# Patient Record
Sex: FEMALE | Race: WHITE | Employment: OTHER | URBAN - METROPOLITAN AREA
[De-identification: names, ages, dates, MRNs, and addresses within clinical notes are randomized per-mention and may not be internally consistent; named-entity substitution may affect disease eponyms.]

---

## 2017-01-23 ENCOUNTER — TELEPHONE (OUTPATIENT)
Dept: CARDIOLOGY CLINIC | Age: 82
End: 2017-01-23

## 2017-02-13 RX ORDER — CARVEDILOL 12.5 MG/1
TABLET ORAL
Qty: 60 TABLET | Refills: 2 | Status: SHIPPED | OUTPATIENT
Start: 2017-02-13 | End: 2017-03-23 | Stop reason: SDUPTHER

## 2017-02-27 LAB — HEMOGLOBIN: 14.4 G/DL (ref 12–16)

## 2017-03-22 ENCOUNTER — TELEPHONE (OUTPATIENT)
Dept: INTERNAL MEDICINE CLINIC | Age: 82
End: 2017-03-22

## 2017-03-23 ENCOUNTER — OFFICE VISIT (OUTPATIENT)
Dept: INTERNAL MEDICINE CLINIC | Age: 82
End: 2017-03-23

## 2017-03-23 VITALS
WEIGHT: 131.2 LBS | RESPIRATION RATE: 16 BRPM | HEART RATE: 72 BPM | SYSTOLIC BLOOD PRESSURE: 138 MMHG | BODY MASS INDEX: 24.79 KG/M2 | DIASTOLIC BLOOD PRESSURE: 92 MMHG

## 2017-03-23 DIAGNOSIS — R41.89 COGNITIVE IMPAIRMENT: Primary | ICD-10-CM

## 2017-03-23 DIAGNOSIS — J44.9 CHRONIC OBSTRUCTIVE PULMONARY DISEASE, UNSPECIFIED COPD TYPE (HCC): ICD-10-CM

## 2017-03-23 DIAGNOSIS — E78.2 MIXED HYPERLIPIDEMIA: ICD-10-CM

## 2017-03-23 DIAGNOSIS — N39.46 MIXED INCONTINENCE: ICD-10-CM

## 2017-03-23 PROCEDURE — G8926 SPIRO NO PERF OR DOC: HCPCS | Performed by: INTERNAL MEDICINE

## 2017-03-23 PROCEDURE — 1090F PRES/ABSN URINE INCON ASSESS: CPT | Performed by: INTERNAL MEDICINE

## 2017-03-23 PROCEDURE — G8484 FLU IMMUNIZE NO ADMIN: HCPCS | Performed by: INTERNAL MEDICINE

## 2017-03-23 PROCEDURE — G8420 CALC BMI NORM PARAMETERS: HCPCS | Performed by: INTERNAL MEDICINE

## 2017-03-23 PROCEDURE — G8599 NO ASA/ANTIPLAT THER USE RNG: HCPCS | Performed by: INTERNAL MEDICINE

## 2017-03-23 PROCEDURE — G8427 DOCREV CUR MEDS BY ELIG CLIN: HCPCS | Performed by: INTERNAL MEDICINE

## 2017-03-23 PROCEDURE — 1036F TOBACCO NON-USER: CPT | Performed by: INTERNAL MEDICINE

## 2017-03-23 PROCEDURE — 0509F URINE INCON PLAN DOCD: CPT | Performed by: INTERNAL MEDICINE

## 2017-03-23 PROCEDURE — 3023F SPIROM DOC REV: CPT | Performed by: INTERNAL MEDICINE

## 2017-03-23 PROCEDURE — 4040F PNEUMOC VAC/ADMIN/RCVD: CPT | Performed by: INTERNAL MEDICINE

## 2017-03-23 PROCEDURE — G8400 PT W/DXA NO RESULTS DOC: HCPCS | Performed by: INTERNAL MEDICINE

## 2017-03-23 PROCEDURE — 1123F ACP DISCUSS/DSCN MKR DOCD: CPT | Performed by: INTERNAL MEDICINE

## 2017-03-23 PROCEDURE — 99213 OFFICE O/P EST LOW 20 MIN: CPT | Performed by: INTERNAL MEDICINE

## 2017-05-01 RX ORDER — CARVEDILOL 12.5 MG/1
TABLET ORAL
Qty: 60 TABLET | Refills: 1 | Status: ON HOLD | OUTPATIENT
Start: 2017-05-01 | End: 2017-07-06 | Stop reason: HOSPADM

## 2017-05-04 ENCOUNTER — TELEPHONE (OUTPATIENT)
Dept: INTERNAL MEDICINE CLINIC | Age: 82
End: 2017-05-04

## 2017-06-01 ENCOUNTER — OFFICE VISIT (OUTPATIENT)
Dept: CARDIOLOGY CLINIC | Age: 82
End: 2017-06-01

## 2017-06-01 VITALS
BODY MASS INDEX: 24.55 KG/M2 | WEIGHT: 130 LBS | HEART RATE: 64 BPM | SYSTOLIC BLOOD PRESSURE: 122 MMHG | DIASTOLIC BLOOD PRESSURE: 76 MMHG | HEIGHT: 61 IN

## 2017-06-01 DIAGNOSIS — R06.02 SHORTNESS OF BREATH: Primary | ICD-10-CM

## 2017-06-01 PROCEDURE — G8400 PT W/DXA NO RESULTS DOC: HCPCS | Performed by: INTERNAL MEDICINE

## 2017-06-01 PROCEDURE — G8599 NO ASA/ANTIPLAT THER USE RNG: HCPCS | Performed by: INTERNAL MEDICINE

## 2017-06-01 PROCEDURE — G8420 CALC BMI NORM PARAMETERS: HCPCS | Performed by: INTERNAL MEDICINE

## 2017-06-01 PROCEDURE — 93000 ELECTROCARDIOGRAM COMPLETE: CPT | Performed by: INTERNAL MEDICINE

## 2017-06-01 PROCEDURE — G8427 DOCREV CUR MEDS BY ELIG CLIN: HCPCS | Performed by: INTERNAL MEDICINE

## 2017-06-01 PROCEDURE — 4040F PNEUMOC VAC/ADMIN/RCVD: CPT | Performed by: INTERNAL MEDICINE

## 2017-06-01 PROCEDURE — 99214 OFFICE O/P EST MOD 30 MIN: CPT | Performed by: INTERNAL MEDICINE

## 2017-06-01 PROCEDURE — 1123F ACP DISCUSS/DSCN MKR DOCD: CPT | Performed by: INTERNAL MEDICINE

## 2017-06-01 PROCEDURE — 1036F TOBACCO NON-USER: CPT | Performed by: INTERNAL MEDICINE

## 2017-06-01 PROCEDURE — 1090F PRES/ABSN URINE INCON ASSESS: CPT | Performed by: INTERNAL MEDICINE

## 2017-06-01 RX ORDER — ROSUVASTATIN CALCIUM 5 MG/1
5 TABLET, COATED ORAL DAILY
Qty: 30 TABLET | Refills: 3 | Status: SHIPPED | OUTPATIENT
Start: 2017-06-01 | End: 2017-10-31 | Stop reason: DRUGHIGH

## 2017-06-05 ENCOUNTER — HOSPITAL ENCOUNTER (OUTPATIENT)
Dept: GENERAL RADIOLOGY | Age: 82
Discharge: HOME | End: 2017-06-05
Attending: INTERNAL MEDICINE | Admitting: INTERNAL MEDICINE

## 2017-06-05 DIAGNOSIS — Z01.818 PRE-OP EXAMINATION: ICD-10-CM

## 2017-06-05 LAB
ANION GAP SERPL CALCULATED.3IONS-SCNC: 12 MMOL/L (ref 4–16)
APTT: 29.1 SECONDS (ref 21.2–33)
BUN BLDV-MCNC: 16 MG/DL (ref 6–23)
CALCIUM SERPL-MCNC: 9.6 MG/DL (ref 8.3–10.6)
CHLORIDE BLD-SCNC: 100 MMOL/L (ref 99–110)
CO2: 31 MMOL/L (ref 21–32)
CREAT SERPL-MCNC: 0.7 MG/DL (ref 0.6–1.1)
GFR AFRICAN AMERICAN: >60 ML/MIN/1.73M2
GFR NON-AFRICAN AMERICAN: >60 ML/MIN/1.73M2
GLUCOSE BLD-MCNC: 183 MG/DL (ref 70–140)
HCT VFR BLD CALC: 43.6 % (ref 37–47)
HEMOGLOBIN: 14.4 GM/DL (ref 12.5–16)
INR BLD: 0.95 INDEX
MCH RBC QN AUTO: 33.5 PG (ref 27–31)
MCHC RBC AUTO-ENTMCNC: 33 % (ref 32–36)
MCV RBC AUTO: 101.4 FL (ref 78–100)
PDW BLD-RTO: 12.7 % (ref 11.7–14.9)
PLATELET # BLD: 312 K/CU MM (ref 140–440)
PMV BLD AUTO: 8.7 FL (ref 7.5–11.1)
POTASSIUM SERPL-SCNC: 4.3 MMOL/L (ref 3.5–5.1)
PROTHROMBIN TIME: 10.8 SECONDS
RBC # BLD: 4.3 M/CU MM (ref 4.2–5.4)
SODIUM BLD-SCNC: 143 MMOL/L (ref 135–145)
WBC # BLD: 5.9 K/CU MM (ref 4–10.5)

## 2017-06-12 PROBLEM — Z95.1 S/P CABG (CORONARY ARTERY BYPASS GRAFT): Status: ACTIVE | Noted: 2017-06-01

## 2017-06-15 ENCOUNTER — TELEPHONE (OUTPATIENT)
Dept: CARDIOLOGY CLINIC | Age: 82
End: 2017-06-15

## 2017-07-01 ENCOUNTER — HOSPITAL ENCOUNTER (OUTPATIENT)
Dept: OTHER | Age: 82
Discharge: HOME | End: 2017-07-01
Attending: INTERNAL MEDICINE | Admitting: INTERNAL MEDICINE

## 2017-07-07 LAB
ALBUMIN SERPL-MCNC: 3.6 GM/DL (ref 3.4–5)
ALP BLD-CCNC: 76 IU/L (ref 40–128)
ALT SERPL-CCNC: 13 U/L (ref 10–40)
ANION GAP SERPL CALCULATED.3IONS-SCNC: 18 MMOL/L (ref 4–16)
AST SERPL-CCNC: 20 IU/L (ref 15–37)
BASOPHILS ABSOLUTE: 0 K/CU MM
BASOPHILS RELATIVE PERCENT: 0.5 % (ref 0–1)
BILIRUB SERPL-MCNC: 0.5 MG/DL (ref 0–1)
BUN BLDV-MCNC: 13 MG/DL (ref 6–23)
CALCIUM SERPL-MCNC: 8.7 MG/DL (ref 8.3–10.6)
CHLORIDE BLD-SCNC: 101 MMOL/L (ref 99–110)
CO2: 27 MMOL/L (ref 21–32)
CREAT SERPL-MCNC: 0.6 MG/DL (ref 0.6–1.1)
DIFFERENTIAL TYPE: ABNORMAL
EOSINOPHILS ABSOLUTE: 0.1 K/CU MM
EOSINOPHILS RELATIVE PERCENT: 1.4 % (ref 0–3)
GFR AFRICAN AMERICAN: >60 ML/MIN/1.73M2
GFR NON-AFRICAN AMERICAN: >60 ML/MIN/1.73M2
GLUCOSE BLD-MCNC: 100 MG/DL (ref 70–140)
HCT VFR BLD CALC: 33.7 % (ref 37–47)
HEMOGLOBIN: 10.1 GM/DL (ref 12.5–16)
IMMATURE NEUTROPHIL %: 0.8 % (ref 0–0.43)
LYMPHOCYTES ABSOLUTE: 2.1 K/CU MM
LYMPHOCYTES RELATIVE PERCENT: 23.7 % (ref 24–44)
MCH RBC QN AUTO: 32.8 PG (ref 27–31)
MCHC RBC AUTO-ENTMCNC: 30 % (ref 32–36)
MCV RBC AUTO: 109.4 FL (ref 78–100)
MONOCYTES ABSOLUTE: 0.9 K/CU MM
MONOCYTES RELATIVE PERCENT: 10.2 % (ref 0–4)
NUCLEATED RBC %: 0 %
PDW BLD-RTO: 17 % (ref 11.7–14.9)
PLATELET # BLD: 573 K/CU MM (ref 140–440)
PMV BLD AUTO: 9.2 FL (ref 7.5–11.1)
POTASSIUM SERPL-SCNC: 3.7 MMOL/L (ref 3.5–5.1)
RBC # BLD: 3.08 M/CU MM (ref 4.2–5.4)
SEGMENTED NEUTROPHILS ABSOLUTE COUNT: 5.5 K/CU MM
SEGMENTED NEUTROPHILS RELATIVE PERCENT: 63.4 % (ref 36–66)
SODIUM BLD-SCNC: 146 MMOL/L (ref 135–145)
TOTAL IMMATURE NEUTOROPHIL: 0.07 K/CU MM
TOTAL NUCLEATED RBC: 0 K/CU MM
TOTAL PROTEIN: 6.5 GM/DL (ref 6.4–8.2)
TSH HIGH SENSITIVITY: 1.77 UIU/ML (ref 0.27–4.2)
WBC # BLD: 8.7 K/CU MM (ref 4–10.5)

## 2017-07-14 LAB
ANION GAP SERPL CALCULATED.3IONS-SCNC: 14 MMOL/L (ref 4–16)
BUN BLDV-MCNC: 15 MG/DL (ref 6–23)
CALCIUM SERPL-MCNC: 8.7 MG/DL (ref 8.3–10.6)
CHLORIDE BLD-SCNC: 97 MMOL/L (ref 99–110)
CO2: 32 MMOL/L (ref 21–32)
CREAT SERPL-MCNC: 0.6 MG/DL (ref 0.6–1.1)
GFR AFRICAN AMERICAN: >60 ML/MIN/1.73M2
GFR NON-AFRICAN AMERICAN: >60 ML/MIN/1.73M2
GLUCOSE BLD-MCNC: 95 MG/DL (ref 70–140)
POTASSIUM SERPL-SCNC: 3.9 MMOL/L (ref 3.5–5.1)
SODIUM BLD-SCNC: 143 MMOL/L (ref 135–145)

## 2017-07-24 ENCOUNTER — TELEPHONE (OUTPATIENT)
Dept: INTERNAL MEDICINE CLINIC | Age: 82
End: 2017-07-24

## 2017-07-24 ENCOUNTER — OFFICE VISIT (OUTPATIENT)
Dept: CARDIOLOGY CLINIC | Age: 82
End: 2017-07-24

## 2017-07-24 VITALS
HEART RATE: 80 BPM | WEIGHT: 121 LBS | BODY MASS INDEX: 22.26 KG/M2 | SYSTOLIC BLOOD PRESSURE: 116 MMHG | DIASTOLIC BLOOD PRESSURE: 70 MMHG | HEIGHT: 62 IN

## 2017-07-24 DIAGNOSIS — I25.10 CORONARY ARTERY DISEASE INVOLVING NATIVE CORONARY ARTERY OF NATIVE HEART WITHOUT ANGINA PECTORIS: Primary | ICD-10-CM

## 2017-07-24 PROCEDURE — 1036F TOBACCO NON-USER: CPT | Performed by: INTERNAL MEDICINE

## 2017-07-24 PROCEDURE — G8400 PT W/DXA NO RESULTS DOC: HCPCS | Performed by: INTERNAL MEDICINE

## 2017-07-24 PROCEDURE — G8598 ASA/ANTIPLAT THER USED: HCPCS | Performed by: INTERNAL MEDICINE

## 2017-07-24 PROCEDURE — 1111F DSCHRG MED/CURRENT MED MERGE: CPT | Performed by: INTERNAL MEDICINE

## 2017-07-24 PROCEDURE — 1123F ACP DISCUSS/DSCN MKR DOCD: CPT | Performed by: INTERNAL MEDICINE

## 2017-07-24 PROCEDURE — G8420 CALC BMI NORM PARAMETERS: HCPCS | Performed by: INTERNAL MEDICINE

## 2017-07-24 PROCEDURE — 4040F PNEUMOC VAC/ADMIN/RCVD: CPT | Performed by: INTERNAL MEDICINE

## 2017-07-24 PROCEDURE — G8428 CUR MEDS NOT DOCUMENT: HCPCS | Performed by: INTERNAL MEDICINE

## 2017-07-24 PROCEDURE — 99214 OFFICE O/P EST MOD 30 MIN: CPT | Performed by: INTERNAL MEDICINE

## 2017-07-24 PROCEDURE — 1090F PRES/ABSN URINE INCON ASSESS: CPT | Performed by: INTERNAL MEDICINE

## 2017-07-31 ENCOUNTER — OFFICE VISIT (OUTPATIENT)
Dept: INTERNAL MEDICINE CLINIC | Age: 82
End: 2017-07-31

## 2017-07-31 VITALS
WEIGHT: 118 LBS | SYSTOLIC BLOOD PRESSURE: 110 MMHG | RESPIRATION RATE: 16 BRPM | BODY MASS INDEX: 22.3 KG/M2 | DIASTOLIC BLOOD PRESSURE: 70 MMHG | HEART RATE: 85 BPM | OXYGEN SATURATION: 85 %

## 2017-07-31 DIAGNOSIS — D50.9 IRON DEFICIENCY ANEMIA, UNSPECIFIED IRON DEFICIENCY ANEMIA TYPE: Primary | ICD-10-CM

## 2017-07-31 DIAGNOSIS — I25.10 CORONARY ARTERY DISEASE DUE TO LIPID RICH PLAQUE: ICD-10-CM

## 2017-07-31 DIAGNOSIS — I25.83 CORONARY ARTERY DISEASE DUE TO LIPID RICH PLAQUE: ICD-10-CM

## 2017-07-31 DIAGNOSIS — R73.09 ELEVATED GLUCOSE: ICD-10-CM

## 2017-07-31 DIAGNOSIS — Z95.1 S/P CABG (CORONARY ARTERY BYPASS GRAFT): ICD-10-CM

## 2017-07-31 DIAGNOSIS — I77.1 SUBCLAVIAN ARTERY STENOSIS, LEFT (HCC): ICD-10-CM

## 2017-07-31 PROCEDURE — G8427 DOCREV CUR MEDS BY ELIG CLIN: HCPCS | Performed by: INTERNAL MEDICINE

## 2017-07-31 PROCEDURE — 4040F PNEUMOC VAC/ADMIN/RCVD: CPT | Performed by: INTERNAL MEDICINE

## 2017-07-31 PROCEDURE — 1036F TOBACCO NON-USER: CPT | Performed by: INTERNAL MEDICINE

## 2017-07-31 PROCEDURE — 1090F PRES/ABSN URINE INCON ASSESS: CPT | Performed by: INTERNAL MEDICINE

## 2017-07-31 PROCEDURE — G8420 CALC BMI NORM PARAMETERS: HCPCS | Performed by: INTERNAL MEDICINE

## 2017-07-31 PROCEDURE — G8400 PT W/DXA NO RESULTS DOC: HCPCS | Performed by: INTERNAL MEDICINE

## 2017-07-31 PROCEDURE — 99215 OFFICE O/P EST HI 40 MIN: CPT | Performed by: INTERNAL MEDICINE

## 2017-07-31 PROCEDURE — 1123F ACP DISCUSS/DSCN MKR DOCD: CPT | Performed by: INTERNAL MEDICINE

## 2017-07-31 PROCEDURE — G8598 ASA/ANTIPLAT THER USED: HCPCS | Performed by: INTERNAL MEDICINE

## 2017-07-31 PROCEDURE — 1111F DSCHRG MED/CURRENT MED MERGE: CPT | Performed by: INTERNAL MEDICINE

## 2017-07-31 RX ORDER — IPRATROPIUM BROMIDE AND ALBUTEROL SULFATE 2.5; .5 MG/3ML; MG/3ML
1 SOLUTION RESPIRATORY (INHALATION) 3 TIMES DAILY
COMMUNITY
End: 2017-10-31 | Stop reason: SDUPTHER

## 2017-08-01 ENCOUNTER — HOSPITAL ENCOUNTER (OUTPATIENT)
Dept: OTHER | Age: 82
Discharge: HOME | End: 2017-08-01
Attending: INTERNAL MEDICINE | Admitting: INTERNAL MEDICINE

## 2017-08-01 ASSESSMENT — ENCOUNTER SYMPTOMS
SORE THROAT: 0
BLURRED VISION: 0
VOMITING: 0
SPUTUM PRODUCTION: 0
DOUBLE VISION: 0
ORTHOPNEA: 0
ABDOMINAL PAIN: 0
HEARTBURN: 0
COUGH: 0
SHORTNESS OF BREATH: 1
BACK PAIN: 0
NAUSEA: 0
PHOTOPHOBIA: 0
DIARRHEA: 0

## 2017-08-07 LAB
BASOPHILS ABSOLUTE: 0 K/CU MM
BASOPHILS RELATIVE PERCENT: 0.3 % (ref 0–1)
DIFFERENTIAL TYPE: ABNORMAL
EOSINOPHILS ABSOLUTE: 0.3 K/CU MM
EOSINOPHILS RELATIVE PERCENT: 4.2 % (ref 0–3)
ESTIMATED AVERAGE GLUCOSE: 100 MG/DL
HBA1C MFR BLD: 5.1 % (ref 4.2–6.3)
HCT VFR BLD CALC: 37 % (ref 37–47)
HEMOGLOBIN: 11.7 GM/DL (ref 12.5–16)
IMMATURE NEUTROPHIL %: 0.3 % (ref 0–0.43)
LYMPHOCYTES ABSOLUTE: 2.2 K/CU MM
LYMPHOCYTES RELATIVE PERCENT: 29.5 % (ref 24–44)
MCH RBC QN AUTO: 31.7 PG (ref 27–31)
MCHC RBC AUTO-ENTMCNC: 31.6 % (ref 32–36)
MCV RBC AUTO: 100.3 FL (ref 78–100)
MONOCYTES ABSOLUTE: 0.5 K/CU MM
MONOCYTES RELATIVE PERCENT: 6.1 % (ref 0–4)
NUCLEATED RBC %: 0 %
PDW BLD-RTO: 14 % (ref 11.7–14.9)
PLATELET # BLD: 366 K/CU MM (ref 140–440)
PMV BLD AUTO: 9 FL (ref 7.5–11.1)
RBC # BLD: 3.69 M/CU MM (ref 4.2–5.4)
SEGMENTED NEUTROPHILS ABSOLUTE COUNT: 4.4 K/CU MM
SEGMENTED NEUTROPHILS RELATIVE PERCENT: 59.6 % (ref 36–66)
TOTAL IMMATURE NEUTOROPHIL: 0.02 K/CU MM
TOTAL NUCLEATED RBC: 0 K/CU MM
WBC # BLD: 7.4 K/CU MM (ref 4–10.5)

## 2017-08-11 LAB
FERRITIN: 77 NG/ML (ref 15–150)
FOLATE: 12.8 NG/ML (ref 3.1–17.5)
HCT VFR BLD CALC: 37.3 % (ref 37–47)
HEMOGLOBIN: 11.5 GM/DL (ref 12.5–16)
IRON: 48 UG/DL (ref 37–145)
PCT TRANSFERRIN: 18 % (ref 10–44)
TOTAL IRON BINDING CAPACITY: 264 UG/DL (ref 250–450)
UNSATURATED IRON BINDING CAPACITY: 216 UG/DL (ref 110–370)
VITAMIN B-12: 267.2 PG/ML (ref 211–911)

## 2017-08-21 LAB
ALBUMIN SERPL-MCNC: 4.3 GM/DL (ref 3.4–5)
ALP BLD-CCNC: 57 IU/L (ref 40–128)
ALT SERPL-CCNC: 22 U/L (ref 10–40)
ANION GAP SERPL CALCULATED.3IONS-SCNC: 14 MMOL/L (ref 4–16)
AST SERPL-CCNC: 22 IU/L (ref 15–37)
BILIRUB SERPL-MCNC: 0.4 MG/DL (ref 0–1)
BUN BLDV-MCNC: 15 MG/DL (ref 6–23)
CALCIUM SERPL-MCNC: 9.5 MG/DL (ref 8.3–10.6)
CHLORIDE BLD-SCNC: 96 MMOL/L (ref 99–110)
CHOLESTEROL: 210 MG/DL
CO2: 28 MMOL/L (ref 21–32)
CREAT SERPL-MCNC: 0.6 MG/DL (ref 0.6–1.1)
ESTIMATED AVERAGE GLUCOSE: 111 MG/DL
GFR AFRICAN AMERICAN: >60 ML/MIN/1.73M2
GFR NON-AFRICAN AMERICAN: >60 ML/MIN/1.73M2
GLUCOSE BLD-MCNC: 91 MG/DL (ref 70–140)
HBA1C MFR BLD: 5.5 % (ref 4.2–6.3)
HDLC SERPL-MCNC: 85 MG/DL
LDL CHOLESTEROL DIRECT: 108 MG/DL
POTASSIUM SERPL-SCNC: 4.8 MMOL/L (ref 3.5–5.1)
SODIUM BLD-SCNC: 138 MMOL/L (ref 135–145)
T3 FREE: 2.1 PG/ML (ref 2.3–4.2)
T4 FREE: 1.31 NG/DL (ref 0.9–1.8)
TOTAL PROTEIN: 7.3 GM/DL (ref 6.4–8.2)
TRIGL SERPL-MCNC: 152 MG/DL

## 2017-08-31 ENCOUNTER — TELEPHONE (OUTPATIENT)
Dept: INTERNAL MEDICINE CLINIC | Age: 82
End: 2017-08-31

## 2017-09-07 ENCOUNTER — TELEPHONE (OUTPATIENT)
Dept: INTERNAL MEDICINE CLINIC | Age: 82
End: 2017-09-07

## 2017-09-11 ENCOUNTER — HOSPITAL ENCOUNTER (OUTPATIENT)
Dept: GENERAL RADIOLOGY | Age: 82
Discharge: HOME | End: 2017-09-11
Attending: INTERNAL MEDICINE | Admitting: INTERNAL MEDICINE

## 2017-09-11 ENCOUNTER — OFFICE VISIT (OUTPATIENT)
Dept: INTERNAL MEDICINE CLINIC | Age: 82
End: 2017-09-11

## 2017-09-11 VITALS
RESPIRATION RATE: 14 BRPM | OXYGEN SATURATION: 82 % | WEIGHT: 121.4 LBS | HEART RATE: 39 BPM | DIASTOLIC BLOOD PRESSURE: 60 MMHG | SYSTOLIC BLOOD PRESSURE: 102 MMHG | BODY MASS INDEX: 22.94 KG/M2

## 2017-09-11 DIAGNOSIS — F32.4 MAJOR DEPRESSIVE DISORDER WITH SINGLE EPISODE, IN PARTIAL REMISSION (HCC): ICD-10-CM

## 2017-09-11 DIAGNOSIS — J90 RECURRENT RIGHT PLEURAL EFFUSION: ICD-10-CM

## 2017-09-11 DIAGNOSIS — R06.09 DYSPNEA ON EXERTION: ICD-10-CM

## 2017-09-11 DIAGNOSIS — R09.02 HYPOXEMIA: ICD-10-CM

## 2017-09-11 DIAGNOSIS — R06.09 DYSPNEA ON EXERTION: Primary | ICD-10-CM

## 2017-09-11 PROCEDURE — 4040F PNEUMOC VAC/ADMIN/RCVD: CPT | Performed by: INTERNAL MEDICINE

## 2017-09-11 PROCEDURE — G8420 CALC BMI NORM PARAMETERS: HCPCS | Performed by: INTERNAL MEDICINE

## 2017-09-11 PROCEDURE — G8427 DOCREV CUR MEDS BY ELIG CLIN: HCPCS | Performed by: INTERNAL MEDICINE

## 2017-09-11 PROCEDURE — 1123F ACP DISCUSS/DSCN MKR DOCD: CPT | Performed by: INTERNAL MEDICINE

## 2017-09-11 PROCEDURE — G8598 ASA/ANTIPLAT THER USED: HCPCS | Performed by: INTERNAL MEDICINE

## 2017-09-11 PROCEDURE — 99214 OFFICE O/P EST MOD 30 MIN: CPT | Performed by: INTERNAL MEDICINE

## 2017-09-11 PROCEDURE — G8400 PT W/DXA NO RESULTS DOC: HCPCS | Performed by: INTERNAL MEDICINE

## 2017-09-11 PROCEDURE — 1090F PRES/ABSN URINE INCON ASSESS: CPT | Performed by: INTERNAL MEDICINE

## 2017-09-11 PROCEDURE — 1036F TOBACCO NON-USER: CPT | Performed by: INTERNAL MEDICINE

## 2017-09-11 RX ORDER — BUDESONIDE AND FORMOTEROL FUMARATE DIHYDRATE 160; 4.5 UG/1; UG/1
2 AEROSOL RESPIRATORY (INHALATION) 2 TIMES DAILY
Qty: 3 INHALER | Refills: 2 | Status: SHIPPED | OUTPATIENT
Start: 2017-09-11 | End: 2018-12-21 | Stop reason: SDUPTHER

## 2017-09-18 ENCOUNTER — HOSPITAL ENCOUNTER (OUTPATIENT)
Dept: CARDIAC REHAB | Age: 82
Discharge: HOME | End: 2017-09-18
Attending: INTERNAL MEDICINE | Admitting: INTERNAL MEDICINE

## 2017-09-21 ENCOUNTER — TELEPHONE (OUTPATIENT)
Dept: INTERNAL MEDICINE CLINIC | Age: 82
End: 2017-09-21

## 2017-09-25 PROBLEM — J18.9 PNEUMONIA: Status: ACTIVE | Noted: 2017-09-25

## 2017-09-29 ENCOUNTER — TELEPHONE (OUTPATIENT)
Dept: INTERNAL MEDICINE CLINIC | Age: 82
End: 2017-09-29

## 2017-10-01 ENCOUNTER — HOSPITAL ENCOUNTER (OUTPATIENT)
Dept: OTHER | Age: 82
Discharge: HOME | End: 2017-10-01
Attending: INTERNAL MEDICINE | Admitting: INTERNAL MEDICINE

## 2017-10-10 LAB
BACTERIA: ABNORMAL /HPF
BILIRUBIN URINE: NEGATIVE MG/DL
BLOOD, URINE: ABNORMAL
CALCIUM OXALATE CRYSTALS: ABNORMAL /HPF
CLARITY: ABNORMAL
COLOR: YELLOW
CULTURE: NORMAL
GLUCOSE, URINE: NEGATIVE MG/DL
KETONES, URINE: NEGATIVE MG/DL
LEUKOCYTE ESTERASE, URINE: ABNORMAL
MUCUS: ABNORMAL HPF
NITRITE URINE, QUANTITATIVE: NEGATIVE
PH, URINE: 5 (ref 5–8)
PROTEIN UA: NEGATIVE MG/DL
RBC URINE: ABNORMAL /HPF (ref 0–6)
REPORT STATUS: NORMAL
REQUEST PROBLEM: NORMAL
SPECIFIC GRAVITY UA: 1.02 (ref 1–1.03)
SPECIMEN: NORMAL
SQUAMOUS EPITHELIAL: 21 /HPF
TOTAL COLONY COUNT: NORMAL
TRICHOMONAS: ABNORMAL /HPF
UROBILINOGEN, URINE: NORMAL MG/DL (ref 0.2–1)
WBC UA: 37 /HPF (ref 0–5)
YEAST: ABNORMAL /HPF

## 2017-10-13 LAB
ANION GAP SERPL CALCULATED.3IONS-SCNC: 14 MMOL/L (ref 4–16)
BASOPHILS ABSOLUTE: 0 K/CU MM
BASOPHILS RELATIVE PERCENT: 0.3 % (ref 0–1)
BUN BLDV-MCNC: 9 MG/DL (ref 6–23)
CALCIUM SERPL-MCNC: 8.9 MG/DL (ref 8.3–10.6)
CHLORIDE BLD-SCNC: 104 MMOL/L (ref 99–110)
CO2: 26 MMOL/L (ref 21–32)
CREAT SERPL-MCNC: 0.6 MG/DL (ref 0.6–1.1)
DIFFERENTIAL TYPE: ABNORMAL
EOSINOPHILS ABSOLUTE: 0.3 K/CU MM
EOSINOPHILS RELATIVE PERCENT: 2.5 % (ref 0–3)
GFR AFRICAN AMERICAN: >60 ML/MIN/1.73M2
GFR NON-AFRICAN AMERICAN: >60 ML/MIN/1.73M2
GLUCOSE BLD-MCNC: 110 MG/DL (ref 70–140)
HCT VFR BLD CALC: 42.1 % (ref 37–47)
HEMOGLOBIN: 12.9 GM/DL (ref 12.5–16)
IMMATURE NEUTROPHIL %: 0.7 % (ref 0–0.43)
LYMPHOCYTES ABSOLUTE: 2.1 K/CU MM
LYMPHOCYTES RELATIVE PERCENT: 19.9 % (ref 24–44)
MCH RBC QN AUTO: 29.5 PG (ref 27–31)
MCHC RBC AUTO-ENTMCNC: 30.6 % (ref 32–36)
MCV RBC AUTO: 96.1 FL (ref 78–100)
MONOCYTES ABSOLUTE: 0.7 K/CU MM
MONOCYTES RELATIVE PERCENT: 6.7 % (ref 0–4)
NUCLEATED RBC %: 0 %
PDW BLD-RTO: 15.9 % (ref 11.7–14.9)
PLATELET # BLD: 343 K/CU MM (ref 140–440)
PMV BLD AUTO: 9.2 FL (ref 7.5–11.1)
POTASSIUM SERPL-SCNC: 4.1 MMOL/L (ref 3.5–5.1)
RBC # BLD: 4.38 M/CU MM (ref 4.2–5.4)
SEGMENTED NEUTROPHILS ABSOLUTE COUNT: 7.2 K/CU MM
SEGMENTED NEUTROPHILS RELATIVE PERCENT: 69.9 % (ref 36–66)
SODIUM BLD-SCNC: 144 MMOL/L (ref 135–145)
TOTAL IMMATURE NEUTOROPHIL: 0.07 K/CU MM
TOTAL NUCLEATED RBC: 0 K/CU MM
WBC # BLD: 10.3 K/CU MM (ref 4–10.5)

## 2017-10-19 ENCOUNTER — CARE COORDINATION (OUTPATIENT)
Dept: CASE MANAGEMENT | Age: 82
End: 2017-10-19

## 2017-10-19 NOTE — CARE COORDINATION
Return call regarding patient updates. Kenney Ayala from therapy reports patients progress depends upon fatigue level. Bed mobility mod I, transfers CGA, ambulation CGA, distance 5-10 ft at a time with multiple rest breaks using a rolling walker. ADL's grooming set up/supervision, supervision for upper/lower body, toileting and bathing. Last cover day 10/25/17 discharge home with  and possibly [facility]. Will continue to monitor.  ANNE MARIE StoryN RN [address] [place] [address] Transition Coordinator  [phone]

## 2017-10-19 NOTE — CARE COORDINATION
Received a VM from rehab director Nisha Silva regarding patient updates, left contact information. Will return call today.  ANNE MARIE HelmsN RN  Care Transition Coordinator  [phone]

## 2017-10-31 ENCOUNTER — OFFICE VISIT (OUTPATIENT)
Dept: INTERNAL MEDICINE CLINIC | Age: 82
End: 2017-10-31

## 2017-10-31 ENCOUNTER — HOSPITAL ENCOUNTER (OUTPATIENT)
Dept: GENERAL RADIOLOGY | Age: 82
Discharge: HOME | End: 2017-10-31
Attending: INTERNAL MEDICINE | Admitting: INTERNAL MEDICINE

## 2017-10-31 VITALS
DIASTOLIC BLOOD PRESSURE: 78 MMHG | WEIGHT: 122.2 LBS | SYSTOLIC BLOOD PRESSURE: 110 MMHG | RESPIRATION RATE: 16 BRPM | BODY MASS INDEX: 23.09 KG/M2 | HEART RATE: 64 BPM

## 2017-10-31 DIAGNOSIS — J44.9 CHRONIC OBSTRUCTIVE PULMONARY DISEASE, UNSPECIFIED COPD TYPE (HCC): Primary | ICD-10-CM

## 2017-10-31 DIAGNOSIS — J90 PLEURAL EFFUSION: ICD-10-CM

## 2017-10-31 DIAGNOSIS — R41.89 COGNITIVE IMPAIRMENT: ICD-10-CM

## 2017-10-31 DIAGNOSIS — Z23 NEED FOR INFLUENZA VACCINATION: ICD-10-CM

## 2017-10-31 DIAGNOSIS — F33.41 RECURRENT MAJOR DEPRESSIVE DISORDER, IN PARTIAL REMISSION (HCC): ICD-10-CM

## 2017-10-31 PROCEDURE — G8598 ASA/ANTIPLAT THER USED: HCPCS | Performed by: INTERNAL MEDICINE

## 2017-10-31 PROCEDURE — 3023F SPIROM DOC REV: CPT | Performed by: INTERNAL MEDICINE

## 2017-10-31 PROCEDURE — G0008 ADMIN INFLUENZA VIRUS VAC: HCPCS | Performed by: INTERNAL MEDICINE

## 2017-10-31 PROCEDURE — 4040F PNEUMOC VAC/ADMIN/RCVD: CPT | Performed by: INTERNAL MEDICINE

## 2017-10-31 PROCEDURE — 1036F TOBACCO NON-USER: CPT | Performed by: INTERNAL MEDICINE

## 2017-10-31 PROCEDURE — G8427 DOCREV CUR MEDS BY ELIG CLIN: HCPCS | Performed by: INTERNAL MEDICINE

## 2017-10-31 PROCEDURE — 90662 IIV NO PRSV INCREASED AG IM: CPT | Performed by: INTERNAL MEDICINE

## 2017-10-31 PROCEDURE — G8926 SPIRO NO PERF OR DOC: HCPCS | Performed by: INTERNAL MEDICINE

## 2017-10-31 PROCEDURE — 99215 OFFICE O/P EST HI 40 MIN: CPT | Performed by: INTERNAL MEDICINE

## 2017-10-31 PROCEDURE — G8400 PT W/DXA NO RESULTS DOC: HCPCS | Performed by: INTERNAL MEDICINE

## 2017-10-31 PROCEDURE — 1090F PRES/ABSN URINE INCON ASSESS: CPT | Performed by: INTERNAL MEDICINE

## 2017-10-31 PROCEDURE — G8420 CALC BMI NORM PARAMETERS: HCPCS | Performed by: INTERNAL MEDICINE

## 2017-10-31 PROCEDURE — G8484 FLU IMMUNIZE NO ADMIN: HCPCS | Performed by: INTERNAL MEDICINE

## 2017-10-31 PROCEDURE — 1123F ACP DISCUSS/DSCN MKR DOCD: CPT | Performed by: INTERNAL MEDICINE

## 2017-10-31 RX ORDER — DULOXETIN HYDROCHLORIDE 60 MG/1
60 CAPSULE, DELAYED RELEASE ORAL DAILY
Qty: 30 CAPSULE | Refills: 4 | Status: SHIPPED | OUTPATIENT
Start: 2017-10-31 | End: 2018-07-02 | Stop reason: SDUPTHER

## 2017-10-31 RX ORDER — CARVEDILOL 12.5 MG/1
12.5 TABLET ORAL 2 TIMES DAILY
Qty: 60 TABLET | Refills: 2 | Status: SHIPPED | OUTPATIENT
Start: 2017-10-31 | End: 2018-03-12 | Stop reason: SDUPTHER

## 2017-10-31 RX ORDER — IPRATROPIUM BROMIDE AND ALBUTEROL SULFATE 2.5; .5 MG/3ML; MG/3ML
1 SOLUTION RESPIRATORY (INHALATION) EVERY 6 HOURS PRN
Qty: 360 ML | Refills: 3 | Status: ON HOLD | OUTPATIENT
Start: 2017-10-31 | End: 2018-09-11 | Stop reason: HOSPADM

## 2017-10-31 RX ORDER — ROSUVASTATIN CALCIUM 5 MG/1
5 TABLET, COATED ORAL DAILY
COMMUNITY
End: 2017-10-31 | Stop reason: SDUPTHER

## 2017-10-31 RX ORDER — ROSUVASTATIN CALCIUM 5 MG/1
5 TABLET, COATED ORAL NIGHTLY
Qty: 30 TABLET | Refills: 4 | Status: SHIPPED | OUTPATIENT
Start: 2017-10-31 | End: 2018-06-25 | Stop reason: SDUPTHER

## 2017-10-31 ASSESSMENT — ENCOUNTER SYMPTOMS
SHORTNESS OF BREATH: 0
BLURRED VISION: 0
DOUBLE VISION: 0
COUGH: 1
BACK PAIN: 1
ABDOMINAL PAIN: 0
DIARRHEA: 0
SORE THROAT: 0
HEARTBURN: 0
NAUSEA: 0
SPUTUM PRODUCTION: 0
VOMITING: 0

## 2017-10-31 NOTE — PROGRESS NOTES
carvedilol (COREG) 12.5 MG tablet Take 1 tablet by mouth 2 times daily 60 tablet 2    DULoxetine (CYMBALTA) 60 MG extended release capsule Take 1 capsule by mouth daily 30 capsule 4    ipratropium-albuterol (DUONEB) 0.5-2.5 (3) MG/3ML SOLN nebulizer solution Inhale 3 mLs into the lungs every 6 hours as needed for Shortness of Breath 360 mL 3    guaiFENesin (MUCINEX) 600 MG extended release tablet Take 1 tablet by mouth 2 times daily      budesonide-formoterol (SYMBICORT) 160-4.5 MCG/ACT AERO Inhale 2 puffs into the lungs 2 times daily 3 Inhaler 2    aspirin 81 MG chewable tablet Take 1 tablet by mouth daily 30 tablet 3     No current facility-administered medications for this visit. Past Medical History:   Diagnosis Date    Acute ischemic colitis ([facility] [address] 2008    Colonoscopy done    CAD (coronary artery disease) 06/2017    4 vessel CABG    Cerebrovascular event ([facility] [address] 02/2016    balance, speech, leg weakness; ARU    Chronic neck pain     Dr Lacey Scale 2/2011    Cognitive impairment 09/2011    COPD (chronic obstructive pulmonary disease) ([facility] [address] 3/2013    Moderately severe by PFTs    Depression     Diverticulitis 5/2012    Family history of colon cancer     Family history of diabetes mellitus     Fibromyalgia 1989    Graves disease 2012    Sandeep Clements; tapazole for a time    Hyperlipidemia     Hypertension     Lumbar spinal stenosis 11/2015    moderate ; MRI by Dr Kang  Nocturnal oxygen desaturation 2015    Obstructive sleep apnea     Osteoporosis     Prediabetes 2012    hgb 6.5    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 Sandeep Clements;  Tapazole    Urine incontinence 6/2011    Timed voiding; Kegel; urology      Past Surgical History:   Procedure Laterality Date    CARPAL

## 2017-11-06 ENCOUNTER — TELEPHONE (OUTPATIENT)
Dept: INTERNAL MEDICINE CLINIC | Age: 82
End: 2017-11-06

## 2017-11-06 PROCEDURE — G0179 MD RECERTIFICATION HHA PT: HCPCS | Performed by: INTERNAL MEDICINE

## 2017-11-09 ENCOUNTER — CARE COORDINATION (OUTPATIENT)
Dept: CASE MANAGEMENT | Age: 82
End: 2017-11-09

## 2017-11-09 NOTE — CARE COORDINATION
Called skilled nursing facility [facility] for a patient update. This nurse informed per therapy patient discharged to home with [facility] on 10/25/17. CC notified. Post acute transition coordinator signing off .  Joselito Beltran, ANNE MARIEN  [facility] Transition Coordinator  [phone]

## 2017-11-10 ENCOUNTER — TELEPHONE (OUTPATIENT)
Dept: INTERNAL MEDICINE CLINIC | Age: 82
End: 2017-11-10

## 2017-11-10 NOTE — TELEPHONE ENCOUNTER
[facility] called in to report that patient continues to be weak, no energy, today noting diminished bases RLL with a faint crackle.

## 2017-11-13 ENCOUNTER — OFFICE VISIT (OUTPATIENT)
Dept: INTERNAL MEDICINE CLINIC | Age: 82
End: 2017-11-13

## 2017-11-13 VITALS
BODY MASS INDEX: 23.66 KG/M2 | DIASTOLIC BLOOD PRESSURE: 80 MMHG | SYSTOLIC BLOOD PRESSURE: 112 MMHG | WEIGHT: 125.2 LBS | HEART RATE: 103 BPM | RESPIRATION RATE: 16 BRPM

## 2017-11-13 DIAGNOSIS — I10 ESSENTIAL HYPERTENSION: ICD-10-CM

## 2017-11-13 DIAGNOSIS — R53.83 FATIGUE, UNSPECIFIED TYPE: Primary | ICD-10-CM

## 2017-11-13 DIAGNOSIS — R06.09 DYSPNEA ON EXERTION: ICD-10-CM

## 2017-11-13 PROCEDURE — 1123F ACP DISCUSS/DSCN MKR DOCD: CPT | Performed by: INTERNAL MEDICINE

## 2017-11-13 PROCEDURE — G8400 PT W/DXA NO RESULTS DOC: HCPCS | Performed by: INTERNAL MEDICINE

## 2017-11-13 PROCEDURE — 99214 OFFICE O/P EST MOD 30 MIN: CPT | Performed by: INTERNAL MEDICINE

## 2017-11-13 PROCEDURE — G8484 FLU IMMUNIZE NO ADMIN: HCPCS | Performed by: INTERNAL MEDICINE

## 2017-11-13 PROCEDURE — G8427 DOCREV CUR MEDS BY ELIG CLIN: HCPCS | Performed by: INTERNAL MEDICINE

## 2017-11-13 PROCEDURE — 1090F PRES/ABSN URINE INCON ASSESS: CPT | Performed by: INTERNAL MEDICINE

## 2017-11-13 PROCEDURE — 1036F TOBACCO NON-USER: CPT | Performed by: INTERNAL MEDICINE

## 2017-11-13 PROCEDURE — 4040F PNEUMOC VAC/ADMIN/RCVD: CPT | Performed by: INTERNAL MEDICINE

## 2017-11-13 PROCEDURE — G8598 ASA/ANTIPLAT THER USED: HCPCS | Performed by: INTERNAL MEDICINE

## 2017-11-13 PROCEDURE — G8420 CALC BMI NORM PARAMETERS: HCPCS | Performed by: INTERNAL MEDICINE

## 2017-11-13 RX ORDER — THEOPHYLLINE ANHYDROUS 100 MG
100 TABLET, EXTENDED RELEASE 12 HR ORAL 2 TIMES DAILY
Qty: 60 TABLET | Refills: 3 | Status: SHIPPED | OUTPATIENT
Start: 2017-11-13 | End: 2017-11-13 | Stop reason: RX

## 2017-11-14 ENCOUNTER — HOSPITAL ENCOUNTER (OUTPATIENT)
Dept: GENERAL RADIOLOGY | Age: 82
Discharge: HOME | End: 2017-11-14
Attending: INTERNAL MEDICINE | Admitting: INTERNAL MEDICINE

## 2017-11-14 DIAGNOSIS — R06.09 DYSPNEA ON EXERTION: ICD-10-CM

## 2017-11-14 LAB
ALBUMIN SERPL-MCNC: 4.9 GM/DL (ref 3.4–5)
ALP BLD-CCNC: 60 IU/L (ref 40–128)
ALT SERPL-CCNC: 19 U/L (ref 10–40)
ANION GAP SERPL CALCULATED.3IONS-SCNC: 16 MMOL/L (ref 4–16)
AST SERPL-CCNC: 18 IU/L (ref 15–37)
BASOPHILS ABSOLUTE: 0 K/CU MM
BASOPHILS RELATIVE PERCENT: 0.3 % (ref 0–1)
BILIRUB SERPL-MCNC: 1 MG/DL (ref 0–1)
BUN BLDV-MCNC: 18 MG/DL (ref 6–23)
CALCIUM SERPL-MCNC: 9.6 MG/DL (ref 8.3–10.6)
CHLORIDE BLD-SCNC: 99 MMOL/L (ref 99–110)
CO2: 25 MMOL/L (ref 21–32)
CREAT SERPL-MCNC: 0.6 MG/DL (ref 0.6–1.1)
DIFFERENTIAL TYPE: ABNORMAL
EOSINOPHILS ABSOLUTE: 0.2 K/CU MM
EOSINOPHILS RELATIVE PERCENT: 2.3 % (ref 0–3)
GFR AFRICAN AMERICAN: >60 ML/MIN/1.73M2
GFR NON-AFRICAN AMERICAN: >60 ML/MIN/1.73M2
GLUCOSE FASTING: 110 MG/DL (ref 70–99)
HCT VFR BLD CALC: 46 % (ref 37–47)
HEMOGLOBIN: 14.8 GM/DL (ref 12.5–16)
IMMATURE NEUTROPHIL %: 0.4 % (ref 0–0.43)
LYMPHOCYTES ABSOLUTE: 2.7 K/CU MM
LYMPHOCYTES RELATIVE PERCENT: 28.1 % (ref 24–44)
MCH RBC QN AUTO: 31.1 PG (ref 27–31)
MCHC RBC AUTO-ENTMCNC: 32.2 % (ref 32–36)
MCV RBC AUTO: 96.6 FL (ref 78–100)
MONOCYTES ABSOLUTE: 0.8 K/CU MM
MONOCYTES RELATIVE PERCENT: 8.3 % (ref 0–4)
NUCLEATED RBC %: 0 %
PDW BLD-RTO: 16.5 % (ref 11.7–14.9)
PLATELET # BLD: 324 K/CU MM (ref 140–440)
PMV BLD AUTO: 9.2 FL (ref 7.5–11.1)
POTASSIUM SERPL-SCNC: 4.4 MMOL/L (ref 3.5–5.1)
RBC # BLD: 4.76 M/CU MM (ref 4.2–5.4)
SEGMENTED NEUTROPHILS ABSOLUTE COUNT: 5.8 K/CU MM
SEGMENTED NEUTROPHILS RELATIVE PERCENT: 60.6 % (ref 36–66)
SODIUM BLD-SCNC: 140 MMOL/L (ref 135–145)
TOTAL IMMATURE NEUTOROPHIL: 0.04 K/CU MM
TOTAL NUCLEATED RBC: 0 K/CU MM
TOTAL PROTEIN: 7.2 GM/DL (ref 6.4–8.2)
WBC # BLD: 9.6 K/CU MM (ref 4–10.5)

## 2017-11-16 ENCOUNTER — OFFICE VISIT (OUTPATIENT)
Dept: CARDIOLOGY CLINIC | Age: 82
End: 2017-11-16

## 2017-11-16 VITALS
DIASTOLIC BLOOD PRESSURE: 84 MMHG | BODY MASS INDEX: 23.37 KG/M2 | SYSTOLIC BLOOD PRESSURE: 122 MMHG | HEART RATE: 66 BPM | HEIGHT: 61 IN | WEIGHT: 123.8 LBS

## 2017-11-16 DIAGNOSIS — I25.10 CORONARY ARTERY DISEASE INVOLVING NATIVE CORONARY ARTERY OF NATIVE HEART WITHOUT ANGINA PECTORIS: Primary | ICD-10-CM

## 2017-11-16 PROCEDURE — G8427 DOCREV CUR MEDS BY ELIG CLIN: HCPCS | Performed by: INTERNAL MEDICINE

## 2017-11-16 PROCEDURE — G8598 ASA/ANTIPLAT THER USED: HCPCS | Performed by: INTERNAL MEDICINE

## 2017-11-16 PROCEDURE — 1123F ACP DISCUSS/DSCN MKR DOCD: CPT | Performed by: INTERNAL MEDICINE

## 2017-11-16 PROCEDURE — 99213 OFFICE O/P EST LOW 20 MIN: CPT | Performed by: INTERNAL MEDICINE

## 2017-11-16 PROCEDURE — 1036F TOBACCO NON-USER: CPT | Performed by: INTERNAL MEDICINE

## 2017-11-16 PROCEDURE — 1090F PRES/ABSN URINE INCON ASSESS: CPT | Performed by: INTERNAL MEDICINE

## 2017-11-16 PROCEDURE — G8484 FLU IMMUNIZE NO ADMIN: HCPCS | Performed by: INTERNAL MEDICINE

## 2017-11-16 PROCEDURE — 4040F PNEUMOC VAC/ADMIN/RCVD: CPT | Performed by: INTERNAL MEDICINE

## 2017-11-16 PROCEDURE — G8400 PT W/DXA NO RESULTS DOC: HCPCS | Performed by: INTERNAL MEDICINE

## 2017-11-16 PROCEDURE — G8420 CALC BMI NORM PARAMETERS: HCPCS | Performed by: INTERNAL MEDICINE

## 2017-11-20 ENCOUNTER — HOSPITAL ENCOUNTER (OUTPATIENT)
Dept: PULMONOLOGY | Age: 82
Discharge: HOME | End: 2017-11-20
Attending: INTERNAL MEDICINE | Admitting: INTERNAL MEDICINE

## 2017-11-20 DIAGNOSIS — R06.09 OTHER FORMS OF DYSPNEA: ICD-10-CM

## 2017-11-20 NOTE — PROGRESS NOTES
Patient was evaluated today for the diagnosis of COPD. I entered a DME order for home oxygen because the diagnosis and testing requires the patient to have supplemental oxygen. Condition will improve or be benefited by oxygen use. The patient is not able to perform good mobility in a home setting and therefore does require the use of a portable oxygen system. The need for this equipment was discussed with the patient and she understands and is in agreement.
tablet 3    rosuvastatin (CRESTOR) 5 MG tablet Take 1 tablet by mouth nightly 30 tablet 4    carvedilol (COREG) 12.5 MG tablet Take 1 tablet by mouth 2 times daily 60 tablet 2    DULoxetine (CYMBALTA) 60 MG extended release capsule Take 1 capsule by mouth daily 30 capsule 4    ipratropium-albuterol (DUONEB) 0.5-2.5 (3) MG/3ML SOLN nebulizer solution Inhale 3 mLs into the lungs every 6 hours as needed for Shortness of Breath 360 mL 3    guaiFENesin (MUCINEX) 600 MG extended release tablet Take 1 tablet by mouth 2 times daily      budesonide-formoterol (SYMBICORT) 160-4.5 MCG/ACT AERO Inhale 2 puffs into the lungs 2 times daily 3 Inhaler 2    aspirin 81 MG chewable tablet Take 1 tablet by mouth daily 30 tablet 3     No current facility-administered medications for this visit. Past Medical History:   Diagnosis Date    Acute ischemic colitis ([facility] [address] 2008    Colonoscopy done    CAD (coronary artery disease) 06/2017    4 vessel CABG    Cerebrovascular event ([facility] [address] 02/2016    balance, speech, leg weakness; ARU    Chronic neck pain     Dr Kale Alvarenga 2/2011    Cognitive impairment 09/2011    COPD (chronic obstructive pulmonary disease) ([facility] [address] 3/2013    Moderately severe by PFTs    Depression     Diverticulitis 5/2012    Family history of colon cancer     Family history of diabetes mellitus     Fibromyalgia 1989    Graves disease 2012    Matthew Mejia; tapazole for a time    Hyperlipidemia     Hypertension     Lumbar spinal stenosis 11/2015    moderate ; MRI by Dr Nikki Sethi Nocturnal oxygen desaturation 2015    Obstructive sleep apnea     Osteoporosis     Prediabetes 2012    hgb 6.5    S/P CABG (coronary artery bypass graft) 06/2017    4 vessel-CABG EVH RCA Marginal, OM1, OM2,  LIMA to LAD    Subclavian artery stenosis, left ([facility] 06/2017    noted in hosp for CABG; to be dealt with as OP by vasc surgeons    Subclinical Hyperthyroidism 2010    Dr. Matthew Mejia;  Tapazole    Urine incontinence 6/2011

## 2017-11-20 NOTE — PROGRESS NOTES
11/20/2017 1:42 PM    Patient Room #: Room/bed info not found  Patient Name: Kar Wills    (Step 1 Done by RN if possible otherwise call Pulmonary Diagnostics)  1. Place patient on room air at rest for at least 30 minutes. If patient falls below 88% before 30 minutes then you can record the level and stop. Record room air saturation level 95 %. If patient is at 88% or below, they will qualify for home oxygen and you can stop. If level does not fall below 88%, fill in level above. If indicated continue to Step 2. RN Signature:_______________________ Date: ____________    (Step 2&3 Done by [facility]) 3 minute room air walk  2. Ambulate patient on room air until saturation falls below 89%. Record level of room air saturation with ambulation_92 %. Next, place patient back on ______lpm oxygen and ambulate, record level _____%. (Note:  this level must show improvement from room air level done with ambulation.)  If patients saturation on room air with ambulation is 88% or below AND patient shows improvement with oxygen during ambulation, they will qualify for home oxygen and you can stop. If patient does not drop below 89%, then patient should have an overnight oximetry trending on room air to see if level falls below 88%. Complete level in Step 3 below. 3. Room air overnight oximetry level ____  % for____  cumulative minutes. If patients room air oxygen level is < 89% for at least 5 cumulative minutes, patient will qualify for home oxygen and you can stop. (Attach Night Trending Report)  Done by [facility]    Complete order below: Diagnosis:______________________________________    Home oxygen at:  Length of Need: ? Lifetime ?  3 Months     _____lpm or _____%   via  [] nasal cannula  []mask  [] other:________________         []continuous []  with activity  []  Nocturnal   [] Portable Tanks []  Concentrator        Therapist Signature:  _____________________________     Date:

## 2017-11-21 ENCOUNTER — OFFICE VISIT (OUTPATIENT)
Dept: INTERNAL MEDICINE CLINIC | Age: 82
End: 2017-11-21

## 2017-11-21 VITALS
RESPIRATION RATE: 16 BRPM | DIASTOLIC BLOOD PRESSURE: 70 MMHG | BODY MASS INDEX: 23.43 KG/M2 | SYSTOLIC BLOOD PRESSURE: 136 MMHG | WEIGHT: 124 LBS | HEART RATE: 81 BPM | OXYGEN SATURATION: 93 %

## 2017-11-21 DIAGNOSIS — J44.9 CHRONIC OBSTRUCTIVE PULMONARY DISEASE, UNSPECIFIED COPD TYPE (HCC): Primary | ICD-10-CM

## 2017-11-21 DIAGNOSIS — I10 ESSENTIAL HYPERTENSION: ICD-10-CM

## 2017-11-21 DIAGNOSIS — G47.34 NOCTURNAL HYPOXEMIA: ICD-10-CM

## 2017-11-21 LAB — THEOPHYLLINE LEVEL: 9.7 UG/ML (ref 8–20)

## 2017-11-21 PROCEDURE — 4040F PNEUMOC VAC/ADMIN/RCVD: CPT | Performed by: INTERNAL MEDICINE

## 2017-11-21 PROCEDURE — 99214 OFFICE O/P EST MOD 30 MIN: CPT | Performed by: INTERNAL MEDICINE

## 2017-11-21 PROCEDURE — 36415 COLL VENOUS BLD VENIPUNCTURE: CPT | Performed by: INTERNAL MEDICINE

## 2017-11-21 PROCEDURE — G8427 DOCREV CUR MEDS BY ELIG CLIN: HCPCS | Performed by: INTERNAL MEDICINE

## 2017-11-21 PROCEDURE — G8598 ASA/ANTIPLAT THER USED: HCPCS | Performed by: INTERNAL MEDICINE

## 2017-11-21 PROCEDURE — G8926 SPIRO NO PERF OR DOC: HCPCS | Performed by: INTERNAL MEDICINE

## 2017-11-21 PROCEDURE — G8420 CALC BMI NORM PARAMETERS: HCPCS | Performed by: INTERNAL MEDICINE

## 2017-11-21 PROCEDURE — G8484 FLU IMMUNIZE NO ADMIN: HCPCS | Performed by: INTERNAL MEDICINE

## 2017-11-21 PROCEDURE — 1123F ACP DISCUSS/DSCN MKR DOCD: CPT | Performed by: INTERNAL MEDICINE

## 2017-11-21 PROCEDURE — 1090F PRES/ABSN URINE INCON ASSESS: CPT | Performed by: INTERNAL MEDICINE

## 2017-11-21 PROCEDURE — 1036F TOBACCO NON-USER: CPT | Performed by: INTERNAL MEDICINE

## 2017-11-21 PROCEDURE — 3023F SPIROM DOC REV: CPT | Performed by: INTERNAL MEDICINE

## 2017-11-21 PROCEDURE — G8400 PT W/DXA NO RESULTS DOC: HCPCS | Performed by: INTERNAL MEDICINE

## 2017-11-21 NOTE — PROGRESS NOTES
Subjective:      Deepthi Montalvo is a 80 y.o. female who presents today for follow up on her chronic medical conditions as noted below. Patient Active Problem List:     Depression     Chronic neck pain     Graves' disease     Hyperlipidemia     Hypertension     Colon cancer screening     Breast cancer screening     Cognitive impairment     COPD (chronic obstructive pulmonary disease)     Cerebrovascular accident (CVA) due to vascular stenosis (HCC)     Gait disturbance     Mixed incontinence     Abnormal stress test     S/P CABG (coronary artery bypass graft)     Subclavian artery stenosis, left (HCC)     Pneumonia     Was here with sob on 11/13    Added back theo24 200mg   Will check blood level 6 hr after morning doseage    Had home oxygen eval and qualifies for nocturnal home oxygen at 2L nocturnally   Will arrange    Deepthi Montalvo requires the assistance of a wheeled walker to successfully ambulate from room to room at home to allow completion of daily living tasks such as: bathing, toileting, dressing and grooming. A wheeled walker is necessary due to the patient's unsteady gait, upper body weakness, inability to  a standard walker. This patient can ambulate only by pushing a walker instead of using a lesser assistive device such as a cane or crutch. Patient denies any exertional chest pain, dyspnea, palpitations, syncope, orthopnea, edema or paroxysmal nocturnal dyspnea. BP is fine    The patient denies cough, chest pain, increased sputum production, wheezing or hemoptysis.     Mood is satisfactory with cymbalta      Current Outpatient Prescriptions   Medication Sig Dispense Refill    OXYGEN Inhale 2 L/min into the lungs nightly      theophylline (BROOKE-24) 200 MG extended release capsule Take 1 capsule by mouth daily 30 capsule 5    rosuvastatin (CRESTOR) 5 MG tablet Take 1 tablet by mouth nightly 30 tablet 4    carvedilol (COREG) 12.5 MG tablet Take 1 tablet by mouth 2 times daily 60 tablet 2    DULoxetine (CYMBALTA) 60 MG extended release capsule Take 1 capsule by mouth daily 30 capsule 4    ipratropium-albuterol (DUONEB) 0.5-2.5 (3) MG/3ML SOLN nebulizer solution Inhale 3 mLs into the lungs every 6 hours as needed for Shortness of Breath 360 mL 3    guaiFENesin (MUCINEX) 600 MG extended release tablet Take 1 tablet by mouth 2 times daily      budesonide-formoterol (SYMBICORT) 160-4.5 MCG/ACT AERO Inhale 2 puffs into the lungs 2 times daily 3 Inhaler 2    aspirin 81 MG chewable tablet Take 1 tablet by mouth daily 30 tablet 3     No current facility-administered medications for this visit. Past Medical History:   Diagnosis Date    Acute ischemic colitis ([facility] [address] 2008    Colonoscopy done    CAD (coronary artery disease) 06/2017    4 vessel CABG    Cerebrovascular event ([facility] [address] 02/2016    balance, speech, leg weakness; ARU    Chronic neck pain     Dr Teresa Randolph 2/2011    Cognitive impairment 09/2011    COPD (chronic obstructive pulmonary disease) ([facility] [address] 3/2013    Moderately severe by PFTs    Depression     Diverticulitis 5/2012    Family history of colon cancer     Family history of diabetes mellitus     Fibromyalgia 1989    Graves disease 2012    Gay Graves; tapazole for a time    H/O cardiac catheterization 06/07/2017    severe multivessel disease    H/O echocardiogram 06/19/2017    EF 50-55%    Hyperlipidemia     Hypertension     Lumbar spinal stenosis 11/2015    moderate ; MRI by Dr Ramirez Hi Nocturnal oxygen desaturation 2015    Obstructive sleep apnea     Osteoporosis     Prediabetes 2012    hgb 6.5    S/P CABG (coronary artery bypass graft) 06/2017    4 vessel-CABG EVH RCA Marginal, OM1, OM2,  LIMA to LAD    Subclavian artery stenosis, left ([facility] [address] 06/2017    noted in hosp for CABG; to be dealt with as OP by vasc surgeons    Subclinical Hyperthyroidism 2010    Dr. Gay Graves;  Tapazole    Urine incontinence 6/2011    Timed voiding; Kegel; urology        Social History Substance Use Topics    Smoking status: Former Smoker     Packs/day: 0.50     Years: 30.00     Types: Cigarettes     Start date: 12/16/1957     Quit date: 1/25/1987    Smokeless tobacco: Never Used    Alcohol use Yes      Comment: occ        ROS: The patient has had no headache, sore throat, fever or chills, cough, dyspnea, chest pain, nausea, vomiting or diarrhea, or edema. Objective:      /70   Pulse 81   Resp 16   Wt 124 lb (56.2 kg)   SpO2 93%   BMI 23.43 kg/m²    General: in no apparent distress   The patient's neck is free of nodes. Lungs are clear. Heart is normal in rate and regular in rhythm. Legs are free of edema. No rash or erythema. NOv labs and home oxygen eval rev'd       Assessment / Plan:      1. Chronic obstructive pulmonary disease, unspecified COPD type ([address]    2. Nocturnal hypoxemia    3.  Essential hypertension            Plan   Arrange nocturnal oxygen  kermit level drawn here 6 hr after morning dose  Same meds  RTC 6wk  Orders Placed This Encounter   Procedures    THEOPHYLLINE LEVEL

## 2017-11-29 ENCOUNTER — CARE COORDINATION (OUTPATIENT)
Dept: MEDSURG UNIT | Age: 82
End: 2017-11-29

## 2017-11-29 NOTE — CARE COORDINATION
[facility] contacted [facility] at [phone] and was informed by [place] that Patient has been readmitted to [facility] of admit date. Transfer to RN and disconnected    Called facility back to speak to IBETH Nowak.  Informed Pt was discharged to home on 10/25/17    Ramon Prieto RN BSN   Care Transitions Coordinator  [phone]

## 2017-12-01 ENCOUNTER — TELEPHONE (OUTPATIENT)
Dept: INTERNAL MEDICINE CLINIC | Age: 82
End: 2017-12-01

## 2017-12-01 DIAGNOSIS — Z87.01 HISTORY OF RECURRENT PNEUMONIA: Primary | ICD-10-CM

## 2017-12-01 NOTE — PROGRESS NOTES
Deb Fields was evaluated today and a DME order was entered for a wheeled walker with seat because she requires this to successfully complete daily living tasks of eating, bathing, toileting, personal cares, ambulating, grooming, hygiene, dressing upper body, dressing lower body, meal preparation and taking own medications. A wheeled walker with seat is necessary due to the patient's unsteady gait, upper body weakness, inability to  and ambulation device, ambulating only short distances by pushing a walker, and the need to sit for a short time before resuming ambulation. These tasks cannot be completed with a lesser ambulation device such as a cane, crutch, or standard walker. The need for this equipment was discussed with the patient and she understands and is in agreement.

## 2017-12-20 ENCOUNTER — TELEPHONE (OUTPATIENT)
Dept: INTERNAL MEDICINE CLINIC | Age: 82
End: 2017-12-20

## 2018-01-02 ENCOUNTER — OFFICE VISIT (OUTPATIENT)
Dept: INTERNAL MEDICINE CLINIC | Age: 83
End: 2018-01-02

## 2018-01-02 VITALS
WEIGHT: 126 LBS | HEART RATE: 72 BPM | DIASTOLIC BLOOD PRESSURE: 90 MMHG | SYSTOLIC BLOOD PRESSURE: 138 MMHG | BODY MASS INDEX: 23.81 KG/M2 | RESPIRATION RATE: 16 BRPM

## 2018-01-02 DIAGNOSIS — I10 ESSENTIAL HYPERTENSION: Primary | ICD-10-CM

## 2018-01-02 DIAGNOSIS — I25.10 CORONARY ARTERY DISEASE INVOLVING NATIVE CORONARY ARTERY OF NATIVE HEART WITHOUT ANGINA PECTORIS: ICD-10-CM

## 2018-01-02 DIAGNOSIS — F33.41 RECURRENT MAJOR DEPRESSIVE DISORDER, IN PARTIAL REMISSION (HCC): ICD-10-CM

## 2018-01-02 DIAGNOSIS — J43.9 PULMONARY EMPHYSEMA, UNSPECIFIED EMPHYSEMA TYPE (HCC): ICD-10-CM

## 2018-01-02 PROBLEM — J18.9 PNEUMONIA: Status: RESOLVED | Noted: 2017-09-25 | Resolved: 2018-01-02

## 2018-01-02 PROCEDURE — 99214 OFFICE O/P EST MOD 30 MIN: CPT | Performed by: INTERNAL MEDICINE

## 2018-01-09 ENCOUNTER — TELEPHONE (OUTPATIENT)
Dept: INTERNAL MEDICINE CLINIC | Age: 83
End: 2018-01-09

## 2018-01-09 DIAGNOSIS — Z87.01 HISTORY OF RECURRENT PNEUMONIA: Primary | ICD-10-CM

## 2018-01-30 ENCOUNTER — HOSPITAL ENCOUNTER (OUTPATIENT)
Dept: SLEEP CENTER | Age: 83
Discharge: HOME | End: 2018-01-30
Attending: INTERNAL MEDICINE | Admitting: INTERNAL MEDICINE

## 2018-01-30 VITALS — HEIGHT: 61 IN | BODY MASS INDEX: 23.22 KG/M2 | WEIGHT: 123 LBS

## 2018-01-30 ASSESSMENT — SLEEP AND FATIGUE QUESTIONNAIRES
HOW LIKELY ARE YOU TO NOD OFF OR FALL ASLEEP WHILE SITTING AND TALKING TO SOMEONE: 0
HOW LIKELY ARE YOU TO NOD OFF OR FALL ASLEEP WHEN YOU ARE A PASSENGER IN A CAR FOR AN HOUR WITHOUT A BREAK: 1
HOW LIKELY ARE YOU TO NOD OFF OR FALL ASLEEP IN A CAR, WHILE STOPPED FOR A FEW MINUTES IN TRAFFIC: 1
HOW LIKELY ARE YOU TO NOD OFF OR FALL ASLEEP WHILE WATCHING TV: 2
NECK CIRCUMFERENCE (INCHES): 13.5
NECK CIRCUMFERENCE (INCHES): 16
ESS TOTAL SCORE: 11
HOW LIKELY ARE YOU TO NOD OFF OR FALL ASLEEP WHILE LYING DOWN TO REST IN THE AFTERNOON WHEN CIRCUMSTANCES PERMIT: 3
HOW LIKELY ARE YOU TO NOD OFF OR FALL ASLEEP WHILE SITTING INACTIVE IN A PUBLIC PLACE: 1
HOW LIKELY ARE YOU TO NOD OFF OR FALL ASLEEP WHILE SITTING AND READING: 2
HOW LIKELY ARE YOU TO NOD OFF OR FALL ASLEEP WHILE SITTING QUIETLY AFTER LUNCH WITHOUT ALCOHOL: 1

## 2018-02-05 NOTE — PROGRESS NOTES
Results for the most recent sleep study on [address] [place] [address] Place  1937 are finalized and available. Please see media tab.     Electronically signed by Vik Morales on 2/4/2018 at 10:22 PM

## 2018-03-02 ENCOUNTER — OFFICE VISIT (OUTPATIENT)
Dept: CARDIOLOGY CLINIC | Age: 83
End: 2018-03-02

## 2018-03-02 VITALS
WEIGHT: 130.2 LBS | DIASTOLIC BLOOD PRESSURE: 70 MMHG | SYSTOLIC BLOOD PRESSURE: 120 MMHG | BODY MASS INDEX: 24.58 KG/M2 | HEIGHT: 61 IN | HEART RATE: 68 BPM | OXYGEN SATURATION: 95 %

## 2018-03-02 DIAGNOSIS — Z95.1 S/P CABG (CORONARY ARTERY BYPASS GRAFT): Primary | ICD-10-CM

## 2018-03-02 PROCEDURE — 1123F ACP DISCUSS/DSCN MKR DOCD: CPT | Performed by: INTERNAL MEDICINE

## 2018-03-02 PROCEDURE — G8599 NO ASA/ANTIPLAT THER USE RNG: HCPCS | Performed by: INTERNAL MEDICINE

## 2018-03-02 PROCEDURE — 1090F PRES/ABSN URINE INCON ASSESS: CPT | Performed by: INTERNAL MEDICINE

## 2018-03-02 PROCEDURE — 99214 OFFICE O/P EST MOD 30 MIN: CPT | Performed by: INTERNAL MEDICINE

## 2018-03-02 PROCEDURE — G8420 CALC BMI NORM PARAMETERS: HCPCS | Performed by: INTERNAL MEDICINE

## 2018-03-02 PROCEDURE — 4040F PNEUMOC VAC/ADMIN/RCVD: CPT | Performed by: INTERNAL MEDICINE

## 2018-03-02 PROCEDURE — 1036F TOBACCO NON-USER: CPT | Performed by: INTERNAL MEDICINE

## 2018-03-02 PROCEDURE — G8427 DOCREV CUR MEDS BY ELIG CLIN: HCPCS | Performed by: INTERNAL MEDICINE

## 2018-03-02 PROCEDURE — G8400 PT W/DXA NO RESULTS DOC: HCPCS | Performed by: INTERNAL MEDICINE

## 2018-03-02 PROCEDURE — G8484 FLU IMMUNIZE NO ADMIN: HCPCS | Performed by: INTERNAL MEDICINE

## 2018-03-02 RX ORDER — ACETAMINOPHEN 500 MG
500 TABLET ORAL EVERY 6 HOURS PRN
COMMUNITY
End: 2018-04-02 | Stop reason: CLARIF

## 2018-03-02 NOTE — PROGRESS NOTES
Clay Lorenzo MD        OFFICE  FOLLOWUP NOTE    Chief complaints: patient is here for management of CAD,S/P CABG, HTN, DYSLPIDEMIA  Subjective: patient feels better, no chest pain, no shortness of breath, no dizziness, no palpitations    HPI Dana Glasgow is a [de-identified] y. o.year old who  has a past medical history of Acute ischemic colitis ([facility] [address] CAD (coronary artery disease); Cerebrovascular event ([facility] [address] Chronic neck pain; Cognitive impairment; COPD (chronic obstructive pulmonary disease) ([facility] [address] Depression; Diverticulitis; Family history of colon cancer; Family history of diabetes mellitus; Fibromyalgia; Graves disease; H/O cardiac catheterization; H/O echocardiogram; Hyperlipidemia; Hypertension; Lumbar spinal stenosis; Nocturnal oxygen desaturation; Obstructive sleep apnea; Osteoporosis; Prediabetes; S/P CABG (coronary artery bypass graft); Subclavian artery stenosis, left ([facility] [address] Subclinical Hyperthyroidism; and Urine incontinence. and presents for management of CAD,S/P CABG, HTN, DYSLPIDEMIA which are well controlled, she will need pulmonary rehab, has not started cardiac rehab, she has recovered from pneumonia and then sent to NH, finally at home now.       Current Outpatient Prescriptions   Medication Sig Dispense Refill    acetaminophen (TYLENOL) 500 MG tablet Take 500 mg by mouth every 6 hours as needed for Pain      OXYGEN Inhale 2 L/min into the lungs continuous       theophylline (BROOKE-24) 200 MG extended release capsule Take 1 capsule by mouth daily 30 capsule 5    rosuvastatin (CRESTOR) 5 MG tablet Take 1 tablet by mouth nightly 30 tablet 4    carvedilol (COREG) 12.5 MG tablet Take 1 tablet by mouth 2 times daily 60 tablet 2    DULoxetine (CYMBALTA) 60 MG extended release capsule Take 1 capsule by mouth daily 30 capsule 4    ipratropium-albuterol (DUONEB) 0.5-2.5 (3) MG/3ML SOLN nebulizer solution Inhale 3 mLs into the lungs every 6 hours as needed for Shortness of Breath 360 mL 3    198 (H) 12/07/2016    LDLDIRECT 108 (H) 08/21/2017     Lab Results   Component Value Date    ALT 19 11/14/2017    AST 18 11/14/2017     TSH:    Lab Results   Component Value Date    TSH 0.87 12/07/2016       Impression:  Marge Espinosa is a [de-identified] y. o.year old who  has a past medical history of Acute ischemic colitis ([facility] [address] CAD (coronary artery disease); Cerebrovascular event ([address] Chronic neck pain; Cognitive impairment; COPD (chronic obstructive pulmonary disease) ([address] Depression; Diverticulitis; Family history of colon cancer; Family history of diabetes mellitus; Fibromyalgia; Graves disease; H/O cardiac catheterization; H/O echocardiogram; Hyperlipidemia; Hypertension; Lumbar spinal stenosis; Nocturnal oxygen desaturation; Obstructive sleep apnea; Osteoporosis; Prediabetes; S/P CABG (coronary artery bypass graft); Subclavian artery stenosis, left ([address] Subclinical Hyperthyroidism; and Urine incontinence. and presents with     Plan:  1. CAD s/p CABG and had complications post CABG with sternal infection and had to redo sternotomy, she is better now,continue coreg, aspirin and crestor, home physical therapy. once she makes up her mind, will start cardiac rehab after regular stress test  2. Dyslipidemia: continue crestor 5mg po daily  3. History of TIA:CONTINUE aspirincretor  4. HTN: stable, continue coreg  5. Dm: she is  Not Diabetuc and off  metformin Health maintenance: exerise and diet  All labs, medications and tests reviewed, continue all other medications of all above medical condition listed as is.     @[facility]@

## 2018-03-06 ENCOUNTER — PROCEDURE VISIT (OUTPATIENT)
Dept: CARDIOLOGY CLINIC | Age: 83
End: 2018-03-06

## 2018-03-06 VITALS
HEART RATE: 84 BPM | HEIGHT: 61 IN | BODY MASS INDEX: 24.55 KG/M2 | DIASTOLIC BLOOD PRESSURE: 88 MMHG | SYSTOLIC BLOOD PRESSURE: 138 MMHG | RESPIRATION RATE: 16 BRPM | WEIGHT: 130 LBS

## 2018-03-06 DIAGNOSIS — Z95.1 S/P CABG (CORONARY ARTERY BYPASS GRAFT): Primary | ICD-10-CM

## 2018-03-06 DIAGNOSIS — R06.02 SHORTNESS OF BREATH: ICD-10-CM

## 2018-03-06 DIAGNOSIS — R00.0 TACHYCARDIA: ICD-10-CM

## 2018-03-06 PROCEDURE — 93015 CV STRESS TEST SUPVJ I&R: CPT | Performed by: INTERNAL MEDICINE

## 2018-03-07 PROCEDURE — G0179 MD RECERTIFICATION HHA PT: HCPCS | Performed by: INTERNAL MEDICINE

## 2018-03-12 ENCOUNTER — TELEPHONE (OUTPATIENT)
Dept: CARDIOLOGY CLINIC | Age: 83
End: 2018-03-12

## 2018-03-12 RX ORDER — CARVEDILOL 12.5 MG/1
12.5 TABLET ORAL 2 TIMES DAILY
Qty: 60 TABLET | Refills: 2 | Status: SHIPPED | OUTPATIENT
Start: 2018-03-12 | End: 2018-08-20 | Stop reason: SDUPTHER

## 2018-03-12 NOTE — TELEPHONE ENCOUNTER
Monika Lima from Cardiac Rehab called concerning pt and stated you would know what it was about. She would like you to return her call.

## 2018-03-23 ENCOUNTER — TELEPHONE (OUTPATIENT)
Dept: INTERNAL MEDICINE CLINIC | Age: 83
End: 2018-03-23

## 2018-03-23 DIAGNOSIS — J44.9 OBSTRUCTIVE CHRONIC BRONCHITIS WITHOUT EXACERBATION (HCC): Primary | ICD-10-CM

## 2018-03-28 ENCOUNTER — HOSPITAL ENCOUNTER (OUTPATIENT)
Dept: OTHER | Age: 83
Discharge: HOME | End: 2018-03-31
Attending: INTERNAL MEDICINE | Admitting: INTERNAL MEDICINE

## 2018-04-01 ENCOUNTER — HOSPITAL ENCOUNTER (OUTPATIENT)
Dept: OTHER | Age: 83
Discharge: HOME | End: 2018-04-30
Attending: INTERNAL MEDICINE | Admitting: INTERNAL MEDICINE

## 2018-04-02 ENCOUNTER — OFFICE VISIT (OUTPATIENT)
Dept: INTERNAL MEDICINE CLINIC | Age: 83
End: 2018-04-02

## 2018-04-02 VITALS
RESPIRATION RATE: 16 BRPM | WEIGHT: 129 LBS | HEART RATE: 68 BPM | DIASTOLIC BLOOD PRESSURE: 72 MMHG | BODY MASS INDEX: 24.37 KG/M2 | SYSTOLIC BLOOD PRESSURE: 132 MMHG

## 2018-04-02 DIAGNOSIS — I77.1 SUBCLAVIAN ARTERY STENOSIS, LEFT (HCC): ICD-10-CM

## 2018-04-02 DIAGNOSIS — G89.29 CHRONIC NECK PAIN: Primary | ICD-10-CM

## 2018-04-02 DIAGNOSIS — M54.2 CHRONIC NECK PAIN: Primary | ICD-10-CM

## 2018-04-02 DIAGNOSIS — E78.2 MIXED HYPERLIPIDEMIA: ICD-10-CM

## 2018-04-02 DIAGNOSIS — R10.13 DYSPEPSIA: ICD-10-CM

## 2018-04-02 DIAGNOSIS — I25.10 CORONARY ARTERY DISEASE INVOLVING NATIVE CORONARY ARTERY OF NATIVE HEART WITHOUT ANGINA PECTORIS: ICD-10-CM

## 2018-04-02 DIAGNOSIS — J43.9 PULMONARY EMPHYSEMA, UNSPECIFIED EMPHYSEMA TYPE (HCC): ICD-10-CM

## 2018-04-02 DIAGNOSIS — R11.0 NAUSEA: ICD-10-CM

## 2018-04-02 PROCEDURE — G8599 NO ASA/ANTIPLAT THER USE RNG: HCPCS | Performed by: INTERNAL MEDICINE

## 2018-04-02 PROCEDURE — 1123F ACP DISCUSS/DSCN MKR DOCD: CPT | Performed by: INTERNAL MEDICINE

## 2018-04-02 PROCEDURE — 4040F PNEUMOC VAC/ADMIN/RCVD: CPT | Performed by: INTERNAL MEDICINE

## 2018-04-02 PROCEDURE — 99214 OFFICE O/P EST MOD 30 MIN: CPT | Performed by: INTERNAL MEDICINE

## 2018-04-02 PROCEDURE — 3023F SPIROM DOC REV: CPT | Performed by: INTERNAL MEDICINE

## 2018-04-02 PROCEDURE — 1036F TOBACCO NON-USER: CPT | Performed by: INTERNAL MEDICINE

## 2018-04-02 PROCEDURE — G8420 CALC BMI NORM PARAMETERS: HCPCS | Performed by: INTERNAL MEDICINE

## 2018-04-02 PROCEDURE — 1090F PRES/ABSN URINE INCON ASSESS: CPT | Performed by: INTERNAL MEDICINE

## 2018-04-02 PROCEDURE — G8400 PT W/DXA NO RESULTS DOC: HCPCS | Performed by: INTERNAL MEDICINE

## 2018-04-02 PROCEDURE — G8926 SPIRO NO PERF OR DOC: HCPCS | Performed by: INTERNAL MEDICINE

## 2018-04-02 PROCEDURE — G8427 DOCREV CUR MEDS BY ELIG CLIN: HCPCS | Performed by: INTERNAL MEDICINE

## 2018-04-02 RX ORDER — OMEPRAZOLE 40 MG/1
40 CAPSULE, DELAYED RELEASE ORAL DAILY
Qty: 30 CAPSULE | Refills: 3 | Status: SHIPPED | OUTPATIENT
Start: 2018-04-02 | End: 2018-06-06 | Stop reason: ALTCHOICE

## 2018-04-03 PROBLEM — R06.02 SOB (SHORTNESS OF BREATH): Status: ACTIVE | Noted: 2018-04-03

## 2018-04-03 PROBLEM — R20.2 NUMBNESS AND TINGLING: Status: ACTIVE | Noted: 2018-04-03

## 2018-04-03 PROBLEM — J69.0 ASPIRATION PNEUMONIA DUE TO GASTRIC SECRETIONS (HCC): Status: ACTIVE | Noted: 2018-04-03

## 2018-04-03 PROBLEM — J20.9 ACUTE BRONCHITIS: Status: ACTIVE | Noted: 2018-04-03

## 2018-04-03 PROBLEM — R20.0 NUMBNESS AND TINGLING: Status: ACTIVE | Noted: 2018-04-03

## 2018-04-25 ENCOUNTER — CARE COORDINATION (OUTPATIENT)
Dept: CASE MANAGEMENT | Age: 83
End: 2018-04-25

## 2018-05-01 ENCOUNTER — HOSPITAL ENCOUNTER (OUTPATIENT)
Dept: OTHER | Age: 83
Discharge: HOME | End: 2018-05-31
Attending: INTERNAL MEDICINE | Admitting: INTERNAL MEDICINE

## 2018-05-09 ENCOUNTER — CARE COORDINATION (OUTPATIENT)
Dept: CASE MANAGEMENT | Age: 83
End: 2018-05-09

## 2018-05-23 ENCOUNTER — CARE COORDINATION (OUTPATIENT)
Dept: CASE MANAGEMENT | Age: 83
End: 2018-05-23

## 2018-05-31 ENCOUNTER — TELEPHONE (OUTPATIENT)
Dept: INTERNAL MEDICINE CLINIC | Age: 83
End: 2018-05-31

## 2018-05-31 RX ORDER — FUROSEMIDE 20 MG/1
20 TABLET ORAL DAILY
Qty: 30 TABLET | Refills: 3 | Status: SHIPPED | OUTPATIENT
Start: 2018-05-31 | End: 2018-06-06 | Stop reason: DRUGHIGH

## 2018-05-31 RX ORDER — POTASSIUM CHLORIDE 750 MG/1
10 TABLET, EXTENDED RELEASE ORAL DAILY
Qty: 60 TABLET | Refills: 3 | Status: SHIPPED | OUTPATIENT
Start: 2018-05-31 | End: 2019-04-09 | Stop reason: SDUPTHER

## 2018-06-01 ENCOUNTER — HOSPITAL ENCOUNTER (OUTPATIENT)
Dept: GENERAL RADIOLOGY | Age: 83
Discharge: HOME | End: 2018-06-01
Attending: INTERNAL MEDICINE | Admitting: INTERNAL MEDICINE

## 2018-06-01 ENCOUNTER — OFFICE VISIT (OUTPATIENT)
Dept: CARDIOLOGY CLINIC | Age: 83
End: 2018-06-01

## 2018-06-01 ENCOUNTER — HOSPITAL ENCOUNTER (OUTPATIENT)
Dept: OTHER | Age: 83
Discharge: HOME | End: 2018-06-30
Attending: INTERNAL MEDICINE | Admitting: INTERNAL MEDICINE

## 2018-06-01 VITALS
DIASTOLIC BLOOD PRESSURE: 80 MMHG | BODY MASS INDEX: 24.84 KG/M2 | SYSTOLIC BLOOD PRESSURE: 118 MMHG | HEIGHT: 61 IN | WEIGHT: 131.6 LBS | HEART RATE: 72 BPM

## 2018-06-01 DIAGNOSIS — I25.10 CORONARY ARTERY DISEASE INVOLVING NATIVE CORONARY ARTERY OF NATIVE HEART WITHOUT ANGINA PECTORIS: Primary | ICD-10-CM

## 2018-06-01 LAB
CHOLESTEROL: 196 MG/DL
ESTIMATED AVERAGE GLUCOSE: 131 MG/DL
HBA1C MFR BLD: 6.2 % (ref 4.2–6.3)
HDLC SERPL-MCNC: 74 MG/DL
LDL CHOLESTEROL DIRECT: 112 MG/DL
TRIGL SERPL-MCNC: 174 MG/DL

## 2018-06-01 PROCEDURE — G8420 CALC BMI NORM PARAMETERS: HCPCS | Performed by: INTERNAL MEDICINE

## 2018-06-01 PROCEDURE — G8400 PT W/DXA NO RESULTS DOC: HCPCS | Performed by: INTERNAL MEDICINE

## 2018-06-01 PROCEDURE — 1090F PRES/ABSN URINE INCON ASSESS: CPT | Performed by: INTERNAL MEDICINE

## 2018-06-01 PROCEDURE — 1036F TOBACCO NON-USER: CPT | Performed by: INTERNAL MEDICINE

## 2018-06-01 PROCEDURE — G8427 DOCREV CUR MEDS BY ELIG CLIN: HCPCS | Performed by: INTERNAL MEDICINE

## 2018-06-01 PROCEDURE — G8598 ASA/ANTIPLAT THER USED: HCPCS | Performed by: INTERNAL MEDICINE

## 2018-06-01 PROCEDURE — 4040F PNEUMOC VAC/ADMIN/RCVD: CPT | Performed by: INTERNAL MEDICINE

## 2018-06-01 PROCEDURE — 1123F ACP DISCUSS/DSCN MKR DOCD: CPT | Performed by: INTERNAL MEDICINE

## 2018-06-01 PROCEDURE — 99214 OFFICE O/P EST MOD 30 MIN: CPT | Performed by: INTERNAL MEDICINE

## 2018-06-04 ENCOUNTER — HOSPITAL ENCOUNTER (OUTPATIENT)
Dept: GENERAL RADIOLOGY | Age: 83
Discharge: HOME | End: 2018-06-04
Attending: INTERNAL MEDICINE | Admitting: INTERNAL MEDICINE

## 2018-06-04 DIAGNOSIS — R63.5 WEIGHT GAIN: ICD-10-CM

## 2018-06-04 DIAGNOSIS — R06.02 SOB (SHORTNESS OF BREATH): Primary | ICD-10-CM

## 2018-06-04 LAB
ANION GAP SERPL CALCULATED.3IONS-SCNC: 19 MMOL/L (ref 4–16)
BUN BLDV-MCNC: 17 MG/DL (ref 6–23)
CALCIUM SERPL-MCNC: 10.2 MG/DL (ref 8.3–10.6)
CHLORIDE BLD-SCNC: 98 MMOL/L (ref 99–110)
CO2: 28 MMOL/L (ref 21–32)
CREAT SERPL-MCNC: 0.8 MG/DL (ref 0.6–1.1)
GFR AFRICAN AMERICAN: >60 ML/MIN/1.73M2
GFR NON-AFRICAN AMERICAN: >60 ML/MIN/1.73M2
GLUCOSE BLD-MCNC: 132 MG/DL (ref 70–99)
MAGNESIUM: 2.2 MG/DL (ref 1.8–2.4)
POTASSIUM SERPL-SCNC: 4.2 MMOL/L (ref 3.5–5.1)
SODIUM BLD-SCNC: 145 MMOL/L (ref 135–145)

## 2018-06-06 ENCOUNTER — OFFICE VISIT (OUTPATIENT)
Dept: INTERNAL MEDICINE CLINIC | Age: 83
End: 2018-06-06

## 2018-06-06 VITALS
SYSTOLIC BLOOD PRESSURE: 122 MMHG | DIASTOLIC BLOOD PRESSURE: 60 MMHG | RESPIRATION RATE: 16 BRPM | HEART RATE: 64 BPM | WEIGHT: 129.6 LBS | BODY MASS INDEX: 24.49 KG/M2

## 2018-06-06 DIAGNOSIS — M54.2 CHRONIC NECK PAIN: ICD-10-CM

## 2018-06-06 DIAGNOSIS — N39.41 URGE INCONTINENCE OF URINE: Primary | ICD-10-CM

## 2018-06-06 DIAGNOSIS — G89.29 CHRONIC NECK PAIN: ICD-10-CM

## 2018-06-06 DIAGNOSIS — R60.9 FLUID RETENTION: ICD-10-CM

## 2018-06-06 DIAGNOSIS — J43.9 PULMONARY EMPHYSEMA, UNSPECIFIED EMPHYSEMA TYPE (HCC): ICD-10-CM

## 2018-06-06 PROBLEM — J20.9 ACUTE BRONCHITIS: Status: RESOLVED | Noted: 2018-04-03 | Resolved: 2018-06-06

## 2018-06-06 PROBLEM — J69.0 ASPIRATION PNEUMONIA DUE TO GASTRIC SECRETIONS (HCC): Status: RESOLVED | Noted: 2018-04-03 | Resolved: 2018-06-06

## 2018-06-06 PROBLEM — R06.02 SOB (SHORTNESS OF BREATH): Status: RESOLVED | Noted: 2018-04-03 | Resolved: 2018-06-06

## 2018-06-06 PROCEDURE — G8400 PT W/DXA NO RESULTS DOC: HCPCS | Performed by: INTERNAL MEDICINE

## 2018-06-06 PROCEDURE — G8427 DOCREV CUR MEDS BY ELIG CLIN: HCPCS | Performed by: INTERNAL MEDICINE

## 2018-06-06 PROCEDURE — G8598 ASA/ANTIPLAT THER USED: HCPCS | Performed by: INTERNAL MEDICINE

## 2018-06-06 PROCEDURE — 0509F URINE INCON PLAN DOCD: CPT | Performed by: INTERNAL MEDICINE

## 2018-06-06 PROCEDURE — 4040F PNEUMOC VAC/ADMIN/RCVD: CPT | Performed by: INTERNAL MEDICINE

## 2018-06-06 PROCEDURE — G8926 SPIRO NO PERF OR DOC: HCPCS | Performed by: INTERNAL MEDICINE

## 2018-06-06 PROCEDURE — 99214 OFFICE O/P EST MOD 30 MIN: CPT | Performed by: INTERNAL MEDICINE

## 2018-06-06 PROCEDURE — 1090F PRES/ABSN URINE INCON ASSESS: CPT | Performed by: INTERNAL MEDICINE

## 2018-06-06 PROCEDURE — 3023F SPIROM DOC REV: CPT | Performed by: INTERNAL MEDICINE

## 2018-06-06 PROCEDURE — 1123F ACP DISCUSS/DSCN MKR DOCD: CPT | Performed by: INTERNAL MEDICINE

## 2018-06-06 PROCEDURE — G8420 CALC BMI NORM PARAMETERS: HCPCS | Performed by: INTERNAL MEDICINE

## 2018-06-06 PROCEDURE — 1036F TOBACCO NON-USER: CPT | Performed by: INTERNAL MEDICINE

## 2018-06-06 RX ORDER — FUROSEMIDE 20 MG/1
20 TABLET ORAL DAILY
COMMUNITY
End: 2018-12-18 | Stop reason: SDUPTHER

## 2018-06-06 RX ORDER — TOLTERODINE 4 MG/1
4 CAPSULE, EXTENDED RELEASE ORAL DAILY
Qty: 30 CAPSULE | Refills: 3 | Status: SHIPPED | OUTPATIENT
Start: 2018-06-06 | End: 2018-08-06 | Stop reason: ALTCHOICE

## 2018-06-12 DIAGNOSIS — J44.9 CHRONIC OBSTRUCTIVE PULMONARY DISEASE, UNSPECIFIED COPD TYPE (HCC): Primary | ICD-10-CM

## 2018-06-15 ENCOUNTER — HOSPITAL ENCOUNTER (OUTPATIENT)
Dept: GENERAL RADIOLOGY | Age: 83
Discharge: HOME | End: 2018-06-15
Attending: INTERNAL MEDICINE | Admitting: INTERNAL MEDICINE

## 2018-06-15 LAB — THEOPHYLLINE LEVEL: 13.6 UG/ML (ref 10–20)

## 2018-06-25 RX ORDER — ROSUVASTATIN CALCIUM 5 MG/1
TABLET, COATED ORAL
Qty: 30 TABLET | Refills: 3 | Status: SHIPPED | OUTPATIENT
Start: 2018-06-25 | End: 2019-01-08 | Stop reason: SDUPTHER

## 2018-07-01 ENCOUNTER — HOSPITAL ENCOUNTER (OUTPATIENT)
Dept: OTHER | Age: 83
Discharge: HOME | End: 2018-07-31
Attending: INTERNAL MEDICINE | Admitting: INTERNAL MEDICINE

## 2018-07-02 RX ORDER — DULOXETIN HYDROCHLORIDE 60 MG/1
CAPSULE, DELAYED RELEASE ORAL
Qty: 30 CAPSULE | Refills: 3 | Status: SHIPPED | OUTPATIENT
Start: 2018-07-02 | End: 2019-01-08 | Stop reason: SDUPTHER

## 2018-07-06 ENCOUNTER — CARE COORDINATION (OUTPATIENT)
Dept: CASE MANAGEMENT | Age: 83
End: 2018-07-06

## 2018-08-01 ENCOUNTER — HOSPITAL ENCOUNTER (OUTPATIENT)
Dept: OTHER | Age: 83
Discharge: HOME | End: 2018-08-31
Attending: INTERNAL MEDICINE | Admitting: INTERNAL MEDICINE

## 2018-08-02 ENCOUNTER — HOSPITAL ENCOUNTER (OUTPATIENT)
Dept: GENERAL RADIOLOGY | Age: 83
Discharge: HOME | End: 2018-08-02
Attending: INTERNAL MEDICINE | Admitting: INTERNAL MEDICINE

## 2018-08-02 LAB
ALBUMIN SERPL-MCNC: 4.8 GM/DL (ref 3.4–5)
ALP BLD-CCNC: 47 IU/L (ref 40–128)
ALT SERPL-CCNC: 22 U/L (ref 10–40)
ANION GAP SERPL CALCULATED.3IONS-SCNC: 20 MMOL/L (ref 4–16)
AST SERPL-CCNC: 20 IU/L (ref 15–37)
BASOPHILS ABSOLUTE: 0 K/CU MM
BASOPHILS RELATIVE PERCENT: 0.6 % (ref 0–1)
BILIRUB SERPL-MCNC: 0.7 MG/DL (ref 0–1)
BUN BLDV-MCNC: 17 MG/DL (ref 6–23)
CALCIUM SERPL-MCNC: 9.9 MG/DL (ref 8.3–10.6)
CHLORIDE BLD-SCNC: 102 MMOL/L (ref 99–110)
CHOLESTEROL: 183 MG/DL
CO2: 24 MMOL/L (ref 21–32)
CREAT SERPL-MCNC: 0.8 MG/DL (ref 0.6–1.1)
DIFFERENTIAL TYPE: ABNORMAL
EOSINOPHILS ABSOLUTE: 0.1 K/CU MM
EOSINOPHILS RELATIVE PERCENT: 1.7 % (ref 0–3)
GFR AFRICAN AMERICAN: >60 ML/MIN/1.73M2
GFR NON-AFRICAN AMERICAN: >60 ML/MIN/1.73M2
GLUCOSE BLD-MCNC: 108 MG/DL (ref 70–99)
HCT VFR BLD CALC: 46.8 % (ref 37–47)
HDLC SERPL-MCNC: 69 MG/DL
HEMOGLOBIN: 15 GM/DL (ref 12.5–16)
IMMATURE NEUTROPHIL %: 0.3 % (ref 0–0.43)
LDL CHOLESTEROL DIRECT: 104 MG/DL
LYMPHOCYTES ABSOLUTE: 2.2 K/CU MM
LYMPHOCYTES RELATIVE PERCENT: 33.4 % (ref 24–44)
MCH RBC QN AUTO: 33.8 PG (ref 27–31)
MCHC RBC AUTO-ENTMCNC: 32.1 % (ref 32–36)
MCV RBC AUTO: 105.4 FL (ref 78–100)
MONOCYTES ABSOLUTE: 0.5 K/CU MM
MONOCYTES RELATIVE PERCENT: 7.4 % (ref 0–4)
NUCLEATED RBC %: 0 %
PDW BLD-RTO: 12.5 % (ref 11.7–14.9)
PLATELET # BLD: 304 K/CU MM (ref 140–440)
PMV BLD AUTO: 9 FL (ref 7.5–11.1)
POTASSIUM SERPL-SCNC: 4.4 MMOL/L (ref 3.5–5.1)
RBC # BLD: 4.44 M/CU MM (ref 4.2–5.4)
SEGMENTED NEUTROPHILS ABSOLUTE COUNT: 3.8 K/CU MM
SEGMENTED NEUTROPHILS RELATIVE PERCENT: 56.6 % (ref 36–66)
SODIUM BLD-SCNC: 146 MMOL/L (ref 135–145)
TOTAL IMMATURE NEUTOROPHIL: 0.02 K/CU MM
TOTAL NUCLEATED RBC: 0 K/CU MM
TOTAL PROTEIN: 6.8 GM/DL (ref 6.4–8.2)
TRIGL SERPL-MCNC: 168 MG/DL
WBC # BLD: 6.6 K/CU MM (ref 4–10.5)

## 2018-08-06 ENCOUNTER — TELEPHONE (OUTPATIENT)
Dept: INTERNAL MEDICINE CLINIC | Age: 83
End: 2018-08-06

## 2018-08-06 ENCOUNTER — TELEPHONE (OUTPATIENT)
Dept: CARDIOLOGY CLINIC | Age: 83
End: 2018-08-06

## 2018-08-06 ENCOUNTER — OFFICE VISIT (OUTPATIENT)
Dept: INTERNAL MEDICINE CLINIC | Age: 83
End: 2018-08-06

## 2018-08-06 VITALS
WEIGHT: 131 LBS | BODY MASS INDEX: 24.75 KG/M2 | SYSTOLIC BLOOD PRESSURE: 132 MMHG | HEART RATE: 86 BPM | RESPIRATION RATE: 16 BRPM | DIASTOLIC BLOOD PRESSURE: 80 MMHG | OXYGEN SATURATION: 95 %

## 2018-08-06 DIAGNOSIS — R27.0 ATAXIA: Primary | ICD-10-CM

## 2018-08-06 DIAGNOSIS — R26.89 BALANCE PROBLEM: ICD-10-CM

## 2018-08-06 PROCEDURE — 99214 OFFICE O/P EST MOD 30 MIN: CPT | Performed by: INTERNAL MEDICINE

## 2018-08-06 PROCEDURE — G8427 DOCREV CUR MEDS BY ELIG CLIN: HCPCS | Performed by: INTERNAL MEDICINE

## 2018-08-06 PROCEDURE — 1036F TOBACCO NON-USER: CPT | Performed by: INTERNAL MEDICINE

## 2018-08-06 PROCEDURE — 1123F ACP DISCUSS/DSCN MKR DOCD: CPT | Performed by: INTERNAL MEDICINE

## 2018-08-06 PROCEDURE — 4040F PNEUMOC VAC/ADMIN/RCVD: CPT | Performed by: INTERNAL MEDICINE

## 2018-08-06 PROCEDURE — G8420 CALC BMI NORM PARAMETERS: HCPCS | Performed by: INTERNAL MEDICINE

## 2018-08-06 PROCEDURE — G8400 PT W/DXA NO RESULTS DOC: HCPCS | Performed by: INTERNAL MEDICINE

## 2018-08-06 PROCEDURE — 1090F PRES/ABSN URINE INCON ASSESS: CPT | Performed by: INTERNAL MEDICINE

## 2018-08-06 PROCEDURE — 1101F PT FALLS ASSESS-DOCD LE1/YR: CPT | Performed by: INTERNAL MEDICINE

## 2018-08-06 PROCEDURE — G8598 ASA/ANTIPLAT THER USED: HCPCS | Performed by: INTERNAL MEDICINE

## 2018-08-06 RX ORDER — OMEPRAZOLE 40 MG/1
40 CAPSULE, DELAYED RELEASE ORAL DAILY
Qty: 30 CAPSULE | Refills: 3 | Status: SHIPPED | OUTPATIENT
Start: 2018-08-06 | End: 2019-01-08 | Stop reason: SDUPTHER

## 2018-08-06 NOTE — PROGRESS NOTES
Subjective:      Mónica Oliver is a [de-identified] y.o. female who presents today for follow up on her chronic medical conditions as noted below. Patient Active Problem List:     Depression     Chronic neck pain     Graves' disease     Hyperlipidemia     Hypertension     Colon cancer screening     Breast cancer screening     Cognitive impairment     COPD (chronic obstructive pulmonary disease)     Cerebrovascular accident (CVA) due to vascular stenosis (HCC)     Gait disturbance     Mixed incontinence     Abnormal stress test     S/P CABG (coronary artery bypass graft)     Subclavian artery stenosis, left (HCC)     Numbness and tingling     She was last seen by me in June     She completed cardiac rehab   She has had issues with incontinence and Boyd Blake has given botox in past  The detrol I gave her didn't help and it is stopped  She will see Boyd Blake again for more Botox in late Aug    She saw Cherelle Barron in late June for COPD; no changes made  Uses oxygen at night    These actions were taken last visit:    CUT LASIX TO DAILY     ADD DETROL LA 4 MG DAILY OF BLADDER CONTROL     STOP OMEPRAZOLE , RESTART IF NECESSARY     STAY OFF KLONOPIN     She is fine off klonopin    She needs prilosec and it is restarted for recureent dyspepsia    Patient denies any exertional chest pain, dyspnea, palpitations, syncope, orthopnea, edema or paroxysmal nocturnal dyspnea. The patient denies any new focal symptoms of neurological impairment or TIA's; no amaurosis, diplopia, dysphasia, or unilateral disturbance of motor or sensory function.      Her major problem is ongoing balance trouble  She is using cane and walker    Current Outpatient Prescriptions   Medication Sig Dispense Refill    omeprazole (PRILOSEC) 40 MG delayed release capsule Take 1 capsule by mouth daily 30 capsule 3    DULoxetine (CYMBALTA) 60 MG extended release capsule TAKE ONE (1) CAPSULE ONCE DAILY 30 capsule 3    rosuvastatin (CRESTOR) 5 MG tablet TAKE ONE (1) TABLET Subclavian artery stenosis, left ([address] 06/2017    noted in hosp for CABG; to be dealt with as OP by vasc surgeons    Subclinical Hyperthyroidism 2010    Dr. Skylar Cochran; Tapazole    Urine incontinence 6/2011    Timed voiding; Isidro; urology      Past Surgical History:   Procedure Laterality Date    CARPAL TUNNEL RELEASE      Rt & Lt     CATARACT REMOVAL WITH IMPLANT Right 07/2016    CERVICAL DISCECTOMY  01-    C5-6    CERVICAL LAMINECTOMY  09-    with fusion and instrumentation    CERVICAL SPINE SURGERY  4/2011    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; fusion; fixation    CORONARY ARTERY BYPASS GRAFT  06/2017    4- vessel    ROTATOR CUFF REPAIR  1992    Left    ROTATOR CUFF REPAIR  1998    Right    TOTAL HIP ARTHROPLASTY Left 06-012008    Left         Social History   Substance Use Topics    Smoking status: Former Smoker     Packs/day: 0.50     Years: 30.00     Types: Cigarettes     Start date: 12/16/1957     Quit date: 1/25/1987    Smokeless tobacco: Never Used    Alcohol use Yes      Comment: occ        ROS: The patient has had no headache, sore throat, fever or chills, cough, dyspnea, chest pain, nausea, vomiting or diarrhea, or edema. Objective:      /80   Pulse 86   Resp 16   Wt 131 lb (59.4 kg)   SpO2 95%   BMI 24.75 kg/m²    General: in no apparent distress   The patient's neck is free of nodes. Lungs are clear. Heart is normal in rate and regular in rhythm. Legs are free of edema. No rash or erythema. aug lab on chart and rev'd and umremarkable    Assessment / Plan:      1. Ataxia    2.  Balance problem            Plan   Get B12 today  Refer for MRI brain for NPH:  (ataxia and urine incont)  Refer to DC[facility], neuro for eval  PT for balance therapy  Add back prilosec 40/d  F/u Suze Mooers for botox  RTC 2 mo  Orders Placed This Encounter   Procedures    MRI Brain W WO Contrast    Vitamin B12 & Folate    Ambulatory referral to Physical Therapy    External Referral To Neurology

## 2018-08-07 LAB
FOLATE: 11.74 NG/ML (ref 4.78–24.2)
VITAMIN B-12: 290 PG/ML (ref 211–911)

## 2018-08-08 ENCOUNTER — NURSE ONLY (OUTPATIENT)
Dept: INTERNAL MEDICINE CLINIC | Age: 83
End: 2018-08-08

## 2018-08-08 VITALS
DIASTOLIC BLOOD PRESSURE: 70 MMHG | HEART RATE: 89 BPM | SYSTOLIC BLOOD PRESSURE: 108 MMHG | OXYGEN SATURATION: 93 % | RESPIRATION RATE: 16 BRPM

## 2018-08-08 DIAGNOSIS — E53.8 VITAMIN B12 DEFICIENCY: Primary | ICD-10-CM

## 2018-08-08 PROCEDURE — 96372 THER/PROPH/DIAG INJ SC/IM: CPT | Performed by: INTERNAL MEDICINE

## 2018-08-08 RX ORDER — CYANOCOBALAMIN 1000 UG/ML
1000 INJECTION INTRAMUSCULAR; SUBCUTANEOUS ONCE
Status: COMPLETED | OUTPATIENT
Start: 2018-08-08 | End: 2018-08-08

## 2018-08-08 RX ADMIN — CYANOCOBALAMIN 1000 MCG: 1000 INJECTION INTRAMUSCULAR; SUBCUTANEOUS at 09:52

## 2018-08-08 NOTE — PROGRESS NOTES
Patient is here today for Cyanocobalamin injection 1000mcg/ml  1 ml given into left deltoid IM. Patient tolerated well.

## 2018-08-13 ENCOUNTER — NURSE ONLY (OUTPATIENT)
Dept: INTERNAL MEDICINE CLINIC | Age: 83
End: 2018-08-13

## 2018-08-13 VITALS
RESPIRATION RATE: 16 BRPM | SYSTOLIC BLOOD PRESSURE: 120 MMHG | HEART RATE: 76 BPM | DIASTOLIC BLOOD PRESSURE: 60 MMHG | OXYGEN SATURATION: 94 %

## 2018-08-13 DIAGNOSIS — E53.8 B12 DEFICIENCY: Primary | ICD-10-CM

## 2018-08-13 PROCEDURE — 96372 THER/PROPH/DIAG INJ SC/IM: CPT | Performed by: INTERNAL MEDICINE

## 2018-08-13 RX ORDER — CYANOCOBALAMIN 1000 UG/ML
1000 INJECTION INTRAMUSCULAR; SUBCUTANEOUS ONCE
Status: COMPLETED | OUTPATIENT
Start: 2018-08-13 | End: 2018-08-13

## 2018-08-13 RX ADMIN — CYANOCOBALAMIN 1000 MCG: 1000 INJECTION INTRAMUSCULAR; SUBCUTANEOUS at 09:12

## 2018-08-14 ENCOUNTER — HOSPITAL ENCOUNTER (OUTPATIENT)
Dept: MRI IMAGING | Age: 83
Discharge: HOME | End: 2018-08-14
Attending: INTERNAL MEDICINE | Admitting: INTERNAL MEDICINE

## 2018-08-14 DIAGNOSIS — R26.89 BALANCE PROBLEM: ICD-10-CM

## 2018-08-14 DIAGNOSIS — R27.0 ATAXIA: ICD-10-CM

## 2018-08-20 ENCOUNTER — NURSE ONLY (OUTPATIENT)
Dept: INTERNAL MEDICINE CLINIC | Age: 83
End: 2018-08-20

## 2018-08-20 VITALS
RESPIRATION RATE: 16 BRPM | HEART RATE: 85 BPM | DIASTOLIC BLOOD PRESSURE: 70 MMHG | OXYGEN SATURATION: 96 % | SYSTOLIC BLOOD PRESSURE: 112 MMHG

## 2018-08-20 DIAGNOSIS — E53.8 VITAMIN B 12 DEFICIENCY: Primary | ICD-10-CM

## 2018-08-20 PROCEDURE — 96372 THER/PROPH/DIAG INJ SC/IM: CPT | Performed by: INTERNAL MEDICINE

## 2018-08-20 RX ORDER — CARVEDILOL 12.5 MG/1
TABLET ORAL
Qty: 60 TABLET | Refills: 1 | Status: SHIPPED | OUTPATIENT
Start: 2018-08-20 | End: 2018-11-29 | Stop reason: SDUPTHER

## 2018-08-20 RX ORDER — CYANOCOBALAMIN 1000 UG/ML
1000 INJECTION INTRAMUSCULAR; SUBCUTANEOUS ONCE
Status: COMPLETED | OUTPATIENT
Start: 2018-08-20 | End: 2018-08-20

## 2018-08-20 RX ADMIN — CYANOCOBALAMIN 1000 MCG: 1000 INJECTION INTRAMUSCULAR; SUBCUTANEOUS at 09:09

## 2018-08-22 ENCOUNTER — HOSPITAL ENCOUNTER (OUTPATIENT)
Dept: PHYSICAL THERAPY | Age: 83
Discharge: HOME | End: 2018-08-31
Attending: INTERNAL MEDICINE | Admitting: INTERNAL MEDICINE

## 2018-08-22 ASSESSMENT — PAIN DESCRIPTION - DESCRIPTORS: DESCRIPTORS: RADIATING

## 2018-08-22 ASSESSMENT — PAIN SCALES - GENERAL: PAINLEVEL_OUTOF10: 6

## 2018-08-22 ASSESSMENT — PAIN DESCRIPTION - ORIENTATION: ORIENTATION: LEFT

## 2018-08-22 ASSESSMENT — PAIN DESCRIPTION - FREQUENCY: FREQUENCY: CONTINUOUS

## 2018-08-22 ASSESSMENT — PAIN DESCRIPTION - DIRECTION: RADIATING_TOWARDS: LEFT SHOULDER/ELBOW

## 2018-08-22 ASSESSMENT — PAIN DESCRIPTION - LOCATION: LOCATION: NECK

## 2018-08-22 ASSESSMENT — PAIN DESCRIPTION - PAIN TYPE: TYPE: CHRONIC PAIN

## 2018-08-22 NOTE — FLOWSHEET NOTE
Physical Therapy Daily Treatment Note  Date:  2018    Patient Name:  Tiffani Gresham    :  1935  MRN: [id number]  Other position/activity restrictions: High fall risk - use gait belt  DX:  Ataxia, balance problems  Treatment Diagnosis: dec balance, B LE weakness, deconditioning  PT Insurance Information: Medicare  -  G-coded  Referring Practitioner: Devyn Fitzgerald  Referring Practitioner Follow-Up:   Unknown  POC Signed: pending  POC Date Range:  18 - 10/17/18  Progress Note Due:  Every 10 visits w/goal documentation  Visit# / total visits:                    G-Code Selection: (On Eval and every 10th visit or Discharge)    MEASURE    [x] Mobility: Walking and Moving Around     [x] Current ()   [x] Goal ()   [] DC ()  [] Changing/Maintaining Body Position     [] Current (3373)      [] Goal ()   [] DC ()  [] Carrying / Moving / Handling Objects     [] Current ()   [] Goal ()   [] DC ()  [] Self-Care     [] Current ()   [] Goal ()   [] DC ()  [] Other PT/OT primary DX     [] Current ()   [] Goal ()   [] DC ()    SEVERITY    CURRENT - EVAL GOAL  DISCHARGE   [] CH (0% Impaired, Indep.)  [] CI (1-19% Impaired, SBA-CGA)  [] CJ (20-39% Impaired, MIN A)  [] CK  (40-59% Impairment, Mod A)  [x] CL  (60-79% Impairment, Max A)  [] CM  (80-99% Impairment, Dep.)   [] CN  (100% Impairment, Tot Dep.) [] CH (0% Impaired, Indep.)  [] CI (1-19% Impaired, SBA-CGA)  [x] CJ (20-39% Impaired, MIN A)  [] CK  (40-59% Impairment, Mod A)  [] CL  (60-79% Impairment, Max A)  [] CM  (80-99% Impairment, Dep.)   [] CN  (100% Impairment, Tot Dep.)  [] CH (0% Impaired, Indep.)  [] CI (1-19% Impaired, SBA-CGA)  [] CJ (20-39% Impaired, MIN A)  [] CK  (40-59% Impairment, Mod A)  [] CL  (60-79% Impairment, Max A)  [] CM  (80-99% Impairment, Dep.)   [] CN  (100% Impairment, Tot Dep.)            Goals:     Long term goals  Time Frame for Long term goals : In 8 weeks, patient will  Long term goal 1: demonstrate compliance and SBA for HEP. Long term goal 2: score 19 or better on Tinetti Balance/Gait assessment tool as indication of decreased risk of falls. Long term goal 3: perform all appropriate facility transfers w/supervision, using chair arms prn to complete the task safely. Long term goal 4: ambulate facility surfaces >= 200 feet, using appropriate AD, w/supervision. Long term goal 5: ambulate community surfaces >= 200 feet, using appropriate AD, w/close supervision and cues for safety prn. Summary of Eval:   Pt is an [de-identified] y.o. female w/DX balance problem/ataxia. Patient and spouse report a decline in mobility, balance, strength and activity over the last year. Over recent years, has had significant medical issues (CVAs/pneumonia.)  Currently, patient is ambulating w/a SPC over the last 3 years off/on, however recently uses it consistently and ocassionally uses a RW. Spouse assist w/mobility (transfers sometimes and gait), showers. Patient toilets self, self-feed self. Patient is unable to perform any household tasks other than fold clothes. Balance and mobility deficits appear to be multi-factorial; hx CVAs, c-spine ROM limitations, LE weakness, impaired safety awareness, deconditioning. INITIAL PAIN LEVEL: 6 /10;  Left posterior neck    SUBJECTIVE:  See summary of eval above. Any changes to Ambulatory Summary Sheet? Any major status changes? No/No    ACTIVITIES: Date 8/22/18 Date Date   Outcomes Measure Tinetti 10/28  64.3%     THER EX                                    MANUAL                                    NEURO RE-ED                                     THER ACT Therapeutic Activity Training:   Therapeutic activity training was instructed today. Cues were given for safety, sequence, UE/LE placement, awareness, and balance. Activities performed today included  sup-sit, sit-stand, SPT.   vc especially for complete rise from chair w/ leaning LEs against chair. Also, vc to turn fully and back to chair prior to sit; use UEs to reach back to chair. GAIT amb 100 feet x 2 using SPC and CGA x 1. Pt w/wide YUNIOR, tendency for posterior LOB, inconsistent foot placement and step length. Gait Training:  Cues were given for safety, sequence, device management, balance, posture, awareness, path.                                    MODALITIES                          HEP: To be developed     Supervision/Cues:  CGA gait     Objectives: See eval     Response to intervention: fatigue     Post Tx Pain Rating: No change     Overall change since Evaluation: N/A     Prognosis: Fair to Good     Plan for Next Session: C-spine STM and gentle ROM  Nu-Step       Plan:  _2_x/week x _8_ weeks    Intervention used today:  [] Therapeutic Exercise    [x] Therapeutic Activity     [] Ultrasound  [] Elec  Stim  [x] Gait Training      [] Cervical Traction [] Lumbar Traction  [] Neuromuscular Re-education    [] Cold/hotpack [] Iontophoresis   [] Instruction in HEP      [] Vasopneumatic     [] Manual Therapy               [] Self care home management                    (    ) Dry needling    Time In: 0906  Time CPY:4274  Timed Code Treatment Minutes:  28  Total Treatment Minutes:  58    Electronically signed by:  Thomas Michael, PT 8/22/2018, 3:29 PM

## 2018-08-24 ENCOUNTER — OFFICE VISIT (OUTPATIENT)
Dept: CARDIOLOGY CLINIC | Age: 83
End: 2018-08-24

## 2018-08-24 VITALS
SYSTOLIC BLOOD PRESSURE: 126 MMHG | HEART RATE: 68 BPM | BODY MASS INDEX: 25.19 KG/M2 | DIASTOLIC BLOOD PRESSURE: 84 MMHG | WEIGHT: 133.4 LBS | HEIGHT: 61 IN

## 2018-08-24 DIAGNOSIS — E78.1 PURE HYPERGLYCERIDEMIA: ICD-10-CM

## 2018-08-24 DIAGNOSIS — I10 ESSENTIAL HYPERTENSION: Primary | ICD-10-CM

## 2018-08-24 DIAGNOSIS — Z95.1 S/P CABG (CORONARY ARTERY BYPASS GRAFT): ICD-10-CM

## 2018-08-24 PROCEDURE — 99214 OFFICE O/P EST MOD 30 MIN: CPT | Performed by: NURSE PRACTITIONER

## 2018-08-24 RX ORDER — SULFAMETHOXAZOLE AND TRIMETHOPRIM 800; 160 MG/1; MG/1
1 TABLET ORAL 2 TIMES DAILY
Status: ON HOLD | COMMUNITY
End: 2018-09-07

## 2018-08-24 NOTE — PROGRESS NOTES
ARTERY BYPASS GRAFT  06/2017    4- vessel    ROTATOR CUFF REPAIR  1992    Left    ROTATOR CUFF REPAIR  1998    Right    TOTAL HIP ARTHROPLASTY Left [id number]    Left     Family History   Problem Relation Age of Onset    Diabetes Mother     Cancer Father 76        colon cancer    Diabetes Other         [address]    High Blood Pressure Other         [address]    Stroke Other         [address]    Cancer Other         [address] colon ca     Social History   Substance Use Topics    Smoking status: Former Smoker     Packs/day: 0.50     Years: 30.00     Types: Cigarettes     Start date: 12/16/1957     Quit date: 1/25/1987    Smokeless tobacco: Never Used    Alcohol use Yes      Comment: occ        Review of Systems:   · Constitutional: No Fever or Weight Loss   · Eyes: No Decreased Vision  · ENT: No Headaches, Hearing Loss or Vertigo  · Cardiovascular: as per note above   · Respiratory: No cough or wheezing and as per note above. · Gastrointestinal: No abdominal pain, appetite loss, blood in stools, constipation, diarrhea or heartburn  · Genitourinary: No dysuria, trouble voiding, or hematuria  · Musculoskeletal:  None  · Integumentary: No rash or pruritis  · Neurological: No TIA or stroke symptoms  · Psychiatric: No anxiety or depression  · Endocrine: No malaise, fatigue or temperature intolerance  · Hematologic/Lymphatic: No bleeding problems, blood clots or swollen lymph nodes  · Allergic/Immunologic: No nasal congestion or hives    Objective:      Physical Exam:  /84   Pulse 68   Ht 5' 1\" (1.549 m)   Wt 133 lb 6.4 oz (60.5 kg)   BMI 25.21 kg/m²   Wt Readings from Last 3 Encounters:   08/24/18 133 lb 6.4 oz (60.5 kg)   08/06/18 131 lb (59.4 kg)   06/26/18 131 lb (59.4 kg)     Body mass index is 25.21 kg/m². GENERAL - Alert, oriented, pleasant, in no apparent distress. Head unremarkable  Eyes - Not injected conjunctiva  ENT  normal mucosa  Neck - Supple. No jugular venous distention noted. No carotid bruits. Cardiovascular  Normal S1 and S2 No murmur appreciated, No gallop. Extremities - No cyanosis, clubbing, trace edema. Pulmonary  No respiratory distress. No wheezes or rales. Chest is clear and diminished in bases. Pulses: Bilateral radial and pedal pulses normal  Abdomen  no tenderness  Musculoskeletal  normal strength  Neurologic  There are no gross focal neurologic abnormalities. Skin-  No rash  Affect- normal mood    DATA:  Lab Results   Component Value Date    TROPONINI <0.006 02/25/2013     BNP:    Lab Results   Component Value Date    BNP 44 02/25/2013     PT/INR:  No results found for: PTINR  Lab Results   Component Value Date    LABA1C 6.2 06/01/2018    LABA1C 5.5 08/21/2017     Lab Results   Component Value Date    CHOL 183 08/02/2018    TRIG 168 (H) 08/02/2018    HDL 69 08/02/2018    LDLCALC 198 (H) 12/07/2016    LDLDIRECT 104 (H) 08/02/2018     Lab Results   Component Value Date    ALT 22 08/02/2018    AST 20 08/02/2018     TSH:    Lab Results   Component Value Date    TSH 0.87 12/07/2016       Assessment/ Plan:     Patient seen,  interviewed and examined     CAD    S/p CABG     Stable continue with medications  On beta blocker, ASA, plavix and statin    HTN    Controlled  advised low salt diet  To cont same medications  Echo 04/2018  Technically difficult examination. Patient is on ventilator.   Left ventricular function is hyperdynamic with small size ventricle, EF >   60%   moderate to severe concentric left ventricular hypertrophy.   Grade II diastolic dysfunction.   Right ventricular systolic pressure of 32 mm Hg.   Mild tricuspid regurgitation.   No evidence of any pericardial effusion. Hyperlipidemia   Labs noted from 08/02/2018  HDL 69    Does not tolerate man statins  Taking Crestor 5 mg daily  Continue.         TIA   Some confusion and memory loss at times  secondary prevention with asa statin     Patient is encouraged to exercise even a brisk walk for 30 minutes at

## 2018-09-01 ENCOUNTER — HOSPITAL ENCOUNTER (OUTPATIENT)
Dept: OTHER | Age: 83
Discharge: HOME | End: 2018-09-01
Attending: INTERNAL MEDICINE | Admitting: INTERNAL MEDICINE

## 2018-09-01 ENCOUNTER — HOSPITAL ENCOUNTER (OUTPATIENT)
Dept: OTHER | Age: 83
Discharge: HOME | End: 2018-09-10
Attending: INTERNAL MEDICINE | Admitting: INTERNAL MEDICINE

## 2018-09-07 PROBLEM — J96.00 ACUTE RESPIRATORY FAILURE (HCC): Status: ACTIVE | Noted: 2018-09-07

## 2018-09-07 PROBLEM — J96.01 ACUTE RESPIRATORY FAILURE WITH HYPOXIA (HCC): Status: ACTIVE | Noted: 2018-09-07

## 2018-09-13 ENCOUNTER — CARE COORDINATION (OUTPATIENT)
Dept: CASE MANAGEMENT | Age: 83
End: 2018-09-13

## 2018-09-20 ENCOUNTER — CARE COORDINATION (OUTPATIENT)
Dept: CASE MANAGEMENT | Age: 83
End: 2018-09-20

## 2018-09-26 ENCOUNTER — CARE COORDINATION (OUTPATIENT)
Dept: CASE MANAGEMENT | Age: 83
End: 2018-09-26

## 2018-10-03 ENCOUNTER — CARE COORDINATION (OUTPATIENT)
Dept: CASE MANAGEMENT | Age: 83
End: 2018-10-03

## 2018-10-10 ENCOUNTER — TELEPHONE (OUTPATIENT)
Dept: INTERNAL MEDICINE CLINIC | Age: 83
End: 2018-10-10

## 2018-10-10 NOTE — TELEPHONE ENCOUNTER
[facility] [place] states that [facility] and she needs a home care services.

## 2018-10-12 ENCOUNTER — CARE COORDINATION (OUTPATIENT)
Dept: CARE COORDINATION | Age: 83
End: 2018-10-12

## 2018-10-12 NOTE — CARE COORDINATION
[facility] and spoke to Stanley Sidhu. Patient discharged to home 10/12/2018. Per Theodore with F/U.     Lg Mcknight LPN  Care Transitions

## 2018-10-15 ENCOUNTER — CARE COORDINATION (OUTPATIENT)
Dept: CASE MANAGEMENT | Age: 83
End: 2018-10-15

## 2018-10-19 RX ORDER — SULFAMETHOXAZOLE AND TRIMETHOPRIM 800; 160 MG/1; MG/1
1 TABLET ORAL 2 TIMES DAILY
Qty: 10 TABLET | Refills: 0 | Status: SHIPPED | OUTPATIENT
Start: 2018-10-19 | End: 2018-10-24

## 2018-10-19 NOTE — TELEPHONE ENCOUNTER
Home care nurse called that patient having a urinary tract infection issue- having discomfort on urination, no temp. Please advise.

## 2018-10-26 ENCOUNTER — TELEPHONE (OUTPATIENT)
Dept: CARDIOLOGY CLINIC | Age: 83
End: 2018-10-26

## 2018-10-29 ENCOUNTER — OFFICE VISIT (OUTPATIENT)
Dept: CARDIOLOGY CLINIC | Age: 83
End: 2018-10-29
Payer: MEDICARE

## 2018-10-29 VITALS
HEART RATE: 84 BPM | DIASTOLIC BLOOD PRESSURE: 82 MMHG | SYSTOLIC BLOOD PRESSURE: 130 MMHG | WEIGHT: 132.8 LBS | BODY MASS INDEX: 25.07 KG/M2 | HEIGHT: 61 IN

## 2018-10-29 DIAGNOSIS — I25.10 CORONARY ARTERY DISEASE INVOLVING NATIVE CORONARY ARTERY OF NATIVE HEART WITHOUT ANGINA PECTORIS: ICD-10-CM

## 2018-10-29 DIAGNOSIS — I10 ESSENTIAL HYPERTENSION: ICD-10-CM

## 2018-10-29 DIAGNOSIS — E78.5 HYPERLIPIDEMIA, UNSPECIFIED HYPERLIPIDEMIA TYPE: ICD-10-CM

## 2018-10-29 DIAGNOSIS — Z01.810 PREOP CARDIOVASCULAR EXAM: Primary | ICD-10-CM

## 2018-10-29 PROCEDURE — 1090F PRES/ABSN URINE INCON ASSESS: CPT | Performed by: INTERNAL MEDICINE

## 2018-10-29 PROCEDURE — 1101F PT FALLS ASSESS-DOCD LE1/YR: CPT | Performed by: INTERNAL MEDICINE

## 2018-10-29 PROCEDURE — G8598 ASA/ANTIPLAT THER USED: HCPCS | Performed by: INTERNAL MEDICINE

## 2018-10-29 PROCEDURE — G8419 CALC BMI OUT NRM PARAM NOF/U: HCPCS | Performed by: INTERNAL MEDICINE

## 2018-10-29 PROCEDURE — 4040F PNEUMOC VAC/ADMIN/RCVD: CPT | Performed by: INTERNAL MEDICINE

## 2018-10-29 PROCEDURE — 1036F TOBACCO NON-USER: CPT | Performed by: INTERNAL MEDICINE

## 2018-10-29 PROCEDURE — 1123F ACP DISCUSS/DSCN MKR DOCD: CPT | Performed by: INTERNAL MEDICINE

## 2018-10-29 PROCEDURE — G8427 DOCREV CUR MEDS BY ELIG CLIN: HCPCS | Performed by: INTERNAL MEDICINE

## 2018-10-29 PROCEDURE — G8484 FLU IMMUNIZE NO ADMIN: HCPCS | Performed by: INTERNAL MEDICINE

## 2018-10-29 PROCEDURE — G8400 PT W/DXA NO RESULTS DOC: HCPCS | Performed by: INTERNAL MEDICINE

## 2018-10-29 PROCEDURE — 99214 OFFICE O/P EST MOD 30 MIN: CPT | Performed by: INTERNAL MEDICINE

## 2018-10-29 RX ORDER — MEMANTINE HYDROCHLORIDE 5 MG-10 MG
KIT ORAL SEE ADMIN INSTRUCTIONS
COMMUNITY
End: 2019-03-19

## 2018-10-29 RX ORDER — ACETAMINOPHEN 325 MG/1
650 TABLET ORAL EVERY 6 HOURS PRN
COMMUNITY
End: 2018-11-13 | Stop reason: ALTCHOICE

## 2018-10-29 NOTE — PROGRESS NOTES
60 MG extended release capsule TAKE ONE (1) CAPSULE ONCE DAILY 30 capsule 3    rosuvastatin (CRESTOR) 5 MG tablet TAKE ONE (1) TABLET BY MOUTH EACH EVENING 30 tablet 3    theophylline (BROOKE-24) 200 MG extended release capsule Take 1 capsule by mouth daily 30 capsule 3    furosemide (LASIX) 20 MG tablet Take 20 mg by mouth daily      potassium chloride (KLOR-CON M) 10 MEQ extended release tablet Take 1 tablet by mouth daily 60 tablet 3    OXYGEN Inhale 2 L/min into the lungs nightly       budesonide-formoterol (SYMBICORT) 160-4.5 MCG/ACT AERO Inhale 2 puffs into the lungs 2 times daily 3 Inhaler 2    aspirin 81 MG chewable tablet Take 1 tablet by mouth daily 30 tablet 3     No current facility-administered medications for this visit. Allergies: Cipro xr; Demerol; Moxifloxacin; Statins;  Lipitor [atorvastatin]; and Wellbutrin [bupropion]  Past Medical History:   Diagnosis Date    Acute ischemic [facility] [address] 2008    Colonoscopy done    CAD (coronary artery disease) 06/2017    4 vessel CABG    Cerebrovascular event ([facility] [address] 02/2016    balance, speech, leg weakness; ARU    Chronic neck pain     Dr Mitzi Olson 2/2011    Cognitive impairment 09/2011    COPD (chronic obstructive pulmonary disease) ([facility] [address] 3/2013    Moderately severe by PFTs    Depression     Diverticulitis 5/2012    Family history of colon cancer     Family history of diabetes mellitus     Fibromyalgia 1989    Graves disease 2012    Gloria Perches; tapazole for a time    History of exercise stress test 03/06/2018    treadmill    Hyperlipidemia     Hypertension     Lumbar spinal stenosis 11/2015    moderate ; MRI by Dr Xiomara Trujillo Nocturnal oxygen desaturation 2015    Obstructive sleep apnea     Osteoporosis     Prediabetes 2012    hgb 6.5    S/P CABG (coronary artery bypass graft) 06/2017    4 vessel-CABG EVH RCA Marginal, OM1, OM2,  LIMA to LAD    S/P cardiac catheterization 06/07/2017    severe multivessel disease    Subclavian artery stenosis, left ([facility] [address] 06/2017    noted in hosp for CABG; to be dealt with as OP by vas surgeons    Subclinical Hyperthyroidism 2010    Dr. Alisa Shannon;  Tapazole    Urine incontinence 6/2011    Timed voiding; Isidro; urology     Past Surgical History:   Procedure Laterality Date    CARPAL TUNNEL RELEASE      Rt & Lt     CATARACT REMOVAL WITH IMPLANT Right 07/2016    CERVICAL DISCECTOMY  01-    C5-6    CERVICAL LAMINECTOMY  09-    with fusion and instrumentation    CERVICAL SPINE SURGERY  4/2011    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; fusion; fixation    CORONARY ARTERY BYPASS GRAFT  06/2017    4- vessel    ROTATOR CUFF REPAIR  1992    Left    ROTATOR CUFF REPAIR  1998    Right    TOTAL HIP ARTHROPLASTY Left [id number]    Left     Family History   Problem Relation Age of Onset    Diabetes Mother     Cancer Father 76        colon cancer    Diabetes Other         [address]    High Blood Pressure Other         [address]    Stroke Other         [address]    Cancer Other         [address] colon ca     Social History   Substance Use Topics    Smoking status: Former Smoker     Packs/day: 0.50     Years: 30.00     Types: Cigarettes     Start date: 12/16/1957     Quit date: 1/25/1987    Smokeless tobacco: Never Used    Alcohol use No      @CHA@  Review of Systems:   · Constitutional: No Fever or Weight Loss   · Eyes: No Decreased Vision  · ENT: No Headaches, Hearing Loss or Vertigo  · Cardiovascular: No chest pain, dyspnea on exertion, palpitations or loss of consciousness  · Respiratory: No cough or wheezing    · Gastrointestinal: No abdominal pain, appetite loss, blood in stools, constipation, diarrhea or heartburn  · Genitourinary: No dysuria, trouble voiding, or hematuria  · Musculoskeletal:  No gait disturbance, weakness or joint complaints  · Integumentary: No rash or pruritis  · Neurological: No TIA or stroke symptoms  · Psychiatric: No anxiety or depression  · Endocrine: No malaise, fatigue or temperature

## 2018-11-07 ENCOUNTER — INITIAL CONSULT (OUTPATIENT)
Dept: PULMONOLOGY | Age: 83
End: 2018-11-07
Payer: MEDICARE

## 2018-11-07 VITALS
HEART RATE: 77 BPM | BODY MASS INDEX: 24.92 KG/M2 | RESPIRATION RATE: 18 BRPM | OXYGEN SATURATION: 97 % | SYSTOLIC BLOOD PRESSURE: 120 MMHG | HEIGHT: 61 IN | DIASTOLIC BLOOD PRESSURE: 70 MMHG | WEIGHT: 132 LBS

## 2018-11-07 DIAGNOSIS — R06.02 SHORTNESS OF BREATH: ICD-10-CM

## 2018-11-07 DIAGNOSIS — G47.34 NOCTURNAL HYPOXEMIA: ICD-10-CM

## 2018-11-07 DIAGNOSIS — J44.9 COPD, SEVERE (HCC): Primary | ICD-10-CM

## 2018-11-07 PROCEDURE — G8926 SPIRO NO PERF OR DOC: HCPCS | Performed by: INTERNAL MEDICINE

## 2018-11-07 PROCEDURE — 1101F PT FALLS ASSESS-DOCD LE1/YR: CPT | Performed by: INTERNAL MEDICINE

## 2018-11-07 PROCEDURE — 4040F PNEUMOC VAC/ADMIN/RCVD: CPT | Performed by: INTERNAL MEDICINE

## 2018-11-07 PROCEDURE — G8484 FLU IMMUNIZE NO ADMIN: HCPCS | Performed by: INTERNAL MEDICINE

## 2018-11-07 PROCEDURE — 1123F ACP DISCUSS/DSCN MKR DOCD: CPT | Performed by: INTERNAL MEDICINE

## 2018-11-07 PROCEDURE — G8598 ASA/ANTIPLAT THER USED: HCPCS | Performed by: INTERNAL MEDICINE

## 2018-11-07 PROCEDURE — G8400 PT W/DXA NO RESULTS DOC: HCPCS | Performed by: INTERNAL MEDICINE

## 2018-11-07 PROCEDURE — 3023F SPIROM DOC REV: CPT | Performed by: INTERNAL MEDICINE

## 2018-11-07 PROCEDURE — 1090F PRES/ABSN URINE INCON ASSESS: CPT | Performed by: INTERNAL MEDICINE

## 2018-11-07 PROCEDURE — G8420 CALC BMI NORM PARAMETERS: HCPCS | Performed by: INTERNAL MEDICINE

## 2018-11-07 PROCEDURE — 1036F TOBACCO NON-USER: CPT | Performed by: INTERNAL MEDICINE

## 2018-11-07 PROCEDURE — G8427 DOCREV CUR MEDS BY ELIG CLIN: HCPCS | Performed by: INTERNAL MEDICINE

## 2018-11-07 PROCEDURE — 99203 OFFICE O/P NEW LOW 30 MIN: CPT | Performed by: INTERNAL MEDICINE

## 2018-11-07 RX ORDER — ALBUTEROL SULFATE 2.5 MG/3ML
2.5 SOLUTION RESPIRATORY (INHALATION) EVERY 6 HOURS PRN
Qty: 120 EACH | Refills: 3 | Status: SHIPPED | OUTPATIENT
Start: 2018-11-07 | End: 2018-11-29

## 2018-11-07 RX ORDER — ALBUTEROL SULFATE 90 UG/1
2 AEROSOL, METERED RESPIRATORY (INHALATION) EVERY 6 HOURS PRN
Qty: 1 INHALER | Refills: 11 | Status: SHIPPED | OUTPATIENT
Start: 2018-11-07 | End: 2020-02-25 | Stop reason: SDUPTHER

## 2018-11-07 NOTE — PROGRESS NOTES
6. 5    S/P CABG (coronary artery bypass graft) 06/2017    4 vessel-CABG EVH RCA Marginal, OM1, OM2,  LIMA to LAD    S/P cardiac catheterization 06/07/2017    severe multivessel disease    Shortness of breath 11/7/2018    Subclavian artery stenosis, left ([address] 06/2017    noted in hosp for CABG; to be dealt with as OP by [facility] surgeons    Subclinical Hyperthyroidism 2010    Dr. Macias Senior; Tapazole    Urine incontinence 6/2011    Timed voiding; Kegel; urology       Current Medications:    No current facility-administered medications for this visit. Allergies   Allergen Reactions    Cipro Xr     Demerol     Moxifloxacin Other (See Comments)     Phlebitis, urticaria    Statins      Myalgia      Lipitor [Atorvastatin] Other (See Comments)     myalgia    Wellbutrin [Bupropion] Other (See Comments)     \"zonked\"       Social History:    Social History     Social History    Marital status:      Spouse name: N/A    Number of children: 2    Years of education: 15     Occupational History    retired      Social History Main Topics    Smoking status: Former Smoker     Packs/day: 0.50     Years: 30.00     Types: Cigarettes     Start date: 12/16/1957     Quit date: 1/25/1987    Smokeless tobacco: Never Used    Alcohol use No    Drug use: No    Sexual activity: Not Currently     Partners: Male     Other Topics Concern    None     Social History Narrative    None       Family History:    Family History   Problem Relation Age of Onset    Diabetes Mother     Cancer Father 76        colon cancer    Diabetes Other         [address]    High Blood Pressure Other         [address]    Stroke Other         [address]    Cancer Other         [address] colon ca         REVIEW OF SYSTEMS:    CONSTITUTIONAL:  negative for fevers, chills, diaphoresis, activity change, appetite change, night sweats and unexpected weight change.    HEENT:  negative for hearing loss,  sinus pressure, nasal congestion, epistaxis   RESPIRATORY:  See

## 2018-11-13 ENCOUNTER — OFFICE VISIT (OUTPATIENT)
Dept: INTERNAL MEDICINE CLINIC | Age: 83
End: 2018-11-13
Payer: MEDICARE

## 2018-11-13 VITALS
RESPIRATION RATE: 16 BRPM | OXYGEN SATURATION: 98 % | WEIGHT: 135 LBS | HEART RATE: 73 BPM | SYSTOLIC BLOOD PRESSURE: 108 MMHG | DIASTOLIC BLOOD PRESSURE: 60 MMHG | BODY MASS INDEX: 25.51 KG/M2

## 2018-11-13 DIAGNOSIS — E09.9 STEROID-INDUCED DIABETES (HCC): Primary | ICD-10-CM

## 2018-11-13 DIAGNOSIS — T38.0X5A STEROID-INDUCED DIABETES (HCC): Primary | ICD-10-CM

## 2018-11-13 DIAGNOSIS — Z23 NEED FOR IMMUNIZATION AGAINST INFLUENZA: ICD-10-CM

## 2018-11-13 DIAGNOSIS — R73.9 ELEVATED BLOOD SUGAR: ICD-10-CM

## 2018-11-13 DIAGNOSIS — J44.9 COPD, SEVERE (HCC): ICD-10-CM

## 2018-11-13 PROBLEM — R20.0 NUMBNESS AND TINGLING: Status: RESOLVED | Noted: 2018-04-03 | Resolved: 2018-11-13

## 2018-11-13 PROBLEM — R06.02 SHORTNESS OF BREATH: Status: RESOLVED | Noted: 2018-11-07 | Resolved: 2018-11-13

## 2018-11-13 PROBLEM — J96.01 ACUTE RESPIRATORY FAILURE WITH HYPOXIA (HCC): Status: RESOLVED | Noted: 2018-09-07 | Resolved: 2018-11-13

## 2018-11-13 PROBLEM — R20.2 NUMBNESS AND TINGLING: Status: RESOLVED | Noted: 2018-04-03 | Resolved: 2018-11-13

## 2018-11-13 PROBLEM — J96.00 ACUTE RESPIRATORY FAILURE (HCC): Status: RESOLVED | Noted: 2018-09-07 | Resolved: 2018-11-13

## 2018-11-13 LAB — GLUCOSE BLD-MCNC: 151 MG/DL

## 2018-11-13 PROCEDURE — G0008 ADMIN INFLUENZA VIRUS VAC: HCPCS | Performed by: INTERNAL MEDICINE

## 2018-11-13 PROCEDURE — 4040F PNEUMOC VAC/ADMIN/RCVD: CPT | Performed by: INTERNAL MEDICINE

## 2018-11-13 PROCEDURE — G8419 CALC BMI OUT NRM PARAM NOF/U: HCPCS | Performed by: INTERNAL MEDICINE

## 2018-11-13 PROCEDURE — 3023F SPIROM DOC REV: CPT | Performed by: INTERNAL MEDICINE

## 2018-11-13 PROCEDURE — 1123F ACP DISCUSS/DSCN MKR DOCD: CPT | Performed by: INTERNAL MEDICINE

## 2018-11-13 PROCEDURE — G8598 ASA/ANTIPLAT THER USED: HCPCS | Performed by: INTERNAL MEDICINE

## 2018-11-13 PROCEDURE — G8400 PT W/DXA NO RESULTS DOC: HCPCS | Performed by: INTERNAL MEDICINE

## 2018-11-13 PROCEDURE — G8427 DOCREV CUR MEDS BY ELIG CLIN: HCPCS | Performed by: INTERNAL MEDICINE

## 2018-11-13 PROCEDURE — 1090F PRES/ABSN URINE INCON ASSESS: CPT | Performed by: INTERNAL MEDICINE

## 2018-11-13 PROCEDURE — 82962 GLUCOSE BLOOD TEST: CPT | Performed by: INTERNAL MEDICINE

## 2018-11-13 PROCEDURE — 1101F PT FALLS ASSESS-DOCD LE1/YR: CPT | Performed by: INTERNAL MEDICINE

## 2018-11-13 PROCEDURE — G8482 FLU IMMUNIZE ORDER/ADMIN: HCPCS | Performed by: INTERNAL MEDICINE

## 2018-11-13 PROCEDURE — 1036F TOBACCO NON-USER: CPT | Performed by: INTERNAL MEDICINE

## 2018-11-13 PROCEDURE — 90662 IIV NO PRSV INCREASED AG IM: CPT | Performed by: INTERNAL MEDICINE

## 2018-11-13 PROCEDURE — 99214 OFFICE O/P EST MOD 30 MIN: CPT | Performed by: INTERNAL MEDICINE

## 2018-11-13 PROCEDURE — G8926 SPIRO NO PERF OR DOC: HCPCS | Performed by: INTERNAL MEDICINE

## 2018-11-13 NOTE — PROGRESS NOTES
Vaccine Information Sheet, \"Influenza - Inactivated\"  given to Christiaen Barber, or parent/legal guardian of  Christiane Barber and verbalized understanding. Patient responses:    Have you ever had a reaction to a flu vaccine? No  Are you able to eat eggs without adverse effects? Yes  Do you have any current illness? No  Have you ever had Guillian [place] Syndrome? No    Flu vaccine given per order. Please see immunization tab.

## 2018-11-13 NOTE — PROGRESS NOTES
Inhale 2 puffs into the lungs daily 1 Inhaler 11    albuterol (PROVENTIL) (2.5 MG/3ML) 0.083% nebulizer solution Take 3 mLs by nebulization every 6 hours as needed for Wheezing 120 each 3    albuterol sulfate HFA (PROAIR HFA) 108 (90 Base) MCG/ACT inhaler Inhale 2 puffs into the lungs every 6 hours as needed for Wheezing 1 Inhaler 11    memantine (NAMENDA TITRATION PACK) 5 (28)-10 (21) MG tablet pack Take by mouth See Admin Instructions 5 mg/day for =1 week; 5 mg twice daily for =1 week; 15 mg/day given in 5 mg and 10 mg  doses for =1 week; then 10 mg twice daily      carvedilol (COREG) 12.5 MG tablet TAKE 1 TABLET BY MOUTH TWO TIMES DAILY (Patient taking differently: TAKE 1 TABLET daily) 60 tablet 1    omeprazole (PRILOSEC) 40 MG delayed release capsule Take 1 capsule by mouth daily 30 capsule 3    DULoxetine (CYMBALTA) 60 MG extended release capsule TAKE ONE (1) CAPSULE ONCE DAILY 30 capsule 3    rosuvastatin (CRESTOR) 5 MG tablet TAKE ONE (1) TABLET BY MOUTH EACH EVENING 30 tablet 3    theophylline (BROOKE-24) 200 MG extended release capsule Take 1 capsule by mouth daily 30 capsule 3    furosemide (LASIX) 20 MG tablet Take 20 mg by mouth daily      potassium chloride (KLOR-CON M) 10 MEQ extended release tablet Take 1 tablet by mouth daily 60 tablet 3    OXYGEN Inhale 2 L/min into the lungs nightly       budesonide-formoterol (SYMBICORT) 160-4.5 MCG/ACT AERO Inhale 2 puffs into the lungs 2 times daily 3 Inhaler 2    aspirin 81 MG chewable tablet Take 1 tablet by mouth daily 30 tablet 3     No current facility-administered medications for this visit.         Past Medical History:   Diagnosis Date    Acute ischemic colitis [facility]) 2008    Colonoscopy done    CAD (coronary artery disease) 06/2017    4 vessel CABG    Cerebrovascular event ([facility] [address] 02/2016    balance, speech, leg weakness; ARU    Chronic neck pain     Dr Mary Barrow 2/2011    Cognitive impairment 09/2011    COPD (chronic obstructive pulmonary disease) ([address] 3/2013    Moderately severe by PFTs    COPD, severe ([address] 11/7/2018    Depression     Diverticulitis 5/2012    Family history of colon cancer     Family history of diabetes mellitus     Fibromyalgia 1989    Graves disease 2012    Sheri Muniz; tapazole for a time    History of exercise stress test 03/06/2018    treadmill    Hyperlipidemia     Hypertension     Lumbar spinal stenosis 11/2015    moderate ; MRI by Dr Horacio Chaidez Nocturnal hypoxemia 11/7/2018    Nocturnal oxygen desaturation 2015    Obstructive sleep apnea     Osteoporosis     Prediabetes 2012    hgb 6.5    S/P CABG (coronary artery bypass graft) 06/2017    4 vessel-CABG EVH RCA Marginal, OM1, OM2,  LIMA to LAD    S/P cardiac catheterization 06/07/2017    severe multivessel disease    Shortness of breath 11/7/2018    Subclavian artery stenosis, left ([address] 06/2017    noted in hosp for CABG; to be dealt with as OP by vasc surgeons    Subclinical Hyperthyroidism 2010    Dr. Sheri Muniz; Tapazole    Urine incontinence 6/2011    Timed voiding; [place]; urology        Social History   Substance Use Topics    Smoking status: Former Smoker     Packs/day: 0.50     Years: 30.00     Types: Cigarettes     Start date: 12/16/1957     Quit date: 1/25/1987    Smokeless tobacco: Never Used    Alcohol use No        ROS: The patient has had no headache, sore throat, fever or chills, cough, dyspnea, chest pain, nausea, vomiting or diarrhea, or edema. Objective:      /60   Pulse 73   Resp 16   Wt 135 lb (61.2 kg)   SpO2 98%   BMI 25.51 kg/m²    General: in no apparent distress   The patient's neck is free of nodes. Lungs are clear. Heart is normal in rate and regular in rhythm. Legs are free of edema. No rash or erythema. Random  today     Assessment / Plan:      1. Steroid-induced diabetes ([address]    2. COPD, severe ([address]    3. Need for immunization against influenza    4.  Elevated blood sugar

## 2018-11-20 ENCOUNTER — HOSPITAL ENCOUNTER (OUTPATIENT)
Age: 83
Discharge: HOME | End: 2018-11-20
Payer: MEDICARE

## 2018-11-20 LAB
ALBUMIN SERPL-MCNC: 4.9 GM/DL (ref 3.4–5)
ALP BLD-CCNC: 53 IU/L (ref 40–129)
ALT SERPL-CCNC: 16 U/L (ref 10–40)
ANION GAP SERPL CALCULATED.3IONS-SCNC: 15 MMOL/L (ref 4–16)
AST SERPL-CCNC: 16 IU/L (ref 15–37)
BASOPHILS ABSOLUTE: 0 K/CU MM
BASOPHILS RELATIVE PERCENT: 0.5 % (ref 0–1)
BILIRUB SERPL-MCNC: 0.8 MG/DL (ref 0–1)
BUN BLDV-MCNC: 14 MG/DL (ref 6–23)
CALCIUM SERPL-MCNC: 9.8 MG/DL (ref 8.3–10.6)
CHLORIDE BLD-SCNC: 102 MMOL/L (ref 99–110)
CO2: 28 MMOL/L (ref 21–32)
CREAT SERPL-MCNC: 0.7 MG/DL (ref 0.6–1.1)
DIFFERENTIAL TYPE: ABNORMAL
EOSINOPHILS ABSOLUTE: 0.2 K/CU MM
EOSINOPHILS RELATIVE PERCENT: 3.3 % (ref 0–3)
GFR AFRICAN AMERICAN: >60 ML/MIN/1.73M2
GFR NON-AFRICAN AMERICAN: >60 ML/MIN/1.73M2
GLUCOSE BLD-MCNC: 123 MG/DL (ref 70–99)
HCT VFR BLD CALC: 43.2 % (ref 37–47)
HEMOGLOBIN: 14.3 GM/DL (ref 12.5–16)
IMMATURE NEUTROPHIL %: 0.7 % (ref 0–0.43)
LYMPHOCYTES ABSOLUTE: 2 K/CU MM
LYMPHOCYTES RELATIVE PERCENT: 27.2 % (ref 24–44)
MCH RBC QN AUTO: 34.2 PG (ref 27–31)
MCHC RBC AUTO-ENTMCNC: 33.1 % (ref 32–36)
MCV RBC AUTO: 103.3 FL (ref 78–100)
MONOCYTES ABSOLUTE: 0.5 K/CU MM
MONOCYTES RELATIVE PERCENT: 7.1 % (ref 0–4)
NUCLEATED RBC %: 0 %
PDW BLD-RTO: 13 % (ref 11.7–14.9)
PLATELET # BLD: 327 K/CU MM (ref 140–440)
PMV BLD AUTO: 9.2 FL (ref 7.5–11.1)
POTASSIUM SERPL-SCNC: 4.3 MMOL/L (ref 3.5–5.1)
RBC # BLD: 4.18 M/CU MM (ref 4.2–5.4)
SEGMENTED NEUTROPHILS ABSOLUTE COUNT: 4.5 K/CU MM
SEGMENTED NEUTROPHILS RELATIVE PERCENT: 61.2 % (ref 36–66)
SODIUM BLD-SCNC: 145 MMOL/L (ref 135–145)
TOTAL IMMATURE NEUTOROPHIL: 0.05 K/CU MM
TOTAL NUCLEATED RBC: 0 K/CU MM
TOTAL PROTEIN: 7.2 GM/DL (ref 6.4–8.2)
WBC # BLD: 7.3 K/CU MM (ref 4–10.5)

## 2018-11-20 PROCEDURE — 36415 COLL VENOUS BLD VENIPUNCTURE: CPT

## 2018-11-20 PROCEDURE — 80053 COMPREHEN METABOLIC PANEL: CPT

## 2018-11-20 PROCEDURE — 85025 COMPLETE CBC W/AUTO DIFF WBC: CPT

## 2018-11-26 ENCOUNTER — TELEPHONE (OUTPATIENT)
Dept: INTERNAL MEDICINE CLINIC | Age: 83
End: 2018-11-26

## 2018-11-29 ENCOUNTER — OFFICE VISIT (OUTPATIENT)
Dept: CARDIOLOGY CLINIC | Age: 83
End: 2018-11-29
Payer: MEDICARE

## 2018-11-29 ENCOUNTER — TELEPHONE (OUTPATIENT)
Dept: CARDIOLOGY CLINIC | Age: 83
End: 2018-11-29

## 2018-11-29 VITALS
HEIGHT: 62 IN | SYSTOLIC BLOOD PRESSURE: 132 MMHG | DIASTOLIC BLOOD PRESSURE: 84 MMHG | BODY MASS INDEX: 24.84 KG/M2 | WEIGHT: 135 LBS | HEART RATE: 118 BPM

## 2018-11-29 DIAGNOSIS — I25.10 CORONARY ARTERY DISEASE INVOLVING NATIVE CORONARY ARTERY OF NATIVE HEART WITHOUT ANGINA PECTORIS: ICD-10-CM

## 2018-11-29 DIAGNOSIS — I10 ESSENTIAL HYPERTENSION: Primary | ICD-10-CM

## 2018-11-29 DIAGNOSIS — E78.1 PURE HYPERGLYCERIDEMIA: ICD-10-CM

## 2018-11-29 PROCEDURE — 93000 ELECTROCARDIOGRAM COMPLETE: CPT | Performed by: NURSE PRACTITIONER

## 2018-11-29 PROCEDURE — 99214 OFFICE O/P EST MOD 30 MIN: CPT | Performed by: NURSE PRACTITIONER

## 2018-11-29 RX ORDER — CARVEDILOL 12.5 MG/1
TABLET ORAL
Qty: 60 TABLET | Refills: 1 | Status: SHIPPED | OUTPATIENT
Start: 2018-11-29 | End: 2019-02-26 | Stop reason: SDUPTHER

## 2018-11-29 RX ORDER — IPRATROPIUM BROMIDE AND ALBUTEROL SULFATE 2.5; .5 MG/3ML; MG/3ML
1 SOLUTION RESPIRATORY (INHALATION) EVERY 4 HOURS
COMMUNITY
End: 2020-02-25 | Stop reason: SDUPTHER

## 2018-11-29 NOTE — PROGRESS NOTES
CARDIOLOGY  NOTE      11/29/2018    RE: Kristi Saunders  (1935)                               TO:  Dr. Tiera Anderson MD  The primary cardiologist is     CC: elevated HR per home care nurse:  H/o CAD  HTN  HLP    HPI:  She during this visit has the following concerns:   Chest pain Yes  She notices a constant chest tightness. It is across the neck. It is non radiating. There are no associated s/s. SOB Yes   Palpitations No  Dizziness Yes in the am when first getting up  Goes away on own  Brief in nature. Syncope No    She was noted per her home care nurse that her heart rate was elevated today at 114 bpm.    She notes she has had an eventful year- CABG - infection- resp failure requiring intubation and ventilation. She has been in and out of rehab and nursing facilities.      Vitals:    11/29/18 1446   BP: 132/84   Pulse: 118       Current Outpatient Prescriptions   Medication Sig Dispense Refill    ipratropium-albuterol (DUONEB) 0.5-2.5 (3) MG/3ML SOLN nebulizer solution Inhale 1 vial into the lungs every 4 hours      tiotropium (SPIRIVA RESPIMAT) 2.5 MCG/ACT AERS inhaler Inhale 2 puffs into the lungs daily 1 Inhaler 11    albuterol sulfate HFA (PROAIR HFA) 108 (90 Base) MCG/ACT inhaler Inhale 2 puffs into the lungs every 6 hours as needed for Wheezing 1 Inhaler 11    memantine (NAMENDA TITRATION PACK) 5 (28)-10 (21) MG tablet pack Take by mouth See Admin Instructions 5 mg/day for =1 week; 5 mg twice daily for =1 week; 15 mg/day given in 5 mg and 10 mg  doses for =1 week; then 10 mg twice daily      carvedilol (COREG) 12.5 MG tablet TAKE 1 TABLET BY MOUTH TWO TIMES DAILY (Patient taking differently: TAKE 1 TABLET daily) 60 tablet 1    omeprazole (PRILOSEC) 40 MG delayed release capsule Take 1 capsule by mouth daily 30 capsule 3    DULoxetine (CYMBALTA) 60 MG extended release capsule TAKE ONE (1) CAPSULE ONCE DAILY 30 16 11/20/2018     TSH:    Lab Results   Component Value Date    TSH 0.87 12/07/2016     EKG ST     Assessment/ Plan:    Patient seen, interviewed and examined. Testing was reviewed. CAD    H/o CABG  Stable   She to increase the coreg to bid   Return in 2 weeks for BP check     HTN    Controlled  advised low salt diet     Hyperlipidemia  At or near goal Yes  Current medications include: crestor. She is to continue current medications     Patient is encouraged to exercise even a brisk walk for 30 minutes at least 3 to 4 times a week. Lifestyle and risk factor modificatons discussed. Various goals are discussed and questions answered. Continue current medications. Appropriate prescriptions are addressed. Questions answered and patient verbalizes understanding.    Call for any problems, questions, or concerns.

## 2018-12-07 RX ORDER — FUROSEMIDE 20 MG/1
TABLET ORAL
Qty: 30 TABLET | Refills: 2 | Status: SHIPPED | OUTPATIENT
Start: 2018-12-07 | End: 2019-03-09 | Stop reason: SDUPTHER

## 2018-12-10 ENCOUNTER — TELEPHONE (OUTPATIENT)
Dept: INTERNAL MEDICINE CLINIC | Age: 83
End: 2018-12-10

## 2018-12-13 ENCOUNTER — NURSE ONLY (OUTPATIENT)
Dept: CARDIOLOGY CLINIC | Age: 83
End: 2018-12-13

## 2018-12-13 VITALS
BODY MASS INDEX: 25.21 KG/M2 | DIASTOLIC BLOOD PRESSURE: 76 MMHG | HEIGHT: 62 IN | HEART RATE: 74 BPM | SYSTOLIC BLOOD PRESSURE: 118 MMHG | WEIGHT: 137 LBS

## 2018-12-13 DIAGNOSIS — Z01.30 BLOOD PRESSURE CHECK: Primary | ICD-10-CM

## 2018-12-18 ENCOUNTER — OFFICE VISIT (OUTPATIENT)
Dept: INTERNAL MEDICINE CLINIC | Age: 83
End: 2018-12-18
Payer: MEDICARE

## 2018-12-18 VITALS — SYSTOLIC BLOOD PRESSURE: 126 MMHG | RESPIRATION RATE: 16 BRPM | HEART RATE: 68 BPM | DIASTOLIC BLOOD PRESSURE: 76 MMHG

## 2018-12-18 DIAGNOSIS — N39.46 MIXED INCONTINENCE: ICD-10-CM

## 2018-12-18 DIAGNOSIS — G89.29 CHRONIC NECK PAIN: Primary | ICD-10-CM

## 2018-12-18 DIAGNOSIS — I10 ESSENTIAL HYPERTENSION: ICD-10-CM

## 2018-12-18 DIAGNOSIS — R41.89 COGNITIVE IMPAIRMENT: ICD-10-CM

## 2018-12-18 DIAGNOSIS — M54.2 CHRONIC NECK PAIN: Primary | ICD-10-CM

## 2018-12-18 DIAGNOSIS — Z95.1 S/P CABG (CORONARY ARTERY BYPASS GRAFT): ICD-10-CM

## 2018-12-18 PROCEDURE — 4040F PNEUMOC VAC/ADMIN/RCVD: CPT | Performed by: INTERNAL MEDICINE

## 2018-12-18 PROCEDURE — 1036F TOBACCO NON-USER: CPT | Performed by: INTERNAL MEDICINE

## 2018-12-18 PROCEDURE — G8598 ASA/ANTIPLAT THER USED: HCPCS | Performed by: INTERNAL MEDICINE

## 2018-12-18 PROCEDURE — G8400 PT W/DXA NO RESULTS DOC: HCPCS | Performed by: INTERNAL MEDICINE

## 2018-12-18 PROCEDURE — G8482 FLU IMMUNIZE ORDER/ADMIN: HCPCS | Performed by: INTERNAL MEDICINE

## 2018-12-18 PROCEDURE — 1123F ACP DISCUSS/DSCN MKR DOCD: CPT | Performed by: INTERNAL MEDICINE

## 2018-12-18 PROCEDURE — G8427 DOCREV CUR MEDS BY ELIG CLIN: HCPCS | Performed by: INTERNAL MEDICINE

## 2018-12-18 PROCEDURE — 1090F PRES/ABSN URINE INCON ASSESS: CPT | Performed by: INTERNAL MEDICINE

## 2018-12-18 PROCEDURE — G8419 CALC BMI OUT NRM PARAM NOF/U: HCPCS | Performed by: INTERNAL MEDICINE

## 2018-12-18 PROCEDURE — 0509F URINE INCON PLAN DOCD: CPT | Performed by: INTERNAL MEDICINE

## 2018-12-18 PROCEDURE — 1101F PT FALLS ASSESS-DOCD LE1/YR: CPT | Performed by: INTERNAL MEDICINE

## 2018-12-18 PROCEDURE — 99213 OFFICE O/P EST LOW 20 MIN: CPT | Performed by: INTERNAL MEDICINE

## 2018-12-18 NOTE — PROGRESS NOTES
Subjective:      Tyler Wong is a [de-identified] y.o. female who presents today for follow up on her chronic medical conditions as noted below. Patient Active Problem List:     Depression     Chronic neck pain     Graves' disease     Hyperlipidemia     Hypertension     Cognitive impairment     Cerebrovascular accident (CVA) due to vascular stenosis (HCC)     Gait disturbance     Mixed incontinence     Abnormal stress test     S/P CABG (coronary artery bypass graft)     Subclavian artery stenosis, left (HCC)     COPD, severe (HCC)     Nocturnal hypoxemia     Coronary artery disease involving native coronary artery of native heart without angina pectoris     She was last seen 11/13    Her chronic neck pain is stable    She saw Karolina 2 wk ago for tachy with   Coreg was increased to bid    Dr Timothy Espinal added namenda with benefit to memory  Not taking exelon  She has nearterm f/u with [facility]    botox in urinary sphincter from [place] helped    Home health is completed     The patient denies cough, chest pain, increased phlegm, wheezing or hemoptysis. Patient denies any exertional chest pain, palpitations, syncope, orthopnea, edema or paroxysmal nocturnal dyspnea.     She is ambulatory with walker    Current Outpatient Prescriptions   Medication Sig Dispense Refill    furosemide (LASIX) 20 MG tablet TAKE 1 TABLET BY MOUTH ONCE DAILY 30 tablet 2    ipratropium-albuterol (DUONEB) 0.5-2.5 (3) MG/3ML SOLN nebulizer solution Inhale 1 vial into the lungs every 4 hours      carvedilol (COREG) 12.5 MG tablet TAKE 1 TABLET BY MOUTH TWO TIMES DAILY 60 tablet 1    tiotropium (SPIRIVA RESPIMAT) 2.5 MCG/ACT AERS inhaler Inhale 2 puffs into the lungs daily 1 Inhaler 11    albuterol sulfate HFA (PROAIR HFA) 108 (90 Base) MCG/ACT inhaler Inhale 2 puffs into the lungs every 6 hours as needed for Wheezing 1 Inhaler 11    memantine (NAMENDA TITRATION PACK) 5 (28)-10 (21) MG tablet pack Take by mouth See Admin Instructions 5 mg/day

## 2018-12-21 RX ORDER — BUDESONIDE AND FORMOTEROL FUMARATE DIHYDRATE 160; 4.5 UG/1; UG/1
2 AEROSOL RESPIRATORY (INHALATION) 2 TIMES DAILY
Qty: 3 INHALER | Refills: 2 | Status: SHIPPED | OUTPATIENT
Start: 2018-12-21 | End: 2019-10-28 | Stop reason: SDUPTHER

## 2019-01-08 RX ORDER — ROSUVASTATIN CALCIUM 5 MG/1
TABLET, COATED ORAL
Qty: 30 TABLET | Refills: 5 | Status: SHIPPED | OUTPATIENT
Start: 2019-01-08 | End: 2019-07-15 | Stop reason: SDUPTHER

## 2019-01-08 RX ORDER — DULOXETIN HYDROCHLORIDE 60 MG/1
60 CAPSULE, DELAYED RELEASE ORAL DAILY
Qty: 30 CAPSULE | Refills: 5 | Status: SHIPPED | OUTPATIENT
Start: 2019-01-08 | End: 2019-07-08 | Stop reason: SDUPTHER

## 2019-01-08 RX ORDER — OMEPRAZOLE 40 MG/1
CAPSULE, DELAYED RELEASE ORAL
Qty: 30 CAPSULE | Refills: 5 | Status: SHIPPED | OUTPATIENT
Start: 2019-01-08 | End: 2019-07-08 | Stop reason: SDUPTHER

## 2019-02-18 ENCOUNTER — OFFICE VISIT (OUTPATIENT)
Dept: PULMONOLOGY | Age: 84
End: 2019-02-18
Payer: MEDICARE

## 2019-02-18 VITALS
BODY MASS INDEX: 25.3 KG/M2 | DIASTOLIC BLOOD PRESSURE: 66 MMHG | HEART RATE: 86 BPM | WEIGHT: 134 LBS | OXYGEN SATURATION: 94 % | SYSTOLIC BLOOD PRESSURE: 120 MMHG | HEIGHT: 61 IN

## 2019-02-18 DIAGNOSIS — R06.02 SHORTNESS OF BREATH: ICD-10-CM

## 2019-02-18 DIAGNOSIS — J96.11 CHRONIC HYPOXEMIC RESPIRATORY FAILURE (HCC): ICD-10-CM

## 2019-02-18 DIAGNOSIS — J44.9 COPD, SEVERE (HCC): Primary | ICD-10-CM

## 2019-02-18 PROBLEM — G47.34 NOCTURNAL HYPOXEMIA: Status: RESOLVED | Noted: 2018-11-07 | Resolved: 2019-02-18

## 2019-02-18 PROCEDURE — G8599 NO ASA/ANTIPLAT THER USE RNG: HCPCS | Performed by: INTERNAL MEDICINE

## 2019-02-18 PROCEDURE — 4040F PNEUMOC VAC/ADMIN/RCVD: CPT | Performed by: INTERNAL MEDICINE

## 2019-02-18 PROCEDURE — 1090F PRES/ABSN URINE INCON ASSESS: CPT | Performed by: INTERNAL MEDICINE

## 2019-02-18 PROCEDURE — 3023F SPIROM DOC REV: CPT | Performed by: INTERNAL MEDICINE

## 2019-02-18 PROCEDURE — 1036F TOBACCO NON-USER: CPT | Performed by: INTERNAL MEDICINE

## 2019-02-18 PROCEDURE — 1123F ACP DISCUSS/DSCN MKR DOCD: CPT | Performed by: INTERNAL MEDICINE

## 2019-02-18 PROCEDURE — G8427 DOCREV CUR MEDS BY ELIG CLIN: HCPCS | Performed by: INTERNAL MEDICINE

## 2019-02-18 PROCEDURE — G8482 FLU IMMUNIZE ORDER/ADMIN: HCPCS | Performed by: INTERNAL MEDICINE

## 2019-02-18 PROCEDURE — 1101F PT FALLS ASSESS-DOCD LE1/YR: CPT | Performed by: INTERNAL MEDICINE

## 2019-02-18 PROCEDURE — 99213 OFFICE O/P EST LOW 20 MIN: CPT | Performed by: INTERNAL MEDICINE

## 2019-02-18 PROCEDURE — G8419 CALC BMI OUT NRM PARAM NOF/U: HCPCS | Performed by: INTERNAL MEDICINE

## 2019-02-18 PROCEDURE — G8400 PT W/DXA NO RESULTS DOC: HCPCS | Performed by: INTERNAL MEDICINE

## 2019-02-18 PROCEDURE — G8926 SPIRO NO PERF OR DOC: HCPCS | Performed by: INTERNAL MEDICINE

## 2019-02-18 RX ORDER — DONEPEZIL HYDROCHLORIDE 10 MG/1
10 TABLET, FILM COATED ORAL NIGHTLY
COMMUNITY
End: 2019-07-31 | Stop reason: ALTCHOICE

## 2019-02-26 LAB
ALBUMIN SERPL-MCNC: 4.9 G/DL
ALP BLD-CCNC: 63 U/L
ALT SERPL-CCNC: 18 U/L
ANION GAP SERPL CALCULATED.3IONS-SCNC: 14 MMOL/L
AST SERPL-CCNC: 20 U/L
AVERAGE GLUCOSE: 163
BILIRUB SERPL-MCNC: 0.9 MG/DL (ref 0.1–1.4)
BUN BLDV-MCNC: 12 MG/DL
CALCIUM SERPL-MCNC: 10.4 MG/DL
CHLORIDE BLD-SCNC: 99 MMOL/L
CHOLESTEROL, TOTAL: 231 MG/DL
CHOLESTEROL/HDL RATIO: 3
CO2: 31 MMOL/L
CREAT SERPL-MCNC: 0.9 MG/DL
GFR CALCULATED: ABNORMAL
GLUCOSE BLD-MCNC: 100 MG/DL
HBA1C MFR BLD: 7.3 %
HDLC SERPL-MCNC: 78 MG/DL (ref 35–70)
LDL CHOLESTEROL CALCULATED: 105 MG/DL (ref 0–160)
POTASSIUM SERPL-SCNC: 4.7 MMOL/L
SODIUM BLD-SCNC: 144 MMOL/L
TOTAL PROTEIN: 7.4
TRIGL SERPL-MCNC: 242 MG/DL
VLDLC SERPL CALC-MCNC: 48 MG/DL

## 2019-02-26 RX ORDER — CARVEDILOL 12.5 MG/1
TABLET ORAL
Qty: 60 TABLET | Refills: 1 | Status: SHIPPED | OUTPATIENT
Start: 2019-02-26 | End: 2019-04-29 | Stop reason: SDUPTHER

## 2019-03-11 RX ORDER — FUROSEMIDE 20 MG/1
TABLET ORAL
Qty: 30 TABLET | Refills: 1 | Status: SHIPPED | OUTPATIENT
Start: 2019-03-11 | End: 2019-05-11 | Stop reason: SDUPTHER

## 2019-03-19 ENCOUNTER — OFFICE VISIT (OUTPATIENT)
Dept: INTERNAL MEDICINE CLINIC | Age: 84
End: 2019-03-19
Payer: MEDICARE

## 2019-03-19 VITALS
DIASTOLIC BLOOD PRESSURE: 80 MMHG | SYSTOLIC BLOOD PRESSURE: 120 MMHG | WEIGHT: 135 LBS | BODY MASS INDEX: 25.49 KG/M2 | OXYGEN SATURATION: 97 % | HEART RATE: 84 BPM | HEIGHT: 61 IN

## 2019-03-19 DIAGNOSIS — R73.9 ELEVATED BLOOD SUGAR: Primary | ICD-10-CM

## 2019-03-19 DIAGNOSIS — J44.9 COPD, SEVERE (HCC): ICD-10-CM

## 2019-03-19 DIAGNOSIS — F33.40 RECURRENT MAJOR DEPRESSIVE DISORDER, IN REMISSION (HCC): ICD-10-CM

## 2019-03-19 DIAGNOSIS — I77.1 SUBCLAVIAN ARTERY STENOSIS, LEFT (HCC): ICD-10-CM

## 2019-03-19 DIAGNOSIS — R41.89 COGNITIVE IMPAIRMENT: ICD-10-CM

## 2019-03-19 DIAGNOSIS — F32.4 MAJOR DEPRESSIVE DISORDER, SINGLE EPISODE, IN PARTIAL REMISSION (HCC): ICD-10-CM

## 2019-03-19 PROCEDURE — G8599 NO ASA/ANTIPLAT THER USE RNG: HCPCS | Performed by: INTERNAL MEDICINE

## 2019-03-19 PROCEDURE — 1123F ACP DISCUSS/DSCN MKR DOCD: CPT | Performed by: INTERNAL MEDICINE

## 2019-03-19 PROCEDURE — G8926 SPIRO NO PERF OR DOC: HCPCS | Performed by: INTERNAL MEDICINE

## 2019-03-19 PROCEDURE — 1090F PRES/ABSN URINE INCON ASSESS: CPT | Performed by: INTERNAL MEDICINE

## 2019-03-19 PROCEDURE — 3023F SPIROM DOC REV: CPT | Performed by: INTERNAL MEDICINE

## 2019-03-19 PROCEDURE — G8482 FLU IMMUNIZE ORDER/ADMIN: HCPCS | Performed by: INTERNAL MEDICINE

## 2019-03-19 PROCEDURE — 4040F PNEUMOC VAC/ADMIN/RCVD: CPT | Performed by: INTERNAL MEDICINE

## 2019-03-19 PROCEDURE — G8419 CALC BMI OUT NRM PARAM NOF/U: HCPCS | Performed by: INTERNAL MEDICINE

## 2019-03-19 PROCEDURE — G8427 DOCREV CUR MEDS BY ELIG CLIN: HCPCS | Performed by: INTERNAL MEDICINE

## 2019-03-19 PROCEDURE — G8400 PT W/DXA NO RESULTS DOC: HCPCS | Performed by: INTERNAL MEDICINE

## 2019-03-19 PROCEDURE — 99214 OFFICE O/P EST MOD 30 MIN: CPT | Performed by: INTERNAL MEDICINE

## 2019-03-19 PROCEDURE — 1036F TOBACCO NON-USER: CPT | Performed by: INTERNAL MEDICINE

## 2019-03-19 PROCEDURE — 1101F PT FALLS ASSESS-DOCD LE1/YR: CPT | Performed by: INTERNAL MEDICINE

## 2019-03-19 RX ORDER — TOBRAMYCIN 3 MG/ML
1 SOLUTION/ DROPS OPHTHALMIC 2 TIMES DAILY
COMMUNITY
Start: 2019-01-29 | End: 2019-04-30

## 2019-03-19 RX ORDER — MEMANTINE HYDROCHLORIDE 10 MG/1
10 TABLET ORAL 2 TIMES DAILY
COMMUNITY
End: 2019-07-31 | Stop reason: ALTCHOICE

## 2019-03-19 RX ORDER — BUPROPION HYDROCHLORIDE 150 MG/1
150 TABLET ORAL EVERY MORNING
Qty: 30 TABLET | Refills: 3 | Status: SHIPPED | OUTPATIENT
Start: 2019-03-19 | End: 2019-04-30 | Stop reason: ALTCHOICE

## 2019-03-19 RX ORDER — PREDNISOLONE ACETATE 10 MG/ML
1 SUSPENSION/ DROPS OPHTHALMIC 3 TIMES DAILY
COMMUNITY
Start: 2019-01-29 | End: 2019-09-03

## 2019-04-02 RX ORDER — THEOPHYLLINE ANHYDROUS 200 MG/1
CAPSULE, EXTENDED RELEASE ORAL
Qty: 30 CAPSULE | Refills: 4 | Status: SHIPPED | OUTPATIENT
Start: 2019-04-02 | End: 2019-09-03 | Stop reason: SDUPTHER

## 2019-04-09 RX ORDER — POTASSIUM CHLORIDE 750 MG/1
10 TABLET, EXTENDED RELEASE ORAL DAILY
Qty: 30 TABLET | Refills: 3 | Status: SHIPPED | OUTPATIENT
Start: 2019-04-09 | End: 2019-08-17 | Stop reason: SDUPTHER

## 2019-04-17 ENCOUNTER — HOSPITAL ENCOUNTER (OUTPATIENT)
Dept: PHYSICAL THERAPY | Age: 84
Setting detail: THERAPIES SERIES
Discharge: HOME | End: 2019-04-17
Payer: MEDICARE

## 2019-04-17 PROCEDURE — 97535 SELF CARE MNGMENT TRAINING: CPT

## 2019-04-17 PROCEDURE — 97110 THERAPEUTIC EXERCISES: CPT

## 2019-04-17 PROCEDURE — 97162 PT EVAL MOD COMPLEX 30 MIN: CPT

## 2019-04-17 NOTE — PLAN OF CARE
Stimulation  [x] Gait Training     [] Cervical Traction [] Lumbar Traction  [x] Neuromuscular Re-education [] Cold/hotpack [] Iontophoresis   [x] Instruction in HEP       [] Manual Therapy     [] vasopneumatic            [x] Self care home management        []Dry needling trigger point point/pain management      Plan of Care Date Range:  4/17/2019 to 5/29/2019    ? Frequency/Duration:  # Days per week: [] 1 day # Weeks: [] 1 week [x] 5 weeks     [x] 2 days? [] 2 weeks [] 6 weeks     [] 3 days   [] 3 weeks [] 7 weeks     [] 4 days   [] 4 weeks [] 8 weeks    Rehab Potential: [] Excellent [] Good [x] Fair  [] Poor     Goals:     Long term goals  Time Frame for Long term goals : All goals to be assessed by the 10th visit  Long term goal 1: Pt to be independent with HEP  Long term goal 2: Pt to increase chandrika LE strength  Long term goal 3: Pt to ambulate with better safety awareness x 100 feet  Long term goal 4: Pt to improve balance to be able to ambulate in // bars without hands-on assistance from therapist    Electronically signed by:  Diana Hicks, PT, 4/17/2019, 4:15 PM          If you have any questions or concerns, please don't hesitate to call.   Thank you for your referral.    Physician Signature:_________________Date:____________Time: ________  By signing above, therapists plan is approved by physician

## 2019-04-17 NOTE — PROGRESS NOTES
Physical Therapy  Initial Assessment  Date: 2019  Patient Name: Agustin Smith  MRN: [id number]  : 1935     Treatment Diagnosis: Muscular weakness, deconditioned, difficulty walking    Restrictions  Position Activity Restriction  Other position/activity restrictions: None noted    Subjective   General  Chart Reviewed: Yes  Patient assessed for rehabilitation services?: Yes  Family / Caregiver Present: Yes  Referring Practitioner: Angel Monterroso PA-C  Referral Date : 19  Diagnosis: Muscle Weakness, Vascular Dementia  Follows Commands: Impaired  General Comment  Comments: CABG X 4, FLU, Left Hip Replacement 6-7 yr ago, Cataract surgery recently,   PT Visit Information  PT Insurance Information: Medicare  Subjective  Subjective: Pt has a soft cervical collar on and c/o neck and shoulder pain. Pt has had significant weakness since the flu 3 weeks ago and was incapacitated with a lot of time down. Pt has had a decline since her CABG several years ago. Pt reports that her memory seems better since increasing fluids and her medicine has been changed. Vision/Hearing  Vision  Vision: Within Functional Limits  Hearing  Hearing: Within functional limits    Orientation  Orientation  Overall Orientation Status: Within Functional Limits    Social/Functional History  Social/Functional History  Lives With: Spouse  Type of Home: House  Home Layout: One level(condo (story and 1/2, only used by visiting family))  Bathroom Shower/Tub: Walk-in shower  Bathroom Equipment: Grab bars in [address]: 4 wheeled walker;Rolling walker; Wheelchair-manual;Cane  Active : No    Objective     Observation/Palpation  Posture: Fair         Strength RLE  Comment: Hip 3-/5 Knee 4/5 Ankle 3-/5  Strength LLE  Comment: Hip 3-/5 Knee 4/5 Ankle 3-/5                      Ambulation  Ambulation?: Yes  Ambulation 1  Surface: level tile  Device: Rollator  Assistance: Stand by assistance  Quality of Gait: Pt takes very short stride/narrow steps, shuffling gait  Distance: x 30 feet  Comments: Pt requires VC's for staying up close to the 4WW, making sure she keeps the walker with her when she turns to sit down, MAX VC's to back up to the chair/lock the brakes and reach back for chair when sitting down.   Balance  Standing - Static: Poor;+  Standing - Dynamic: Poor;-                         Assessment  Patient is an  83 yo female who presents with  Muscle Weakness, Vascular Dementia which impacts on gait, balance, ADL's, activities in/out of the house;patient's goal is to be able to do more than just walk from her chair to the bathroom or kitchen and sit back down, she wants to be able to go places with her spouse to go shopping and walking more ; PT to address patient's goals, impairments and activity limitations with skilled interventions checked in plan of care;patient's level of function prior to onset of  Muscle Weakness, Vascular Dementia was [facility]; did not observe any barriers to learning during PT eval; learning preferences include demonstration, practice, and handouts; patient expressed understanding of HEP; patient appears to be motivated to participate in an active PT program and to be compliant with HEP expectations;patient assisted in developing treatment plan and goals; 4WW DME is currently being used;        Conditions Requiring Skilled Therapeutic Intervention  Treatment Diagnosis: Muscular weakness, deconditioned, difficulty walking  Prognosis: Fair  Decision Making: Medium Complexity  Exam: strength, balance, gait  Clinical Presentation: evolving  Patient Education: HEP, educated on what therapy will be working on to develop her needs for improved gait and balance  Barriers to Learning: Dementia  REQUIRES PT FOLLOW UP: Yes  Treatment Initiated : Yes         Plan   Plan  Times per week: 2x's/wk  Plan weeks: 5 weeks  Specific instructions for Next Treatment: plan to advance chandrika LE strength, Nu-Step, eventually work into gait/balance activities  Current Treatment Recommendations: Strengthening, Balance Training, Functional Mobility Training, Transfer Training, Endurance Training, Neuromuscular Re-education, Stair training, Gait Training, Home Exercise Program, Safety Education & Training, Patient/Caregiver Education & Training    OutComes Score   Dynamic Standing Balance = POOR -                                               Goals  Long term goals  Time Frame for Long term goals :  All goals to be assessed by the 10th visit  Long term goal 1: Pt to be independent with HEP  Long term goal 2: Pt to increase chandrika LE strength  Long term goal 3: Pt to ambulate with better safety awareness x 100 feet  Long term goal 4: Pt to improve balance to be able to ambulate in // bars without hands-on assistance from therapist       Therapy Time   Individual Concurrent Group Co-treatment   Time In [address]         Time Out 1545         Minutes 60         Timed Code Treatment Minutes: [address], PT

## 2019-04-17 NOTE — FLOWSHEET NOTE
Outpatient Physical Therapy  [facility]           [x] Phone: [phone]   Fax: [phone]  [facility]           [] Phone: [phone]   Fax: [phone]        Physical Therapy Daily Treatment Note  Date:  2019    Patient Name:  Kimberly Postal    :  1935  MRN: [id number]  Restrictions/Precautions: Other position/activity restrictions: None noted  Diagnosis:   Diagnosis: Muscle Weakness, Vascular Dementia  Date of Injury/Surgery:   Treatment Diagnosis: Treatment Diagnosis: Muscular weakness, deconditioned, difficulty walking    Insurance/Certification information: PT Insurance Information: Medicare   Referring Physician:  Referring Practitioner: Susana Cheng PA-C  Next Doctor Visit:    Plan of care signed (Y/N):    Outcome Measure:   Visit# / total visits:   1/10  Pain level: 010   Goals:          Long term goals  Time Frame for Long term goals :  All goals to be assessed by the 10th visit  Long term goal 1: Pt to be independent with HEP  Long term goal 2: Pt to increase chandrika LE strength  Long term goal 3: Pt to ambulate with better safety awareness x 100 feet  Long term goal 4: Pt to improve balance to be able to ambulate in // bars without hands-on assistance from therapist    Summary of Evaluation: Assessment: Patient is an  81 yo female who presents with  Muscle Weakness, Vascular Dementia which impacts on gait, balance, ADL's, activities in/out of the house;patient's goal is to be able to do more than just walk from her chair to the bathroom or kitchen and sit back down, she wants to be able to go places with her spouse to go shopping and walking more ; PT to address patient's goals, impairments and activity limitations with skilled interventions checked in plan of care;patient's level of function prior to onset of  Muscle Weakness, Vascular Dementia was [facility]; did not observe any barriers to learning during PT eval; learning preferences include demonstration, practice, and handouts; patient expressed understanding of HEP; patient appears to be motivated to participate in an active PT program and to be compliant with HEP expectations;patient assisted in developing treatment plan and goals; 4[employer] DME is currently being used; Subjective:  See eval         Any changes in Ambulatory Summary Sheet? None        Objective:  See eval           Exercises: (No more than 4 columns)   Exercise/Equipment Date 4/17/2019 (1) Date Date           WARM UP       Nu-Step  -----           TABLE                               SEATED        LAQ 3 ct x 10 reps (add 1# next visit)     Hip abd RTB x 10 reps     Hip add Pillow squeeze 3 ct x 10 reps                 STANDING                                                     PROPRIOCEPTION                                    MODALITIES                      Other Therapeutic Activities/Education:  Pt received education on their current pathology and how their condition affects functional activities. Pt understood discussion and questions were answered. Pt understands their activity limitations and understands rational for treatment progression.       Home Exercise Program:  4/17/2019 HEP as above, copy to pt/chart      Manual Treatments: ------       Modalities: ------        Communication with other providers:        Assessment:  (Response towards treatment session) (Pain Rating)    Assessment: Patient is an  83 yo female who presents with  Muscle Weakness, Vascular Dementia which impacts on gait, balance, ADL's, activities in/out of the house;patient's goal is to be able to do more than just walk from her chair to the bathroom or kitchen and sit back down, she wants to be able to go places with her spouse to go shopping and walking more ; PT to address patient's goals, impairments and activity limitations with skilled interventions checked in plan of care;patient's level of function prior to onset of  Muscle Weakness, Vascular Dementia was [facility]; did not observe any barriers to learning during PT eval; learning preferences include demonstration, practice, and handouts; patient expressed understanding of HEP; patient appears to be motivated to participate in an active PT program and to be compliant with HEP expectations;patient assisted in developing treatment plan and goals; [employer] DME is currently being used;      Plan for Next Session: Specific instructions for Next Treatment: plan to advance chandrika LE strength, Nu-Step, eventually work into gait/balance activities      Time In / Time Out:  1445/1545         Timed Code/Total Treatment Minutes: 23/60; 1 PT Eval Mod; 1 TE (13'); 1 ADL (10')       Next Progress Note due:  10th visit      Plan of Care Interventions:  [x] Therapeutic Exercise             [] Aquatics:  [x] Therapeutic Activity               [] Ultrasound                  [] Elec Stimulation  [x] Gait Training                          [] Cervical Traction        [] Lumbar Traction  [x] Neuromuscular Re-education            [] Cold/hotpack  [] Iontophoresis   [x] Instruction in HEP                                [] Manual Therapy                                 [] vasopneumatic            [x] Self care home management        []Dry needling trigger point point/pain management                   Electronically signed by:  Phil Blake 4/17/2019, 4:15 PM

## 2019-04-23 ENCOUNTER — OFFICE VISIT (OUTPATIENT)
Dept: CARDIOLOGY CLINIC | Age: 84
End: 2019-04-23
Payer: MEDICARE

## 2019-04-23 VITALS
HEART RATE: 80 BPM | SYSTOLIC BLOOD PRESSURE: 126 MMHG | WEIGHT: 131 LBS | BODY MASS INDEX: 24.73 KG/M2 | DIASTOLIC BLOOD PRESSURE: 76 MMHG | HEIGHT: 61 IN

## 2019-04-23 DIAGNOSIS — I25.10 CORONARY ARTERY DISEASE INVOLVING NATIVE CORONARY ARTERY OF NATIVE HEART WITHOUT ANGINA PECTORIS: Primary | ICD-10-CM

## 2019-04-23 PROCEDURE — G8420 CALC BMI NORM PARAMETERS: HCPCS | Performed by: INTERNAL MEDICINE

## 2019-04-23 PROCEDURE — 4040F PNEUMOC VAC/ADMIN/RCVD: CPT | Performed by: INTERNAL MEDICINE

## 2019-04-23 PROCEDURE — G8427 DOCREV CUR MEDS BY ELIG CLIN: HCPCS | Performed by: INTERNAL MEDICINE

## 2019-04-23 PROCEDURE — G8599 NO ASA/ANTIPLAT THER USE RNG: HCPCS | Performed by: INTERNAL MEDICINE

## 2019-04-23 PROCEDURE — 99214 OFFICE O/P EST MOD 30 MIN: CPT | Performed by: INTERNAL MEDICINE

## 2019-04-23 PROCEDURE — G8400 PT W/DXA NO RESULTS DOC: HCPCS | Performed by: INTERNAL MEDICINE

## 2019-04-23 PROCEDURE — 1123F ACP DISCUSS/DSCN MKR DOCD: CPT | Performed by: INTERNAL MEDICINE

## 2019-04-23 PROCEDURE — 1036F TOBACCO NON-USER: CPT | Performed by: INTERNAL MEDICINE

## 2019-04-23 PROCEDURE — 1090F PRES/ABSN URINE INCON ASSESS: CPT | Performed by: INTERNAL MEDICINE

## 2019-04-23 NOTE — PROGRESS NOTES
Jamse Severance, MD        OFFICE  FOLLOWUP NOTE    Chief complaints: patient is here for management of  CAD,S/P CABG, HTN, DYSLPIDEMIA, post op for botox ( bladder in continence)    Subjective: patient recovered from FLU,no chest pain, no shortness of breath, no dizziness, no palpitations    POPEYE Liriano is a 82 y. o.year old who  has a past medical history of Acute ischemic colitis ([address] CAD (coronary artery disease), Cerebrovascular event ([address] Chronic hypoxemic respiratory failure ([address] Chronic neck pain, Cognitive impairment, COPD, severe ([address] Depression, Diverticulitis, Family history of colon cancer, Family history of diabetes mellitus, Fibromyalgia, Graves disease, History of exercise stress test, Hyperlipidemia, Hypertension, Lumbar spinal stenosis, Nocturnal hypoxemia, Nocturnal oxygen desaturation, Obstructive sleep apnea, Osteoporosis, Prediabetes, S/P CABG (coronary artery bypass graft), S/P cardiac catheterization, Subclavian artery stenosis, left ([address] Subclinical Hyperthyroidism, and Urine incontinence.  and presents for management of CAD,S/P CABG, HTN, DYSLPIDEMIA, post op for botox ( bladder in continence)   which are well controlled      Current Outpatient Medications   Medication Sig Dispense Refill    potassium chloride (KLOR-CON M) 10 MEQ extended release tablet Take 1 tablet by mouth daily 30 tablet 3    BROOKE-24 200 MG extended release capsule TAKE 1 CAPSULE BY MOUTH ONCE DAILY 30 capsule 4    prednisoLONE acetate (PRED FORTE) 1 % ophthalmic suspension Place 1 drop into the left eye 3 times daily      tobramycin (TOBREX) 0.3 % ophthalmic solution Place 1 drop into the left eye 2 times daily      memantine (NAMENDA) 10 MG tablet Take 10 mg by mouth 2 times daily      omeprazole (PRILOSEC) 40 MG delayed release capsule TAKE ONE (1) CAPSULE BY MOUTH ONCE DAILY 30 capsule 5    rosuvastatin (CRESTOR) 5 MG tablet TAKE ONE (1) TABLET BY MOUTH ONCE DAILY 30 tablet 5    tiotropium (SPIRIVA RESPIMAT) 2.5 MCG/ACT AERS inhaler Inhale 2 puffs into the lungs daily 1 Inhaler 11    ipratropium-albuterol (DUONEB) 0.5-2.5 (3) MG/3ML SOLN nebulizer solution Inhale 1 vial into the lungs every 4 hours      OXYGEN Inhale 2 L/min into the lungs nightly And portable; battery powered oxygen unit when needed      buPROPion (WELLBUTRIN XL) 150 MG extended release tablet Take 1 tablet by mouth every morning 30 tablet 3    furosemide (LASIX) 20 MG tablet TAKE 1 TABLET BY MOUTH ONCE DAILY 30 tablet 1    carvedilol (COREG) 12.5 MG tablet TAKE 1 TABLET BY MOUTH TWO TIMES DAILY 60 tablet 1    donepezil (ARICEPT) 10 MG tablet Take 10 mg by mouth nightly      DULoxetine (CYMBALTA) 60 MG extended release capsule Take 1 capsule by mouth daily 30 capsule 5    budesonide-formoterol (SYMBICORT) 160-4.5 MCG/ACT AERO Inhale 2 puffs into the lungs 2 times daily 3 Inhaler 2    albuterol sulfate HFA (PROAIR HFA) 108 (90 Base) MCG/ACT inhaler Inhale 2 puffs into the lungs every 6 hours as needed for Wheezing 1 Inhaler 11    aspirin 81 MG chewable tablet Take 1 tablet by mouth daily 30 tablet 3     No current facility-administered medications for this visit. Allergies: Cipro xr; Demerol; Moxifloxacin; Statins;  Lipitor [atorvastatin]; and Wellbutrin [bupropion]  Past Medical History:   Diagnosis Date    Acute ischemic colitis ([facility] 2008    Colonoscopy done    CAD (coronary artery disease) 06/2017    4 vessel CABG    Cerebrovascular event ([facility] 02/2016    balance, speech, leg weakness; ARU    Chronic hypoxemic respiratory failure ([facility] 2/18/2019    Chronic neck pain     Dr Ruth Brooks 2/2011    Cognitive impairment 09/2011    COPD, severe ([facility] 11/7/2018    Depression     Diverticulitis 5/2012    Family history of colon cancer     Family history of diabetes mellitus     Fibromyalgia 1989    Graves disease 2012    Jean-Claude Perks; tapazole for a time    History of exercise stress test 03/06/2018 a year maybe       @CHA@  Review of Systems:   · Constitutional: No Fever or Weight Loss   · Eyes: No Decreased Vision  · ENT: No Headaches, Hearing Loss or Vertigo  · Cardiovascular: No chest pain, dyspnea on exertion, palpitations or loss of consciousness  · Respiratory: No cough or wheezing    · Gastrointestinal: No abdominal pain, appetite loss, blood in stools, constipation, diarrhea or heartburn  · Genitourinary: No dysuria, trouble voiding, or hematuria  · Musculoskeletal:  No gait disturbance, weakness or joint complaints  · Integumentary: No rash or pruritis  · Neurological: No TIA or stroke symptoms  · Psychiatric: No anxiety or depression  · Endocrine: No malaise, fatigue or temperature intolerance  · Hematologic/Lymphatic: No bleeding problems, blood clots or swollen lymph nodes  · Allergic/Immunologic: No nasal congestion or hives  All systems negative except as marked. Objective:  /76   Pulse 80   Ht 5' 1\" (1.549 m)   Wt 131 lb (59.4 kg)   LMP  (LMP Unknown)   BMI 24.75 kg/m²   Wt Readings from Last 3 Encounters:   04/23/19 131 lb (59.4 kg)   03/19/19 135 lb (61.2 kg)   02/18/19 134 lb (60.8 kg)     Body mass index is 24.75 kg/m². GENERAL - Alert, oriented, pleasant, in no apparent distress,normal grooming  HEENT - pupils are reactive to light and accomodation, cornea intact, conjunctive normal color, ears are normal in exam,throat exam in normal, teeth, gum and palate are normal, oral mucosa is normal without any notation of pallor or cyanosis  Neck - Supple. No jugular venous distention noted. No carotid bruits, no apical -carotid delay  Respiratory:  Normal breath sounds, No respiratory distress, No wheezing, No chest tenderness. ,no use of accessory muscles, diaphragm movement is normal  Cardiovascular: (PMI) apex non displaced,no lifts no thrills, no s3,no s4, Normal heart rate, Normal rhythm, No murmurs, No rubs, No gallops.  Carotid arteries pulse and amplitude are normal no bruit, no abdominal bruit noted ( normal abdominal aorta ausculation), femoral arteries pulse and amplitude are normal no bruit, pedal pulses are normal  Femoral pulses have normal amplitude, no bruits   Extremities - No cyanosis, clubbing, or significant edema, no varicose veins    Abdomen - No masses, tenderness, or organomegaly, no hepato-splenomegally, no bruits  Musculoskeletal - No significant edema, no kyphosis or scoliosis, no deformity in any extremity noted, muscle strength and tone are normal  Skin: no ulcer,no scar,no stasis dermatitis   Neurologic - alert oriented times 3,Cranial nerves II through XII are grossly intact. There were no gross focal neurologic abnormalities. All sensory and motor nerves examined and were normal  Psychiatric: normal mood and affect    Lab Results   Component Value Date    TROPONINI <0.006 02/25/2013     BNP:    Lab Results   Component Value Date    BNP 44 02/25/2013     PT/INR:  No results found for: [employer]Easy  Lab Results   Component Value Date    LABA1C 7.3 (H) 02/06/2019    LABA1C 6.9 (H) 09/07/2018     Lab Results   Component Value Date    CHOL 231 (H) 02/06/2019    TRIG 242 (H) 02/06/2019    HDL 78 (A) 02/06/2019    LDLCALC 105 02/06/2019    LDLDIRECT 104 (H) 08/02/2018     Lab Results   Component Value Date    ALT 18 02/06/2019    AST 20 02/06/2019     TSH:    Lab Results   Component Value Date    TSH 0.87 12/07/2016       Impression:  Sawti Wolff is a 82 y. o.year old who  has a past medical history of Acute ischemic colitis ([address] CAD (coronary artery disease), Cerebrovascular event ([address] Chronic hypoxemic respiratory failure ([address] Chronic neck pain, Cognitive impairment, COPD, severe ([address] Depression, Diverticulitis, Family history of colon cancer, Family history of diabetes mellitus, Fibromyalgia, Graves disease, History of exercise stress test, Hyperlipidemia, Hypertension, Lumbar spinal stenosis, Nocturnal hypoxemia, Nocturnal oxygen desaturation, Obstructive sleep apnea, Osteoporosis, Prediabetes, S/P CABG (coronary artery bypass graft), S/P cardiac catheterization, Subclavian artery stenosis, left ([address] Subclinical Hyperthyroidism, and Urine incontinence. and presents with     Plan:  1. POST OP for bladder botox, no cardiac complication noted, recovered from FLU  2. CAD s/p CABG and had complications post CABG with sternal infection and had to redo sternotomy, she is better now,continue coreg, aspirin and crestor, continue cardiac rehab   3. Dyslipidemia: continue crestor 5mg po daily  4. History of TIA:CONTINUE aspirincretor  5. HTN: stable, continue core  6. Dm: she is  Not Diabetuc and off  metformin    7. Health maintenance: exerise and diet  All labs, medications and tests reviewed, continue all other medications of all above medical condition listed as is.     @[facility]@

## 2019-04-25 ENCOUNTER — HOSPITAL ENCOUNTER (OUTPATIENT)
Dept: PHYSICAL THERAPY | Age: 84
Setting detail: THERAPIES SERIES
Discharge: HOME | End: 2019-04-25
Payer: MEDICARE

## 2019-04-25 PROCEDURE — 97112 NEUROMUSCULAR REEDUCATION: CPT

## 2019-04-25 PROCEDURE — 97110 THERAPEUTIC EXERCISES: CPT

## 2019-04-25 PROCEDURE — 97116 GAIT TRAINING THERAPY: CPT

## 2019-04-25 NOTE — FLOWSHEET NOTE
Outpatient Physical Therapy  [place]           [x] Phone: [phone]   Fax: [phone]  Jack Natarajan           [] Phone: [phone]   Fax: [phone]        Physical Therapy Daily Treatment Note  Date:  2019    Patient Name:  Zulema Evans    :  1935  MRN: [id number]  Restrictions/Precautions: Other position/activity restrictions: None noted  Diagnosis:   Diagnosis: Muscle Weakness, Vascular Dementia  Date of Injury/Surgery:   Treatment Diagnosis: Treatment Diagnosis: Muscular weakness, deconditioned, difficulty walking    Insurance/Certification information: PT Insurance Information: Medicare   Referring Physician:  Referring Practitioner: Jesus Alberto Nielson PA-C  Next Doctor Visit:    Plan of care signed (Y/N):    Outcome Measure:   Visit# / total visits:   2/10  Pain level: 6/10   Goals:          Long term goals  Time Frame for Long term goals :  All goals to be assessed by the 10th visit  Long term goal 1: Pt to be independent with HEP  Long term goal 2: Pt to increase chandrika LE strength  Long term goal 3: Pt to ambulate with better safety awareness x 100 feet  Long term goal 4: Pt to improve balance to be able to ambulate in // bars without hands-on assistance from therapist    Summary of Evaluation: Assessment: Patient is an  81 yo female who presents with  Muscle Weakness, Vascular Dementia which impacts on gait, balance, ADL's, activities in/out of the house;patient's goal is to be able to do more than just walk from her chair to the bathroom or kitchen and sit back down, she wants to be able to go places with her spouse to go shopping and walking more ; PT to address patient's goals, impairments and activity limitations with skilled interventions checked in plan of care;patient's level of function prior to onset of  Muscle Weakness, Vascular Dementia was [facility]; did not observe any barriers to learning during PT eval; learning preferences include demonstration, practice, and handouts; patient expressed understanding of HEP; patient appears to be motivated to participate in an active PT program and to be compliant with HEP expectations;patient assisted in developing treatment plan and goals; [employer] DME is currently being used; Subjective:  Pt reports of pain in her neck and shlds. Any changes in Ambulatory Summary Sheet? None        Objective:  See gait below          Exercises: (No more than 4 columns)   Exercise/Equipment Date 4/17/2019 (1) Date 4/25/19 (2) Date           WARM UP       Nu-Step  ----- Seat 6 arms7 WL 2 x 10 min          TABLE                               SEATED        LAQ 3 ct x 10 reps (add 1# next visit) 3 ct 2x10 reps     Hip abd RTB x 10 reps RTB 2x10 reps    Hip add Pillow squeeze 3 ct x 10 reps ball squeeze 3 ct 2x10 reps    Sit to stands   Using UE's x 5 reps, c/o left knee pain          STANDING      Gait with 4 wheel walker  [id number] SBA, fatigued and a little short of breath following. Gait with st cane  1x20 with CGA/SBA                                       PROPRIOCEPTION                                    MODALITIES                      Other Therapeutic Activities/Education:  Pt received education on their current pathology and how their condition affects functional activities. Pt understood discussion and questions were answered. Pt understands their activity limitations and understands rational for treatment progression. Home Exercise Program:  4/17/2019 HEP as above, copy to pt/chart      Manual Treatments: ------       Modalities: ------        Communication with other providers:        Assessment:  (Response towards treatment session) (Pain Rating) Verbal and manual cueing on proper performance of the prescribed exercise or of the designated task. Reports of fatigue following therapy session. Did require rest breaks during therapy session.            Plan for Next Session: Specific instructions for Next Treatment: plan to advance chandrika LE strength, Nu-Step, eventually work into gait/balance activities      Time In / Time Out:  0815/0900         Timed Code/Total Treatment Minutes: 45/45, (1) gait, (1) TE, (1) NR      Next Progress Note due:  10th visit      Plan of Care Interventions:  [x] Therapeutic Exercise             [] Aquatics:  [x] Therapeutic Activity               [] Ultrasound                  [] Elec Stimulation  [x] Gait Training                          [] Cervical Traction        [] Lumbar Traction  [x] Neuromuscular Re-education            [] Cold/hotpack  [] Iontophoresis   [x] Instruction in HEP                                [] Manual Therapy                                 [] vasopneumatic            [x] Self care home management        []Dry needling trigger point point/pain management                   Electronically signed by:  Rosio Suazo 4/25/2019, 8:13 AM

## 2019-04-29 ENCOUNTER — HOSPITAL ENCOUNTER (OUTPATIENT)
Dept: PHYSICAL THERAPY | Age: 84
Setting detail: THERAPIES SERIES
Discharge: HOME | End: 2019-04-29
Payer: MEDICARE

## 2019-04-29 PROCEDURE — 97112 NEUROMUSCULAR REEDUCATION: CPT

## 2019-04-29 PROCEDURE — 97110 THERAPEUTIC EXERCISES: CPT

## 2019-04-29 PROCEDURE — 97116 GAIT TRAINING THERAPY: CPT

## 2019-04-29 NOTE — FLOWSHEET NOTE
Outpatient Physical Therapy  [facility]           [x] Phone: [phone]   Fax: [phone]  Naveen Velez           [] Phone: [phone]   Fax: [phone]        Physical Therapy Daily Treatment Note  Date:  2019    Patient Name:  Henri Gaines    :  1935  MRN: [id number]  Restrictions/Precautions: Other position/activity restrictions: None noted  Diagnosis:   Diagnosis: Muscle Weakness, Vascular Dementia  Date of Injury/Surgery:   Treatment Diagnosis: Treatment Diagnosis: Muscular weakness, deconditioned, difficulty walking    Insurance/Certification information: PT Insurance Information: Medicare   Referring Physician:  Referring Practitioner: Elizabeth Nowak PA-C  Next Doctor Visit:    Plan of care signed (Y/N):    Outcome Measure:   Visit# / total visits:   3/10  Pain level: 6/10   Goals:          Long term goals  Time Frame for Long term goals :  All goals to be assessed by the 10th visit  Long term goal 1: Pt to be independent with HEP  Long term goal 2: Pt to increase chandrika LE strength  Long term goal 3: Pt to ambulate with better safety awareness x 100 feet  Long term goal 4: Pt to improve balance to be able to ambulate in // bars without hands-on assistance from therapist    Summary of Evaluation: Assessment: Patient is an  83 yo female who presents with  Muscle Weakness, Vascular Dementia which impacts on gait, balance, ADL's, activities in/out of the house;patient's goal is to be able to do more than just walk from her chair to the bathroom or kitchen and sit back down, she wants to be able to go places with her spouse to go shopping and walking more ; PT to address patient's goals, impairments and activity limitations with skilled interventions checked in plan of care;patient's level of function [facility] to onset of  [facility] Weakness[facility] did not observe any barriers to learning during PT eval; learning preferences include demonstration, practice, and handouts; patient expressed understanding of HEP; patient appears to be motivated to participate in an active PT program and to be compliant with HEP expectations;patient assisted in developing treatment plan and goals; [employer] DME is currently being used; Subjective:  Pt states she always has pain in her neck and shlds. States today is not one of her better days. Feeling awfully tired and not sure why. Any changes in Ambulatory Summary Sheet? None        Objective:  See gait below          Exercises: (No more than 4 columns)   Exercise/Equipment Date 4/17/2019 (1) Date 4/25/19 (2) Date 4/29/19 (3)           WARM UP       Nu-Step  ----- Seat 6 arms7 WL 2 x 10 min Seat 6 arms7 WL 2 x 10 min         TABLE                               SEATED        LAQ 3 ct x 10 reps (add 1# next visit) 3 ct 2x10 reps  3 ct 2x10 reps   Hip abd RTB x 10 reps RTB 2x10 reps RTB 2x10 reps   Hip add Pillow squeeze 3 ct x 10 reps ball squeeze 3 ct 2x10 reps ball squeeze 3 ct 2x10 reps   Sit to stands   Using UE's x 5 reps, c/o left knee pain Using UE's x 5 reps, c/o left knee pain         STANDING      Gait with 4 wheel walker  [id number] SBA, fatigued and a little short of breath following. [id number] SBA, fatigued and a little short of breath following. Gait with st cane  1x20 with CGA/SBA 1x50 with HHA                                      PROPRIOCEPTION                                    MODALITIES                      Other Therapeutic Activities/Education:  Pt received education on their current pathology and how their condition affects functional activities. Pt understood discussion and questions were answered. Pt understands their activity limitations and understands rational for treatment progression.       Home Exercise Program:  4/17/2019 HEP as above, copy to pt/chart      Manual Treatments: ------       Modalities: ------        Communication with other providers:        Assessment:  (Response towards treatment session) (Pain

## 2019-04-30 ENCOUNTER — OFFICE VISIT (OUTPATIENT)
Dept: INTERNAL MEDICINE CLINIC | Age: 84
End: 2019-04-30
Payer: MEDICARE

## 2019-04-30 VITALS
WEIGHT: 131.5 LBS | DIASTOLIC BLOOD PRESSURE: 82 MMHG | BODY MASS INDEX: 24.85 KG/M2 | HEART RATE: 98 BPM | RESPIRATION RATE: 16 BRPM | SYSTOLIC BLOOD PRESSURE: 118 MMHG | OXYGEN SATURATION: 98 %

## 2019-04-30 DIAGNOSIS — G89.29 CHRONIC NECK PAIN: ICD-10-CM

## 2019-04-30 DIAGNOSIS — J44.9 COPD, SEVERE (HCC): ICD-10-CM

## 2019-04-30 DIAGNOSIS — M54.2 CHRONIC NECK PAIN: ICD-10-CM

## 2019-04-30 DIAGNOSIS — R73.03 PREDIABETES: Primary | ICD-10-CM

## 2019-04-30 DIAGNOSIS — E03.9 HYPOTHYROIDISM, UNSPECIFIED TYPE: ICD-10-CM

## 2019-04-30 DIAGNOSIS — R41.89 COGNITIVE IMPAIRMENT: ICD-10-CM

## 2019-04-30 PROCEDURE — 99214 OFFICE O/P EST MOD 30 MIN: CPT | Performed by: INTERNAL MEDICINE

## 2019-04-30 PROCEDURE — 1123F ACP DISCUSS/DSCN MKR DOCD: CPT | Performed by: INTERNAL MEDICINE

## 2019-04-30 PROCEDURE — 3023F SPIROM DOC REV: CPT | Performed by: INTERNAL MEDICINE

## 2019-04-30 PROCEDURE — 1090F PRES/ABSN URINE INCON ASSESS: CPT | Performed by: INTERNAL MEDICINE

## 2019-04-30 PROCEDURE — G8427 DOCREV CUR MEDS BY ELIG CLIN: HCPCS | Performed by: INTERNAL MEDICINE

## 2019-04-30 PROCEDURE — G8420 CALC BMI NORM PARAMETERS: HCPCS | Performed by: INTERNAL MEDICINE

## 2019-04-30 PROCEDURE — G8400 PT W/DXA NO RESULTS DOC: HCPCS | Performed by: INTERNAL MEDICINE

## 2019-04-30 PROCEDURE — 1036F TOBACCO NON-USER: CPT | Performed by: INTERNAL MEDICINE

## 2019-04-30 PROCEDURE — G8599 NO ASA/ANTIPLAT THER USE RNG: HCPCS | Performed by: INTERNAL MEDICINE

## 2019-04-30 PROCEDURE — G8926 SPIRO NO PERF OR DOC: HCPCS | Performed by: INTERNAL MEDICINE

## 2019-04-30 PROCEDURE — 4040F PNEUMOC VAC/ADMIN/RCVD: CPT | Performed by: INTERNAL MEDICINE

## 2019-04-30 RX ORDER — TRIAMCINOLONE ACETONIDE 0.15 MG/G
AEROSOL, SPRAY TOPICAL
Qty: 1 CAN | Refills: 5 | Status: SHIPPED | OUTPATIENT
Start: 2019-04-30 | End: 2019-10-28 | Stop reason: ALTCHOICE

## 2019-04-30 RX ORDER — KETOCONAZOLE 20 MG/ML
SHAMPOO TOPICAL
Qty: 1 BOTTLE | Refills: 5 | Status: SHIPPED | OUTPATIENT
Start: 2019-04-30 | End: 2019-10-28 | Stop reason: ALTCHOICE

## 2019-04-30 NOTE — PROGRESS NOTES
nizo  Subjective:      Christa Egan is a 82 y.o. female who presents today for follow up on her chronic medical conditions as noted below. Patient Active Problem List:     Depression     Chronic neck pain     Graves' disease     Hyperlipidemia     Hypertension     Cognitive impairment     Cerebrovascular accident (CVA) due to vascular stenosis (HCC)     Gait disturbance     Mixed incontinence     Abnormal stress test     S/P CABG (coronary artery bypass graft)     Subclavian artery stenosis, left (HCC)     COPD, severe (HCC)     Coronary artery disease involving native coronary artery of native heart without angina pectoris     Chronic hypoxemic respiratory failure (HCC)     Shortness of breath     I saw her last March 19    I added wellbutrin for irribability   No benefit and it is stopped    She complains of itching scalp  Will try kenalog spray , head and shoulders, and nizoral shampoo    She has been in PT and is improving in walking    She has been using nebulizer with mouthpiece    The patient denies cough, chest pain, dyspnea, wheezing or hemoptysis. Her Home BS this AM was 103  Running   She has \"prediabetes\" by hx   Will checck BS after next blood draw to confirm accuracy    Dat [place] for some reason  Has hx of low thyroid and i'll recheck values    Patient denies any exertional chest pain, dyspnea, palpitations, syncope, orthopnea, edema or paroxysmal nocturnal dyspnea. The patient denies any new focL symptoms of neurological impairment or TIA's; no amaurosis, diplopia, dysphasia, or unilateral disturbance of motor or sensory function. No loss of balance or vertigo. Current Outpatient Medications   Medication Sig Dispense Refill    ketoconazole (NIZORAL) 2 % shampoo Apply topically daily as needed. 1 Bottle 5    triamcinolone (KENALOG) 0.147 MG/GM topical spray Apply topically 2 times daily.  1 Can 5    potassium chloride (KLOR-CON M) 10 MEQ extended release tablet Take 1 tablet by mouth daily 30 tablet 3    BROOKE-24 200 MG extended release capsule TAKE 1 CAPSULE BY MOUTH ONCE DAILY 30 capsule 4    prednisoLONE acetate (PRED FORTE) 1 % ophthalmic suspension Place 1 drop into the left eye 3 times daily      memantine (NAMENDA) 10 MG tablet Take 10 mg by mouth 2 times daily      furosemide (LASIX) 20 MG tablet TAKE 1 TABLET BY MOUTH ONCE DAILY 30 tablet 1    carvedilol (COREG) 12.5 MG tablet TAKE 1 TABLET BY MOUTH TWO TIMES DAILY 60 tablet 1    donepezil (ARICEPT) 10 MG tablet Take 10 mg by mouth nightly      omeprazole (PRILOSEC) 40 MG delayed release capsule TAKE ONE (1) CAPSULE BY MOUTH ONCE DAILY 30 capsule 5    rosuvastatin (CRESTOR) 5 MG tablet TAKE ONE (1) TABLET BY MOUTH ONCE DAILY 30 tablet 5    DULoxetine (CYMBALTA) 60 MG extended release capsule Take 1 capsule by mouth daily 30 capsule 5    tiotropium (SPIRIVA RESPIMAT) 2.5 MCG/ACT AERS inhaler Inhale 2 puffs into the lungs daily 1 Inhaler 11    budesonide-formoterol (SYMBICORT) 160-4.5 MCG/ACT AERO Inhale 2 puffs into the lungs 2 times daily 3 Inhaler 2    ipratropium-albuterol (DUONEB) 0.5-2.5 (3) MG/3ML SOLN nebulizer solution Inhale 1 vial into the lungs every 4 hours      albuterol sulfate HFA (PROAIR HFA) 108 (90 Base) MCG/ACT inhaler Inhale 2 puffs into the lungs every 6 hours as needed for Wheezing 1 Inhaler 11    OXYGEN Inhale 2 L/min into the lungs nightly And portable; battery powered oxygen unit when needed      aspirin 81 MG chewable tablet Take 1 tablet by mouth daily 30 tablet 3     No current facility-administered medications for this visit.             Past Medical History:   Diagnosis Date    Acute ischemic colitis [facility]) 2008    Colonoscopy done    CAD (coronary artery disease) 06/2017    4 vessel CABG    Cerebrovascular event ([facility] [address] 02/2016    balance, speech, leg weakness; ARU    Chronic hypoxemic respiratory failure (HCC) 2/18/2019    Chronic neck pain     Dr Pastrana Screen 2/2011    Cognitive impairment 2011    COPD, severe ([address] 2018    Depression     Diverticulitis 2012    Family history of colon cancer     Family history of diabetes mellitus     Fibromyalgia 1989    Graves disease     Elvin Mary; tapazole for a time    Hyperlipidemia     Hypertension     Lumbar spinal stenosis 2015    moderate ; MRI by Dr Yoly Richard Nocturnal hypoxemia 2018    Nocturnal oxygen desaturation     Obstructive sleep apnea     Osteoporosis     Prediabetes     hgb 6.5    S/P CABG (coronary artery bypass graft) 2017    4 vessel-CABG EVH RCA Marginal, OM1, OM2,  LIMA to LAD    S/P cardiac catheterization 2017    severe multivessel disease    Subclavian artery stenosis, left ([address] 2017    noted in hosp for CABG; to be dealt with as OP by vasc surgeons    Subclinical Hyperthyroidism     Dr. Elvin Mary; Tapazole    Urine incontinence 2011    Timed voiding; Isidro; urology        Social History     Tobacco Use    Smoking status: Former Smoker     Packs/day: 0.50     Years: 30.00     Pack years: 15.00     Types: Cigarettes     Start date: 1957     Last attempt to quit: 1987     Years since quittin.2    Smokeless tobacco: Never Used   Substance Use Topics    Alcohol use: No     Comment: special occasion once a year maybe         ROS: The patient has had no headache, sore throat, fever or chills, cough, dyspnea, chest pain, nausea, vomiting or diarrhea, or edema. Objective:      /82   Pulse 98   Resp 16   Wt 131 lb 8 oz (59.6 kg)   LMP  (LMP Unknown)   SpO2 98%   BMI 24.85 kg/m²      Physical Exam   Constitutional: She is oriented to person, place, and time. She appears well-developed and well-nourished. No distress. HENT:   Head: Normocephalic and atraumatic. Mouth/Throat: Oropharynx is clear and moist.   Eyes: Conjunctivae are normal. No scleral icterus. Neck: Neck supple. Cardiovascular: Normal rate and regular rhythm.    No murmur heard. Pulmonary/Chest: Effort normal. No respiratory distress. She has no wheezes. She has no rales. Abdominal: Soft. She exhibits no distension. There is no tenderness. Musculoskeletal: Normal range of motion. Neurological: She is alert and oriented to person, place, and time. Coordination normal.   Skin: Skin is warm and dry. Psychiatric: She has a normal mood and affect. Her behavior is normal.   Vitals reviewed. Feb lab rev'd       Assessment / Plan:      1. Prediabetes    2. COPD, severe ([address]    3. Chronic neck pain    4. Hypothyroidism, unspecified type    5.  Cognitive impairment            Plan   STOP WELLBUTRIN ; INEFFECTIVE  ADD NIZORAL SHAMPOO AND KENALOG SPRAY TO SCALP FOR SEBORREHA    F/U JAWADI, NEUROLOGY , ETC  CONTINUE PT AND NEBULIZER  rtc 3 MO, LAB FIRST  Orders Placed This Encounter   Procedures    Basic Metabolic Panel    Hemoglobin A1C    TSH without Reflex    T4, Free     I believe she is on namzeric now   to bring dose

## 2019-05-01 ENCOUNTER — HOSPITAL ENCOUNTER (OUTPATIENT)
Dept: PHYSICAL THERAPY | Age: 84
Setting detail: THERAPIES SERIES
Discharge: HOME | End: 2019-05-01
Payer: MEDICARE

## 2019-05-01 PROCEDURE — 97116 GAIT TRAINING THERAPY: CPT

## 2019-05-01 PROCEDURE — 97110 THERAPEUTIC EXERCISES: CPT

## 2019-05-01 RX ORDER — CARVEDILOL 12.5 MG/1
TABLET ORAL
Qty: 60 TABLET | Refills: 5 | Status: SHIPPED | OUTPATIENT
Start: 2019-05-01 | End: 2019-10-28 | Stop reason: SDUPTHER

## 2019-05-01 NOTE — FLOWSHEET NOTE
Outpatient Physical Therapy  [facility]           [x] Phone: [phone]   Fax: [phone]  [place]           [] Phone: [phone]   Fax: [phone]        Physical Therapy Daily Treatment Note  Date:  2019    Patient Name:  Agustin Smith    :  1935  MRN: [id number]  Restrictions/Precautions: Other position/activity restrictions: None noted  Diagnosis:   Diagnosis: Muscle Weakness, Vascular Dementia  Date of Injury/Surgery:   Treatment Diagnosis: Treatment Diagnosis: Muscular weakness, deconditioned, difficulty walking    Insurance/Certification information: PT Insurance Information: Medicare   Referring Physician:  Referring Practitioner: Angel Monterroso PA-C  Next Doctor Visit:    Plan of care signed (Y/N):    Outcome Measure:   Visit# / total visits:  4/10  Pain level: 6/10   Goals:          Long term goals  Time Frame for Long term goals :  All goals to be assessed by the 10th visit  Long term goal 1: Pt to be independent with HEP  Long term goal 2: Pt to increase chandrika LE strength  Long term goal 3: Pt to ambulate with better safety awareness x 100 feet  Long term goal 4: Pt to improve balance to be able to ambulate in // bars without hands-on assistance from therapist    Summary of Evaluation: Assessment: Patient is an  81 yo female who presents with  Muscle Weakness, Vascular Dementia which impacts on gait, balance, ADL's, activities in/out of the house;patient's goal is to be able to do more than just walk from her chair to the bathroom or kitchen and sit back down, she wants to be able to go places with her spouse to go shopping and walking more ; PT to address patient's goals, impairments and activity limitations with skilled interventions checked in plan of care;patient's level of function prior to onset of  Muscle Weakness, Vascular Dementia was [facility]; did not observe any barriers to learning during PT eval; learning preferences include demonstration, practice, and handouts; patient expressed understanding of HEP; patient appears to be motivated to participate in an active PT program and to be compliant with HEP expectations;patient assisted in developing treatment plan and goals; [employer] DME is currently being used; Subjective:  Pt states she is feeling really weak today. Luis Metz backwards yesterday landing on the floor. Any changes in Ambulatory Summary Sheet? None        Objective:  See gait below          Exercises: (No more than 4 columns)   Exercise/Equipment Date 4/17/2019 (1) Date 4/25/19 (2) Date 4/29/19 (3) Date 5/1/19 (4)            WARM UP        Nu-Step  ----- Seat 6 arms7 WL 2 x 10 min Seat 6 arms7 WL 2 x 10 min Seat 6 arms7 WL 2 x 10 min          TABLE                                    SEATED         LAQ 3 ct x 10 reps (add 1# next visit) 3 ct 2x10 reps  3 ct 2x10 reps 3 ct 2x10 reps   Hip abd RTB x 10 reps RTB 2x10 reps RTB 2x10 reps RTB 2x10 reps   Hip add Pillow squeeze 3 ct x 10 reps ball squeeze 3 ct 2x10 reps ball squeeze 3 ct 2x10 reps ball squeeze 3 ct 2x10 reps   Sit to stands   Using UE's x 5 reps, c/o left knee pain Using UE's x 5 reps, c/o left knee pain Using UE's x 5 reps, c/o left knee pain from mat table with aeromat          STANDING       Gait with 4 wheel walker  [id number] SBA, fatigued and a little short of breath following. [id number] SBA, fatigued and a little short of breath following. ambulated in and out of therapy visit with rolling walker with CGA, required sitting rest breaks   Gait with st cane  1x20 with CGA/SBA 1x50 with HHA 1 x 10ft                                           PROPRIOCEPTION                                          MODALITIES                         Other Therapeutic Activities/Education:  Pt received education on their current pathology and how their condition affects functional activities. Pt understood discussion and questions were answered.  Pt understands their activity limitations and understands rational for treatment progression. Home Exercise Program:  4/17/2019 HEP as above, copy to pt/chart      Manual Treatments: ------       Modalities: ------        Communication with other providers:        Assessment:  (Response towards treatment session) (Pain Rating) Verbal and manual cueing on proper performance of the prescribed exercise. Pt required more assistance today and several rest breaks do due fatigue and feeling weak. Did have a fall yesterday.             Plan for Next Session: Specific instructions for Next Treatment: plan to advance chandrika LE strength, Nu-Step, eventually work into gait/balance activities      Time In / Time Out: 1429/1500         Timed Code/Total Treatment Minutes: 31/31, (1) gait, (1) TE       Next Progress Note due:  10th visit      Plan of Care Interventions:  [x] Therapeutic Exercise             [] Aquatics:  [x] Therapeutic Activity               [] Ultrasound                  [] Elec Stimulation  [x] Gait Training                          [] Cervical Traction        [] Lumbar Traction  [x] Neuromuscular Re-education            [] Cold/hotpack  [] Iontophoresis   [x] Instruction in HEP                                [] Manual Therapy                                 [] vasopneumatic            [x] Self care home management        []Dry needling trigger point point/pain management                   Electronically signed by:  Otf Bailon 5/1/2019, 2:29 PM

## 2019-05-06 ENCOUNTER — HOSPITAL ENCOUNTER (OUTPATIENT)
Dept: PHYSICAL THERAPY | Age: 84
Setting detail: THERAPIES SERIES
Discharge: HOME | End: 2019-05-06
Payer: MEDICARE

## 2019-05-06 PROCEDURE — 97110 THERAPEUTIC EXERCISES: CPT

## 2019-05-06 NOTE — FLOWSHEET NOTE
Outpatient Physical Therapy  [place]           [x] Phone: [phone]   Fax: [phone]  [place]           [] Phone: [phone]   Fax: [phone]        Physical Therapy Daily Treatment Note  Date:  2019    Patient Name:  Maranda Kaufman    :  1935  MRN: [id number]  Restrictions/Precautions: Other position/activity restrictions: None noted  Diagnosis:   Diagnosis: Muscle Weakness, Vascular Dementia  Date of Injury/Surgery:   Treatment Diagnosis: Treatment Diagnosis: Muscular weakness, deconditioned, difficulty walking    Insurance/Certification information: PT Insurance Information: Medicare   Referring Physician:  Referring Practitioner: Nadine Cardona PA-C  Next Doctor Visit:    Plan of care signed (Y/N):    Outcome Measure:   Visit# / total visits:  5/10  Pain level: 6/10   Goals:          Long term goals  Time Frame for Long term goals :  All goals to be assessed by the 10th visit  Long term goal 1: Pt to be independent with HEP  Long term goal 2: Pt to increase chandrika LE strength  Long term goal 3: Pt to ambulate with better safety awareness x 100 feet  Long term goal 4: Pt to improve balance to be able to ambulate in // bars without hands-on assistance from therapist    Summary of Evaluation: Assessment: Patient is an  81 yo female who presents with  Muscle Weakness, Vascular Dementia which impacts on gait, balance, ADL's, activities in/out of the house;patient's goal is to be able to do more than just walk from her chair to the bathroom or kitchen and sit back down, she wants to be able to go places with her spouse to go shopping and walking more ; PT to address patient's goals, impairments and activity limitations with skilled interventions checked in plan of care;patient's level of function prior to onset of  Muscle Weakness, Vascular Dementia was [facility]; did not observe any barriers to learning during PT eval; learning preferences include demonstration, practice, and handouts; patient expressed understanding of HEP; patient appears to be motivated to participate in an active PT program and to be compliant with HEP expectations;patient assisted in developing treatment plan and goals; [employer] DME is currently being used; Subjective:  Pt states she is feeling really weak today and feels the worst she has in awhile. Any changes in Ambulatory Summary Sheet? None        Objective:  Pt required max VC's with gait to stay up close to walker. Exercises: (No more than 4 columns)   Exercise/Equipment Date 4/17/2019 (1) Date 4/25/19 (2) Date 4/29/19 (3) Date 5/1/19 (4) 5/6/2019 (5)             WARM UP         Nu-Step  ----- Seat 6 arms7 WL 2 x 10 min Seat 6 arms7 WL 2 x 10 min Seat 6 arms7 WL 2 x 10 min Seat 6 arms7 WL 1 x 10 min           TABLE                                         SEATED          LAQ 3 ct x 10 reps (add 1# next visit) 3 ct 2x10 reps  3 ct 2x10 reps 3 ct 2x10 reps 1# 3 ct 2x10 reps   Hip abd RTB x 10 reps RTB 2x10 reps RTB 2x10 reps RTB 2x10 reps RTB 2x10 reps   Hip add Pillow squeeze 3 ct x 10 reps ball squeeze 3 ct 2x10 reps ball squeeze 3 ct 2x10 reps ball squeeze 3 ct 2x10 reps ball squeeze 3 ct 2x10 reps   Sit to stands   Using UE's x 5 reps, c/o left knee pain Using UE's x 5 reps, c/o left knee pain Using UE's x 5 reps, c/o left knee pain from mat table with aeromat ----------           STANDING        Gait with 4 wheel walker  [id number] SBA, fatigued and a little short of breath following. [id number] SBA, fatigued and a little short of breath following.  ambulated in and out of therapy visit with rolling walker with CGA, required sitting rest breaks ambulated in and out of therapy visit with rolling walker with CGA, required sitting rest breaks   Gait with st cane  1x20 with CGA/SBA 1x50 with HHA 1 x 10ft ---------                                                PROPRIOCEPTION                                                MODALITIES Other Therapeutic Activities/Education:  Pt received education on their current pathology and how their condition affects functional activities. Pt understood discussion and questions were answered. Pt understands their activity limitations and understands rational for treatment progression. Home Exercise Program:  4/17/2019 HEP as above, copy to pt/chart      Manual Treatments: ------       Modalities: ------        Communication with other providers:        Assessment:  (Response towards treatment session) (Pain Rating) Verbal and manual cueing on proper performance of the prescribed exercise. Pt required more assistance today and several rest breaks do due fatigue and feeling weak.  Pt fell over twice when sitting at edge of bed, stating she just did not feel good and wanted to rest.           Plan for Next Session: Specific instructions for Next Treatment: plan to advance chandrika LE strength, Nu-Step, eventually work into gait/balance activities      Time In / Time Out: 1445/1520         Timed Code/Total Treatment Minutes: 35/35;  (2) TE       Next Progress Note due:  10th visit      Plan of Care Interventions:  [x] Therapeutic Exercise             [] Aquatics:  [x] Therapeutic Activity               [] Ultrasound                  [] Elec Stimulation  [x] Gait Training                          [] Cervical Traction        [] Lumbar Traction  [x] Neuromuscular Re-education            [] Cold/hotpack  [] Iontophoresis   [x] Instruction in HEP                                [] Manual Therapy                                 [] vasopneumatic            [x] Self care home management        []Dry needling trigger point point/pain management                   Electronically signed by:  Palmer Gu 5/6/2019, 2:50 PM

## 2019-05-08 ENCOUNTER — HOSPITAL ENCOUNTER (OUTPATIENT)
Dept: PHYSICAL THERAPY | Age: 84
Setting detail: THERAPIES SERIES
Discharge: HOME | End: 2019-05-08
Payer: MEDICARE

## 2019-05-08 PROCEDURE — 97112 NEUROMUSCULAR REEDUCATION: CPT

## 2019-05-08 PROCEDURE — 97110 THERAPEUTIC EXERCISES: CPT

## 2019-05-08 PROCEDURE — 97530 THERAPEUTIC ACTIVITIES: CPT

## 2019-05-08 NOTE — FLOWSHEET NOTE
patient expressed understanding of HEP; patient appears to be motivated to participate in an active PT program and to be compliant with HEP expectations;patient assisted in developing treatment plan and goals; [employer] DME is currently being used; Subjective:  Pt states she started out her day feeling pretty good but started feeling not so good about an hour ago. Any changes in Ambulatory Summary Sheet? None        Objective:  Pt required max VC's with gait to stay up close to walker. See gait below. Exercises: (No more than 4 columns)   Exercise/Equipment Date 4/29/19 (3) Date 5/1/19 (4) 5/6/2019 (5) 5/8/19 (6)            WARM UP        Nu-Step  Seat 6 arms7 WL 2 x 10 min Seat 6 arms7 WL 2 x 10 min Seat 6 arms7 WL 1 x 10 min Seat 6 arms7 WL 1 x 10 min          TABLE                                    SEATED         LAQ 3 ct 2x10 reps 3 ct 2x10 reps 1# 3 ct 2x10 reps 1# 3 ct 2x10 reps   Hip abd RTB 2x10 reps RTB 2x10 reps RTB 2x10 reps RTB 2x10 reps   Hip add ball squeeze 3 ct 2x10 reps ball squeeze 3 ct 2x10 reps ball squeeze 3 ct 2x10 reps ball squeeze 3 ct 2x10 reps   Sit to stands  Using UE's x 5 reps, c/o left knee pain Using UE's x 5 reps, c/o left knee pain from mat table with aeromat ---------- Lower mat with aeromat to elevate surface x 10 reps with 1 hand pushing from table and 1 on walker. STANDING       Gait with 4 wheel walker [id number] SBA, fatigued and a little short of breath following.  ambulated in and out of therapy visit with rolling walker with CGA, required sitting rest breaks ambulated in and out of therapy visit with rolling walker with CGA, required sitting rest breaks ambulated in and out of therapy visit with rolling walker with CGA   Gait with st cane 1x50 with HHA 1 x 10ft --------- 1x44ft with HHA                                           PROPRIOCEPTION                                          MODALITIES                         Other Therapeutic

## 2019-05-13 ENCOUNTER — HOSPITAL ENCOUNTER (OUTPATIENT)
Dept: PHYSICAL THERAPY | Age: 84
Setting detail: THERAPIES SERIES
Discharge: HOME | End: 2019-05-13
Payer: MEDICARE

## 2019-05-13 PROCEDURE — 97112 NEUROMUSCULAR REEDUCATION: CPT

## 2019-05-13 PROCEDURE — 97110 THERAPEUTIC EXERCISES: CPT

## 2019-05-13 PROCEDURE — 97530 THERAPEUTIC ACTIVITIES: CPT

## 2019-05-13 RX ORDER — FUROSEMIDE 20 MG/1
TABLET ORAL
Qty: 30 TABLET | Refills: 0 | Status: SHIPPED | OUTPATIENT
Start: 2019-05-13 | End: 2019-06-08 | Stop reason: SDUPTHER

## 2019-05-13 NOTE — FLOWSHEET NOTE
Outpatient Physical Therapy  [place]           [x] Phone: [phone]   Fax: [phone]  [place]           [] Phone: [phone]   Fax: [phone]        Physical Therapy Daily Treatment Note  Date:  2019    Patient Name:  Annie Wilson    :  1935  MRN: [id number]  Restrictions/Precautions: Other position/activity restrictions: None noted  Diagnosis:   Diagnosis: Muscle Weakness, Vascular Dementia  Date of Injury/Surgery:   Treatment Diagnosis: Treatment Diagnosis: Muscular weakness, deconditioned, difficulty walking    Insurance/Certification information: PT Insurance Information: Medicare   Referring Physician:  Referring Practitioner: Raiza Friedman PA-C  Next Doctor Visit:    Plan of care signed (Y/N):    Outcome Measure:   Visit# / total visits:  7/10  Pain level: 4-5/10   Goals:          Long term goals  Time Frame for Long term goals :  All goals to be assessed by the 10th visit  Long term goal 1: Pt to be independent with HEP  Long term goal 2: Pt to increase chandrika LE strength  Long term goal 3: Pt to ambulate with better safety awareness x 100 feet  Long term goal 4: Pt to improve balance to be able to ambulate in // bars without hands-on assistance from therapist    Summary of Evaluation: Assessment: Patient is an  81 yo female who presents with  Muscle Weakness, Vascular Dementia which impacts on gait, balance, ADL's, activities in/out of the house;patient's goal is to be able to do more than just walk from her chair to the bathroom or kitchen and sit back down, she wants to be able to go places with her spouse to go shopping and walking more ; PT to address patient's goals, impairments and activity limitations with skilled interventions checked in plan of care;patient's level of function prior to onset of  Muscle Weakness, Vascular Dementia was [facility]; did not observe any barriers to learning during PT eval; learning preferences include demonstration, practice, and handouts; patient expressed understanding of HEP; patient appears to be motivated to participate in an active PT program and to be compliant with HEP expectations;patient assisted in developing treatment plan and goals; [employer] DME is currently being used; Subjective:  Pt states she feels pretty good today. .       Any changes in Ambulatory Summary Sheet? None        Objective:  Pt required max VC's with gait to stay up close to walker. See gait below. Exercises: (No more than 4 columns)   Exercise/Equipment Date 5/1/19 (4) 5/6/2019 (5) 5/8/19 (6) 5/13/2019 (7)            WARM UP        Nu-Step  Seat 6 arms7 WL 2 x 10 min Seat 6 arms7 WL 1 x 10 min Seat 6 arms7 WL 1 x 10 min Seat 6 arms7 WL 1 x 10 min          TABLE                                    SEATED         LAQ 3 ct 2x10 reps 1# 3 ct 2x10 reps 1# 3 ct 2x10 reps 2# 3 ct 2x10 reps   Hip abd RTB 2x10 reps RTB 2x10 reps RTB 2x10 reps RTB 2x10 reps   Hip add ball squeeze 3 ct 2x10 reps ball squeeze 3 ct 2x10 reps ball squeeze 3 ct 2x10 reps ball squeeze 3 ct 2x10 reps   Sit to stands  Using UE's x 5 reps, c/o left knee pain from mat table with aeromat ---------- Lower mat with aeromat to elevate surface x 10 reps with 1 hand pushing from table and 1 on walker.  Lower mat  surface x 6 reps with 2  hand pushing from table           STANDING       Gait with 4 wheel walker ambulated in and out of therapy visit with rolling walker with CGA, required sitting rest breaks ambulated in and out of therapy visit with rolling walker with CGA, required sitting rest breaks ambulated in and out of therapy visit with rolling walker with CGA ambulated in and out of therapy visit with rolling walker with CGA, x 150 feet   Gait with st cane 1 x 10ft --------- 1x44ft with HHA 1x44ft with HHA                                           PROPRIOCEPTION                                          MODALITIES                         Other Therapeutic Activities/Education:  Pt received education on their current pathology and how their condition affects functional activities. Pt understood discussion and questions were answered. Pt understands their activity limitations and understands rational for treatment progression. Home Exercise Program:  4/17/2019 HEP as above, copy to pt/chart      Manual Treatments: ------       Modalities: ------        Communication with other providers:        Assessment:  (Response towards treatment session) (Pain Rating) Verbal and manual cueing on proper performance of the prescribed exercise. Pt required less assistance today. No increase in pain but reports of fatigue following session. Did have some right knee discomfort with sit to stand transfers.          Plan for Next Session: Specific instructions for Next Treatment: plan to advance chandrika LE strength, Nu-Step, eventually work into gait/balance activities      Time In / Time Out: 1300/1340         Timed Code/Total Treatment Minutes: 40/40;  (1) TE, (1) TA, (1) NR       Next Progress Note due:  10th visit      Plan of Care Interventions:  [x] Therapeutic Exercise             [] Aquatics:  [x] Therapeutic Activity               [] Ultrasound                  [] Elec Stimulation  [x] Gait Training                          [] Cervical Traction        [] Lumbar Traction  [x] Neuromuscular Re-education            [] Cold/hotpack  [] Iontophoresis   [x] Instruction in HEP                                [] Manual Therapy                                 [] vasopneumatic            [x] Self care home management        []Dry needling trigger point point/pain management                   Electronically signed by:  Lorenza Meehan 5/13/2019, 1:03 PM

## 2019-05-15 ENCOUNTER — HOSPITAL ENCOUNTER (OUTPATIENT)
Dept: PHYSICAL THERAPY | Age: 84
Setting detail: THERAPIES SERIES
Discharge: HOME | End: 2019-05-15
Payer: MEDICARE

## 2019-05-15 PROCEDURE — 97110 THERAPEUTIC EXERCISES: CPT

## 2019-05-15 PROCEDURE — 97530 THERAPEUTIC ACTIVITIES: CPT

## 2019-05-15 PROCEDURE — 97112 NEUROMUSCULAR REEDUCATION: CPT

## 2019-05-20 ENCOUNTER — HOSPITAL ENCOUNTER (OUTPATIENT)
Dept: PHYSICAL THERAPY | Age: 84
Setting detail: THERAPIES SERIES
Discharge: HOME | End: 2019-05-20
Payer: MEDICARE

## 2019-05-20 PROCEDURE — 97110 THERAPEUTIC EXERCISES: CPT

## 2019-05-20 PROCEDURE — 97112 NEUROMUSCULAR REEDUCATION: CPT

## 2019-05-20 PROCEDURE — 97116 GAIT TRAINING THERAPY: CPT

## 2019-05-20 NOTE — FLOWSHEET NOTE
Outpatient Physical Therapy  [place]           [x] Phone: [phone]   Fax: [phone]  [place]           [] Phone: [phone]   Fax: [phone]        Physical Therapy Daily Treatment Note  Date:  2019    Patient Name:  Rock Vicente    :  1935  MRN: [id number]  Restrictions/Precautions: Other position/activity restrictions: None noted  Diagnosis:   Diagnosis: Muscle Weakness, Vascular Dementia  Date of Injury/Surgery:   Treatment Diagnosis: Treatment Diagnosis: Muscular weakness, deconditioned, difficulty walking    Insurance/Certification information: PT Insurance Information: Medicare   Referring Physician:  Referring Practitioner: Alessia Teixeira PA-C  Next Doctor Visit:    Plan of care signed (Y/N):    Outcome Measure:   Visit# / total visits:  9/10  Pain level: 4-5/10   Goals:          Long term goals  Time Frame for Long term goals :  All goals to be assessed by the 10th visit  Long term goal 1: Pt to be independent with HEP  Long term goal 2: Pt to increase chandrika LE strength  Long term goal 3: Pt to ambulate with better safety awareness x 100 feet  Long term goal 4: Pt to improve balance to be able to ambulate in // bars without hands-on assistance from therapist    Summary of Evaluation: Assessment: Patient is an  81 yo female who presents with  Muscle Weakness, Vascular Dementia which impacts on gait, balance, ADL's, activities in/out of the house;patient's goal is to be able to do more than just walk from her chair to the bathroom or kitchen and sit back down, she wants to be able to go places with her spouse to go shopping and walking more ; PT to address patient's goals, impairments and activity limitations with skilled interventions checked in plan of care;patient's level of function prior to onset of  Muscle Weakness, Vascular Dementia was [facility]; did not observe any barriers to learning during PT eval; learning preferences include demonstration, practice, and handouts; patient expressed understanding of HEP; patient appears to be motivated to participate in an active PT program and to be compliant with HEP expectations;patient assisted in developing treatment plan and goals; [employer] DME is currently being used; Subjective:  Pt states she doesn't feel good today. .       Any changes in Ambulatory Summary Sheet? None        Objective:  Pt required min to mod VC's with gait to stay up close to walker. See gait below. Exercises: (No more than 4 columns)   Exercise/Equipment 5/8/19 (6) 5/13/2019 (7) 5/15/19 (8) 5/20/2019 (9)            WARM UP        Nu-Step  Seat 6 arms7 WL 1 x 10 min Seat 6 arms7 WL 1 x 10 min Seat 6 arms7 WL 1 x 10 min Seat 6 arms7 WL2 x 10 min          TABLE       Bridges    2 x 10 reps   SLR    1 x 10 reps                  SEATED         LAQ 1# 3 ct 2x10 reps 2# 3 ct 2x10 reps 2# 3 ct 2x10 reps 2# 3 ct 2x10 reps   Hip abd RTB 2x10 reps RTB 2x10 reps RTB 2x10 reps RTB 2x10 reps   Hip add ball squeeze 3 ct 2x10 reps ball squeeze 3 ct 2x10 reps ball squeeze 3 ct 2x10 reps ball squeeze 3 ct 2x10 reps   Sit to stands  Lower mat with aeromat to elevate surface x 10 reps with 1 hand pushing from table and 1 on walker. Lower mat  surface x 6 reps with 2  hand pushing from table  Lower mat  Surface 2x5 reps with 1 hand pushing from table and 1 on walker.  Lower mat  Surface 2 x 5 reps with 2  hand pushing from table   Seated lat row   RTB 2x10 reps RTB 2x10 reps   STANDING       Gait with 4 wheel walker ambulated in and out of therapy visit with rolling walker with CGA ambulated in and out of therapy visit with rolling walker with CGA, x 150 feet ambulated in and out of therapy visit with rolling walker with SBA/CGA verbal cueing for safety ambulated in and out of therapy visit with rolling walker with SBA/CGA verbal cueing for safety   Gait with st cane 1x44ft with HHA 1x44ft with HHA 2x ~50ft with CGA 79.8ft with CGA PROPRIOCEPTION                                          MODALITIES                         Other Therapeutic Activities/Education:  Pt received education on their current pathology and how their condition affects functional activities. Pt understood discussion and questions were answered. Pt understands their activity limitations and understands rational for treatment progression. Home Exercise Program:  4/17/2019 HEP as above, copy to pt/chart      Manual Treatments: ------       Modalities: ------        Communication with other providers:        Assessment:  (Response towards treatment session) (Pain Rating) Verbal and manual cueing on proper performance of the prescribed exercise. No increase in pain but reports of fatigue following session.           Plan for Next Session: Specific instructions for Next Treatment: plan to advance chandrika LE strength, Nu-Step, eventually work into gait/balance activities      Time In / Time Out: 1300/1345         Timed Code/Total Treatment Minutes: 45/45;  (1) TE, (1) GT, (1) NR       Next Progress Note due:  10th visit      Plan of Care Interventions:  [x] Therapeutic Exercise             [] Aquatics:  [x] Therapeutic Activity               [] Ultrasound                  [] Elec Stimulation  [x] Gait Training                          [] Cervical Traction        [] Lumbar Traction  [x] Neuromuscular Re-education            [] Cold/hotpack  [] Iontophoresis   [x] Instruction in HEP                                [] Manual Therapy                                 [] vasopneumatic            [x] Self care home management        []Dry needling trigger point point/pain management                   Electronically signed by:  Ginna Howell 5/20/2019, 1:00 PM

## 2019-05-22 ENCOUNTER — HOSPITAL ENCOUNTER (OUTPATIENT)
Dept: PHYSICAL THERAPY | Age: 84
Setting detail: THERAPIES SERIES
Discharge: HOME | End: 2019-05-22
Payer: MEDICARE

## 2019-05-22 PROCEDURE — 97110 THERAPEUTIC EXERCISES: CPT

## 2019-05-22 PROCEDURE — 97112 NEUROMUSCULAR REEDUCATION: CPT

## 2019-05-22 PROCEDURE — 97116 GAIT TRAINING THERAPY: CPT

## 2019-05-22 NOTE — FLOWSHEET NOTE
Outpatient Physical Therapy  [facility]           [x] Phone: [phone]   Fax: [phone]  Lucia kaufman           [] Phone: [phone]   Fax: [phone]        Physical Therapy Daily Treatment Note  Date:  2019    Patient Name:  Kimberly Postal    :  1935  MRN: [id number]  Restrictions/Precautions: Other position/activity restrictions: None noted  Diagnosis:   Diagnosis: Muscle Weakness, Vascular Dementia  Date of Injury/Surgery:   Treatment Diagnosis: Treatment Diagnosis: Muscular weakness, deconditioned, difficulty walking    Insurance/Certification information: PT Insurance Information: Medicare   Referring Physician:  Referring Practitioner: Susana Cheng PA-C  Next Doctor Visit:    Plan of care signed (Y/N):    Outcome Measure:   Visit# / total visits:  10/10  Pain level: 4-5/10 neck  Goals:          Long term goals  Time Frame for Long term goals :  All goals to be assessed by the 10th visit  Long term goal 1: Pt to be independent with HEP  Long term goal 2: Pt to increase chandrika LE strength  Long term goal 3: Pt to ambulate with better safety awareness x 100 feet  Long term goal 4: Pt to improve balance to be able to ambulate in // bars without hands-on assistance from therapist    Summary of Evaluation: Assessment: Patient is an  83 yo female who presents with  Muscle Weakness, Vascular Dementia which impacts on gait, balance, ADL's, activities in/out of the house;patient's goal is to be able to do more than just walk from her chair to the bathroom or kitchen and sit back down, she wants to be able to go places with her spouse to go shopping and walking more ; PT to address patient's goals, impairments and activity limitations with skilled interventions checked in plan of care;patient's level of function prior to onset of  Muscle Weakness, Vascular Dementia was [facility]; did not observe any barriers to learning during PT eval; learning preferences include demonstration, practice, and handouts; patient expressed understanding of HEP; patient appears to be motivated to participate in an active PT program and to be compliant with HEP expectations;patient assisted in developing treatment plan and goals; [employer] DME is currently being used; Subjective:  Pt states they have discovered her blood sugar levels are off. Any changes in Ambulatory Summary Sheet? None        Objective:  See gait below. Exercises: (No more than 4 columns)   Exercise/Equipment 5/13/2019 (7) 5/15/19 (8) 5/20/2019 (9) Date 5/22/19 (10)            WARM UP        Nu-Step  Seat 6 arms7 WL 1 x 10 min Seat 6 arms7 WL 1 x 10 min Seat 6 arms7 WL2 x 10 min Seat 6 arms7 WL2 x 10 min          TABLE       Bridges   2 x 10 reps 2 x 10 reps   SLR   1 x 10 reps 1 x 10 reps                  SEATED         LAQ 2# 3 ct 2x10 reps 2# 3 ct 2x10 reps 2# 3 ct 2x10 reps 2# 3 ct 2x10 reps   Hip abd RTB 2x10 reps RTB 2x10 reps RTB 2x10 reps RTB 2x10 reps   Hip add ball squeeze 3 ct 2x10 reps ball squeeze 3 ct 2x10 reps ball squeeze 3 ct 2x10 reps ball squeeze 3 ct 2x10 reps   Sit to stands  Lower mat  surface x 6 reps with 2  hand pushing from table  Lower mat  Surface 2x5 reps with 1 hand pushing from table and 1 on walker.  Lower mat  Surface 2 x 5 reps with 2  hand pushing from table Lower mat  Surface 2x5 reps with 1 hand pushing from table and 1 on walker   Seated lat row  RTB 2x10 reps RTB 2x10 reps RTB 2x10 reps   STANDING       Gait with 4 wheel walker ambulated in and out of therapy visit with rolling walker with CGA, x 150 feet ambulated in and out of therapy visit with rolling walker with SBA/CGA verbal cueing for safety ambulated in and out of therapy visit with rolling walker with SBA/CGA verbal cueing for safety ambulated in and out of therapy visit with rolling walker with SBA verbal cueing for safety   Gait with st cane 1x44ft with HHA 2x ~50ft with CGA 79.8ft with CGA 1 x 128ft with CGA PROPRIOCEPTION                                          MODALITIES                         Other Therapeutic Activities/Education:  Pt received education on their current pathology and how their condition affects functional activities. Pt understood discussion and questions were answered. Pt understands their activity limitations and understands rational for treatment progression. Home Exercise Program:  4/17/2019 HEP as above, copy to pt/chart      Manual Treatments: ------       Modalities: ------        Communication with other providers:        Assessment:  (Response towards treatment session) (Pain Rating) Pt demonstrated improved performance of exercises requiring fewer rest breaks. Also improved walking endurance with st cane. No increase in pain but reports of fatigue following session. Plan for Next Session: Specific instructions for Next Treatment: plan to advance chandrika LE strength, Nu-Step, eventually work into gait/balance activities. Continue with therapy another 2 weeks per PT.       Time In / Time Out: 1303/1348         Timed Code/Total Treatment Minutes: 45/45;  (1) TE, (1) GT, (1) NR       Next Progress Note due:  10th visit      Plan of Care Interventions:  [x] Therapeutic Exercise             [] Aquatics:  [x] Therapeutic Activity               [] Ultrasound                  [] Elec Stimulation  [x] Gait Training                          [] Cervical Traction        [] Lumbar Traction  [x] Neuromuscular Re-education            [] Cold/hotpack  [] Iontophoresis   [x] Instruction in HEP                                [] Manual Therapy                                 [] vasopneumatic            [x] Self care home management        []Dry needling trigger point point/pain management                   Electronically signed by:  Smith Bach 5/22/2019, 1:03 PM

## 2019-05-23 ENCOUNTER — TELEPHONE (OUTPATIENT)
Dept: INTERNAL MEDICINE CLINIC | Age: 84
End: 2019-05-23

## 2019-05-23 DIAGNOSIS — L21.9 SEBORRHEA: Primary | ICD-10-CM

## 2019-05-23 RX ORDER — CLOBETASOL PROPIONATE 0.46 MG/ML
SOLUTION TOPICAL
Qty: 1 BOTTLE | Refills: 1 | Status: SHIPPED | OUTPATIENT
Start: 2019-05-23 | End: 2019-09-03 | Stop reason: ALTCHOICE

## 2019-05-23 NOTE — TELEPHONE ENCOUNTER
Pt left message stating she is still having issues with her scalp. She said the shampoo that was prescribe is not helping and she's still breaking out and has \"gunk\" on her scalp. She did not get the Kenalog spray as the P/A and appeal was denied by her insurance. She wants to know if something else can be prescribed or know what else she can do.  Please advise

## 2019-05-23 NOTE — TELEPHONE ENCOUNTER
Refer to derm ; [facility] at [place]    Add temovate scalp solution 0.05%  One bottle  Apply bid to scalp    1 ref    Have her use head and shoulders shampoo

## 2019-05-28 ENCOUNTER — HOSPITAL ENCOUNTER (OUTPATIENT)
Dept: PHYSICAL THERAPY | Age: 84
Setting detail: THERAPIES SERIES
Discharge: HOME | End: 2019-05-28
Payer: MEDICARE

## 2019-05-28 PROCEDURE — 97112 NEUROMUSCULAR REEDUCATION: CPT

## 2019-05-28 PROCEDURE — 97110 THERAPEUTIC EXERCISES: CPT

## 2019-05-28 PROCEDURE — 97116 GAIT TRAINING THERAPY: CPT

## 2019-05-28 NOTE — FLOWSHEET NOTE
Outpatient Physical Therapy  [facility]           [x] Phone: [phone]   Fax: [phone]  Julissa Ann           [] Phone: [phone]   Fax: [phone]        Physical Therapy Daily Treatment Note  Date:  2019    Patient Name:  Kimberly Postal    :  1935  MRN: [id number]  Restrictions/Precautions: Other position/activity restrictions: None noted  Diagnosis:   Diagnosis: Muscle Weakness, Vascular Dementia  Date of Injury/Surgery:   Treatment Diagnosis: Treatment Diagnosis: Muscular weakness, deconditioned, difficulty walking    Insurance/Certification information: PT Insurance Information: Medicare   Referring Physician:  Referring Practitioner: Susana Cheng PA-C  Next Doctor Visit:  2019  Plan of care signed (Y/N):    Outcome Measure:   Visit# / total visits:  11/10  Pain level: 4-5/10 neck  Goals:          Long term goals  Time Frame for Long term goals :  All goals to be assessed by the 10th visit  Long term goal 1: Pt to be independent with HEP  Long term goal 2: Pt to increase chandrika LE strength  Long term goal 3: Pt to ambulate with better safety awareness x 100 feet  Long term goal 4: Pt to improve balance to be able to ambulate in // bars without hands-on assistance from therapist    Summary of Evaluation: Assessment: Patient is an  81 yo female who presents with  Muscle Weakness, Vascular Dementia which impacts on gait, balance, ADL's, activities in/out of the house;patient's goal is to be able to do more than just walk from her chair to the bathroom or kitchen and sit back down, she wants to be able to go places with her spouse to go shopping and walking more ; PT to address patient's goals, impairments and activity limitations with skilled interventions checked in plan of care;patient's level of function prior to onset of  Muscle Weakness, Vascular Dementia was [facility]; did not observe any barriers to learning during PT eval; learning preferences include demonstration, practice, and CGA X 147 ft w/ CGA                                           PROPRIOCEPTION                                          MODALITIES                         Other Therapeutic Activities/Education:  Pt received education on their current pathology and how their condition affects functional activities. Pt understood discussion and questions were answered. Pt understands their activity limitations and understands rational for treatment progression. Home Exercise Program:  4/17/2019 HEP as above, copy to pt/chart      Manual Treatments: ------       Modalities: ------        Communication with other providers:        Assessment:  (Response towards treatment session) (Pain Rating) Pt demonstrated improved performance of exercises requiring fewer rest breaks. Also improved walking endurance with st cane. No increase in pain but reports of fatigue following session. Plan for Next Session: Specific instructions for Next Treatment: plan to advance chandrika LE strength, Nu-Step, eventually work into gait/balance activities. Continue with therapy another 2 weeks per PT.       Time In / Time Out: 1555/1640         Timed Code/Total Treatment Minutes: 45/45;  (1) TE, (1) GT, (1) NR       Next Progress Note due:  10th visit      Plan of Care Interventions:  [x] Therapeutic Exercise             [] Aquatics:  [x] Therapeutic Activity               [] Ultrasound                  [] Elec Stimulation  [x] Gait Training                          [] Cervical Traction        [] Lumbar Traction  [x] Neuromuscular Re-education            [] Cold/hotpack  [] Iontophoresis   [x] Instruction in HEP                                [] Manual Therapy                                 [] vasopneumatic            [x] Self care home management        []Dry needling trigger point point/pain management                   Electronically signed by:  Helio Canas 5/28/2019, 4:01 PM

## 2019-05-30 ENCOUNTER — HOSPITAL ENCOUNTER (OUTPATIENT)
Dept: PHYSICAL THERAPY | Age: 84
Setting detail: THERAPIES SERIES
Discharge: HOME | End: 2019-05-30
Payer: MEDICARE

## 2019-05-30 PROCEDURE — 97112 NEUROMUSCULAR REEDUCATION: CPT

## 2019-05-30 PROCEDURE — 97110 THERAPEUTIC EXERCISES: CPT

## 2019-05-30 PROCEDURE — 97116 GAIT TRAINING THERAPY: CPT

## 2019-05-30 NOTE — FLOWSHEET NOTE
Outpatient Physical Therapy  [facility]           [x] Phone: [phone]   Fax: [phone]  Lucia kaufman           [] Phone: [phone]   Fax: [phone]        Physical Therapy Daily Treatment Note  Date:  2019    Patient Name:  Rock Vicente    :  1935  MRN: [id number]  Restrictions/Precautions: Other position/activity restrictions: None noted  Diagnosis:   Diagnosis: Muscle Weakness, Vascular Dementia  Date of Injury/Surgery:   Treatment Diagnosis: Treatment Diagnosis: Muscular weakness, deconditioned, difficulty walking    Insurance/Certification information: PT Insurance Information: Medicare   Referring Physician:  Referring Practitioner: Alessia Teixeira PA-C  Next Doctor Visit:  2019  Plan of care signed (Y/N):    Outcome Measure:   Visit# / total visits:  12/10  Pain level: 4-5/10 neck  Goals:          Long term goals  Time Frame for Long term goals :  All goals to be assessed by the 10th visit  Long term goal 1: Pt to be independent with HEP  Long term goal 2: Pt to increase chandrika LE strength  Long term goal 3: Pt to ambulate with better safety awareness x 100 feet  Long term goal 4: Pt to improve balance to be able to ambulate in // bars without hands-on assistance from therapist    Summary of Evaluation: Assessment: Patient is an  83 yo female who presents with  Muscle Weakness, Vascular Dementia which impacts on gait, balance, ADL's, activities in/out of the house;patient's goal is to be able to do more than just walk from her chair to the bathroom or kitchen and sit back down, she wants to be able to go places with her spouse to go shopping and walking more ; PT to address patient's goals, impairments and activity limitations with skilled interventions checked in plan of care;patient's level of function prior to onset of  Muscle Weakness, Vascular Dementia was [facility]; did not observe any barriers to learning during PT eval; learning preferences include demonstration, practice, and handouts; patient expressed understanding of HEP; patient appears to be motivated to participate in an active PT program and to be compliant with HEP expectations;patient assisted in developing treatment plan and goals; [employer] DME is currently being used; Subjective:  Pt continues to c/o neck pain. Not feeling too horrible today. Any changes in Ambulatory Summary Sheet? None        Objective:  See gait below.           Exercises: (No more than 4 columns)   Exercise/Equipment 5/20/2019 (9) Date 5/22/19 (10) 5/28/2019 (11) 5/30/19 (12)            WARM UP        Nu-Step  Seat 6 arms7 WL2 x 10 min Seat 6 arms7 WL2 x 10 min Seat 6 arms7 WL2 x 10 min Seat 6 arms7 WL2 x 10 min          TABLE       Bridges 2 x 10 reps 2 x 10 reps 2 x 10 reps 2 x 10 reps   SLR 1 x 10 reps 1 x 10 reps 1 x 10 reps 1x10 reps                  SEATED         LAQ 2# 3 ct 2x10 reps 2# 3 ct 2x10 reps 2# 3 ct 2x10 reps 2# 3 ct 2x10 reps   Hip abd RTB 2x10 reps RTB 2x10 reps RTB 2x10 reps RTB 2x10 reps   Hip add ball squeeze 3 ct 2x10 reps ball squeeze 3 ct 2x10 reps ball squeeze 3 ct 2x10 reps ball squeeze 3 ct 2x10 reps   Sit to stands  Lower mat  Surface 2 x 5 reps with 2  hand pushing from table Lower mat  Surface 2x5 reps with 1 hand pushing from table and 1 on walker Lower mat  Surface 2 x 5 reps with 2  hand pushing from table Lower mat  Surface 2 x 5 reps with 2  hand pushing from table   Seated lat row RTB 2x10 reps RTB 2x10 reps RTB 2x10 reps RTB 2x10 reps   STANDING       Gait with 4 wheel walker ambulated in and out of therapy visit with rolling walker with SBA/CGA verbal cueing for safety ambulated in and out of therapy visit with rolling walker with SBA verbal cueing for safety ambulated in and out of therapy visit with rolling walker with SBA verbal cueing for safety ambulated in and out of therapy visit with rolling walker with SBA verbal cueing for safety   Gait with st cane 79.8ft with CGA 1 x 128ft with CGA X 147 ft w/ CGA

## 2019-06-03 ENCOUNTER — HOSPITAL ENCOUNTER (OUTPATIENT)
Dept: PHYSICAL THERAPY | Age: 84
Setting detail: THERAPIES SERIES
Discharge: HOME | End: 2019-06-03
Payer: MEDICARE

## 2019-06-03 PROCEDURE — 97112 NEUROMUSCULAR REEDUCATION: CPT

## 2019-06-03 PROCEDURE — 97110 THERAPEUTIC EXERCISES: CPT

## 2019-06-03 PROCEDURE — 97116 GAIT TRAINING THERAPY: CPT

## 2019-06-03 NOTE — FLOWSHEET NOTE
Outpatient Physical Therapy  [facility]           [x] Phone: [phone]   Fax: [phone]  Lucia kaufman           [] Phone: [phone]   Fax: [phone]        Physical Therapy Daily Treatment Note  Date:  6/3/2019    Patient Name:  Annie Wilson    :  1935  MRN: [id number]  Restrictions/Precautions: Other position/activity restrictions: None noted  Diagnosis:   Diagnosis: Muscle Weakness, Vascular Dementia  Date of Injury/Surgery:   Treatment Diagnosis: Treatment Diagnosis: Muscular weakness, deconditioned, difficulty walking    Insurance/Certification information: PT Insurance Information: Medicare   Referring Physician:  Referring Practitioner: Raiza Friedman PA-C  Next Doctor Visit:  2019  Plan of care signed (Y/N):    Outcome Measure:   Visit# / total visits:  13/10  Pain level: 4-5/10 neck  Goals:          Long term goals  Time Frame for Long term goals :  All goals to be assessed by the 10th visit  Long term goal 1: Pt to be independent with HEP  Long term goal 2: Pt to increase chandrika LE strength  Long term goal 3: Pt to ambulate with better safety awareness x 100 feet  Long term goal 4: Pt to improve balance to be able to ambulate in // bars without hands-on assistance from therapist    Summary of Evaluation: Assessment: Patient is an  81 yo female who presents with  Muscle Weakness, Vascular Dementia which impacts on gait, balance, ADL's, activities in/out of the house;patient's goal is to be able to do more than just walk from her chair to the bathroom or kitchen and sit back down, she wants to be able to go places with her spouse to go shopping and walking more ; PT to address patient's goals, impairments and activity limitations with skilled interventions checked in plan of care;patient's level of function prior to onset of  Muscle Weakness, Vascular Dementia was [facility]; did not observe any barriers to learning during PT eval; learning preferences include demonstration, practice, and handouts; patient expressed understanding of HEP; patient appears to be motivated to participate in an active PT program and to be compliant with HEP expectations;patient assisted in developing treatment plan and goals; [employer] DME is currently being used; Subjective:  Pt continues to c/o neck pain. Has been trying to do steps at home. Any changes in Ambulatory Summary Sheet? None        Objective:  See gait below. Endurance improving.           Exercises: (No more than 4 columns)   Exercise/Equipment Date 5/22/19 (10) 5/28/2019 (11) 5/30/19 (12) 6/3/19 (13)            WARM UP        Nu-Step  Seat 6 arms7 WL2 x 10 min Seat 6 arms7 WL2 x 10 min Seat 6 arms7 WL2 x 10 min Seat 6 arms7 WL2 x 10 min          TABLE       Bridges 2 x 10 reps 2 x 10 reps 2 x 10 reps 2x10 reps   SLR 1 x 10 reps 1 x 10 reps 1x10 reps 2x10 reps                  SEATED         LAQ 2# 3 ct 2x10 reps 2# 3 ct 2x10 reps 2# 3 ct 2x10 reps 2# 3 ct 2x10 reps   Hip abd RTB 2x10 reps RTB 2x10 reps RTB 2x10 reps RTB 2x10 reps   Hip add ball squeeze 3 ct 2x10 reps ball squeeze 3 ct 2x10 reps ball squeeze 3 ct 2x10 reps ball squeeze 3 ct 2x10 reps   Sit to stands  Lower mat  Surface 2x5 reps with 1 hand pushing from table and 1 on walker Lower mat  Surface 2 x 5 reps with 2  hand pushing from table Lower mat  Surface 2 x 5 reps with 2  hand pushing from table Lower mat  Surface 2 x 5 reps with 2  hand pushing from table   Seated lat row RTB 2x10 reps RTB 2x10 reps RTB 2x10 reps RTB 2x10 reps   STANDING       Gait with 4 wheel walker ambulated in and out of therapy visit with rolling walker with SBA verbal cueing for safety ambulated in and out of therapy visit with rolling walker with SBA verbal cueing for safety ambulated in and out of therapy visit with rolling walker with SBA verbal cueing for safety ambulated in and out of therapy visit with rolling walker with SBA verbal cueing for safety   Gait with st cane 1 x 128ft with CGA X 147 ft w/ CGA [id number], 1x48ft with CGA 1x48ft, [id number] with CGA   Stair ambulation    Up/down 4 and 6 inch steps using bilat handrails and CGA Up/down 4 and 6 inch steps using bilat handrails and CGA                                    PROPRIOCEPTION                                          MODALITIES                         Other Therapeutic Activities/Education:  Pt received education on their current pathology and how their condition affects functional activities. Pt understood discussion and questions were answered. Pt understands their activity limitations and understands rational for treatment progression. Home Exercise Program:  4/17/2019 HEP as above, copy to pt/chart      Manual Treatments: ------       Modalities: ------        Communication with other providers:        Assessment:  (Response towards treatment session) (Pain Rating) Pt continues to c/o constant neck pain. Exercise and gait tolerance improving. Neck pain remained 4-5/10 following session. Plan for Next Session: Specific instructions for Next Treatment: plan to advance chandrika LE strength, Nu-Step, eventually work into gait/balance activities. Continue with therapy another 2 weeks per PT.       Time In / Time Out: 1358/1448        Timed Code/Total Treatment Minutes: 50/50;  (1) TE, (1) GT, (1) NR       Next Progress Note due:  10th visit      Plan of Care Interventions:  [x] Therapeutic Exercise             [] Aquatics:  [x] Therapeutic Activity               [] Ultrasound                  [] Elec Stimulation  [x] Gait Training                          [] Cervical Traction        [] Lumbar Traction  [x] Neuromuscular Re-education            [] Cold/hotpack  [] Iontophoresis   [x] Instruction in HEP                                [] Manual Therapy                                 [] vasopneumatic            [x] Self care home management        []Dry needling trigger point point/pain management                   Electronically signed by:  Radha Galaviz LEIDY Friedman 6/3/2019, 1:58 PM

## 2019-06-05 ENCOUNTER — HOSPITAL ENCOUNTER (OUTPATIENT)
Dept: PHYSICAL THERAPY | Age: 84
Setting detail: THERAPIES SERIES
Discharge: HOME | End: 2019-06-05
Payer: MEDICARE

## 2019-06-05 PROCEDURE — 97112 NEUROMUSCULAR REEDUCATION: CPT

## 2019-06-05 PROCEDURE — 97116 GAIT TRAINING THERAPY: CPT

## 2019-06-05 PROCEDURE — 97110 THERAPEUTIC EXERCISES: CPT

## 2019-06-05 NOTE — FLOWSHEET NOTE
Outpatient Physical Therapy  [place]           [x] Phone: [phone]   Fax: [phone]  [place]           [] Phone: [phone]   Fax: [phone]        Physical Therapy Daily Treatment Note  Date:  2019    Patient Name:  Philippe Dalton    :  1935  MRN: [id number]  Restrictions/Precautions: Other position/activity restrictions: None noted  Diagnosis:   Diagnosis: Muscle Weakness, Vascular Dementia  Date of Injury/Surgery:   Treatment Diagnosis: Treatment Diagnosis: Muscular weakness, deconditioned, difficulty walking    Insurance/Certification information: PT Insurance Information: Medicare   Referring Physician:  Referring Practitioner: Kendy Raygoza PA-C  Next Doctor Visit:  2019  Plan of care signed (Y/N):    Outcome Measure:   Visit# / total visits:  14/10  Pain level: 4-5/10 neck  Goals:          Long term goals  Time Frame for Long term goals :  All goals to be assessed by the 10th visit  Long term goal 1: Pt to be independent with HEP  Long term goal 2: Pt to increase chandrika LE strength  Long term goal 3: Pt to ambulate with better safety awareness x 100 feet  Long term goal 4: Pt to improve balance to be able to ambulate in // bars without hands-on assistance from therapist    Summary of Evaluation: Assessment: Patient is an  83 yo female who presents with  Muscle Weakness, Vascular Dementia which impacts on gait, balance, ADL's, activities in/out of the house;patient's goal is to be able to do more than just walk from her chair to the bathroom or kitchen and sit back down, she wants to be able to go places with her spouse to go shopping and walking more ; PT to address patient's goals, impairments and activity limitations with skilled interventions checked in plan of care;patient's level of function prior to onset of  Muscle Weakness, Vascular Dementia was [facility]; did not observe any barriers to learning during PT eval; learning preferences include demonstration, practice, and handouts; patient expressed understanding of HEP; patient appears to be motivated to participate in an active PT program and to be compliant with HEP expectations;patient assisted in developing treatment plan and goals; [employer] DME is currently being used; Subjective:  Pt continues to c/o neck pain. Moving more around inside her home. Any changes in Ambulatory Summary Sheet? None        Objective:  See gait below. Endurance improving.           Exercises: (No more than 4 columns)   Exercise/Equipment 5/28/2019 (11) 5/30/19 (12) 6/3/19 (13) 6/5/19 (14)            WARM UP        Nu-Step  Seat 6 arms7 WL2 x 10 min Seat 6 arms7 WL2 x 10 min Seat 6 arms7 WL2 x 10 min Seat 6 arms7 WL2 x 10 min          TABLE       Bridges 2 x 10 reps 2 x 10 reps 2x10 reps 2x10 reps   SLR 1 x 10 reps 1x10 reps 2x10 reps 2x10 reps                  SEATED         LAQ 2# 3 ct 2x10 reps 2# 3 ct 2x10 reps 2# 3 ct 2x10 reps 2# 3 ct 2x10 reps   Hip abd RTB 2x10 reps RTB 2x10 reps RTB 2x10 reps RTB 2x10 reps   Hip add ball squeeze 3 ct 2x10 reps ball squeeze 3 ct 2x10 reps ball squeeze 3 ct 2x10 reps ball squeeze 3 ct 2x10 reps   Sit to stands  Lower mat  Surface 2 x 5 reps with 2  hand pushing from table Lower mat  Surface 2 x 5 reps with 2  hand pushing from table Lower mat  Surface 2 x 5 reps with 2  hand pushing from table Lower mat  Surface 2 x 5 reps with 2  hand pushing from table   Seated lat row RTB 2x10 reps RTB 2x10 reps RTB 2x10 reps RTB 2x10 reps   STANDING       Gait with 4 wheel walker ambulated in and out of therapy visit with rolling walker with SBA verbal cueing for safety ambulated in and out of therapy visit with rolling walker with SBA verbal cueing for safety ambulated in and out of therapy visit with rolling walker with SBA verbal cueing for safety ambulated in and out of therapy visit with rolling walker with SBA verbal cueing for safety   Gait with st cane X 147 ft w/ CGA [id number], 1x48ft with CGA 1x48ft, [id number] with CGA 1x50ft, 5c727ft with CGA   Stair ambulation   Up/down 4 and 6 inch steps using bilat handrails and CGA Up/down 4 and 6 inch steps using bilat handrails and CGA Up/down 4 and 6 inch steps using bilat handrails and CGA                                    PROPRIOCEPTION                                          MODALITIES                         Other Therapeutic Activities/Education:  Pt received education on their current pathology and how their condition affects functional activities. Pt understood discussion and questions were answered. Pt understands their activity limitations and understands rational for treatment progression. Home Exercise Program:  4/17/2019 HEP as above, copy to pt/chart      Manual Treatments: ------       Modalities: ------        Communication with other providers:        Assessment:  (Response towards treatment session) (Pain Rating) Pt continues to c/o constant neck pain. Exercise and gait tolerance improving. Neck pain remained 4-5/10 following session. Plan for Next Session: Specific instructions for Next Treatment: plan to advance chandrika LE strength, Nu-Step, eventually work into gait/balance activities. Continue with therapy another 2 weeks per PT.       Time In / Time Out: 1358/1445        Timed Code/Total Treatment Minutes: 47/47;  (1) TE, (1) GT, (1) NR       Next Progress Note due:  10th visit      Plan of Care Interventions:  [x] Therapeutic Exercise             [] Aquatics:  [x] Therapeutic Activity               [] Ultrasound                  [] Elec Stimulation  [x] Gait Training                          [] Cervical Traction        [] Lumbar Traction  [x] Neuromuscular Re-education            [] Cold/hotpack  [] Iontophoresis   [x] Instruction in HEP                                [] Manual Therapy                                 [] vasopneumatic            [x] Self care home management        []Dry needling trigger point point/pain management Electronically signed by:  Rosa Alejo 6/5/2019, 1:58 PM

## 2019-06-05 NOTE — PLAN OF CARE
Outpatient Physical Therapy        [x] Phone: [phone]   Fax: [phone]   Pediatric Therapy          [] Phone: [phone]   Fax: [phone]  Pediatric [facility]          [] Phone: [phone]   Fax: [phone]      To: Referring Practitioner: Alessia Teixeira PA-C    From: Galileo Noble PT     Patient: Rock Vicente       : 1935  Diagnosis: Muscle Weakness, Vascular Dementia      Treatment Diagnosis: Muscular weakness, deconditioned, difficulty walking            Date: 2019    Physical Therapy Certification/Re-Certification Form  Dear Edd Maki PA-C  The following patient has been evaluated for physical therapy services and for therapy to continue, Please review the attached evaluation and/or summary of the patient's plan of care, and verify that you agree therapy should continue by signing the attached document and sending it back to our office. Assessment:Patient is making slow progress and shows a significant amount of fear if her  is not near her during therapy sessions. [employer] DME is currently being used but in therapy sessions pt is ambulating with Standard Cane. Pt is increasing her endurance with gait. Planned Services:  [x] Therapeutic Exercise    [] Aquatics:  [x] Therapeutic Activity    [] Ultrasound  [] Elec Stimulation  [x] Gait Training     [] Cervical Traction [] Lumbar Traction  [x] Neuromuscular Re-education [] Cold/hotpack [] Iontophoresis   [x] Instruction in HEP       [] Manual Therapy     [] vasopneumatic            [x] Self care home management        []Dry needling trigger point point/pain management      Plan of Care Date Range:  2019 to 2019    ? Frequency/Duration:  # Days per week: [] 1 day # Weeks: [] 1 week [x] 5 weeks     [x] 2 days?    [] 2 weeks [] 6 weeks     [] 3 days   [] 3 weeks [] 7 weeks     [] 4 days   [] 4 weeks [] 8 weeks    Rehab Potential: [] Excellent [] Good [x] Fair  [] Poor     Goals:   Long term

## 2019-06-10 RX ORDER — FUROSEMIDE 20 MG/1
TABLET ORAL
Qty: 30 TABLET | Refills: 0 | Status: SHIPPED | OUTPATIENT
Start: 2019-06-10 | End: 2019-07-08 | Stop reason: SDUPTHER

## 2019-06-12 ENCOUNTER — HOSPITAL ENCOUNTER (OUTPATIENT)
Dept: PHYSICAL THERAPY | Age: 84
Setting detail: THERAPIES SERIES
Discharge: HOME | End: 2019-06-12
Payer: MEDICARE

## 2019-06-12 PROCEDURE — 97110 THERAPEUTIC EXERCISES: CPT

## 2019-06-12 PROCEDURE — 97116 GAIT TRAINING THERAPY: CPT

## 2019-06-12 PROCEDURE — 97112 NEUROMUSCULAR REEDUCATION: CPT

## 2019-06-12 NOTE — FLOWSHEET NOTE
Outpatient Physical Therapy  [place]           [x] Phone: [phone]   Fax: [phone]  [place]           [] Phone: [phone]   Fax: [phone]        Physical Therapy Daily Treatment Note  Date:  2019    Patient Name:  Chléo Sotelo    :  1935  MRN: [id number]  Restrictions/Precautions: Other position/activity restrictions: None noted  Diagnosis:   Diagnosis: Muscle Weakness, Vascular Dementia  Date of Injury/Surgery:   Treatment Diagnosis: Treatment Diagnosis: Muscular weakness, deconditioned, difficulty walking    Insurance/Certification information: PT Insurance Information: Medicare   Referring Physician:  Referring Practitioner: Martínez Cyr PA-C  Next Doctor Visit:  2019  Plan of care signed (Y/N):    Outcome Measure:   Visit# / total visits:  15/10  Pain level: 4-510 neck  Goals:          Long term goals  Time Frame for Long term goals :  All goals to be assessed by the 10th visit  Long term goal 1: Pt to be independent with HEP  Long term goal 2: Pt to increase chandrika LE strength  Long term goal 3: Pt to ambulate with better safety awareness x 100 feet  Long term goal 4: Pt to improve balance to be able to ambulate in // bars without hands-on assistance from therapist    Summary of Evaluation: Assessment: Patient is an  81 yo female who presents with  Muscle Weakness, Vascular Dementia which impacts on gait, balance, ADL's, activities in/out of the house;patient's goal is to be able to do more than just walk from her chair to the bathroom or kitchen and sit back down, she wants to be able to go places with her spouse to go shopping and walking more ; PT to address patient's goals, impairments and activity limitations with skilled interventions checked in plan of care;patient's level of function prior to onset of  Muscle Weakness, Vascular Dementia was [facility]; did not observe any barriers to learning during PT eval; learning preferences include demonstration, practice, and [id number] with CGA [id number], [id number] with CGA   Stair ambulation  Up/down 4 and 6 inch steps using bilat handrails and CGA Up/down 4 and 6 inch steps using bilat handrails and CGA Up/down 4 and 6 inch steps using bilat handrails and CGA Not today. PROPRIOCEPTION                                          MODALITIES                         Other Therapeutic Activities/Education:  Pt received education on their current pathology and how their condition affects functional activities. Pt understood discussion and questions were answered. Pt understands their activity limitations and understands rational for treatment progression. Home Exercise Program:  4/17/2019 HEP as above, copy to pt/chart      Manual Treatments: ------       Modalities: ------        Communication with other providers:        Assessment:  (Response towards treatment session) (Pain Rating) Pt continues to c/o constant neck pain. Exercise and gait tolerance improving. C/o left hip discomfort while performing exercises. Pain did decrease following gentle hip distraction. Plan for Next Session: Specific instructions for Next Treatment: plan to advance chandrika LE strength, Nu-Step,  gait/balance activities.        Time In / Time Out: 0905/0950        Timed Code/Total Treatment Minutes: 45/45;  (1) TE, (1) GT, (1) NR       Next Progress Note due:  10th visit      Plan of Care Interventions:  [x] Therapeutic Exercise             [] Aquatics:  [x] Therapeutic Activity               [] Ultrasound                  [] Elec Stimulation  [x] Gait Training                          [] Cervical Traction        [] Lumbar Traction  [x] Neuromuscular Re-education            [] Cold/hotpack  [] Iontophoresis   [x] Instruction in HEP                                [] Manual Therapy                                 [] vasopneumatic            [x] Self care home management        []Dry needling trigger point point/pain management Electronically signed by:  Rosa Alejo 6/12/2019, 9:06 AM

## 2019-06-19 ENCOUNTER — HOSPITAL ENCOUNTER (OUTPATIENT)
Dept: PHYSICAL THERAPY | Age: 84
Setting detail: THERAPIES SERIES
Discharge: HOME | End: 2019-06-19
Payer: MEDICARE

## 2019-06-19 PROCEDURE — 97110 THERAPEUTIC EXERCISES: CPT

## 2019-06-19 PROCEDURE — 97112 NEUROMUSCULAR REEDUCATION: CPT

## 2019-06-19 PROCEDURE — 97116 GAIT TRAINING THERAPY: CPT

## 2019-06-19 NOTE — FLOWSHEET NOTE
Outpatient Physical Therapy  [place]           [x] Phone: [phone]   Fax: [phone]  [place]           [] Phone: [phone]   Fax: [phone]        Physical Therapy Daily Treatment Note  Date:  2019    Patient Name:  Shiela Rajput    :  1935  MRN: [id number]  Restrictions/Precautions: Other position/activity restrictions: None noted  Diagnosis:   Diagnosis: Muscle Weakness, Vascular Dementia  Date of Injury/Surgery:   Treatment Diagnosis: Treatment Diagnosis: Muscular weakness, deconditioned, difficulty walking    Insurance/Certification information: PT Insurance Information: Medicare   Referring Physician:  Referring Practitioner: Shira Shepherd PA-C  Next Doctor Visit:  2019  Plan of care signed (Y/N):    Outcome Measure:   Visit# / total visits:  16/10  Pain level: 0/10   Goals:          Long term goals  Time Frame for Long term goals :  All goals to be assessed by the 10th visit  Long term goal 1: Pt to be independent with HEP  Long term goal 2: Pt to increase chandrika LE strength  Long term goal 3: Pt to ambulate with better safety awareness x 100 feet  Long term goal 4: Pt to improve balance to be able to ambulate in // bars without hands-on assistance from therapist    Summary of Evaluation: Assessment: Patient is an  83 yo female who presents with  Muscle Weakness, Vascular Dementia which impacts on gait, balance, ADL's, activities in/out of the house;patient's goal is to be able to do more than just walk from her chair to the bathroom or kitchen and sit back down, she wants to be able to go places with her spouse to go shopping and walking more ; PT to address patient's goals, impairments and activity limitations with skilled interventions checked in plan of care;patient's level of function prior to onset of  Muscle Weakness, Vascular Dementia was [facility]; did not observe any barriers to learning during PT eval; learning preferences include demonstration, practice, and 4 and 6 inch steps using bilat handrails and CGA Up/down 4 and 6 inch steps using bilat handrails and CGA Not today. PROPRIOCEPTION                                          MODALITIES                         Other Therapeutic Activities/Education:  Pt received education on their current pathology and how their condition affects functional activities. Pt understood discussion and questions were answered. Pt understands their activity limitations and understands rational for treatment progression. Home Exercise Program:  4/17/2019 HEP as above, copy to pt/chart      Manual Treatments: ------       Modalities: ------        Communication with other providers:        Assessment:  (Response towards treatment session) (Pain Rating) Pt continues to c/o constant neck pain. Exercise and gait tolerance improving. C/o left hip discomfort while performing exercises. Pain did decrease following gentle hip distraction. Plan for Next Session: Specific instructions for Next Treatment: plan to advance chandrika LE strength, Nu-Step,  gait/balance activities.        Time In / Time Out: 0901/0950        Timed Code/Total Treatment Minutes: 49/49;  (1) TE, (1) GT, (1) NR       Next Progress Note due:  10th visit      Plan of Care Interventions:  [x] Therapeutic Exercise             [] Aquatics:  [x] Therapeutic Activity               [] Ultrasound                  [] Elec Stimulation  [x] Gait Training                          [] Cervical Traction        [] Lumbar Traction  [x] Neuromuscular Re-education            [] Cold/hotpack  [] Iontophoresis   [x] Instruction in HEP                                [] Manual Therapy                                 [] vasopneumatic            [x] Self care home management        []Dry needling trigger point point/pain management                   Electronically signed by:  Anne Galeas 6/19/2019, 9:01 AM

## 2019-06-24 ENCOUNTER — OFFICE VISIT (OUTPATIENT)
Dept: PULMONOLOGY | Age: 84
End: 2019-06-24
Payer: MEDICARE

## 2019-06-24 VITALS
OXYGEN SATURATION: 95 % | SYSTOLIC BLOOD PRESSURE: 118 MMHG | HEART RATE: 92 BPM | DIASTOLIC BLOOD PRESSURE: 62 MMHG | HEIGHT: 61 IN | BODY MASS INDEX: 24.35 KG/M2 | WEIGHT: 129 LBS

## 2019-06-24 DIAGNOSIS — R06.02 SHORTNESS OF BREATH: ICD-10-CM

## 2019-06-24 DIAGNOSIS — J96.11 CHRONIC HYPOXEMIC RESPIRATORY FAILURE (HCC): ICD-10-CM

## 2019-06-24 DIAGNOSIS — J44.9 COPD, SEVERE (HCC): Primary | ICD-10-CM

## 2019-06-24 PROCEDURE — 1036F TOBACCO NON-USER: CPT | Performed by: INTERNAL MEDICINE

## 2019-06-24 PROCEDURE — 1123F ACP DISCUSS/DSCN MKR DOCD: CPT | Performed by: INTERNAL MEDICINE

## 2019-06-24 PROCEDURE — G8427 DOCREV CUR MEDS BY ELIG CLIN: HCPCS | Performed by: INTERNAL MEDICINE

## 2019-06-24 PROCEDURE — G8926 SPIRO NO PERF OR DOC: HCPCS | Performed by: INTERNAL MEDICINE

## 2019-06-24 PROCEDURE — G8599 NO ASA/ANTIPLAT THER USE RNG: HCPCS | Performed by: INTERNAL MEDICINE

## 2019-06-24 PROCEDURE — G8420 CALC BMI NORM PARAMETERS: HCPCS | Performed by: INTERNAL MEDICINE

## 2019-06-24 PROCEDURE — 99213 OFFICE O/P EST LOW 20 MIN: CPT | Performed by: INTERNAL MEDICINE

## 2019-06-24 PROCEDURE — 3023F SPIROM DOC REV: CPT | Performed by: INTERNAL MEDICINE

## 2019-06-24 PROCEDURE — 1090F PRES/ABSN URINE INCON ASSESS: CPT | Performed by: INTERNAL MEDICINE

## 2019-06-24 PROCEDURE — G8400 PT W/DXA NO RESULTS DOC: HCPCS | Performed by: INTERNAL MEDICINE

## 2019-06-24 PROCEDURE — 4040F PNEUMOC VAC/ADMIN/RCVD: CPT | Performed by: INTERNAL MEDICINE

## 2019-06-24 RX ORDER — BETAMETHASONE DIPROPIONATE 0.5 MG/G
CREAM TOPICAL 2 TIMES DAILY
COMMUNITY
End: 2019-09-03 | Stop reason: ALTCHOICE

## 2019-06-24 NOTE — PROGRESS NOTES
chronic hypoxemic respiratory failure, Recheck for Shortness of Breath. This dictation was performed with a verbal recognition program and it was checked for errors. It is possible that there are still dictated errors within this office note. Any errors should be brought immediately to my attention for correction. All efforts were made to ensure that this office note is accurate.

## 2019-06-26 ENCOUNTER — HOSPITAL ENCOUNTER (OUTPATIENT)
Dept: PHYSICAL THERAPY | Age: 84
Setting detail: THERAPIES SERIES
Discharge: HOME | End: 2019-06-26
Payer: MEDICARE

## 2019-06-26 PROCEDURE — 97112 NEUROMUSCULAR REEDUCATION: CPT

## 2019-06-26 PROCEDURE — 97110 THERAPEUTIC EXERCISES: CPT

## 2019-06-26 PROCEDURE — 97116 GAIT TRAINING THERAPY: CPT

## 2019-06-26 NOTE — FLOWSHEET NOTE
Outpatient Physical Therapy  [place]           [x] Phone: [phone]   Fax: [phone]  [place]           [] Phone: [phone]   Fax: [phone]        Physical Therapy Daily Treatment Note  Date:  2019    Patient Name:  Robert Jeffery    :  1935  MRN: [id number]  Restrictions/Precautions: Other position/activity restrictions: None noted  Diagnosis:   Diagnosis: Muscle Weakness, Vascular Dementia  Date of Injury/Surgery:   Treatment Diagnosis: Treatment Diagnosis: Muscular weakness, deconditioned, difficulty walking    Insurance/Certification information: PT Insurance Information: Medicare   Referring Physician:  Referring Practitioner: Leanne Mitchell PA-C  Next Doctor Visit:  2019  Plan of care signed (Y/N):    Outcome Measure:   Visit# / total visits:  17/10  Pain level: 4-510 neck  Goals:          Long term goals  Time Frame for Long term goals :  All goals to be assessed by the 10th visit  Long term goal 1: Pt to be independent with HEP  Long term goal 2: Pt to increase chandrika LE strength  Long term goal 3: Pt to ambulate with better safety awareness x 100 feet  Long term goal 4: Pt to improve balance to be able to ambulate in // bars without hands-on assistance from therapist    Summary of Evaluation: Assessment: Patient is an  83 yo female who presents with  Muscle Weakness, Vascular Dementia which impacts on gait, balance, ADL's, activities in/out of the house;patient's goal is to be able to do more than just walk from her chair to the bathroom or kitchen and sit back down, she wants to be able to go places with her spouse to go shopping and walking more ; PT to address patient's goals, impairments and activity limitations with skilled interventions checked in plan of care;patient's level of function prior to onset of  Muscle Weakness, Vascular Dementia was [facility]; did not observe any barriers to learning during PT eval; learning preferences include demonstration, practice, and handouts; patient expressed understanding of HEP; patient appears to be motivated to participate in an active PT program and to be compliant with HEP expectations;patient assisted in developing treatment plan and goals; [employer] DME is currently being used; Subjective:  Pt continuing to note improvement. Any changes in Ambulatory Summary Sheet? None        Objective:  See gait below. Endurance improving. Exercise tolerance improving.           Exercises: (No more than 4 columns)   Exercise/Equipment 6/5/19 (14) 6/12/19 (15) 6/19/2019 (16) 6/26/19 (17)            WARM UP        Nu-Step  Seat 6 arms7 WL2 x 10 min Seat 6 arms7 WL3 x 10 min Seat 6 arms7 WL3 x 10 min Seat 6 arms7 WL3 x 10 min          TABLE       Bridges 2x10 reps 2x10 reps 2x10 reps 2x10 reps   SLR 2x10 reps 2x10 reps 2x10 reps 2x10 reps                  SEATED         LAQ 2# 3 ct 2x10 reps 2# 3 ct 2x10 reps 2# 3 ct 2x10 reps 2# 3 ct 2x10 reps   Hip abd RTB 2x10 reps RTB 2x10 reps RTB 2x10 reps RTB 2x10 reps   Hip add ball squeeze 3 ct 2x10 reps ball squeeze 3 ct 2x10 reps ball squeeze 3 ct 2x10 reps ball squeeze 3 ct 2x10 reps   Sit to stands  Lower mat  Surface 2 x 5 reps with 2  hand pushing from table Not today  -----    Seated lat row RTB 2x10 reps RTB 2x10 reps RTB 2x10 reps RTB 2x10 reps   STANDING       Gait with 4 wheel walker ambulated in and out of therapy visit with rolling walker with SBA verbal cueing for safety ambulated in and out of therapy visit with rolling walker with SBA minimal verbal cueing for safety ambulated in and out of therapy visit with rolling walker with SBA minimal verbal cueing for safety ambulated in and out of therapy visit with rolling walker with SBA minimal verbal cueing for safety   Gait with st cane 1x50ft, [id number] with CGA [id number], [id number] with CGA X 110 feet, with CGA 1x170 ft with several standing rest breaks, 1x110 ft with CGA   Stair ambulation  Up/down 4 and 6 inch steps using bilat handrails and CGA Not today. PROPRIOCEPTION                                          MODALITIES                         Other Therapeutic Activities/Education:  Pt received education on their current pathology and how their condition affects functional activities. Pt understood discussion and questions were answered. Pt understands their activity limitations and understands rational for treatment progression. Home Exercise Program:  4/17/2019 HEP as above, copy to pt/chart      Manual Treatments: ------       Modalities: ------        Communication with other providers:        Assessment:  (Response towards treatment session) (Pain Rating) Pt continues to c/o constant neck pain. Exercise and gait tolerance improving. C/o left hip discomfort while performing exercises. Plan for Next Session: Specific instructions for Next Treatment: plan to advance chandrika LE strength, Nu-Step,  gait/balance activities.        Time In / Time Out: 0900/0950        Timed Code/Total Treatment Minutes: 50/50;  (1) TE, (1) GT, (1) NR       Next Progress Note due:  10th visit      Plan of Care Interventions:  [x] Therapeutic Exercise             [] Aquatics:  [x] Therapeutic Activity               [] Ultrasound                  [] Elec Stimulation  [x] Gait Training                          [] Cervical Traction        [] Lumbar Traction  [x] Neuromuscular Re-education            [] Cold/hotpack  [] Iontophoresis   [x] Instruction in HEP                                [] Manual Therapy                                 [] vasopneumatic            [x] Self care home management        []Dry needling trigger point point/pain management                   Electronically signed by:  Sherman Bueno 6/26/2019, 8:54 AM

## 2019-07-03 ENCOUNTER — HOSPITAL ENCOUNTER (OUTPATIENT)
Dept: PHYSICAL THERAPY | Age: 84
Setting detail: THERAPIES SERIES
Discharge: HOME | End: 2019-07-03
Payer: MEDICARE

## 2019-07-03 PROCEDURE — 97110 THERAPEUTIC EXERCISES: CPT

## 2019-07-03 PROCEDURE — 97112 NEUROMUSCULAR REEDUCATION: CPT

## 2019-07-03 PROCEDURE — 97116 GAIT TRAINING THERAPY: CPT

## 2019-07-03 NOTE — FLOWSHEET NOTE
Outpatient Physical Therapy  [place]           [x] Phone: [phone]   Fax: [phone]  [place]           [] Phone: [phone]   Fax: [phone]        Physical Therapy Daily Treatment Note  Date:  7/3/2019    Patient Name:  Denia Maxwell    :  1935  MRN: [id number]  Restrictions/Precautions: Other position/activity restrictions: None noted  Diagnosis:   Diagnosis: Muscle Weakness, Vascular Dementia  Date of Injury/Surgery:   Treatment Diagnosis: Treatment Diagnosis: Muscular weakness, deconditioned, difficulty walking    Insurance/Certification information: PT Insurance Information: Medicare   Referring Physician:  Referring Practitioner: Geovanna Vaz PA-C  Next Doctor Visit:  2019  Plan of care signed (Y/N):   2019 to 2019  Outcome Measure:   Visit# / total visits:  18/10  Pain level: 4-5/10 neck  Goals:          Long term goals  Time Frame for Long term goals :  All goals to be assessed by the 10th visit  Long term goal 1: Pt to be independent with HEP  Long term goal 2: Pt to increase chandrika LE strength  Long term goal 3: Pt to ambulate with better safety awareness x 100 feet  Long term goal 4: Pt to improve balance to be able to ambulate in // bars without hands-on assistance from therapist    Summary of Evaluation: Assessment: Patient is an  81 yo female who presents with  Muscle Weakness, Vascular Dementia which impacts on gait, balance, ADL's, activities in/out of the house;patient's goal is to be able to do more than just walk from her chair to the bathroom or kitchen and sit back down, she wants to be able to go places with her spouse to go shopping and walking more ; PT to address patient's goals, impairments and activity limitations with skilled interventions checked in plan of care;patient's level of function prior to onset of  Muscle Weakness, Vascular Dementia was [facility]; did not observe any barriers to learning during PT eval; learning preferences include

## 2019-07-08 RX ORDER — FUROSEMIDE 20 MG/1
TABLET ORAL
Qty: 30 TABLET | Refills: 0 | Status: SHIPPED | OUTPATIENT
Start: 2019-07-08 | End: 2019-08-06 | Stop reason: SDUPTHER

## 2019-07-08 RX ORDER — OMEPRAZOLE 40 MG/1
CAPSULE, DELAYED RELEASE ORAL
Qty: 30 CAPSULE | Refills: 4 | Status: SHIPPED | OUTPATIENT
Start: 2019-07-08 | End: 2019-12-02 | Stop reason: SDUPTHER

## 2019-07-08 RX ORDER — DULOXETIN HYDROCHLORIDE 60 MG/1
CAPSULE, DELAYED RELEASE ORAL
Qty: 30 CAPSULE | Refills: 4 | Status: SHIPPED | OUTPATIENT
Start: 2019-07-08 | End: 2019-11-06 | Stop reason: ALTCHOICE

## 2019-07-10 ENCOUNTER — HOSPITAL ENCOUNTER (OUTPATIENT)
Dept: PHYSICAL THERAPY | Age: 84
Setting detail: THERAPIES SERIES
Discharge: HOME | End: 2019-07-10
Payer: MEDICARE

## 2019-07-10 PROCEDURE — 97116 GAIT TRAINING THERAPY: CPT

## 2019-07-10 PROCEDURE — 97110 THERAPEUTIC EXERCISES: CPT

## 2019-07-10 PROCEDURE — 97112 NEUROMUSCULAR REEDUCATION: CPT

## 2019-07-10 NOTE — FLOWSHEET NOTE
Outpatient Physical Therapy  [place]           [x] Phone: [phone]   Fax: [phone]  [place]           [] Phone: [phone]   Fax: [phone]        Physical Therapy Daily Treatment Note  Date:  7/10/2019    Patient Name:  Alida George    :  1935  MRN: [id number]  Restrictions/Precautions: Other position/activity restrictions: None noted  Diagnosis:   Diagnosis: Muscle Weakness, Vascular Dementia  Date of Injury/Surgery:   Treatment Diagnosis: Treatment Diagnosis: Muscular weakness, deconditioned, difficulty walking    Insurance/Certification information: PT Insurance Information: Medicare   Referring Physician:  Referring Practitioner: Rand Ovalle PA-C  Next Doctor Visit:  2019  Plan of care signed (Y/N):   2019 to 2019  Outcome Measure:   Visit# / total visits:    Pain level: 4-5/10 Neck and shld  Goals:          Long term goals  Time Frame for Long term goals :  All goals to be assessed by the 10th visit  Long term goal 1: Pt to be independent with HEP  Long term goal 2: Pt to increase chandrika LE strength  Long term goal 3: Pt to ambulate with better safety awareness x 100 feet  Long term goal 4: Pt to improve balance to be able to ambulate in // bars without hands-on assistance from therapist    Summary of Evaluation: Assessment: Patient is an  83 yo female who presents with  Muscle Weakness, Vascular Dementia which impacts on gait, balance, ADL's, activities in/out of the house;patient's goal is to be able to do more than just walk from her chair to the bathroom or kitchen and sit back down, she wants to be able to go places with her spouse to go shopping and walking more ; PT to address patient's goals, impairments and activity limitations with skilled interventions checked in plan of care;patient's level of function prior to onset of  Muscle Weakness, Vascular Dementia was [facility]; did not observe any barriers to learning during PT eval; learning preferences include demonstration, practice, and handouts; patient expressed understanding of HEP; patient appears to be motivated to participate in an active PT program and to be compliant with HEP expectations;patient assisted in developing treatment plan and goals; [employer] DME is currently being used; Subjective:  Pt continuing to note improvement in her activity. Continues to c/o neck and shld pain. Any changes in Ambulatory Summary Sheet? None        Objective:  See gait below. Endurance improving. Exercise tolerance improving. Exercises: (No more than 4 columns)   Exercise/Equipment 6/19/2019 (16) 6/26/19 (17) 7/3/19 (18) 7/10/19 (19)            WARM UP        Nu-Step  Seat 6 arms7 WL3 x 10 min Seat 6 arms7 WL3 x 10 min Seat 6 arms7 WL3 x 10 min Seat 6 arms7 WL3 x 10 min          TABLE       Bridges 2x10 reps 2x10 reps 2x10 reps 2x10 reps   SLR 2x10 reps 2x10 reps 2x10 reps 2x10 reps                  SEATED         LAQ 2# 3 ct 2x10 reps 2# 3 ct 2x10 reps 2# 3 ct 2x10 reps 2# 3 ct 2x10 reps   Hip abd RTB 2x10 reps RTB 2x10 reps RTB 2x10 reps RTB 2x10 reps   Hip add ball squeeze 3 ct 2x10 reps ball squeeze 3 ct 2x10 reps ball squeeze 3 ct 2x10 reps ball squeeze 3 ct 2x10 reps   Sit to stands  -----      Seated lat row RTB 2x10 reps RTB 2x10 reps RTB 2x10 reps RTB 2x10 reps   STANDING       Gait with 4 wheel walker ambulated in and out of therapy visit with rolling walker with SBA minimal verbal cueing for safety ambulated in and out of therapy visit with rolling walker with SBA minimal verbal cueing for safety ambulated in and out of therapy visit with rolling walker with SBA minimal verbal cueing for safety ambulated in and out of therapy visit with rolling walker with SBA   Gait with st cane X 110 feet, with CGA 1x170 ft with several standing rest breaks, 1x110 ft with CGA [id number], 1x40ft, 1m552li with CGA.  LOB x 1 with min A to regain [id number], 1x40ft, 7n218ct with CGA and intermittent stopping to gain

## 2019-07-15 RX ORDER — ROSUVASTATIN CALCIUM 5 MG/1
TABLET, COATED ORAL
Qty: 30 TABLET | Refills: 4 | Status: SHIPPED | OUTPATIENT
Start: 2019-07-15 | End: 2019-12-09 | Stop reason: SDUPTHER

## 2019-07-17 ENCOUNTER — HOSPITAL ENCOUNTER (OUTPATIENT)
Dept: PHYSICAL THERAPY | Age: 84
Setting detail: THERAPIES SERIES
Discharge: HOME | End: 2019-07-17
Payer: MEDICARE

## 2019-07-17 PROCEDURE — 97112 NEUROMUSCULAR REEDUCATION: CPT

## 2019-07-17 PROCEDURE — 97116 GAIT TRAINING THERAPY: CPT

## 2019-07-17 PROCEDURE — 97110 THERAPEUTIC EXERCISES: CPT

## 2019-07-22 ENCOUNTER — HOSPITAL ENCOUNTER (OUTPATIENT)
Age: 84
Discharge: HOME | End: 2019-07-22
Payer: MEDICARE

## 2019-07-22 LAB
ANION GAP SERPL CALCULATED.3IONS-SCNC: 13 MMOL/L (ref 4–16)
BUN BLDV-MCNC: 10 MG/DL (ref 6–23)
CALCIUM SERPL-MCNC: 10 MG/DL (ref 8.3–10.6)
CHLORIDE BLD-SCNC: 99 MMOL/L (ref 99–110)
CO2: 32 MMOL/L (ref 21–32)
CREAT SERPL-MCNC: 0.8 MG/DL (ref 0.6–1.1)
ESTIMATED AVERAGE GLUCOSE: 157 MG/DL
GFR AFRICAN AMERICAN: >60 ML/MIN/1.73M2
GFR NON-AFRICAN AMERICAN: >60 ML/MIN/1.73M2
GLUCOSE BLD-MCNC: 103 MG/DL (ref 70–99)
HBA1C MFR BLD: 7.1 % (ref 4.2–6.3)
POTASSIUM SERPL-SCNC: 4.6 MMOL/L (ref 3.5–5.1)
SODIUM BLD-SCNC: 144 MMOL/L (ref 135–145)
T4 FREE: 1.11 NG/DL (ref 0.9–1.8)
TSH HIGH SENSITIVITY: 0.65 UIU/ML (ref 0.27–4.2)

## 2019-07-22 PROCEDURE — 84443 ASSAY THYROID STIM HORMONE: CPT

## 2019-07-22 PROCEDURE — 36415 COLL VENOUS BLD VENIPUNCTURE: CPT

## 2019-07-22 PROCEDURE — 80048 BASIC METABOLIC PNL TOTAL CA: CPT

## 2019-07-22 PROCEDURE — 83036 HEMOGLOBIN GLYCOSYLATED A1C: CPT

## 2019-07-22 PROCEDURE — 84439 ASSAY OF FREE THYROXINE: CPT

## 2019-07-24 ENCOUNTER — HOSPITAL ENCOUNTER (OUTPATIENT)
Dept: PHYSICAL THERAPY | Age: 84
Setting detail: THERAPIES SERIES
Discharge: HOME | End: 2019-07-24
Payer: MEDICARE

## 2019-07-24 PROCEDURE — 97116 GAIT TRAINING THERAPY: CPT

## 2019-07-24 PROCEDURE — 97112 NEUROMUSCULAR REEDUCATION: CPT

## 2019-07-24 NOTE — FLOWSHEET NOTE
Outpatient Physical Therapy  [place]           [x] Phone: [phone]   Fax: [phone]  [facility]           [] Phone: [phone]   Fax: [phone]        Physical Therapy Daily Treatment Note  Date:  2019    Patient Name:  Lala Johnston    :  1935  MRN: [id number]  Restrictions/Precautions: Other position/activity restrictions: None noted  Diagnosis:   Diagnosis: Muscle Weakness, Vascular Dementia  Date of Injury/Surgery:   Treatment Diagnosis: Treatment Diagnosis: Muscular weakness, deconditioned, difficulty walking    Insurance/Certification information: PT Insurance Information: Medicare   Referring Physician:  Referring Practitioner: Andreas David PA-C  Next Doctor Visit:  2019  Plan of care signed (Y/N):   2019 to 8/10/2019  Outcome Measure:   Visit# / total visits:    Pain level: 4-5/10 Neck and shld  Goals:          Long term goals  Time Frame for Long term goals :  All goals to be assessed by the 10th visit  Long term goal 1: Pt to be independent with HEP  Long term goal 2: Pt to increase chandrika LE strength  Long term goal 3: Pt to ambulate with better safety awareness x 100 feet  Long term goal 4: Pt to improve balance to be able to ambulate in // bars without hands-on assistance from therapist    Summary of Evaluation: Assessment: Patient is an  83 yo female who presents with  Muscle Weakness, Vascular Dementia which impacts on gait, balance, ADL's, activities in/out of the house;patient's goal is to be able to do more than just walk from her chair to the bathroom or kitchen and sit back down, she wants to be able to go places with her spouse to go shopping and walking more ; PT to address patient's goals, impairments and activity limitations with skilled interventions checked in plan of care;patient's level of function prior to onset of  Muscle Weakness, Vascular Dementia was [facility]; did not observe any barriers to learning during PT eval; learning preferences bars  Walking fwd/back and sidestepping with single UE support 2 laps each Walking fwd/back and sidestepping with single UE support 2 laps each Walking fwd/back and sidestepping with single UE support 2 laps each Walking fwd/back and sidestepping with single UE support 2 laps each     static standing x 30 sec with 1 LOB Sitting and standing ball toss up and toss R/L Standing ball toss R/L x 20 reps with CGA            Standing marches 2x10 reps with single UE support   PROPRIOCEPTION                                          MODALITIES                         Other Therapeutic Activities/Education:  Pt received education on their current pathology and how their condition affects functional activities. Pt understood discussion and questions were answered. Pt understands their activity limitations and understands rational for treatment progression. Home Exercise Program:  4/17/2019 HEP as above, copy to pt/chart      Manual Treatments: ------       Modalities: ------        Communication with other providers:        Assessment:  (Response towards treatment session) (Pain Rating) Pt continues to c/o constant neck/shld pain. Exercise and gait tolerance improving. Continue with therapy 1 time a week. Plan for Next Session: Specific instructions for Next Treatment: plan to advance chandrika LE strength, Nu-Step,  gait/balance activities.        Time In / Time Out: 0858/0945       Timed Code/Total Treatment Minutes: 47/47;    (2) NR, (1) GT       Next Progress Note due:  10th visit      Plan of Care Interventions:  [x] Therapeutic Exercise             [] Aquatics:  [x] Therapeutic Activity               [] Ultrasound                  [] Elec Stimulation  [x] Gait Training                          [] Cervical Traction        [] Lumbar Traction  [x] Neuromuscular Re-education            [] Cold/hotpack  [] Iontophoresis   [x] Instruction in HEP                                [] Manual Therapy

## 2019-07-31 ENCOUNTER — OFFICE VISIT (OUTPATIENT)
Dept: INTERNAL MEDICINE CLINIC | Age: 84
End: 2019-07-31
Payer: MEDICARE

## 2019-07-31 VITALS
RESPIRATION RATE: 14 BRPM | BODY MASS INDEX: 24.83 KG/M2 | WEIGHT: 131.4 LBS | OXYGEN SATURATION: 98 % | SYSTOLIC BLOOD PRESSURE: 114 MMHG | DIASTOLIC BLOOD PRESSURE: 84 MMHG | HEART RATE: 81 BPM

## 2019-07-31 DIAGNOSIS — L21.9 SEBORRHEA: Primary | ICD-10-CM

## 2019-07-31 DIAGNOSIS — E11.9 TYPE 2 DIABETES MELLITUS WITHOUT COMPLICATION, WITHOUT LONG-TERM CURRENT USE OF INSULIN (HCC): ICD-10-CM

## 2019-07-31 DIAGNOSIS — R41.89 COGNITIVE IMPAIRMENT: ICD-10-CM

## 2019-07-31 DIAGNOSIS — R26.9 GAIT DISORDER: ICD-10-CM

## 2019-07-31 DIAGNOSIS — J44.9 COPD, SEVERE (HCC): ICD-10-CM

## 2019-07-31 PROCEDURE — G8599 NO ASA/ANTIPLAT THER USE RNG: HCPCS | Performed by: INTERNAL MEDICINE

## 2019-07-31 PROCEDURE — 3023F SPIROM DOC REV: CPT | Performed by: INTERNAL MEDICINE

## 2019-07-31 PROCEDURE — 1036F TOBACCO NON-USER: CPT | Performed by: INTERNAL MEDICINE

## 2019-07-31 PROCEDURE — 4040F PNEUMOC VAC/ADMIN/RCVD: CPT | Performed by: INTERNAL MEDICINE

## 2019-07-31 PROCEDURE — 1123F ACP DISCUSS/DSCN MKR DOCD: CPT | Performed by: INTERNAL MEDICINE

## 2019-07-31 PROCEDURE — G8400 PT W/DXA NO RESULTS DOC: HCPCS | Performed by: INTERNAL MEDICINE

## 2019-07-31 PROCEDURE — G8420 CALC BMI NORM PARAMETERS: HCPCS | Performed by: INTERNAL MEDICINE

## 2019-07-31 PROCEDURE — 1090F PRES/ABSN URINE INCON ASSESS: CPT | Performed by: INTERNAL MEDICINE

## 2019-07-31 PROCEDURE — 99214 OFFICE O/P EST MOD 30 MIN: CPT | Performed by: INTERNAL MEDICINE

## 2019-07-31 PROCEDURE — G8427 DOCREV CUR MEDS BY ELIG CLIN: HCPCS | Performed by: INTERNAL MEDICINE

## 2019-07-31 PROCEDURE — G8926 SPIRO NO PERF OR DOC: HCPCS | Performed by: INTERNAL MEDICINE

## 2019-07-31 RX ORDER — KETOCONAZOLE 20 MG/G
CREAM TOPICAL
Qty: 30 G | Refills: 1 | Status: SHIPPED | OUTPATIENT
Start: 2019-07-31 | End: 2019-10-28 | Stop reason: ALTCHOICE

## 2019-07-31 RX ORDER — ALBUTEROL SULFATE 1.25 MG/3ML
1 SOLUTION RESPIRATORY (INHALATION) EVERY 6 HOURS PRN
COMMUNITY
End: 2019-09-03 | Stop reason: ALTCHOICE

## 2019-07-31 RX ORDER — GABAPENTIN 300 MG/1
1 CAPSULE ORAL 2 TIMES DAILY
COMMUNITY
Start: 2019-07-17 | End: 2020-05-28

## 2019-07-31 NOTE — PROGRESS NOTES
Subjective:      Jarred White is a 82 y.o. female who presents today for follow up on her chronic medical conditions as noted below. Patient Active Problem List:     Depression     Chronic neck pain     Graves' disease     Hyperlipidemia     Hypertension     Cognitive impairment     Cerebrovascular accident (CVA) due to vascular stenosis (HCC)     Gait disturbance     Mixed incontinence     Abnormal stress test     S/P CABG (coronary artery bypass graft)     Subclavian artery stenosis, left (HCC)     COPD, severe ([facility] [address]     Coronary artery disease involving native coronary artery of native heart without angina pectoris     Chronic hypoxemic respiratory failure ([address]     Shortness of breath     She was last seen here in April  She saw Richard Coleman in march    She had many sessions PT for strength and balance since then    She saw Angelia in June for her severe COPD    She has seen Stella garcia for rash, scratches on brow and scalp  She is taking neurontin 300 bid for itching   Has blotches of red on brow with erosions     Will rx with nizoral cream ; has been using bactroban for a month or more    STOP MUPIROCIN AND CORTISONE ON BROW    USE NIZORAL CREAM DAILY ON BROW    CONTINUE NIZORAL SHAMPOO    YOU MAY OR MAY NOT CONTINUE CORTISONE SPRAY TO SCALP    The patient denies cough, chest pain, dyspnea, wheezing or hemoptysis. Patient denies any exertional chest pain, dyspnea, palpitations, syncope, orthopnea, edema or paroxysmal nocturnal dyspnea. Mood and memory improved  Balance and strength improved  Ambulatory with cane, not walker now    Has new NIDDM with  and hbA1c 7.1  Diet control    Current Outpatient Medications   Medication Sig Dispense Refill    gabapentin (NEURONTIN) 300 MG capsule Take 1 capsule by mouth 2 times daily.       ketoconazole (NIZORAL) 2 % cream Apply topically daily to brow 30 g 1    Memantine HCl-Donepezil HCl (NAMZARIC) 28-10 MG CP24 Take 1 capsule by mouth daily      chewable tablet Take 1 tablet by mouth daily 30 tablet 3     No current facility-administered medications for this visit. Past Medical History:   Diagnosis Date    Acute ischemic colitis ([facility] [address]     Colonoscopy done    CAD (coronary artery disease) 2017    4 vessel CABG    Cerebrovascular event ([facility] [address] 2016    balance, speech, leg weakness; ARU    Chronic hypoxemic respiratory failure ([facility] [address] 2019    Chronic neck pain     Dr Paul Norman 2011    Cognitive impairment 2011    COPD, severe ([facility] [address] 2018    Depression     Diverticulitis 2012    Family history of colon cancer     Family history of diabetes mellitus     Fibromyalgia     Graves disease     Demetria Lewis; tapazole for a time    Hyperlipidemia     Hypertension     Lumbar spinal stenosis 2015    moderate ; MRI by Dr Per Peres Nocturnal hypoxemia 2018    Nocturnal oxygen desaturation     Obstructive sleep apnea     Osteoporosis     Prediabetes     hgb 6.5    S/P CABG (coronary artery bypass graft) 2017    4 vessel-CABG EVH RCA Marginal, OM1, OM2,  LIMA to LAD    S/P cardiac catheterization 2017    severe multivessel disease    Subclavian artery stenosis, left ([facility] [address] 2017    noted in hosp for CABG; to be dealt with as OP by vasc surgeons    Subclinical Hyperthyroidism     Dr. Demetria Lewis;  Tapazole    Type II diabetes mellitus ([facility] [address] 2019;DIET CONTROL    Urine incontinence 2011    Timed voiding; Isidro; urology        Social History     Tobacco Use    Smoking status: Former Smoker     Packs/day: 0.50     Years: 30.00     Pack years: 15.00     Types: Cigarettes     Start date: 1957     Last attempt to quit: 1987     Years since quittin.5    Smokeless tobacco: Never Used   Substance Use Topics    Alcohol use: No     Comment: special occasion once a year maybe         ROS: The patient has had no headache, sore throat, fever or chills, cough, dyspnea, chest

## 2019-08-01 ENCOUNTER — HOSPITAL ENCOUNTER (OUTPATIENT)
Dept: PHYSICAL THERAPY | Age: 84
Setting detail: THERAPIES SERIES
Discharge: HOME | End: 2019-08-01
Payer: MEDICARE

## 2019-08-01 PROCEDURE — 97116 GAIT TRAINING THERAPY: CPT

## 2019-08-01 PROCEDURE — 97112 NEUROMUSCULAR REEDUCATION: CPT

## 2019-08-06 RX ORDER — FUROSEMIDE 20 MG/1
TABLET ORAL
Qty: 30 TABLET | Refills: 0 | Status: SHIPPED | OUTPATIENT
Start: 2019-08-06 | End: 2019-09-09 | Stop reason: SDUPTHER

## 2019-08-07 ENCOUNTER — HOSPITAL ENCOUNTER (OUTPATIENT)
Dept: PHYSICAL THERAPY | Age: 84
Setting detail: THERAPIES SERIES
Discharge: HOME | End: 2019-08-07
Payer: MEDICARE

## 2019-08-07 PROCEDURE — 97112 NEUROMUSCULAR REEDUCATION: CPT

## 2019-08-07 PROCEDURE — 97116 GAIT TRAINING THERAPY: CPT

## 2019-08-07 NOTE — FLOWSHEET NOTE
Outpatient Physical Therapy  [place]           [x] Phone: [phone]   Fax: [phone]  [place]           [] Phone: [phone]   Fax: [phone]        Physical Therapy Daily Treatment Note  Date:  2019    Patient Name:  Bailey Hernandez    :  1935  MRN: [id number]  Restrictions/Precautions: Other position/activity restrictions: None noted  Diagnosis:   Diagnosis: Muscle Weakness, Vascular Dementia  Date of Injury/Surgery:   Treatment Diagnosis: Treatment Diagnosis: Muscular weakness, deconditioned, difficulty walking    Insurance/Certification information: PT Insurance Information: Medicare   Referring Physician:  Referring Practitioner: Maurisio Rivero PA-C  Next Doctor Visit:  2019  Plan of care signed (Y/N):   2019 to 8/10/2019  Outcome Measure:   Visit# / total visits:    Pain level: 4-5/10 Neck and shld  Goals:          Long term goals  Time Frame for Long term goals :  All goals to be assessed by the 10th visit  Long term goal 1: Pt to be independent with HEP  Long term goal 2: Pt to increase chandrika LE strength  Long term goal 3: Pt to ambulate with better safety awareness x 100 feet  Long term goal 4: Pt to improve balance to be able to ambulate in // bars without hands-on assistance from therapist    Summary of Evaluation: Assessment: Patient is an  83 yo female who presents with  Muscle Weakness, Vascular Dementia which impacts on gait, balance, ADL's, activities in/out of the house;patient's goal is to be able to do more than just walk from her chair to the bathroom or kitchen and sit back down, she wants to be able to go places with her spouse to go shopping and walking more ; PT to address patient's goals, impairments and activity limitations with skilled interventions checked in plan of care;patient's level of function prior to onset of  Muscle Weakness, Vascular Dementia was [facility]; did not observe any barriers to learning during PT eval; learning preferences

## 2019-08-14 ENCOUNTER — HOSPITAL ENCOUNTER (OUTPATIENT)
Dept: PHYSICAL THERAPY | Age: 84
Setting detail: THERAPIES SERIES
Discharge: HOME | End: 2019-08-14
Payer: MEDICARE

## 2019-08-14 PROCEDURE — 97116 GAIT TRAINING THERAPY: CPT

## 2019-08-14 PROCEDURE — 97110 THERAPEUTIC EXERCISES: CPT

## 2019-08-14 PROCEDURE — 97112 NEUROMUSCULAR REEDUCATION: CPT

## 2019-08-14 NOTE — FLOWSHEET NOTE
Outpatient Physical Therapy  [place]           [x] Phone: [phone]   Fax: [phone]  [place]           [] Phone: [phone]   Fax: [phone]        Physical Therapy Daily Treatment Note  Date:  2019    Patient Name:  Abdon Whitehead    :  1935  MRN: [id number]  Restrictions/Precautions: Other position/activity restrictions: None noted  Diagnosis:   Diagnosis: Muscle Weakness, Vascular Dementia  Date of Injury/Surgery:   Treatment Diagnosis: Treatment Diagnosis: Muscular weakness, deconditioned, difficulty walking    Insurance/Certification information: PT Insurance Information: Medicare   Referring Physician:  Referring Practitioner: Kari Friedman PA-C  Next Doctor Visit:  2019  Plan of care signed (Y/N):   8/10/2019 - 9/10/19  Outcome Measure:   Visit# / total visits:    Pain level: 4-5/10 Neck and shld  Goals:          Long term goals  Time Frame for Long term goals :  All goals to be assessed by the 10th visit  Long term goal 1: Pt to be independent with HEP  Long term goal 2: Pt to increase chandrika LE strength  Long term goal 3: Pt to ambulate with better safety awareness x 100 feet  Long term goal 4: Pt to improve balance to be able to ambulate in // bars without hands-on assistance from therapist    Summary of Evaluation: Assessment: Patient is an  81 yo female who presents with  Muscle Weakness, Vascular Dementia which impacts on gait, balance, ADL's, activities in/out of the house;patient's goal is to be able to do more than just walk from her chair to the bathroom or kitchen and sit back down, she wants to be able to go places with her spouse to go shopping and walking more ; PT to address patient's goals, impairments and activity limitations with skilled interventions checked in plan of care;patient's level of function prior to onset of  Muscle Weakness, Vascular Dementia was [facility]; did not observe any barriers to learning during PT eval; learning preferences include

## 2019-08-19 RX ORDER — POTASSIUM CHLORIDE 750 MG/1
TABLET, EXTENDED RELEASE ORAL
Qty: 30 TABLET | Refills: 3 | Status: SHIPPED | OUTPATIENT
Start: 2019-08-19 | End: 2019-12-09 | Stop reason: SDUPTHER

## 2019-08-21 ENCOUNTER — HOSPITAL ENCOUNTER (OUTPATIENT)
Dept: PHYSICAL THERAPY | Age: 84
Setting detail: THERAPIES SERIES
Discharge: HOME | End: 2019-08-21
Payer: MEDICARE

## 2019-08-21 PROCEDURE — 97112 NEUROMUSCULAR REEDUCATION: CPT

## 2019-08-21 PROCEDURE — 97110 THERAPEUTIC EXERCISES: CPT

## 2019-08-21 PROCEDURE — 97116 GAIT TRAINING THERAPY: CPT

## 2019-08-21 NOTE — FLOWSHEET NOTE
demonstration, practice, and handouts; patient expressed understanding of HEP; patient appears to be motivated to participate in an active PT program and to be compliant with HEP expectations;patient assisted in developing treatment plan and goals; [employer] DME is currently being used; Subjective:  Pt states she is extremely tired today due to having to get up early to have blood draw. Pt's  states she had a really bad day on Sunday. Any changes in Ambulatory Summary Sheet? New medication for skin rash, pt did not have name. Objective:  See gait below. Pt not as steady today with gait and balance activities possibly due to fatigue.           Exercises: (No more than 4 columns)   Exercise/Equipment 8/1/19 (22) 8/7/19 (23) 8/14/19 #24 8/21/19 (25)            WARM UP        Nu-Step  Seat 6 arms7 WL3 x 10 min Seat 6 arms7 WL3 x 12 min Seat 6 arms7 WL3 x 12 min Seat 6 arms7 WL3 x 12 min          TABLE       Bridges       SLR                      SEATED         LAQ       Hip abd       Hip add       Sit to stands        Seated lat row       STANDING       Gait with 4 wheel walker       Gait with st cane [id number] CGA with no stopping; [id number], [id number] with CGA did stop a few times to regain balance 1x150 ft with CGA,(~15 feet with SBA), [id number] CGA Gt with str cane 160ft SBA to CGA [id number] CGA, 1x140 ft CGA with intermittent stopping to regain balance   Stair ambulation               Parallel bars  Walking fwd/back and sidestepping with single UE support 2 laps each Walking fwd w/o UE support SBA /backwards and sidestepping with single UE Walking fwd w/o UE support SBA /backwards and sidestepping with single UE Walking fwd w/o UE support SBA /backwards and sidestepping with single UE    Standing level surface holding ball - arm raises, side/side, cw/ccw x 10 reps each            Standing marches 2x10 reps with single UE support Step outs and back, 1 step to side and 1 step fwd x 10 reps each R/L with

## 2019-08-28 ENCOUNTER — HOSPITAL ENCOUNTER (OUTPATIENT)
Dept: PHYSICAL THERAPY | Age: 84
Setting detail: THERAPIES SERIES
Discharge: HOME | End: 2019-08-28
Payer: MEDICARE

## 2019-08-28 PROCEDURE — 97116 GAIT TRAINING THERAPY: CPT

## 2019-08-28 PROCEDURE — 97110 THERAPEUTIC EXERCISES: CPT

## 2019-08-28 PROCEDURE — 97112 NEUROMUSCULAR REEDUCATION: CPT

## 2019-08-28 NOTE — FLOWSHEET NOTE
Outpatient Physical Therapy  [place]           [x] Phone: [phone]   Fax: [phone]  [place]           [] Phone: [phone]   Fax: [phone]        Physical Therapy Daily Treatment Note  Date:  2019    Patient Name:  Jarred White    :  1935  MRN: [id number]  Restrictions/Precautions: Other position/activity restrictions: None noted  Diagnosis:   Diagnosis: Muscle Weakness, Vascular Dementia  Date of Injury/Surgery:   Treatment Diagnosis: Treatment Diagnosis: Muscular weakness, deconditioned, difficulty walking    Insurance/Certification information: PT Insurance Information: Medicare   Referring Physician:  Referring Practitioner: Srinivas Wilhelm PA-C  Next Doctor Visit:  2019  Plan of care signed (Y/N):   8/10/2019 - 9/10/19  Outcome Measure:   Visit# / total visits:    Pain level: 4-5/10 Neck and shld  Goals:          Long term goals  Time Frame for Long term goals :  All goals to be assessed by the 10th visit  Long term goal 1: Pt to be independent with HEP  Long term goal 2: Pt to increase chandrika LE strength  Long term goal 3: Pt to ambulate with better safety awareness x 100 feet  Long term goal 4: Pt to improve balance to be able to ambulate in // bars without hands-on assistance from therapist    Summary of Evaluation: Assessment: Patient is an  83 yo female who presents with  Muscle Weakness, Vascular Dementia which impacts on gait, balance, ADL's, activities in/out of the house;patient's goal is to be able to do more than just walk from her chair to the bathroom or kitchen and sit back down, she wants to be able to go places with her spouse to go shopping and walking more ; PT to address patient's goals, impairments and activity limitations with skilled interventions checked in plan of care;patient's level of function prior to onset of  Muscle Weakness, Vascular Dementia was [facility]; did not observe any barriers to learning during PT eval; learning preferences include UE support Step outs and back, 1 step to side and 1 step fwd x 10 reps each R/L with single UE support Step outs and back, 1 step to side and 1 step fwd x 10 reps each R/L with single UE support     Obstacle course in parallel bars: stepping over half rolls and on/off 4 inch step with bilat to single UE support x 4 laps  Obstacle course in parallel bars: stepping over half rolls and on/off 4 inch step with bilat to single UE support x 2 laps                          PROPRIOCEPTION                                          MODALITIES                         Other Therapeutic Activities/Education:  Pt received education on their current pathology and how their condition affects functional activities. Pt understood discussion and questions were answered. Pt understands their activity limitations and understands rational for treatment progression. Home Exercise Program:  4/17/2019 HEP as above, copy to pt/chart      Manual Treatments: ------       Modalities: ------        Communication with other providers:        Assessment:  (Response towards treatment session) (Pain Rating) Pt continues to c/o constant neck/shld pain. Pt not as stable today with gait and balance activities. Fatigued quickly requiring frequent rest breaks. Continue with therapy 1 time a week. No increase in pain following session. Plan for Next Session: Specific instructions for Next Treatment: plan to advance chandrika LE strength, Nu-Step,  gait/balance activities.        Time In / Time Out: 0900/0940       Timed Code/Total Treatment Minutes: 40/40;  (1) TE, (1) NR, (1) gait      Next Progress Note due:  10th visit      Plan of Care Interventions:  [x] Therapeutic Exercise             [] Aquatics:  [x] Therapeutic Activity               [] Ultrasound                  [] Elec Stimulation  [x] Gait Training                          [] Cervical Traction        [] Lumbar Traction  [x] Neuromuscular Re-education            [] Cold/hotpack  []

## 2019-09-03 ENCOUNTER — OFFICE VISIT (OUTPATIENT)
Dept: INTERNAL MEDICINE CLINIC | Age: 84
End: 2019-09-03
Payer: MEDICARE

## 2019-09-03 VITALS
RESPIRATION RATE: 14 BRPM | SYSTOLIC BLOOD PRESSURE: 116 MMHG | WEIGHT: 127.4 LBS | HEART RATE: 80 BPM | BODY MASS INDEX: 24.07 KG/M2 | DIASTOLIC BLOOD PRESSURE: 82 MMHG | OXYGEN SATURATION: 97 %

## 2019-09-03 DIAGNOSIS — L21.9 SEBORRHEIC DERMATITIS: Primary | ICD-10-CM

## 2019-09-03 DIAGNOSIS — F33.41 RECURRENT MAJOR DEPRESSIVE DISORDER, IN PARTIAL REMISSION (HCC): ICD-10-CM

## 2019-09-03 DIAGNOSIS — J44.9 COPD, SEVERE (HCC): ICD-10-CM

## 2019-09-03 DIAGNOSIS — I10 ESSENTIAL HYPERTENSION: ICD-10-CM

## 2019-09-03 PROBLEM — R06.02 SHORTNESS OF BREATH: Status: RESOLVED | Noted: 2019-02-18 | Resolved: 2019-09-03

## 2019-09-03 PROCEDURE — G8599 NO ASA/ANTIPLAT THER USE RNG: HCPCS | Performed by: INTERNAL MEDICINE

## 2019-09-03 PROCEDURE — 3023F SPIROM DOC REV: CPT | Performed by: INTERNAL MEDICINE

## 2019-09-03 PROCEDURE — 1123F ACP DISCUSS/DSCN MKR DOCD: CPT | Performed by: INTERNAL MEDICINE

## 2019-09-03 PROCEDURE — 4040F PNEUMOC VAC/ADMIN/RCVD: CPT | Performed by: INTERNAL MEDICINE

## 2019-09-03 PROCEDURE — G8420 CALC BMI NORM PARAMETERS: HCPCS | Performed by: INTERNAL MEDICINE

## 2019-09-03 PROCEDURE — G8926 SPIRO NO PERF OR DOC: HCPCS | Performed by: INTERNAL MEDICINE

## 2019-09-03 PROCEDURE — G8400 PT W/DXA NO RESULTS DOC: HCPCS | Performed by: INTERNAL MEDICINE

## 2019-09-03 PROCEDURE — 1036F TOBACCO NON-USER: CPT | Performed by: INTERNAL MEDICINE

## 2019-09-03 PROCEDURE — 99214 OFFICE O/P EST MOD 30 MIN: CPT | Performed by: INTERNAL MEDICINE

## 2019-09-03 PROCEDURE — G8427 DOCREV CUR MEDS BY ELIG CLIN: HCPCS | Performed by: INTERNAL MEDICINE

## 2019-09-03 PROCEDURE — 1090F PRES/ABSN URINE INCON ASSESS: CPT | Performed by: INTERNAL MEDICINE

## 2019-09-03 RX ORDER — THEOPHYLLINE ANHYDROUS 200 MG/1
CAPSULE, EXTENDED RELEASE ORAL
Qty: 30 CAPSULE | Refills: 3 | Status: SHIPPED | OUTPATIENT
Start: 2019-09-03 | End: 2019-10-28 | Stop reason: ALTCHOICE

## 2019-09-03 NOTE — PROGRESS NOTES
Cognitive impairment 2011    COPD, severe ([address] 2018    Depression     Diverticulitis 2012    Family history of colon cancer     Family history of diabetes mellitus     Fibromyalgia 1989    Graves disease     [place] Umana; tapazole for a time    Hyperlipidemia     Hypertension     Lumbar spinal stenosis 2015    moderate ; MRI by Dr Geo Carl Nocturnal hypoxemia 2018    Nocturnal oxygen desaturation     Obstructive sleep apnea     Osteoporosis     Prediabetes     hgb 6.5    S/P CABG (coronary artery bypass graft) 2017    4 vessel-CABG EVH RCA Marginal, OM1, OM2,  LIMA to LAD    S/P cardiac catheterization 2017    severe multivessel disease    Subclavian artery stenosis, left ([address] 2017    noted in hosp for CABG; to be dealt with as OP by vasc surgeons    Subclinical Hyperthyroidism     Dr. [place]ano Umana; Tapazole    Type II diabetes mellitus ([facility] [address] 2019    2019;DIET CONTROL    Urine incontinence 2011    Timed voiding; Isidro; urology        Social History     Tobacco Use    Smoking status: Former Smoker     Packs/day: 0.50     Years: 30.00     Pack years: 15.00     Types: Cigarettes     Start date: 1957     Last attempt to quit: 1987     Years since quittin.6    Smokeless tobacco: Never Used   Substance Use Topics    Alcohol use: No     Comment: special occasion once a year maybe         ROS: The patient has had no headache, sore throat, fever or chills, cough, dyspnea, chest pain, nausea, vomiting or diarrhea, or edema. Objective:      /82   Pulse 80   Resp 14   Wt 127 lb 6.4 oz (57.8 kg)   LMP  (LMP Unknown)   SpO2 97%   BMI 24.07 kg/m²      Physical Exam   Constitutional: She is oriented to person, place, and time. She appears well-developed and well-nourished. No distress. HENT:   Head: Normocephalic and atraumatic. Mouth/Throat: Oropharynx is clear and moist.   Eyes: Conjunctivae are normal. No scleral icterus.

## 2019-09-05 ENCOUNTER — HOSPITAL ENCOUNTER (OUTPATIENT)
Dept: PHYSICAL THERAPY | Age: 84
Setting detail: THERAPIES SERIES
Discharge: HOME | End: 2019-09-05
Payer: MEDICARE

## 2019-09-05 PROCEDURE — 97112 NEUROMUSCULAR REEDUCATION: CPT

## 2019-09-05 PROCEDURE — 97116 GAIT TRAINING THERAPY: CPT

## 2019-09-05 PROCEDURE — 97110 THERAPEUTIC EXERCISES: CPT

## 2019-09-05 NOTE — FLOWSHEET NOTE
Outpatient Physical Therapy  [place]           [x] Phone: [phone]   Fax: [phone]  [facility]           [] Phone: [phone]   Fax: [phone]        Physical Therapy Daily Treatment Note  Date:  2019    Patient Name:  Amanda Sullivan    :  1935  MRN: [id number]  Restrictions/Precautions: Other position/activity restrictions: None noted  Diagnosis:   Diagnosis: Muscle Weakness, Vascular Dementia  Date of Injury/Surgery:   Treatment Diagnosis: Treatment Diagnosis: Muscular weakness, deconditioned, difficulty walking    Insurance/Certification information: PT Insurance Information: Medicare   Referring Physician:  Referring Practitioner: Chio Becker PA-C  Next Doctor Visit:  2019  Plan of care signed (Y/N):   8/10/2019 - 9/10/19  Outcome Measure:   Visit# / total visits:    Pain level: 4-5/10 Neck and shld  Goals:          Long term goals  Time Frame for Long term goals :  All goals to be assessed by the 10th visit  Long term goal 1: Pt to be independent with HEP  Long term goal 2: Pt to increase chandrika LE strength  Long term goal 3: Pt to ambulate with better safety awareness x 100 feet  Long term goal 4: Pt to improve balance to be able to ambulate in // bars without hands-on assistance from therapist    Summary of Evaluation: Assessment: Patient is an  81 yo female who presents with  Muscle Weakness, Vascular Dementia which impacts on gait, balance, ADL's, activities in/out of the house;patient's goal is to be able to do more than just walk from her chair to the bathroom or kitchen and sit back down, she wants to be able to go places with her spouse to go shopping and walking more ; PT to address patient's goals, impairments and activity limitations with skilled interventions checked in plan of care;patient's level of function prior to onset of  Muscle Weakness, Vascular Dementia was [facility]; did not observe any barriers to learning during PT eval; learning preferences include Cold/hotpack  [] Iontophoresis   [x] Instruction in HEP                                [] Manual Therapy                                 [] vasopneumatic            [x] Self care home management        []Dry needling trigger point point/pain management                   Electronically signed by:  Gonzales Ortiz  9/5/2019, 2:41 PM

## 2019-09-08 NOTE — PROGRESS NOTES
Outpatient Physical Therapy        [x] Phone: [phone]   Fax: [phone]  Referring Practitioner: Maurisio Rivero PA-C                                     From:Mary Henderson, PT     Patient: Bailey Hernandez                                                              : 1935  Diagnosis: Muscle Weakness, Vascular Dementia      Treatment Diagnosis: Muscular weakness, deconditioned, difficulty walking             Date: 2019    [x]  Progress Note /Plan of care               []  Discharge Note    Total Visits to date: 32     Cancels/No-shows to date:     Subjective:  19- Pt states she has been feeling tired. Having more difficulty walking this week. Is scheduled to see Diabetic physician tomorrow and then family physician next week.    Prominent Objective Findings:  Ambulating with single point cane in clinic with CGA -[id number], 1x100 ft with HHA, intermittent stopping to regain balance.   Assessment:    Patient is an  83 yo female who presents with  Muscle Weakness, Vascular Dementia which impacts on gait, balance, ADL's, activities in/out of the house;patient's goal is to be able to do more than just walk from her chair to the bathroom or kitchen and sit back down, she wants to be able to go places with her spouse to go shopping and walking more ; PT to address patient's goals, impairments and activity limitations with skilled interventions checked in plan of care;patient's level of function prior to onset of  Muscle Weakness, Vascular Dementia was [facility]; did not observe any barriers to learning during PT eval; learning preferences include demonstration, practice, and handouts; patient expressed understanding of HEP; patient appears to be motivated to participate in an active PT program and to be compliant with HEP expectations;patient assisted in developing treatment plan and goals; [employer] DME is currently being used;  Goal Status:  [] Achieved [x] Partially Achieved  [] Not Achieved      Long term goal 1: Pt to be independent with HEP  Long term goal 2: Pt to increase chandrika LE strength  Long term goal 3: Pt to ambulate with better safety awareness x 100 feet  Long term goal 4: Pt to improve balance to be able to ambulate in // bars without hands-on assistance from therapist- met  Changes to goals:  Planned Services:  [x] Therapeutic Exercise    [] Modalities:  [x] Therapeutic Activity     [] Ultrasound  [] Electric Stimulation  [x] Gait Training      [] Cervical Traction    [] Lumbar Traction  [x] Neuromuscular Re-education  [] Cold/hotpack [] Iontophoresis  [x] Instruction in HEP      Other:  [x] Manual Therapy       []  Vasopneumatic  [x] Self care management                           [] Dry needling trigger point/pain management                ? Plan of Care Date Range: 9/10/19- 10/10/19  Frequency/Duration:  # Days per week: [x] 1 day # Weeks: [] 1 week [x] 4 weeks                 [] 2 days? [] 2 weeks [] 5 weeks      [] 3 days   [] 3 weeks [] 6 weeks   Rehab Potential: [] Excellent [x] Good [] Fair  [] Poor  Patient Status: [] Continue per initial Plan of Care     [] Patient now discharged     [] Additional visits requested, Please re-certify for additional visits:      Requested frequency/duration:  1/week for 4weeks  If we are requesting more visits, we fully anticipate the patient's condition is expected to improve within the treatment timeframe we are requesting. Electronically signed by:  Claire Salcedo PT, 9/8/2019, 11:00 AM  If you have any questions or concerns, please don't hesitate to call.   Thank you for your referral.    Physician Signature:______________________ Date:______ Time: ________  By signing above, therapists plan is approved by physician

## 2019-09-09 RX ORDER — FUROSEMIDE 20 MG/1
TABLET ORAL
Qty: 30 TABLET | Refills: 0 | Status: SHIPPED | OUTPATIENT
Start: 2019-09-09 | End: 2019-10-07 | Stop reason: SDUPTHER

## 2019-09-11 ENCOUNTER — HOSPITAL ENCOUNTER (OUTPATIENT)
Dept: PHYSICAL THERAPY | Age: 84
Setting detail: THERAPIES SERIES
Discharge: HOME | End: 2019-09-11
Payer: MEDICARE

## 2019-09-11 PROCEDURE — 97112 NEUROMUSCULAR REEDUCATION: CPT

## 2019-09-11 PROCEDURE — 97116 GAIT TRAINING THERAPY: CPT

## 2019-09-11 PROCEDURE — 97110 THERAPEUTIC EXERCISES: CPT

## 2019-09-11 NOTE — FLOWSHEET NOTE
Outpatient Physical Therapy  [place]           [x] Phone: [phone]   Fax: [phone]  [facility]           [] Phone: [phone]   Fax: [phone]        Physical Therapy Daily Treatment Note  Date:  2019    Patient Name:  Makenna Serna    :  1935  MRN: [id number]  Restrictions/Precautions: Other position/activity restrictions: None noted  Diagnosis:   Diagnosis: Muscle Weakness, Vascular Dementia  Date of Injury/Surgery:   Treatment Diagnosis: Treatment Diagnosis: Muscular weakness, deconditioned, difficulty walking    Insurance/Certification information: PT Insurance Information: Medicare   Referring Physician:  Referring Practitioner: Anai Rico PA-C  Next Doctor Visit:  2019  Plan of care signed (Y/N):  9/10/19- 10/10/19  Outcome Measure:   Visit# / total visits:    Pain level: 4-5/10 Neck and shld  Goals:          Long term goals  Time Frame for Long term goals :  All goals to be assessed by the 10th visit  Long term goal 1: Pt to be independent with HEP  Long term goal 2: Pt to increase chandrika LE strength  Long term goal 3: Pt to ambulate with better safety awareness x 100 feet  Long term goal 4: Pt to improve balance to be able to ambulate in // bars without hands-on assistance from therapist    Summary of Evaluation: Assessment: Patient is an  83 yo female who presents with  Muscle Weakness, Vascular Dementia which impacts on gait, balance, ADL's, activities in/out of the house;patient's goal is to be able to do more than just walk from her chair to the bathroom or kitchen and sit back down, she wants to be able to go places with her spouse to go shopping and walking more ; PT to address patient's goals, impairments and activity limitations with skilled interventions checked in plan of care;patient's level of function prior to onset of  Muscle Weakness, Vascular Dementia was [facility]; did not observe any barriers to learning during PT eval; learning preferences include Step outs and back, 1 step to side and 1 step fwd x 10 reps each R/L with single UE support     Obstacle course in parallel bars: stepping over half rolls and on/off 4 inch step with bilat to single UE support x 2 laps  Stair ambulation 4 and 6 inch steps using bilat handrails and CGA       Marching with single UE support x 15 reps Marching with single UE support x 15 reps      Mini squats with single UE support x 15 reps Mini squats with single UE support x 15 reps      Standing balance, cross punch 2x10 reps level surface Standing balance, cross punch x15 reps level surface   PROPRIOCEPTION                                          MODALITIES                         Other Therapeutic Activities/Education:  Pt received education on their current pathology and how their condition affects functional activities. Pt understood discussion and questions were answered. Pt understands their activity limitations and understands rational for treatment progression. Home Exercise Program:  4/17/2019 HEP as above, copy to pt/chart      Manual Treatments: ------       Modalities: ------        Communication with other providers:        Assessment:  (Response towards treatment session) (Pain Rating) Pt continues to c/o constant neck/shld pain. No increase in pain following session. No recent falls. Plan for Next Session: Specific instructions for Next Treatment: plan to advance chandrika LE strength, Nu-Step,  gait/balance activities.        Time In / Time Out: 7817/6548      Timed Code/Total Treatment Minutes: 45/45;  (1) TE, (1) NR, (1) gait      Next Progress Note due:  10th visit      Plan of Care Interventions:  [x] Therapeutic Exercise             [] Aquatics:  [x] Therapeutic Activity               [] Ultrasound                  [] Elec Stimulation  [x] Gait Training                          [] Cervical Traction        [] Lumbar Traction  [x] Neuromuscular Re-education            [] Cold/hotpack  [] Iontophoresis [x] Instruction in HEP                                [] Manual Therapy                                 [] vasopneumatic            [x] Self care home management        []Dry needling trigger point point/pain management                   Electronically signed by:  Mirna Celaya PT  9/11/2019, 9:06 AM

## 2019-09-18 ENCOUNTER — HOSPITAL ENCOUNTER (OUTPATIENT)
Dept: PHYSICAL THERAPY | Age: 84
Setting detail: THERAPIES SERIES
Discharge: HOME | End: 2019-09-18
Payer: MEDICARE

## 2019-09-18 PROCEDURE — 97116 GAIT TRAINING THERAPY: CPT

## 2019-09-18 PROCEDURE — 97112 NEUROMUSCULAR REEDUCATION: CPT

## 2019-09-18 PROCEDURE — 97110 THERAPEUTIC EXERCISES: CPT

## 2019-09-18 NOTE — FLOWSHEET NOTE
include demonstration, practice, and handouts; patient expressed understanding of HEP; patient appears to be motivated to participate in an active PT program and to be compliant with HEP expectations;patient assisted in developing treatment plan and goals; [employer] DME is currently being used; Subjective:  Pt's  states she has been sleeping better. Did see Urologist last week due to bladder infection. Any changes in Ambulatory Summary Sheet? New medication for skin rash, pt did not have name. Objective:  See gait and balance activities below. Requires rest breaks between activities. No recent falls. Exercises: (No more than 4 columns)   Exercise/Equipment 8/28/19 (26) 9/5/19 (27) 9/11/19 (28) 9/18/19 (29)            WARM UP        Nu-Step  Seat 6 arms7 WL3 x 12 min Seat 6 arms7 WL3 x 12 min, 545 steps Seat 6 arms7 WL3 x 12 min, 446 steps Seat 6 arms7 WL3 x 10 min,          TABLE       Bridges       SLR                      SEATED         LAQ       Hip abd       Hip add       Sit to stands        Seated lat row       STANDING       Gait with 4 wheel walker       Gait with st cane 1x93ft with st cane and HHA. Pt did have to stop and take standing rest break. Ambulated in and out of dept with RW. [id number], 1x100 ft with HHA, intermittent stopping to regain balance [id number], 1x135 ft with HHA, intermittent stopping to regain balance [id number], 1x135 ft with HHA and VC'ing for safety and placement of st cane. Intermittent stopping to regain balance or due to kicking cane.     Stair ambulation            small obstacle course, Stepping over half rolls and on/off 4 inch box with single UE support x 3 laps   Parallel bars  Walking fwd /backwards and sidestepping with single UE SBA Walking fwd/bkwd x 2 laps with single UE support Walking fwd/bkwd x 2 laps with single UE support Walking fwd/bkwd x 3 laps with single UE support       Walking on aeromats with single UE support x 3 laps Re-education            [] Cold/hotpack  [] Iontophoresis   [x] Instruction in HEP                                [] Manual Therapy                                 [] vasopneumatic            [x] Self care home management        []Dry needling trigger point point/pain management                   Electronically signed by:  Farrah Laird PT  9/18/2019, 8:58 AM

## 2019-09-19 ENCOUNTER — TELEPHONE (OUTPATIENT)
Dept: CARDIOLOGY CLINIC | Age: 84
End: 2019-09-19

## 2019-09-19 NOTE — TELEPHONE ENCOUNTER
Cardiologist: Dr. Urbano Young  Surgeon: Dr. Baum Handsome   Surgery: Botox bladder injections   Date: ASAP  FAX# [phone]    # [phone]

## 2019-09-25 ENCOUNTER — HOSPITAL ENCOUNTER (OUTPATIENT)
Dept: PHYSICAL THERAPY | Age: 84
Setting detail: THERAPIES SERIES
Discharge: HOME | End: 2019-09-25
Payer: MEDICARE

## 2019-09-25 PROCEDURE — 97110 THERAPEUTIC EXERCISES: CPT

## 2019-09-25 PROCEDURE — 97116 GAIT TRAINING THERAPY: CPT

## 2019-09-25 PROCEDURE — 97112 NEUROMUSCULAR REEDUCATION: CPT

## 2019-09-25 NOTE — FLOWSHEET NOTE
Outpatient Physical Therapy  [place]           [x] Phone: [phone]   Fax: [phone]  [facility]           [] Phone: [phone]   Fax: [phone]        Physical Therapy Daily Treatment Note  Date:  2019    Patient Name:  Radha Cadet    :  1935  MRN: [id number]  Restrictions/Precautions: Other position/activity restrictions: None noted  Diagnosis:   Diagnosis: Muscle Weakness, Vascular Dementia  Date of Injury/Surgery:   Treatment Diagnosis: Treatment Diagnosis: Muscular weakness, deconditioned, difficulty walking    Insurance/Certification information: PT Insurance Information: Medicare ABN signed  Referring Physician:  Referring Practitioner: Irwin De Leon PA-C  Next Doctor Visit:  2019  Plan of care signed (Y/N):  9/10/19- 10/10/19  Outcome Measure:   Visit# / total visits:    Pain level: 4-5/10 Neck and shld  Goals:          Long term goals  Time Frame for Long term goals :  All goals to be assessed by the 10th visit  Long term goal 1: Pt to be independent with HEP  Long term goal 2: Pt to increase chandrika LE strength  Long term goal 3: Pt to ambulate with better safety awareness x 100 feet  Long term goal 4: Pt to improve balance to be able to ambulate in // bars without hands-on assistance from therapist    Summary of Evaluation: Assessment: Patient is an  81 yo female who presents with  Muscle Weakness, Vascular Dementia which impacts on gait, balance, ADL's, activities in/out of the house;patient's goal is to be able to do more than just walk from her chair to the bathroom or kitchen and sit back down, she wants to be able to go places with her spouse to go shopping and walking more ; PT to address patient's goals, impairments and activity limitations with skilled interventions checked in plan of care;patient's level of function prior to onset of  Muscle Weakness, Vascular Dementia was [facility]; did not observe any barriers to learning during PT eval; learning preferences

## 2019-10-02 ENCOUNTER — HOSPITAL ENCOUNTER (OUTPATIENT)
Dept: PHYSICAL THERAPY | Age: 84
Setting detail: THERAPIES SERIES
Discharge: HOME | End: 2019-10-02
Payer: MEDICARE

## 2019-10-02 PROCEDURE — 97112 NEUROMUSCULAR REEDUCATION: CPT

## 2019-10-02 PROCEDURE — 97116 GAIT TRAINING THERAPY: CPT

## 2019-10-02 PROCEDURE — 97110 THERAPEUTIC EXERCISES: CPT

## 2019-10-07 RX ORDER — FUROSEMIDE 20 MG/1
TABLET ORAL
Qty: 30 TABLET | Refills: 0 | Status: SHIPPED | OUTPATIENT
Start: 2019-10-07 | End: 2019-11-05 | Stop reason: SDUPTHER

## 2019-10-09 ENCOUNTER — HOSPITAL ENCOUNTER (OUTPATIENT)
Dept: PHYSICAL THERAPY | Age: 84
Setting detail: THERAPIES SERIES
Discharge: HOME | End: 2019-10-09
Payer: MEDICARE

## 2019-10-09 PROCEDURE — 97110 THERAPEUTIC EXERCISES: CPT

## 2019-10-09 PROCEDURE — 97116 GAIT TRAINING THERAPY: CPT

## 2019-10-09 PROCEDURE — 97112 NEUROMUSCULAR REEDUCATION: CPT

## 2019-10-16 ENCOUNTER — HOSPITAL ENCOUNTER (OUTPATIENT)
Dept: PHYSICAL THERAPY | Age: 84
Setting detail: THERAPIES SERIES
Discharge: HOME | End: 2019-10-16
Payer: MEDICARE

## 2019-10-16 PROCEDURE — 97110 THERAPEUTIC EXERCISES: CPT

## 2019-10-16 PROCEDURE — 97116 GAIT TRAINING THERAPY: CPT

## 2019-10-17 ENCOUNTER — OFFICE VISIT (OUTPATIENT)
Dept: INTERNAL MEDICINE CLINIC | Age: 84
End: 2019-10-17
Payer: MEDICARE

## 2019-10-17 VITALS
SYSTOLIC BLOOD PRESSURE: 136 MMHG | HEIGHT: 61 IN | BODY MASS INDEX: 23.98 KG/M2 | OXYGEN SATURATION: 90 % | DIASTOLIC BLOOD PRESSURE: 82 MMHG | HEART RATE: 84 BPM | WEIGHT: 127 LBS

## 2019-10-17 DIAGNOSIS — J44.9 COPD, SEVERE (HCC): ICD-10-CM

## 2019-10-17 DIAGNOSIS — F42.4 SKIN-PICKING DISORDER: Primary | ICD-10-CM

## 2019-10-17 DIAGNOSIS — E78.2 MIXED HYPERLIPIDEMIA: ICD-10-CM

## 2019-10-17 DIAGNOSIS — E11.9 TYPE 2 DIABETES MELLITUS WITHOUT COMPLICATION, WITHOUT LONG-TERM CURRENT USE OF INSULIN (HCC): ICD-10-CM

## 2019-10-17 DIAGNOSIS — I10 ESSENTIAL HYPERTENSION: ICD-10-CM

## 2019-10-17 PROCEDURE — G8926 SPIRO NO PERF OR DOC: HCPCS | Performed by: INTERNAL MEDICINE

## 2019-10-17 PROCEDURE — 1090F PRES/ABSN URINE INCON ASSESS: CPT | Performed by: INTERNAL MEDICINE

## 2019-10-17 PROCEDURE — 4040F PNEUMOC VAC/ADMIN/RCVD: CPT | Performed by: INTERNAL MEDICINE

## 2019-10-17 PROCEDURE — G8400 PT W/DXA NO RESULTS DOC: HCPCS | Performed by: INTERNAL MEDICINE

## 2019-10-17 PROCEDURE — 99214 OFFICE O/P EST MOD 30 MIN: CPT | Performed by: INTERNAL MEDICINE

## 2019-10-17 PROCEDURE — G8420 CALC BMI NORM PARAMETERS: HCPCS | Performed by: INTERNAL MEDICINE

## 2019-10-17 PROCEDURE — G8427 DOCREV CUR MEDS BY ELIG CLIN: HCPCS | Performed by: INTERNAL MEDICINE

## 2019-10-17 PROCEDURE — G8599 NO ASA/ANTIPLAT THER USE RNG: HCPCS | Performed by: INTERNAL MEDICINE

## 2019-10-17 PROCEDURE — G8484 FLU IMMUNIZE NO ADMIN: HCPCS | Performed by: INTERNAL MEDICINE

## 2019-10-17 PROCEDURE — 1123F ACP DISCUSS/DSCN MKR DOCD: CPT | Performed by: INTERNAL MEDICINE

## 2019-10-17 PROCEDURE — 1036F TOBACCO NON-USER: CPT | Performed by: INTERNAL MEDICINE

## 2019-10-17 PROCEDURE — 3023F SPIROM DOC REV: CPT | Performed by: INTERNAL MEDICINE

## 2019-10-17 RX ORDER — SULFAMETHOXAZOLE AND TRIMETHOPRIM 800; 160 MG/1; MG/1
1 TABLET ORAL 2 TIMES DAILY
COMMUNITY
End: 2019-10-28 | Stop reason: ALTCHOICE

## 2019-10-17 RX ORDER — RISPERIDONE 0.5 MG/1
0.5 TABLET, FILM COATED ORAL NIGHTLY
Qty: 30 TABLET | Refills: 2 | Status: SHIPPED | OUTPATIENT
Start: 2019-10-17 | End: 2019-11-06 | Stop reason: SINTOL

## 2019-10-24 ENCOUNTER — OFFICE VISIT (OUTPATIENT)
Dept: PULMONOLOGY | Age: 84
End: 2019-10-24
Payer: MEDICARE

## 2019-10-24 VITALS
HEART RATE: 106 BPM | OXYGEN SATURATION: 95 % | SYSTOLIC BLOOD PRESSURE: 124 MMHG | WEIGHT: 128 LBS | DIASTOLIC BLOOD PRESSURE: 74 MMHG | BODY MASS INDEX: 24.19 KG/M2

## 2019-10-24 DIAGNOSIS — J44.9 COPD, SEVERE (HCC): Primary | ICD-10-CM

## 2019-10-24 DIAGNOSIS — J96.11 CHRONIC HYPOXEMIC RESPIRATORY FAILURE (HCC): ICD-10-CM

## 2019-10-24 DIAGNOSIS — R06.02 SHORTNESS OF BREATH: ICD-10-CM

## 2019-10-24 PROCEDURE — G8599 NO ASA/ANTIPLAT THER USE RNG: HCPCS | Performed by: INTERNAL MEDICINE

## 2019-10-24 PROCEDURE — G8926 SPIRO NO PERF OR DOC: HCPCS | Performed by: INTERNAL MEDICINE

## 2019-10-24 PROCEDURE — 1090F PRES/ABSN URINE INCON ASSESS: CPT | Performed by: INTERNAL MEDICINE

## 2019-10-24 PROCEDURE — G8427 DOCREV CUR MEDS BY ELIG CLIN: HCPCS | Performed by: INTERNAL MEDICINE

## 2019-10-24 PROCEDURE — G8400 PT W/DXA NO RESULTS DOC: HCPCS | Performed by: INTERNAL MEDICINE

## 2019-10-24 PROCEDURE — 1036F TOBACCO NON-USER: CPT | Performed by: INTERNAL MEDICINE

## 2019-10-24 PROCEDURE — 1123F ACP DISCUSS/DSCN MKR DOCD: CPT | Performed by: INTERNAL MEDICINE

## 2019-10-24 PROCEDURE — 99213 OFFICE O/P EST LOW 20 MIN: CPT | Performed by: INTERNAL MEDICINE

## 2019-10-24 PROCEDURE — G8420 CALC BMI NORM PARAMETERS: HCPCS | Performed by: INTERNAL MEDICINE

## 2019-10-24 PROCEDURE — 3023F SPIROM DOC REV: CPT | Performed by: INTERNAL MEDICINE

## 2019-10-24 PROCEDURE — G8484 FLU IMMUNIZE NO ADMIN: HCPCS | Performed by: INTERNAL MEDICINE

## 2019-10-24 PROCEDURE — 4040F PNEUMOC VAC/ADMIN/RCVD: CPT | Performed by: INTERNAL MEDICINE

## 2019-10-28 ENCOUNTER — OFFICE VISIT (OUTPATIENT)
Dept: CARDIOLOGY CLINIC | Age: 84
End: 2019-10-28
Payer: MEDICARE

## 2019-10-28 VITALS
WEIGHT: 127 LBS | HEART RATE: 70 BPM | DIASTOLIC BLOOD PRESSURE: 70 MMHG | HEIGHT: 61 IN | SYSTOLIC BLOOD PRESSURE: 112 MMHG | BODY MASS INDEX: 23.98 KG/M2

## 2019-10-28 DIAGNOSIS — I25.10 CORONARY ARTERY DISEASE INVOLVING NATIVE CORONARY ARTERY OF NATIVE HEART WITHOUT ANGINA PECTORIS: Primary | ICD-10-CM

## 2019-10-28 PROCEDURE — G8400 PT W/DXA NO RESULTS DOC: HCPCS | Performed by: INTERNAL MEDICINE

## 2019-10-28 PROCEDURE — 1036F TOBACCO NON-USER: CPT | Performed by: INTERNAL MEDICINE

## 2019-10-28 PROCEDURE — 4040F PNEUMOC VAC/ADMIN/RCVD: CPT | Performed by: INTERNAL MEDICINE

## 2019-10-28 PROCEDURE — 99214 OFFICE O/P EST MOD 30 MIN: CPT | Performed by: INTERNAL MEDICINE

## 2019-10-28 PROCEDURE — G8420 CALC BMI NORM PARAMETERS: HCPCS | Performed by: INTERNAL MEDICINE

## 2019-10-28 PROCEDURE — G8484 FLU IMMUNIZE NO ADMIN: HCPCS | Performed by: INTERNAL MEDICINE

## 2019-10-28 PROCEDURE — 1090F PRES/ABSN URINE INCON ASSESS: CPT | Performed by: INTERNAL MEDICINE

## 2019-10-28 PROCEDURE — 1123F ACP DISCUSS/DSCN MKR DOCD: CPT | Performed by: INTERNAL MEDICINE

## 2019-10-28 PROCEDURE — G8427 DOCREV CUR MEDS BY ELIG CLIN: HCPCS | Performed by: INTERNAL MEDICINE

## 2019-10-28 PROCEDURE — G8599 NO ASA/ANTIPLAT THER USE RNG: HCPCS | Performed by: INTERNAL MEDICINE

## 2019-10-28 RX ORDER — CARVEDILOL 12.5 MG/1
TABLET ORAL
Qty: 60 TABLET | Refills: 11 | Status: SHIPPED | OUTPATIENT
Start: 2019-10-28 | End: 2020-11-03 | Stop reason: SDUPTHER

## 2019-10-28 RX ORDER — BUDESONIDE AND FORMOTEROL FUMARATE DIHYDRATE 160; 4.5 UG/1; UG/1
AEROSOL RESPIRATORY (INHALATION)
Qty: 1 INHALER | Refills: 11 | Status: SHIPPED | OUTPATIENT
Start: 2019-10-28 | End: 2020-02-25 | Stop reason: SDUPTHER

## 2019-10-29 ENCOUNTER — HOSPITAL ENCOUNTER (OUTPATIENT)
Age: 84
Discharge: HOME | End: 2019-10-29
Payer: MEDICARE

## 2019-10-29 LAB
ALBUMIN SERPL-MCNC: 4.4 GM/DL (ref 3.4–5)
ALP BLD-CCNC: 53 IU/L (ref 40–128)
ALT SERPL-CCNC: 29 U/L (ref 10–40)
ANION GAP SERPL CALCULATED.3IONS-SCNC: 13 MMOL/L (ref 4–16)
AST SERPL-CCNC: 23 IU/L (ref 15–37)
BASOPHILS ABSOLUTE: 0 K/CU MM
BASOPHILS RELATIVE PERCENT: 0.2 % (ref 0–1)
BILIRUB SERPL-MCNC: 0.8 MG/DL (ref 0–1)
BUN BLDV-MCNC: 13 MG/DL (ref 6–23)
CALCIUM SERPL-MCNC: 9.9 MG/DL (ref 8.3–10.6)
CHLORIDE BLD-SCNC: 97 MMOL/L (ref 99–110)
CHOLESTEROL: 171 MG/DL
CO2: 32 MMOL/L (ref 21–32)
CREAT SERPL-MCNC: 0.9 MG/DL (ref 0.6–1.1)
DIFFERENTIAL TYPE: ABNORMAL
EOSINOPHILS ABSOLUTE: 0.1 K/CU MM
EOSINOPHILS RELATIVE PERCENT: 0.8 % (ref 0–3)
ESTIMATED AVERAGE GLUCOSE: 154 MG/DL
GFR AFRICAN AMERICAN: >60 ML/MIN/1.73M2
GFR NON-AFRICAN AMERICAN: >60 ML/MIN/1.73M2
GLUCOSE BLD-MCNC: 135 MG/DL (ref 70–99)
HBA1C MFR BLD: 7 % (ref 4.2–6.3)
HCT VFR BLD CALC: 45.6 % (ref 37–47)
HDLC SERPL-MCNC: 71 MG/DL
HEMOGLOBIN: 14.4 GM/DL (ref 12.5–16)
IMMATURE NEUTROPHIL %: 0.3 % (ref 0–0.43)
LDL CHOLESTEROL DIRECT: 82 MG/DL
LYMPHOCYTES ABSOLUTE: 2.1 K/CU MM
LYMPHOCYTES RELATIVE PERCENT: 22.9 % (ref 24–44)
MCH RBC QN AUTO: 32.6 PG (ref 27–31)
MCHC RBC AUTO-ENTMCNC: 31.6 % (ref 32–36)
MCV RBC AUTO: 103.2 FL (ref 78–100)
MONOCYTES ABSOLUTE: 0.6 K/CU MM
MONOCYTES RELATIVE PERCENT: 6.8 % (ref 0–4)
NUCLEATED RBC %: 0 %
PDW BLD-RTO: 12.9 % (ref 11.7–14.9)
PLATELET # BLD: 314 K/CU MM (ref 140–440)
PMV BLD AUTO: 9.1 FL (ref 7.5–11.1)
POTASSIUM SERPL-SCNC: 4.2 MMOL/L (ref 3.5–5.1)
RBC # BLD: 4.42 M/CU MM (ref 4.2–5.4)
SEGMENTED NEUTROPHILS ABSOLUTE COUNT: 6.2 K/CU MM
SEGMENTED NEUTROPHILS RELATIVE PERCENT: 69 % (ref 36–66)
SODIUM BLD-SCNC: 142 MMOL/L (ref 135–145)
TOTAL IMMATURE NEUTOROPHIL: 0.03 K/CU MM
TOTAL NUCLEATED RBC: 0 K/CU MM
TOTAL PROTEIN: 6.6 GM/DL (ref 6.4–8.2)
TRIGL SERPL-MCNC: 159 MG/DL
WBC # BLD: 9 K/CU MM (ref 4–10.5)

## 2019-10-29 PROCEDURE — 80061 LIPID PANEL: CPT

## 2019-10-29 PROCEDURE — 83721 ASSAY OF BLOOD LIPOPROTEIN: CPT

## 2019-10-29 PROCEDURE — 85025 COMPLETE CBC W/AUTO DIFF WBC: CPT

## 2019-10-29 PROCEDURE — 80053 COMPREHEN METABOLIC PANEL: CPT

## 2019-10-29 PROCEDURE — 36415 COLL VENOUS BLD VENIPUNCTURE: CPT

## 2019-10-29 PROCEDURE — 83036 HEMOGLOBIN GLYCOSYLATED A1C: CPT

## 2019-10-30 ENCOUNTER — HOSPITAL ENCOUNTER (OUTPATIENT)
Dept: PHYSICAL THERAPY | Age: 84
Setting detail: THERAPIES SERIES
Discharge: HOME | End: 2019-10-30
Payer: MEDICARE

## 2019-10-30 PROCEDURE — 97112 NEUROMUSCULAR REEDUCATION: CPT

## 2019-10-30 PROCEDURE — 97116 GAIT TRAINING THERAPY: CPT

## 2019-10-30 PROCEDURE — 97110 THERAPEUTIC EXERCISES: CPT

## 2019-11-05 RX ORDER — FUROSEMIDE 20 MG/1
TABLET ORAL
Qty: 30 TABLET | Refills: 0 | Status: SHIPPED | OUTPATIENT
Start: 2019-11-05 | End: 2019-12-09 | Stop reason: SDUPTHER

## 2019-11-06 ENCOUNTER — OFFICE VISIT (OUTPATIENT)
Dept: INTERNAL MEDICINE CLINIC | Age: 84
End: 2019-11-06
Payer: MEDICARE

## 2019-11-06 VITALS
WEIGHT: 127 LBS | RESPIRATION RATE: 14 BRPM | OXYGEN SATURATION: 96 % | DIASTOLIC BLOOD PRESSURE: 84 MMHG | HEART RATE: 83 BPM | BODY MASS INDEX: 24 KG/M2 | SYSTOLIC BLOOD PRESSURE: 134 MMHG

## 2019-11-06 DIAGNOSIS — R26.9 GAIT DISTURBANCE: ICD-10-CM

## 2019-11-06 DIAGNOSIS — R41.89 COGNITIVE IMPAIRMENT: ICD-10-CM

## 2019-11-06 DIAGNOSIS — J44.9 COPD, SEVERE (HCC): ICD-10-CM

## 2019-11-06 DIAGNOSIS — E11.9 TYPE 2 DIABETES MELLITUS WITHOUT COMPLICATION, WITHOUT LONG-TERM CURRENT USE OF INSULIN (HCC): ICD-10-CM

## 2019-11-06 DIAGNOSIS — F42.4 PICKING OWN SKIN: Primary | ICD-10-CM

## 2019-11-06 PROCEDURE — G8400 PT W/DXA NO RESULTS DOC: HCPCS | Performed by: INTERNAL MEDICINE

## 2019-11-06 PROCEDURE — 1123F ACP DISCUSS/DSCN MKR DOCD: CPT | Performed by: INTERNAL MEDICINE

## 2019-11-06 PROCEDURE — 99214 OFFICE O/P EST MOD 30 MIN: CPT | Performed by: INTERNAL MEDICINE

## 2019-11-06 PROCEDURE — 1036F TOBACCO NON-USER: CPT | Performed by: INTERNAL MEDICINE

## 2019-11-06 PROCEDURE — G8427 DOCREV CUR MEDS BY ELIG CLIN: HCPCS | Performed by: INTERNAL MEDICINE

## 2019-11-06 PROCEDURE — G8599 NO ASA/ANTIPLAT THER USE RNG: HCPCS | Performed by: INTERNAL MEDICINE

## 2019-11-06 PROCEDURE — 1090F PRES/ABSN URINE INCON ASSESS: CPT | Performed by: INTERNAL MEDICINE

## 2019-11-06 PROCEDURE — G8484 FLU IMMUNIZE NO ADMIN: HCPCS | Performed by: INTERNAL MEDICINE

## 2019-11-06 PROCEDURE — G8926 SPIRO NO PERF OR DOC: HCPCS | Performed by: INTERNAL MEDICINE

## 2019-11-06 PROCEDURE — G8420 CALC BMI NORM PARAMETERS: HCPCS | Performed by: INTERNAL MEDICINE

## 2019-11-06 PROCEDURE — 4040F PNEUMOC VAC/ADMIN/RCVD: CPT | Performed by: INTERNAL MEDICINE

## 2019-11-06 PROCEDURE — 3023F SPIROM DOC REV: CPT | Performed by: INTERNAL MEDICINE

## 2019-11-06 RX ORDER — CITALOPRAM 20 MG/1
20 TABLET ORAL DAILY
Qty: 30 TABLET | Refills: 5 | Status: SHIPPED | OUTPATIENT
Start: 2019-11-06 | End: 2020-02-14

## 2019-11-07 ENCOUNTER — HOSPITAL ENCOUNTER (OUTPATIENT)
Dept: PHYSICAL THERAPY | Age: 84
Setting detail: THERAPIES SERIES
Discharge: HOME | End: 2019-11-07
Payer: MEDICARE

## 2019-11-07 PROCEDURE — 97116 GAIT TRAINING THERAPY: CPT

## 2019-11-07 PROCEDURE — 97110 THERAPEUTIC EXERCISES: CPT

## 2019-11-13 ENCOUNTER — HOSPITAL ENCOUNTER (OUTPATIENT)
Dept: PHYSICAL THERAPY | Age: 84
Setting detail: THERAPIES SERIES
Discharge: HOME | End: 2019-11-13
Payer: MEDICARE

## 2019-11-13 PROCEDURE — 97530 THERAPEUTIC ACTIVITIES: CPT

## 2019-11-13 PROCEDURE — 97110 THERAPEUTIC EXERCISES: CPT

## 2019-11-13 PROCEDURE — 97116 GAIT TRAINING THERAPY: CPT

## 2019-11-20 ENCOUNTER — HOSPITAL ENCOUNTER (OUTPATIENT)
Dept: PHYSICAL THERAPY | Age: 84
Setting detail: THERAPIES SERIES
Discharge: HOME | End: 2019-11-20
Payer: MEDICARE

## 2019-11-20 PROCEDURE — 97116 GAIT TRAINING THERAPY: CPT

## 2019-11-20 PROCEDURE — 97530 THERAPEUTIC ACTIVITIES: CPT

## 2019-11-20 PROCEDURE — 97110 THERAPEUTIC EXERCISES: CPT

## 2019-11-26 ENCOUNTER — NURSE ONLY (OUTPATIENT)
Dept: PULMONOLOGY | Age: 84
End: 2019-11-26

## 2019-11-26 DIAGNOSIS — J44.9 COPD, SEVERE (HCC): Primary | ICD-10-CM

## 2019-11-26 DIAGNOSIS — R06.02 SHORTNESS OF BREATH: ICD-10-CM

## 2019-11-26 DIAGNOSIS — J96.11 CHRONIC HYPOXEMIC RESPIRATORY FAILURE (HCC): ICD-10-CM

## 2019-11-26 DIAGNOSIS — J44.9 COPD, SEVERE (HCC): ICD-10-CM

## 2019-11-26 LAB
EXPIRATORY TIME-POST: NORMAL
EXPIRATORY TIME: NORMAL
FEF 25-75% %CHNG: NORMAL
FEF 25-75% %PRED-POST: NORMAL
FEF 25-75% %PRED-PRE: NORMAL
FEF 25-75% PRED: NORMAL
FEF 25-75%-POST: NORMAL
FEF 25-75%-PRE: NORMAL
FEV1 %PRED-POST: 11.1 %
FEV1 %PRED-PRE: 21.9 %
FEV1 PRED: 1.63 L
FEV1-POST: 0.18 L
FEV1-PRE: 0.36 L
FEV1/FVC %PRED-POST: 52.9 %
FEV1/FVC %PRED-PRE: 131.5 %
FEV1/FVC PRED: 73.5 %
FEV1/FVC-POST: 38.9 %
FEV1/FVC-PRE: 96.6 %
FVC %PRED-POST: 21 L
FVC %PRED-PRE: 16.7 %
FVC PRED: 2.21 L
FVC-POST: 0.46 L
FVC-PRE: 0.37 L
PEF %PRED-POST: NORMAL
PEF %PRED-PRE: NORMAL
PEF PRED: NORMAL
PEF%CHNG: NORMAL
PEF-POST: NORMAL
PEF-PRE: NORMAL

## 2019-11-26 PROCEDURE — 94060 EVALUATION OF WHEEZING: CPT | Performed by: INTERNAL MEDICINE

## 2019-11-26 PROCEDURE — 99999 PR OFFICE/OUTPT VISIT,PROCEDURE ONLY: CPT | Performed by: INTERNAL MEDICINE

## 2019-11-26 ASSESSMENT — PULMONARY FUNCTION TESTS
FEV1_PERCENT_PREDICTED_PRE: 21.9
FEV1/FVC_POST: 38.9
FVC_PERCENT_PREDICTED_POST: 21.0
FVC_PREDICTED: 2.21
FEV1_PREDICTED: 1.63
FVC_PRE: 0.37
FEV1/FVC_PRE: 96.6
FEV1_POST: 0.18
FEV1/FVC_PERCENT_PREDICTED_PRE: 131.5
FEV1_PRE: 0.36
FEV1/FVC_PREDICTED: 73.5
FVC_PERCENT_PREDICTED_PRE: 16.7
FEV1/FVC_PERCENT_PREDICTED_POST: 52.9
FEV1_PERCENT_PREDICTED_POST: 11.1
FVC_POST: 0.46

## 2019-12-02 RX ORDER — OMEPRAZOLE 40 MG/1
CAPSULE, DELAYED RELEASE ORAL
Qty: 30 CAPSULE | Refills: 3 | Status: SHIPPED | OUTPATIENT
Start: 2019-12-02 | End: 2020-03-02

## 2019-12-04 ENCOUNTER — HOSPITAL ENCOUNTER (OUTPATIENT)
Dept: PHYSICAL THERAPY | Age: 84
Setting detail: THERAPIES SERIES
Discharge: HOME | End: 2019-12-04
Payer: MEDICARE

## 2019-12-04 PROCEDURE — 97112 NEUROMUSCULAR REEDUCATION: CPT

## 2019-12-04 PROCEDURE — 97116 GAIT TRAINING THERAPY: CPT

## 2019-12-04 PROCEDURE — 97110 THERAPEUTIC EXERCISES: CPT

## 2019-12-09 RX ORDER — POTASSIUM CHLORIDE 750 MG/1
TABLET, FILM COATED, EXTENDED RELEASE ORAL
Qty: 30 TABLET | Refills: 2 | Status: SHIPPED | OUTPATIENT
Start: 2019-12-09 | End: 2020-05-28

## 2019-12-09 RX ORDER — FUROSEMIDE 20 MG/1
TABLET ORAL
Qty: 30 TABLET | Refills: 0 | Status: SHIPPED | OUTPATIENT
Start: 2019-12-09 | End: 2019-12-26

## 2019-12-09 RX ORDER — ROSUVASTATIN CALCIUM 5 MG/1
TABLET, COATED ORAL
Qty: 30 TABLET | Refills: 3 | Status: SHIPPED | OUTPATIENT
Start: 2019-12-09 | End: 2020-03-22

## 2019-12-11 ENCOUNTER — HOSPITAL ENCOUNTER (OUTPATIENT)
Dept: PHYSICAL THERAPY | Age: 84
Setting detail: THERAPIES SERIES
Discharge: HOME | End: 2019-12-11
Payer: MEDICARE

## 2019-12-11 PROCEDURE — 97116 GAIT TRAINING THERAPY: CPT

## 2019-12-11 PROCEDURE — 97110 THERAPEUTIC EXERCISES: CPT

## 2019-12-11 PROCEDURE — 97112 NEUROMUSCULAR REEDUCATION: CPT

## 2019-12-18 ENCOUNTER — HOSPITAL ENCOUNTER (OUTPATIENT)
Dept: PHYSICAL THERAPY | Age: 84
Setting detail: THERAPIES SERIES
Discharge: HOME | End: 2019-12-18
Payer: MEDICARE

## 2019-12-18 PROCEDURE — 97112 NEUROMUSCULAR REEDUCATION: CPT

## 2019-12-18 PROCEDURE — 97116 GAIT TRAINING THERAPY: CPT

## 2019-12-18 PROCEDURE — 97110 THERAPEUTIC EXERCISES: CPT

## 2019-12-23 ENCOUNTER — TELEPHONE (OUTPATIENT)
Dept: INTERNAL MEDICINE CLINIC | Age: 84
End: 2019-12-23

## 2019-12-23 DIAGNOSIS — R26.9 GAIT DISTURBANCE: ICD-10-CM

## 2019-12-23 DIAGNOSIS — J96.11 CHRONIC HYPOXEMIC RESPIRATORY FAILURE (HCC): ICD-10-CM

## 2019-12-23 DIAGNOSIS — Z95.1 S/P CABG (CORONARY ARTERY BYPASS GRAFT): ICD-10-CM

## 2019-12-23 DIAGNOSIS — R06.02 SHORTNESS OF BREATH: ICD-10-CM

## 2019-12-23 DIAGNOSIS — J44.9 COPD, SEVERE (HCC): Primary | ICD-10-CM

## 2019-12-26 RX ORDER — FUROSEMIDE 20 MG/1
TABLET ORAL
Qty: 30 TABLET | Refills: 0 | Status: SHIPPED | OUTPATIENT
Start: 2019-12-26 | End: 2020-02-06 | Stop reason: SDUPTHER

## 2020-02-05 ENCOUNTER — TELEPHONE (OUTPATIENT)
Dept: CARDIOLOGY CLINIC | Age: 85
End: 2020-02-05

## 2020-02-05 NOTE — TELEPHONE ENCOUNTER
Cardiologist: Dr. Montse Fitch  Surgeon: Dr. Andres Sanford  Surgery: Botox for bladder  Anesthesia: Local  Date: ASAP  FAX# [phone]  # [phone]    Last OV 10/28/19 w/Karolina  1. POST OP for bladder botox, no cardiac complication noted, recovered from FLU  2. CAD s/p CABG and had complications post CABG with sternal infection and had to redo sternotomy, she is better now,continue coreg, aspirin and crestor, continue cardiac rehab   3. Dyslipidemia: continue crestor 5mg po daily  4. History of TIA:CONTINUE aspirincretor  5. HTN: stable, continue core  6. Dm: she is  Not Diabetuc and off  metforminAll labs, medications and tests reviewed, continue all other medications of all above medical condition listed as is. Echo 4/3/18   Technically difficult examination. Patient is on ventilator. Left ventricular function is hyperdynamic with small size ventricle, EF >   60%   moderate to severe concentric left ventricular hypertrophy. Grade II diastolic dysfunction. Right ventricular systolic pressure of 32 mm Hg. Mild tricuspid regurgitation.    No evidence of any pericardial effusion      NM 12/9/16-  Normal  EF 70%    CATH - 6/8/17    CABG- 6/2017    Aspirin

## 2020-02-06 ENCOUNTER — OFFICE VISIT (OUTPATIENT)
Dept: INTERNAL MEDICINE CLINIC | Age: 85
End: 2020-02-06
Payer: MEDICARE

## 2020-02-06 VITALS
HEART RATE: 74 BPM | DIASTOLIC BLOOD PRESSURE: 88 MMHG | BODY MASS INDEX: 24.49 KG/M2 | SYSTOLIC BLOOD PRESSURE: 124 MMHG | OXYGEN SATURATION: 96 % | WEIGHT: 129.6 LBS

## 2020-02-06 PROCEDURE — 99213 OFFICE O/P EST LOW 20 MIN: CPT | Performed by: FAMILY MEDICINE

## 2020-02-06 RX ORDER — FUROSEMIDE 20 MG/1
TABLET ORAL
Qty: 30 TABLET | Refills: 0 | Status: SHIPPED | OUTPATIENT
Start: 2020-02-06 | End: 2020-05-28

## 2020-02-06 ASSESSMENT — ENCOUNTER SYMPTOMS
SORE THROAT: 0
CHEST TIGHTNESS: 0
EYE REDNESS: 0
WHEEZING: 0
ABDOMINAL PAIN: 0
VOMITING: 0
ABDOMINAL DISTENTION: 0
SHORTNESS OF BREATH: 0
TROUBLE SWALLOWING: 0
SINUS PAIN: 0
DIARRHEA: 0
NAUSEA: 0
EYE ITCHING: 0
COUGH: 0
CONSTIPATION: 0
BACK PAIN: 1

## 2020-02-06 NOTE — TELEPHONE ENCOUNTER
Revised Cardiac Risk Index:   High risk type of surgery no   H/O ischemic heart disease  (h/o MI, or a positive stress test, current complaint of chest pain considered to be secondary to myocardial ischemia,  Use of nitrate therapy or ECG with pathological Q waves; do not count prior coronary revascularization procedure unless one of the other criteria for ischemic heart disease is present) yes     H/O heart failure no  H/O CVA yes   H/O DM treated with insulin yes  Preoperative serum creatinine >2.0 mg/dl (177 micromol/L) no     The calculated rate of cardiac death, nonfatal myocardial infarction, and nonfatal cardiac arrest according to the number of predictors is; Three or more risk factors  5.4% (95% CI: 2.8-7.9)     she is considered a moderate- ariela risk for surgery. No further testing since it is local anesthetic. Electronically signed by ERMELINDA Wiley CNP on 2/6/2020 at 1:39 PM

## 2020-02-06 NOTE — PROGRESS NOTES
moderate ; MRI by Dr Vern Woodward Nocturnal hypoxemia 2018    Nocturnal oxygen desaturation     Obstructive sleep apnea     Osteoporosis     S/P CABG (coronary artery bypass graft) 2017    4 vessel-CABG EVH RCA Marginal, OM1, OM2,  LIMA to LAD    S/P cardiac catheterization 2017    severe multivessel disease    Subclavian artery stenosis, left ([facility] [address] 2017    noted in hosp for CABG; to be dealt with as OP by vas surgeons    Subclinical Hyperthyroidism     Dr. Zac Blackwood; [place]    Type II diabetes mellitus ([facility] [address] 2019;DIET CONTROL    Urine incontinence 2011    Timed voiding; [place]; urology        Social History     Tobacco Use    Smoking status: Former Smoker     Packs/day: 0.50     Years: 30.00     Pack years: 15.00     Types: Cigarettes     Start date: 1957     Last attempt to quit: 1987     Years since quittin.0    Smokeless tobacco: Never Used   Substance Use Topics    Alcohol use: No     Comment: special occasion once a year maybe         Review of Systems   Constitutional: Positive for fatigue. Negative for appetite change, chills, fever and unexpected weight change. HENT: Negative for congestion, ear pain, sinus pain, sore throat and trouble swallowing. Eyes: Negative for redness and itching. Respiratory: Negative for cough, chest tightness, shortness of breath and wheezing. Cardiovascular: Negative for chest pain and palpitations. Gastrointestinal: Negative for abdominal distention, abdominal pain, constipation, diarrhea, nausea and vomiting. Endocrine: Negative for polyuria. Genitourinary: Positive for dysuria and frequency. Negative for decreased urine volume, difficulty urinating and urgency. Musculoskeletal: Positive for arthralgias, back pain and myalgias. Skin: Negative for rash. Multiple soft scratching on face   Neurological: Negative for dizziness and headaches.    Psychiatric/Behavioral: Negative for behavioral problems, confusion and suicidal ideas. Objective:      /88   Pulse 74   Wt 129 lb 9.6 oz (58.8 kg)   LMP  (LMP Unknown)   SpO2 96%   BMI 24.49 kg/m²      Physical Exam  Vitals signs reviewed. Constitutional:       Appearance: She is well-developed. Comments: Patient is weak and unable to stand on her own and need assistance to walk   HENT:      Head: Normocephalic and atraumatic. Mouth/Throat:      Mouth: Mucous membranes are dry. Eyes:      Conjunctiva/sclera: Conjunctivae normal.      Pupils: Pupils are equal, round, and reactive to light. Neck:      Musculoskeletal: Normal range of motion and neck supple. Thyroid: No thyromegaly. Cardiovascular:      Rate and Rhythm: Normal rate and regular rhythm. Heart sounds: Normal heart sounds. Pulmonary:      Effort: Pulmonary effort is normal.      Breath sounds: Normal breath sounds. Abdominal:      General: Bowel sounds are normal. There is no distension. Palpations: Abdomen is soft. Tenderness: There is no abdominal tenderness. There is no guarding. Musculoskeletal:      Right lower leg: No edema. Left lower leg: No edema. Comments: 5-5 muscular strength throughout and bilateral.   Skin:     General: Skin is warm and dry. Findings: Lesion present. No bruising. Comments: Multiple excoriation marks on her face near the front scalp   Neurological:      General: No focal deficit present. Mental Status: She is alert and oriented to person, place, and time. Psychiatric:         Mood and Affect: Mood normal.         Behavior: Behavior normal.         Thought Content: Thought content normal.         Judgment: Judgment normal.            Assessment / Plan:      1. Acute cystitis without hematuria  - Patient was asked to provide urine sample as she was not able to provide sample in the office.   Patient will drop off a urine sample tomorrow morning.  - Patient was asked to continue her Macrobid prescribed by her urologist.   - URINE RT REFLEX TO CULTURE    2. Mixed stress and urge urinary incontinence  - You follow-up with urologist.    3. Gait disturbance  - Handicap Placard MISC; by Does not apply route Good for 5 years  Dispense: 1 each; Refill: 0          Note:   Patient understand the assessment and agreed to the plan. Medication side effects was discussed during the encounter. Before discharging the patient, all questions and concern were addressed. After visit summary was provided. Advice to call clinic or me for any further questions or concern.        Rajna Olivares, DO

## 2020-02-07 LAB
BILIRUBIN URINE: NEGATIVE
BLOOD, URINE: NEGATIVE
CLARITY: CLEAR
COLOR: ABNORMAL
EPITHELIAL CELLS, UA: 4 /HPF (ref 0–5)
GLUCOSE URINE: NEGATIVE MG/DL
KETONES, URINE: ABNORMAL MG/DL
LEUKOCYTE ESTERASE, URINE: NEGATIVE
MICROSCOPIC EXAMINATION: YES
MUCUS: ABNORMAL /LPF
NITRITE, URINE: NEGATIVE
PH UA: 6.5 (ref 5–8)
PROTEIN UA: ABNORMAL MG/DL
RBC UA: 4 /HPF (ref 0–4)
SPECIFIC GRAVITY UA: 1.02 (ref 1–1.03)
URINE REFLEX TO CULTURE: ABNORMAL
URINE TYPE: ABNORMAL
UROBILINOGEN, URINE: 1 E.U./DL
WBC UA: 5 /HPF (ref 0–5)

## 2020-02-10 PROBLEM — N30.00 ACUTE CYSTITIS: Status: ACTIVE | Noted: 2020-02-10

## 2020-02-14 ENCOUNTER — OFFICE VISIT (OUTPATIENT)
Dept: INTERNAL MEDICINE CLINIC | Age: 85
End: 2020-02-14
Payer: MEDICARE

## 2020-02-14 VITALS
OXYGEN SATURATION: 96 % | HEART RATE: 120 BPM | WEIGHT: 125 LBS | DIASTOLIC BLOOD PRESSURE: 80 MMHG | SYSTOLIC BLOOD PRESSURE: 130 MMHG | BODY MASS INDEX: 23.62 KG/M2

## 2020-02-14 PROBLEM — T14.8XXA SKIN ABRASION: Status: ACTIVE | Noted: 2020-02-14

## 2020-02-14 LAB
A/G RATIO: 1.2 (ref 1.1–2.2)
ALBUMIN SERPL-MCNC: 3.9 G/DL (ref 3.4–5)
ALP BLD-CCNC: 73 U/L (ref 40–129)
ALT SERPL-CCNC: 21 U/L (ref 10–40)
ANION GAP SERPL CALCULATED.3IONS-SCNC: 18 MMOL/L (ref 3–16)
AST SERPL-CCNC: 15 U/L (ref 15–37)
BASOPHILS ABSOLUTE: 0.1 K/UL (ref 0–0.2)
BASOPHILS RELATIVE PERCENT: 0.3 %
BILIRUB SERPL-MCNC: 0.6 MG/DL (ref 0–1)
BILIRUBIN URINE: NEGATIVE
BLOOD, URINE: NEGATIVE
BUN BLDV-MCNC: 12 MG/DL (ref 7–20)
CALCIUM SERPL-MCNC: 9.9 MG/DL (ref 8.3–10.6)
CHLORIDE BLD-SCNC: 97 MMOL/L (ref 99–110)
CHOLESTEROL, FASTING: 134 MG/DL (ref 0–199)
CLARITY: ABNORMAL
CO2: 28 MMOL/L (ref 21–32)
COLOR: ABNORMAL
CREAT SERPL-MCNC: 0.7 MG/DL (ref 0.6–1.2)
EOSINOPHILS ABSOLUTE: 0.1 K/UL (ref 0–0.6)
EOSINOPHILS RELATIVE PERCENT: 0.4 %
EPITHELIAL CELLS, UA: 8 /HPF (ref 0–5)
GFR AFRICAN AMERICAN: >60
GFR NON-AFRICAN AMERICAN: >60
GLOBULIN: 3.2 G/DL
GLUCOSE BLD-MCNC: 154 MG/DL (ref 70–99)
GLUCOSE URINE: NEGATIVE MG/DL
HCT VFR BLD CALC: 38.7 % (ref 36–48)
HDLC SERPL-MCNC: 41 MG/DL (ref 40–60)
HEMOGLOBIN: 12.8 G/DL (ref 12–16)
HYALINE CASTS: 7 /LPF (ref 0–8)
KETONES, URINE: NEGATIVE MG/DL
LDL CHOLESTEROL CALCULATED: 61 MG/DL
LEUKOCYTE ESTERASE, URINE: ABNORMAL
LYMPHOCYTES ABSOLUTE: 1.2 K/UL (ref 1–5.1)
LYMPHOCYTES RELATIVE PERCENT: 7.2 %
MCH RBC QN AUTO: 33.2 PG (ref 26–34)
MCHC RBC AUTO-ENTMCNC: 33.1 G/DL (ref 31–36)
MCV RBC AUTO: 100.3 FL (ref 80–100)
MICROSCOPIC EXAMINATION: YES
MONOCYTES ABSOLUTE: 1.3 K/UL (ref 0–1.3)
MONOCYTES RELATIVE PERCENT: 7.8 %
NEUTROPHILS ABSOLUTE: 14.1 K/UL (ref 1.7–7.7)
NEUTROPHILS RELATIVE PERCENT: 84.3 %
NITRITE, URINE: NEGATIVE
PDW BLD-RTO: 13.5 % (ref 12.4–15.4)
PH UA: 6 (ref 5–8)
PLATELET # BLD: 439 K/UL (ref 135–450)
PMV BLD AUTO: 7.6 FL (ref 5–10.5)
POTASSIUM SERPL-SCNC: 4.4 MMOL/L (ref 3.5–5.1)
PROTEIN UA: ABNORMAL MG/DL
RBC # BLD: 3.86 M/UL (ref 4–5.2)
RBC UA: 3 /HPF (ref 0–4)
SODIUM BLD-SCNC: 143 MMOL/L (ref 136–145)
SPECIFIC GRAVITY UA: 1.02 (ref 1–1.03)
T4 FREE: 1.5 NG/DL (ref 0.9–1.8)
TOTAL PROTEIN: 7.1 G/DL (ref 6.4–8.2)
TRIGLYCERIDE, FASTING: 160 MG/DL (ref 0–150)
TSH SERPL DL<=0.05 MIU/L-ACNC: 0.69 UIU/ML (ref 0.27–4.2)
URINE REFLEX TO CULTURE: YES
URINE TYPE: ABNORMAL
UROBILINOGEN, URINE: 0.2 E.U./DL
VLDLC SERPL CALC-MCNC: 32 MG/DL
WBC # BLD: 16.7 K/UL (ref 4–11)
WBC UA: 6 /HPF (ref 0–5)

## 2020-02-14 PROCEDURE — G8926 SPIRO NO PERF OR DOC: HCPCS | Performed by: FAMILY MEDICINE

## 2020-02-14 PROCEDURE — G8420 CALC BMI NORM PARAMETERS: HCPCS | Performed by: FAMILY MEDICINE

## 2020-02-14 PROCEDURE — 99214 OFFICE O/P EST MOD 30 MIN: CPT | Performed by: FAMILY MEDICINE

## 2020-02-14 PROCEDURE — 1036F TOBACCO NON-USER: CPT | Performed by: FAMILY MEDICINE

## 2020-02-14 PROCEDURE — 36415 COLL VENOUS BLD VENIPUNCTURE: CPT | Performed by: FAMILY MEDICINE

## 2020-02-14 PROCEDURE — G8484 FLU IMMUNIZE NO ADMIN: HCPCS | Performed by: FAMILY MEDICINE

## 2020-02-14 PROCEDURE — 3023F SPIROM DOC REV: CPT | Performed by: FAMILY MEDICINE

## 2020-02-14 PROCEDURE — 1090F PRES/ABSN URINE INCON ASSESS: CPT | Performed by: FAMILY MEDICINE

## 2020-02-14 PROCEDURE — 4040F PNEUMOC VAC/ADMIN/RCVD: CPT | Performed by: FAMILY MEDICINE

## 2020-02-14 PROCEDURE — 1123F ACP DISCUSS/DSCN MKR DOCD: CPT | Performed by: FAMILY MEDICINE

## 2020-02-14 PROCEDURE — G8400 PT W/DXA NO RESULTS DOC: HCPCS | Performed by: FAMILY MEDICINE

## 2020-02-14 PROCEDURE — 96372 THER/PROPH/DIAG INJ SC/IM: CPT | Performed by: FAMILY MEDICINE

## 2020-02-14 PROCEDURE — 0509F URINE INCON PLAN DOCD: CPT | Performed by: FAMILY MEDICINE

## 2020-02-14 PROCEDURE — G8427 DOCREV CUR MEDS BY ELIG CLIN: HCPCS | Performed by: FAMILY MEDICINE

## 2020-02-14 PROCEDURE — 81003 URINALYSIS AUTO W/O SCOPE: CPT | Performed by: FAMILY MEDICINE

## 2020-02-14 RX ORDER — FERROUS SULFATE 325(65) MG
325 TABLET ORAL
Qty: 90 TABLET | Refills: 1 | Status: SHIPPED | OUTPATIENT
Start: 2020-02-14 | End: 2020-05-28

## 2020-02-14 RX ORDER — CYANOCOBALAMIN 1000 UG/ML
1000 INJECTION INTRAMUSCULAR; SUBCUTANEOUS ONCE
Status: COMPLETED | OUTPATIENT
Start: 2020-02-14 | End: 2020-02-14

## 2020-02-14 RX ORDER — FERROUS SULFATE 325(65) MG
325 TABLET ORAL
Qty: 90 TABLET | Refills: 1 | Status: SHIPPED | OUTPATIENT
Start: 2020-02-14 | End: 2020-02-14 | Stop reason: SDUPTHER

## 2020-02-14 RX ADMIN — CYANOCOBALAMIN 1000 MCG: 1000 INJECTION INTRAMUSCULAR; SUBCUTANEOUS at 09:50

## 2020-02-14 NOTE — PROGRESS NOTES
Subjective:      Chief Complaint: Establish care, low energy, incontinence urinary, skin and breast    HPI:  Ortega Zafar is a 82 y.o. female who presents today to establish care and discuss about chronic medical condition mentioned below:    Diabetes mellitus: Patient is currently not on any medication for her diabetes. Patient last hemoglobin A1c was 7.0. Patient is slightly dehydrated and passing frequent urine. Urinary incontinence: Patient has frequent urination with this kind of amount of urine. Patient has urge to urinate while laying down in her bed. Patient also leaks urine with increased abdominal pressure such as with cough. Not taking any nonsteroidal except for aspirin. Patient has recently seen her nephrologist, Dr. Marvin Walsh. Skin aberrations: Patient has multiple skin abrasion over her forehead and face. Patient dermatologist has given her amitriptyline topical for symptom relieves, owever, it never improved her symptoms. She continues to scratch and injure her facial skin. COPD: Patient currently asymptomatic at rest.  Patient denies having any wheezing. Patient does have exertional shortness of breath. Stable on current home medication Symbicort and Spiriva. Patient denies having any cough or fever or chills. Chronic low back pain: Patient complaining of mild low back pain. Pain is aching character, mild intensity, located at low back, nonradiating, continuous, worse with walking standing and better with rest.  Patient is taking gabapentin. Low energy: Patient is complaining about low energy and tiredness. Patient has no motivation or energy to do anything. Patient last CBC shows high MCV of 103.2 and hemoglobin of 14.4. Depression: Patient has anxiety and depression. Patient has recently seen his psychiatrist who discontinued his Celexa and started her on Cymbalta. GERD: Patient is currently taking omeprazole 40 mg once daily to control symptoms.   Patient Marginal, OM1, OM2,  LIMA to LAD    S/P cardiac catheterization 2017    severe multivessel disease    Subclavian artery stenosis, left ([facility] [address] 2017    noted in hosp for CABG; to be dealt with as OP by [facility] surgeons    Subclinical Hyperthyroidism     Dr. Yvrose Osorio; Tapazole    Type II diabetes mellitus ([facility] [address] 2019;DIET CONTROL    Urine incontinence 2011    Timed voiding; Kegel; urology        Social History     Tobacco Use    Smoking status: Former Smoker     Packs/day: 0.50     Years: 30.00     Pack years: 15.00     Types: Cigarettes     Start date: 1957     Last attempt to quit: 1987     Years since quittin.0    Smokeless tobacco: Never Used   Substance Use Topics    Alcohol use: No     Comment: special occasion once a year maybe         Review of Systems   Constitutional: Positive for fatigue. Negative for appetite change, chills, fever and unexpected weight change. HENT: Negative for congestion, ear pain, sinus pain, sore throat and trouble swallowing. Eyes: Negative for redness and itching. Respiratory: Positive for shortness of breath. Negative for cough, chest tightness and wheezing. Cardiovascular: Negative for chest pain and palpitations. Gastrointestinal: Negative for abdominal distention, abdominal pain, constipation, diarrhea, nausea and vomiting. Endocrine: Negative for polyuria. Genitourinary: Positive for decreased urine volume, difficulty urinating, frequency and urgency. Negative for dysuria. Musculoskeletal: Positive for arthralgias, back pain and myalgias. Skin: Negative for rash. Multiple skin abrasions   Neurological: Negative for dizziness and headaches. Psychiatric/Behavioral: Positive for dysphoric mood and sleep disturbance. Negative for behavioral problems, confusion and suicidal ideas. The patient is nervous/anxious.             Objective:      /80   Pulse 120   Wt 125 lb (56.7 kg)   LMP  (LMP Unknown) SpO2 96%   BMI 23.62 kg/m²      Physical Exam  Vitals signs reviewed. Constitutional:       Appearance: She is well-developed. Comments: Difficulty in walking and standing. HENT:      Head: Normocephalic and atraumatic. Mouth/Throat:      Mouth: Mucous membranes are dry. Pharynx: Oropharynx is clear. Eyes:      Conjunctiva/sclera: Conjunctivae normal.      Pupils: Pupils are equal, round, and reactive to light. Neck:      Musculoskeletal: Normal range of motion and neck supple. Thyroid: No thyromegaly. Cardiovascular:      Rate and Rhythm: Normal rate and regular rhythm. Heart sounds: Normal heart sounds. Pulmonary:      Effort: Pulmonary effort is normal.      Breath sounds: Normal breath sounds. Abdominal:      General: Abdomen is flat. Bowel sounds are normal. There is no distension. Palpations: Abdomen is soft. Tenderness: There is no abdominal tenderness. There is no guarding. Musculoskeletal:      Right lower leg: No edema. Left lower leg: No edema. Comments: 5-5 muscular strength throughout and bilateral.  Bilateral lumbar spinal tenderness  Decreased range of motion lumbar spine  Disbalance gait  Leg length discrepancy   Skin:     General: Skin is warm and dry. Comments: Multiple skin self-inflicted abrasions, prominent on forehead and frontal scalp   Neurological:      General: No focal deficit present. Mental Status: She is alert and oriented to person, place, and time. Psychiatric:         Mood and Affect: Mood normal.         Behavior: Behavior normal.         Thought Content: Thought content normal.         Judgment: Judgment normal.            Assessment / Plan:      1. Type 2 diabetes mellitus without complication, without long-term current use of insulin ([facility]  - Patient has a history of diabetes and currently NOT on any antidiabetic medication.   Patient last hemoglobin A1c was 7.0.  - After reviewing patient, hemoglobin A1c and kidney function, we may start patient on metformin next visit. - Hemoglobin A1C    2. Mixed stress and urge urinary incontinence  - We will check patient urine for any infection.    - Urinalysis Reflex to Culture  - Urine Culture  - Microscopic Urinalysis    3. Skin abrasion  -Patient dermatologist has given her topical amitriptyline which failed to improve her symptoms.  -Patient has multiple self inflicted skin abrasion over her forehead and frontal scalp.  - Requested patient to start OTC oral zinc and magnesium supplement. 4. COPD, severe ([address]  -Stable on home medication Symbicort and Spiriva. Patient has exertional shortness of breath without any resting shortness of breath. 5. Chronic midline low back pain without sciatica  - Patient has been currently taking gabapentin for back pain. - Given her short course of oral prednisone and complementary OMT. - predniSONE (DELTASONE) 10 MG tablet; Take 1 tablet by mouth daily for 7 days  Dispense: 7 tablet; Refill: 0    6. Normocytic anemia  - Patient hemoglobin was normal but with high MCV count of 103. Patient was given short of B12.  -Patient was also started on ferrous sulfate. - cyanocobalamin injection 1,000 mcg  - CBC Auto Differential  - Comprehensive Metabolic Panel  - ferrous sulfate 325 (65 Fe) MG tablet; Take 1 tablet by mouth daily (with breakfast)  Dispense: 90 tablet; Refill: 1    7. Recurrent major depressive disorder, in partial remission [facility])  - Patient psychiatrist has discontinued her medication Celexa and placed her on Cymbalta. - T4, Free  - TSH without Reflex    8. Gastroesophageal reflux disease without esophagitis  - Patient is currently taking Prilosec 40 mg once daily. Patient was advised for PPI holiday and take omeprazole as needed only. 9. Mixed hyperlipidemia  -Patient is currently taking 5 mg of rosuvastatin. Patient last LDL was 82 and HDL was 71.    - Lipid, Fasting    10.  Encounter to establish care with

## 2020-02-15 PROBLEM — D64.9 NORMOCYTIC ANEMIA: Status: ACTIVE | Noted: 2020-02-15

## 2020-02-15 PROBLEM — M54.50 CHRONIC MIDLINE LOW BACK PAIN WITHOUT SCIATICA: Status: ACTIVE | Noted: 2020-02-15

## 2020-02-15 PROBLEM — G89.29 CHRONIC MIDLINE LOW BACK PAIN WITHOUT SCIATICA: Status: ACTIVE | Noted: 2020-02-15

## 2020-02-15 PROBLEM — K21.9 GASTROESOPHAGEAL REFLUX DISEASE WITHOUT ESOPHAGITIS: Status: ACTIVE | Noted: 2020-02-15

## 2020-02-15 LAB
ESTIMATED AVERAGE GLUCOSE: 148.5 MG/DL
HBA1C MFR BLD: 6.8 %
URINE CULTURE, ROUTINE: NORMAL

## 2020-02-15 RX ORDER — PREDNISONE 10 MG/1
10 TABLET ORAL DAILY
Qty: 7 TABLET | Refills: 0 | Status: SHIPPED | OUTPATIENT
Start: 2020-02-15 | End: 2020-02-22

## 2020-02-15 ASSESSMENT — ENCOUNTER SYMPTOMS
SORE THROAT: 0
EYE ITCHING: 0
CONSTIPATION: 0
WHEEZING: 0
DIARRHEA: 0
CHEST TIGHTNESS: 0
ABDOMINAL DISTENTION: 0
SINUS PAIN: 0
COUGH: 0
SHORTNESS OF BREATH: 1
BACK PAIN: 1
VOMITING: 0
NAUSEA: 0
ABDOMINAL PAIN: 0
TROUBLE SWALLOWING: 0
ROS SKIN COMMENTS: MULTIPLE SKIN ABRASIONS
EYE REDNESS: 0

## 2020-02-17 PROCEDURE — 96372 THER/PROPH/DIAG INJ SC/IM: CPT | Performed by: FAMILY MEDICINE

## 2020-02-17 RX ORDER — CYANOCOBALAMIN 1000 UG/ML
1000 INJECTION INTRAMUSCULAR; SUBCUTANEOUS
Status: SHIPPED | OUTPATIENT
Start: 2020-02-17

## 2020-02-17 RX ADMIN — CYANOCOBALAMIN 1000 MCG: 1000 INJECTION INTRAMUSCULAR; SUBCUTANEOUS at 09:25

## 2020-02-25 ENCOUNTER — OFFICE VISIT (OUTPATIENT)
Dept: INTERNAL MEDICINE CLINIC | Age: 85
End: 2020-02-25
Payer: MEDICARE

## 2020-02-25 ENCOUNTER — OFFICE VISIT (OUTPATIENT)
Dept: PULMONOLOGY | Age: 85
End: 2020-02-25
Payer: MEDICARE

## 2020-02-25 VITALS
SYSTOLIC BLOOD PRESSURE: 112 MMHG | HEART RATE: 83 BPM | WEIGHT: 121 LBS | DIASTOLIC BLOOD PRESSURE: 58 MMHG | HEIGHT: 61 IN | BODY MASS INDEX: 22.84 KG/M2 | OXYGEN SATURATION: 94 %

## 2020-02-25 VITALS — SYSTOLIC BLOOD PRESSURE: 112 MMHG | DIASTOLIC BLOOD PRESSURE: 72 MMHG | OXYGEN SATURATION: 92 % | HEART RATE: 79 BPM

## 2020-02-25 PROCEDURE — G8427 DOCREV CUR MEDS BY ELIG CLIN: HCPCS | Performed by: INTERNAL MEDICINE

## 2020-02-25 PROCEDURE — G8420 CALC BMI NORM PARAMETERS: HCPCS | Performed by: FAMILY MEDICINE

## 2020-02-25 PROCEDURE — G8400 PT W/DXA NO RESULTS DOC: HCPCS | Performed by: FAMILY MEDICINE

## 2020-02-25 PROCEDURE — G8420 CALC BMI NORM PARAMETERS: HCPCS | Performed by: INTERNAL MEDICINE

## 2020-02-25 PROCEDURE — G8484 FLU IMMUNIZE NO ADMIN: HCPCS | Performed by: INTERNAL MEDICINE

## 2020-02-25 PROCEDURE — 1090F PRES/ABSN URINE INCON ASSESS: CPT | Performed by: INTERNAL MEDICINE

## 2020-02-25 PROCEDURE — G8926 SPIRO NO PERF OR DOC: HCPCS | Performed by: FAMILY MEDICINE

## 2020-02-25 PROCEDURE — G8427 DOCREV CUR MEDS BY ELIG CLIN: HCPCS | Performed by: FAMILY MEDICINE

## 2020-02-25 PROCEDURE — G8926 SPIRO NO PERF OR DOC: HCPCS | Performed by: INTERNAL MEDICINE

## 2020-02-25 PROCEDURE — 1036F TOBACCO NON-USER: CPT | Performed by: INTERNAL MEDICINE

## 2020-02-25 PROCEDURE — 99213 OFFICE O/P EST LOW 20 MIN: CPT | Performed by: INTERNAL MEDICINE

## 2020-02-25 PROCEDURE — 1090F PRES/ABSN URINE INCON ASSESS: CPT | Performed by: FAMILY MEDICINE

## 2020-02-25 PROCEDURE — 0509F URINE INCON PLAN DOCD: CPT | Performed by: FAMILY MEDICINE

## 2020-02-25 PROCEDURE — 3023F SPIROM DOC REV: CPT | Performed by: FAMILY MEDICINE

## 2020-02-25 PROCEDURE — 4040F PNEUMOC VAC/ADMIN/RCVD: CPT | Performed by: INTERNAL MEDICINE

## 2020-02-25 PROCEDURE — 4040F PNEUMOC VAC/ADMIN/RCVD: CPT | Performed by: FAMILY MEDICINE

## 2020-02-25 PROCEDURE — 1123F ACP DISCUSS/DSCN MKR DOCD: CPT | Performed by: INTERNAL MEDICINE

## 2020-02-25 PROCEDURE — 1036F TOBACCO NON-USER: CPT | Performed by: FAMILY MEDICINE

## 2020-02-25 PROCEDURE — 1123F ACP DISCUSS/DSCN MKR DOCD: CPT | Performed by: FAMILY MEDICINE

## 2020-02-25 PROCEDURE — G8484 FLU IMMUNIZE NO ADMIN: HCPCS | Performed by: FAMILY MEDICINE

## 2020-02-25 PROCEDURE — 3023F SPIROM DOC REV: CPT | Performed by: INTERNAL MEDICINE

## 2020-02-25 PROCEDURE — 99214 OFFICE O/P EST MOD 30 MIN: CPT | Performed by: FAMILY MEDICINE

## 2020-02-25 PROCEDURE — G8400 PT W/DXA NO RESULTS DOC: HCPCS | Performed by: INTERNAL MEDICINE

## 2020-02-25 RX ORDER — BUDESONIDE AND FORMOTEROL FUMARATE DIHYDRATE 160; 4.5 UG/1; UG/1
AEROSOL RESPIRATORY (INHALATION)
Qty: 3 INHALER | Refills: 3 | Status: SHIPPED | OUTPATIENT
Start: 2020-02-25 | End: 2021-03-08

## 2020-02-25 RX ORDER — ALBUTEROL SULFATE 90 UG/1
2 AEROSOL, METERED RESPIRATORY (INHALATION) EVERY 6 HOURS PRN
Qty: 3 INHALER | Refills: 3 | Status: SHIPPED | OUTPATIENT
Start: 2020-02-25 | End: 2021-04-13

## 2020-02-25 RX ORDER — NITROFURANTOIN 25; 75 MG/1; MG/1
100 CAPSULE ORAL ONCE
COMMUNITY
End: 2020-05-28

## 2020-02-25 RX ORDER — NITROFURANTOIN MACROCRYSTALS 100 MG/1
100 CAPSULE ORAL 2 TIMES DAILY
COMMUNITY
End: 2020-05-28

## 2020-02-25 RX ORDER — IPRATROPIUM BROMIDE AND ALBUTEROL SULFATE 2.5; .5 MG/3ML; MG/3ML
1 SOLUTION RESPIRATORY (INHALATION) EVERY 4 HOURS
Qty: 120 VIAL | Refills: 11 | Status: SHIPPED | OUTPATIENT
Start: 2020-02-25 | End: 2021-05-20

## 2020-02-25 NOTE — PROGRESS NOTES
SUBJECTIVE:  Chief Complaint: Severe COPD, chronic hypoxemic respiratory failure, shortness of breath  Mrs. Saunders has been dealing with recurrent UTI and incontinence. Continues be short of breath even at rest.  She does continue on Symbicort, Spiriva, long-acting theophylline and albuterol rescue. She wears oxygen throughout the night and on and off during the day and has a portable unit for ambulation but does not always use it. She denies chest pain or chest discomfort. She still has cognitive behavior issues and does seem mildly confused. She has had no recent bronchitic infections but does have daily sputum expectoration according to her     OBJECTIVE:  BP (!) 112/58   Pulse 83   Ht 5' 1\" (1.549 m)   Wt 121 lb (54.9 kg)   LMP  (LMP Unknown)   SpO2 94%   BMI 22.86 kg/m²      Physical Exam:  Constitutional:  She appears well developed and well-nourished. Wearing nasal oxygen  Neck:  Supple, No palpable lymphadenopathy, No JVD, no wheezing or stridor over the neck  Cardiovascular:  S1, S2 Normal, Regular rhythm, no murmurs or gallops, No pericardial  rubs. Pulmonary: Diminished breath sounds bilaterally without wheezing or rhonchi  Abdomen: Not examined  Extremities: no edema, No DVT  Neurologic:  Awake and Alert, No focal deficits    Radiology: No recent chest x-ray available  PFT: Office spirometry last obtained on 11/26/2019 was very difficult to interpret because of poor test performance and inability to follow commands but was consistent with a severe obstructive lung defect which she has had previously        ASSESSMENT:    1. COPD, severe ([address]    2. Chronic hypoxemic respiratory failure (HCC)    3. Shortness of breath          PLAN:   I did renew her inhaled medications. I will make no change in her current therapy.   I will continue to follow her  Return in about 6 months (around 8/25/2020) for Recheck for COPD, chronic hypoxemic respiratory failure, Recheck for Shortness of Breath. This dictation was performed with a verbal recognition program and it was checked for errors. It is possible that there are still dictated errors within this office note. Any errors should be brought immediately to my attention for correction. All efforts were made to ensure that this office note is accurate.

## 2020-02-25 NOTE — PROGRESS NOTES
history of diabetes mellitus     Fibromyalgia     Gastroesophageal reflux disease without esophagitis 2/15/2020    Graves disease     Dirk Anthony; tapazole for a time    Hyperlipidemia     Hypertension     Lumbar spinal stenosis 2015    moderate ; MRI by Dr Thang Recinos Nocturnal hypoxemia 2018    Nocturnal oxygen desaturation     Obstructive sleep apnea     Osteoporosis     S/P CABG (coronary artery bypass graft) 2017    4 vessel-CABG EVH RCA Marginal, OM1, OM2,  LIMA to LAD    S/P cardiac catheterization 2017    severe multivessel disease    Subclavian artery stenosis, left ([address] 2017    noted in hosp for CABG; to be dealt with as OP by vasc surgeons    Subclinical Hyperthyroidism     Dr. Dirk Anthony; Tapazole    Type II diabetes mellitus ([address] 2019;DIET CONTROL    Urine incontinence 2011    Timed voiding; Isidro; urology        Social History     Tobacco Use    Smoking status: Former Smoker     Packs/day: 0.50     Years: 30.00     Pack years: 15.00     Types: Cigarettes     Start date: 1957     Last attempt to quit: 1987     Years since quittin.1    Smokeless tobacco: Never Used   Substance Use Topics    Alcohol use: No     Comment: special occasion once a year maybe         Review of Systems   Constitutional: Positive for fatigue. Negative for appetite change, chills, fever and unexpected weight change. HENT: Negative for congestion, ear pain, sinus pain, sore throat and trouble swallowing. Eyes: Negative for redness and itching. Respiratory: Positive for shortness of breath. Negative for cough, chest tightness and wheezing. Cardiovascular: Negative for chest pain and palpitations. Gastrointestinal: Negative for abdominal distention, abdominal pain, constipation, diarrhea, nausea and vomiting. Endocrine: Negative for polyuria. Genitourinary: Positive for difficulty urinating, dysuria, frequency and urgency. hematuria  - Currently on antibiotic. Patient continues to have urinary incontinence. - nitrofurantoin, macrocrystal-monohydrate, (MACROBID) 100 MG capsule; Take 100 mg by mouth once    2. Mixed stress and urge urinary incontinence  - Start the patient on trial dose of Myrbetriq. 3. Type 2 diabetes mellitus without complication, without long-term current use of insulin ([address]  - Start the patient on metformin 500 mg 1 tablet twice daily. - metFORMIN (GLUCOPHAGE) 500 MG tablet; Take 1 tablet by mouth 2 times daily (with meals)  Dispense: 60 tablet; Refill: 5    4. Essential hypertension  -Stable on current home medication Lasix and carvedilol. 5. Chronic midline low back pain without sciatica  - Stable on current home medication gabapentin. 6. Generalized anxiety disorder  - Stable on Cymbalta. 7. Skin abrasion  -Start the patient on magnesium and zinc supplement. 8. COPD, severe ([address]  -Stable on home medication Symbicort and Spiriva. 9. Cognitive impairment  -Stable on home medication donepezil and memantine. 10. Gastroesophageal reflux disease without esophagitis  - Patient symptoms are stable on home medication Prilosec.  - Recommended PPI holiday. 11. Normocytic anemia  - Patient noticed improved energy after receiving B12 shot. B12 shot every 3 months. Note:   Patient understand the assessment and agreed to the plan. Medication side effects was discussed during the encounter. Before discharging the patient, all questions and concern were addressed. After visit summary was provided. Advice to call clinic or me for any further questions or concern.        Rajan Cuba,

## 2020-02-29 PROBLEM — R06.02 SHORTNESS OF BREATH: Status: RESOLVED | Noted: 2018-11-07 | Resolved: 2020-02-29

## 2020-02-29 PROBLEM — J96.11 CHRONIC HYPOXEMIC RESPIRATORY FAILURE (HCC): Status: RESOLVED | Noted: 2019-02-18 | Resolved: 2020-02-29

## 2020-02-29 ASSESSMENT — ENCOUNTER SYMPTOMS
VOMITING: 0
ABDOMINAL DISTENTION: 0
DIARRHEA: 0
SINUS PAIN: 0
CONSTIPATION: 0
EYE ITCHING: 0
NAUSEA: 0
CHEST TIGHTNESS: 0
SHORTNESS OF BREATH: 1
SORE THROAT: 0
TROUBLE SWALLOWING: 0
ABDOMINAL PAIN: 0
BACK PAIN: 1
WHEEZING: 0
COUGH: 0
EYE REDNESS: 0

## 2020-03-02 RX ORDER — OMEPRAZOLE 40 MG/1
CAPSULE, DELAYED RELEASE ORAL
Qty: 30 CAPSULE | Refills: 1 | Status: SHIPPED | OUTPATIENT
Start: 2020-03-02 | End: 2020-05-04

## 2020-03-10 ENCOUNTER — TELEPHONE (OUTPATIENT)
Dept: CARDIOLOGY CLINIC | Age: 85
End: 2020-03-10

## 2020-03-10 NOTE — TELEPHONE ENCOUNTER
Patient is cleared for his surgery or procedure at a moderate risk based on her previous clearance on 2/7/20.

## 2020-03-10 NOTE — LETTER
[facility]  [address]  [facility], [address] [facility],[address]  Phone: [phone]    Fax [phone]                  Breanne Hendrix MD, Nic Butler MD, [address] [facility], MD, MD Ray Bowen MD Curlene Silos, MD Mirta Fanny, APRN-CNP   April Carson, APRN-CNP  Lynn Thompson, APRN-CNP    Adolfo Mcintosh, APRN-CNP    Cardiac Risk Assessment   3/10/2020        Surgery Date: Pending  Surgery: Cystoscopy, Detrusor, Injection of Botox  Anesthesia Type: MAC  Fax Number: [phone]    To: Dr. Elizabeth Lott  From : Neli Peña MD, [facility]    Patient Name: Amirah Lara     YOB: 1935  MRN: [id number]    Amirah Lara was evaluated from a cardiac standpoint for her surgery or procedure and based on her history, diagnosis, recent cardiac testing, she is considered a moderate risk candidate for any arianna-operative cardiac complications. Patients with known CAD with moderate or high risk should have surgical procedures done where they have access to invasive cardiology services if emergently needed. Antiplatelet/anticoagulant therapy can be held prior to the surgery or procedure at the discretion of the surgeon to be resumed as soon as possible if held. Please call with any further questions.     Respectfully,          Citlalli Reyes MD  MA/cjs

## 2020-03-22 RX ORDER — ROSUVASTATIN CALCIUM 5 MG/1
TABLET, COATED ORAL
Qty: 90 TABLET | Refills: 2 | Status: SHIPPED | OUTPATIENT
Start: 2020-03-22 | End: 2020-08-18

## 2020-05-04 RX ORDER — OMEPRAZOLE 40 MG/1
CAPSULE, DELAYED RELEASE ORAL
Qty: 30 CAPSULE | Refills: 3 | Status: SHIPPED | OUTPATIENT
Start: 2020-05-04 | End: 2020-09-11

## 2020-05-28 PROBLEM — N30.00 ACUTE CYSTITIS WITHOUT HEMATURIA: Status: ACTIVE | Noted: 2020-05-28

## 2020-05-28 PROBLEM — N39.3 STRESS INCONTINENCE OF URINE: Status: ACTIVE | Noted: 2020-05-28

## 2020-06-24 ENCOUNTER — OFFICE VISIT (OUTPATIENT)
Dept: INTERNAL MEDICINE CLINIC | Age: 85
End: 2020-06-24
Payer: MEDICARE

## 2020-06-24 VITALS
SYSTOLIC BLOOD PRESSURE: 120 MMHG | TEMPERATURE: 97.2 F | HEART RATE: 82 BPM | BODY MASS INDEX: 22.18 KG/M2 | WEIGHT: 117.4 LBS | OXYGEN SATURATION: 96 % | DIASTOLIC BLOOD PRESSURE: 60 MMHG

## 2020-06-24 PROCEDURE — 99214 OFFICE O/P EST MOD 30 MIN: CPT | Performed by: FAMILY MEDICINE

## 2020-06-24 PROCEDURE — 1036F TOBACCO NON-USER: CPT | Performed by: FAMILY MEDICINE

## 2020-06-24 PROCEDURE — 1123F ACP DISCUSS/DSCN MKR DOCD: CPT | Performed by: FAMILY MEDICINE

## 2020-06-24 PROCEDURE — G8427 DOCREV CUR MEDS BY ELIG CLIN: HCPCS | Performed by: FAMILY MEDICINE

## 2020-06-24 PROCEDURE — 1090F PRES/ABSN URINE INCON ASSESS: CPT | Performed by: FAMILY MEDICINE

## 2020-06-24 PROCEDURE — G8400 PT W/DXA NO RESULTS DOC: HCPCS | Performed by: FAMILY MEDICINE

## 2020-06-24 PROCEDURE — G8420 CALC BMI NORM PARAMETERS: HCPCS | Performed by: FAMILY MEDICINE

## 2020-06-24 PROCEDURE — 4040F PNEUMOC VAC/ADMIN/RCVD: CPT | Performed by: FAMILY MEDICINE

## 2020-06-24 PROCEDURE — 0509F URINE INCON PLAN DOCD: CPT | Performed by: FAMILY MEDICINE

## 2020-06-24 RX ORDER — BLOOD-GLUCOSE METER
EACH MISCELLANEOUS
COMMUNITY
Start: 2020-05-22

## 2020-06-24 RX ORDER — NITROFURANTOIN MACROCRYSTALS 50 MG/1
50 CAPSULE ORAL DAILY
COMMUNITY
Start: 2020-06-18 | End: 2021-01-28

## 2020-06-24 RX ORDER — BLOOD SUGAR DIAGNOSTIC
STRIP MISCELLANEOUS
COMMUNITY
Start: 2020-05-21

## 2020-06-24 RX ORDER — BUDESONIDE AND FORMOTEROL FUMARATE DIHYDRATE 160; 4.5 UG/1; UG/1
AEROSOL RESPIRATORY (INHALATION)
COMMUNITY
Start: 2020-06-15 | End: 2020-06-24

## 2020-06-24 RX ORDER — LANCETS 33 GAUGE
EACH MISCELLANEOUS
COMMUNITY
Start: 2020-05-21

## 2020-06-24 RX ORDER — CYANOCOBALAMIN 1000 UG/ML
1000 INJECTION INTRAMUSCULAR; SUBCUTANEOUS
Qty: 6 VIAL | Refills: 1 | Status: ON HOLD | OUTPATIENT
Start: 2020-06-24 | End: 2022-04-21 | Stop reason: HOSPADM

## 2020-06-24 RX ORDER — TIOTROPIUM BROMIDE INHALATION SPRAY 3.12 UG/1
SPRAY, METERED RESPIRATORY (INHALATION)
COMMUNITY
Start: 2020-06-16 | End: 2020-06-24

## 2020-06-24 RX ORDER — FERROUS SULFATE 325(65) MG
325 TABLET ORAL DAILY
Qty: 90 TABLET | Refills: 1 | Status: SHIPPED | OUTPATIENT
Start: 2020-06-24 | End: 2021-12-22

## 2020-06-24 NOTE — PROGRESS NOTES
l Subjective:      Chief Complaint: Recurrent UTI, diabetes mellitus, hypertension, chronic low back pain,    HPI:  Fili Blevins is a 82 y.o. female who presents today with 3 months follow-up on below chronic complaint:    UTI: Patient has a history of chronic recurrent urine tract infection. At current visit, patient denies having any urinary frequency or burning on urination. Denies any fever or chills    Urinary incontinence: Myrbetriq failed to control her urinary frequency and incontinence. She recently received urinary Botox injection by her urologist which did improved her symptoms. Diabetes mellitus: Patient currently NOT on any medication for diabetes. Patient last hemoglobin A1c was 6.8%. Patient has urinary incontinence and hard to distinguish between patient increased frequency or polyuria. However after receiving Botox injection her urinary symptom has been improved considerably. Patient denies having any numbness or tingling in lower extremities    Hypertension: Patient blood pressure has been stable on current home medication carvedilol and Lasix. Patient denies having any chest pain, or shortness of breath or lower extremity edema. Chronic low back pain: Patient back pain has been stable on gabapentin. Patient pain is aching character, mild intensity located at low back pain without any radiation. Pain is continuous worse with standing walking and better with rest.    Generalized anxiety disorder: Patient anxiety has been stable on Cymbalta. Patient denies any increase in anxiety or depression. Patient denies having any suicidal ideation. Skin aberrations: At last visit, patient was asked to take oral magnesium and zinc supplement to improve aberrations secondary to constant scratching on forehead and other areas of forearms. Patient skin condition has improved.     COPD: Patient currently asymptomatic at rest.  Patient denies having any wheezing.  Patient does have exertional shortness of breath.  Stable on current home medication Symbicort and Spiriva.  Patient denies having any cough or fever or chills. Dementia: Patient dementia has been stable on current home medication memantine and donepezil. Patient medication has been held by her . Patient need help in performing some of the ADLs at home. Normocytic anemia: Last hemoglobin level was 12.0 with hemoglobin of 98.8. Patient is coming out tired in the fatigue and sometimes excessive daytime somnolence. Patient was given l B12 shot at last visit and her MCV level dropped from 103.2-98. 8. GERD: Patient currently taking omeprazole 40 mg 1 tablet. Patient denies having any burning in her chest or dyspepsia. Patient also denies any change in stool color or consistency.     Patient Active Problem List   Diagnosis    Generalized anxiety disorder    Chronic neck pain    Graves' disease    Hyperlipidemia    Essential hypertension    Vascular dementia with behavior disturbance ([address]    Cerebrovascular accident (CVA) due to vascular stenosis ([address]    Gait disturbance    Mixed stress and urge urinary incontinence    Abnormal stress test    S/P CABG (coronary artery bypass graft)    Subclavian artery stenosis, left (HCC)    COPD, severe ([address]    Coronary artery disease involving native coronary artery of native heart without angina pectoris    Type II diabetes mellitus ([address]    Acute cystitis    Skin abrasion    Normocytic anemia    Chronic midline low back pain without sciatica    Gastroesophageal reflux disease without esophagitis    Acute cystitis without hematuria    Stress incontinence of urine       Past Medical History:   Diagnosis Date    Acute cystitis 2/10/2020    Acute ischemic colitis ([address] 2008    Colonoscopy done    CAD (coronary artery disease) 06/2017    4 vessel CABG    Cerebrovascular event ([address] 02/2016    balance, speech, leg weakness; ARU    Chronic hypoxemic respiratory failure ([facility] 2019    Chronic neck pain     Dr Courtney Jackson 2011    Cognitive impairment 2011    COPD, severe ([facility] 2018    Depression     Diverticulitis 2012    Family history of colon cancer     Family history of diabetes mellitus     Fibromyalgia     Gastroesophageal reflux disease without esophagitis 2/15/2020    Graves disease     Chuyita Benavidez; tapazole for a time    Hyperlipidemia     Hypertension     Lumbar spinal stenosis 2015    moderate ; MRI by Dr Paris Huggins Nocturnal hypoxemia 2018    Nocturnal oxygen desaturation     Obstructive sleep apnea     Osteoporosis     S/P CABG (coronary artery bypass graft) 2017    4 vessel-CABG EVH RCA Marginal, OM1, OM2,  LIMA to LAD    S/P cardiac catheterization 2017    severe multivessel disease    Subclavian artery stenosis, left ([facility] 2017    noted in hosp for CABG; to be dealt with as OP by vasc surgeons    Subclinical Hyperthyroidism     Dr. Chuyita Benavidez;  Tapazole    Type II diabetes mellitus ([facility] 2019;DIET CONTROL    Urine incontinence 2011    Timed voiding; [place]; urology        Social History     Tobacco Use    Smoking status: Former Smoker     Packs/day: 0.50     Years: 30.00     Pack years: 15.00     Types: Cigarettes     Start date: 1957     Last attempt to quit: 1987     Years since quittin.4    Smokeless tobacco: Never Used   Substance Use Topics    Alcohol use: No     Comment: special occasion once a year [de-identified]         Family History   Problem Relation Age of Onset    Diabetes Mother     Alzheimer's Disease Mother     Cancer Father 76        colon cancer    Pituitary Disorder Brother     Diabetes Brother     Diabetes Other         [address]    High Blood Pressure Other         [address]    Stroke Other         [address]    Cancer Other         [address] colon ca       Review of Systems   Constitutional: Negative for appetite change, chills, fatigue, fever and unexpected weight change. HENT: Negative for congestion, ear pain, sinus pain, sore throat and trouble swallowing. Eyes: Negative for redness and itching. Respiratory: Negative for cough, chest tightness, shortness of breath and wheezing. Cardiovascular: Negative for chest pain and palpitations. Gastrointestinal: Negative for abdominal distention, abdominal pain, constipation, diarrhea, nausea and vomiting. Endocrine: Negative for polyuria. Genitourinary: Negative for difficulty urinating, dysuria, frequency and urgency. Musculoskeletal: Positive for arthralgias, back pain and myalgias. Skin: Negative for rash. Forehead abrasion   Neurological: Negative for dizziness and headaches. Psychiatric/Behavioral: Positive for behavioral problems. Negative for confusion and suicidal ideas. The patient is nervous/anxious. Objective:      /60   Pulse 82   Temp 97.2 °F (36.2 °C) (Tympanic)   Wt 117 lb 6.4 oz (53.3 kg)   LMP  (LMP Unknown)   SpO2 96%   BMI 22.18 kg/m²      Physical Exam  Vitals signs reviewed. Constitutional:       Appearance: Normal appearance. She is well-developed. HENT:      Head: Normocephalic and atraumatic. Right Ear: External ear normal.      Left Ear: External ear normal.      Mouth/Throat:      Mouth: Mucous membranes are moist.      Pharynx: Oropharynx is clear. Eyes:      Extraocular Movements: Extraocular movements intact. Conjunctiva/sclera: Conjunctivae normal.      Pupils: Pupils are equal, round, and reactive to light. Neck:      Musculoskeletal: Normal range of motion and neck supple. Thyroid: No thyromegaly or thyroid tenderness. Vascular: No carotid bruit. Cardiovascular:      Rate and Rhythm: Normal rate and regular rhythm. Pulses: Normal pulses. Heart sounds: Normal heart sounds, S1 normal and S2 normal. No murmur.    Pulmonary:      Effort: Pulmonary effort is normal.      Breath sounds: Normal breath sounds. Abdominal:      General: Bowel sounds are normal. There is no distension. Palpations: Abdomen is soft. Tenderness: There is no abdominal tenderness. There is no guarding. Hernia: No hernia is present. Comments: Soft, no hepatosplenomegaly   Musculoskeletal:      Right lower leg: No edema. Left lower leg: No edema. Comments: 5-5 muscular strength throughout and bilateral.  On wheel chain   Lymphadenopathy:      Cervical: No cervical adenopathy. Skin:     General: Skin is warm and dry. Findings: No rash. Comments: Facial skin abrasion secondary to compulsive scratching   Neurological:      General: No focal deficit present. Mental Status: She is alert and oriented to person, place, and time. Sensory: No sensory deficit. Motor: No weakness. Psychiatric:         Mood and Affect: Mood normal.            Assessment / Plan:      1. Acute cystitis without hematuria  -Stable. Patient denies having any burning on urination. Currently on Macrobid and probiotic.    - nitrofurantoin (MACRODANTIN) 50 MG capsule; Take 50 mg by mouth daily  - Lactobacillus (PROBIOTIC ACIDOPHILUS PO); Take by mouth    2. Type 2 diabetes mellitus without complication, without long-term current use of insulin (HCC)  - Stable and at goal.  Patient last hemoglobin A1c was 6.8%. Secondary to patient long-term sedentary lifestyle and her recurrent UTI patient would benefit from lowest dose of metformin. We will start metformin at next visit. - ONETOUCH VERIO strip; TEST ONE TO TWO TIMES DAILY AS DIRECTED  - Blood Glucose Monitoring Suppl ([address]) w/Device KIT; USE AS DIRECTED  - HEMOGLOBIN A1C; Future    3. Mixed stress and urge urinary incontinence  -Stable. Patient was started on Myrbetriq at last visit was not able to improve her symptoms. Her urologist gave her bladder Botox injection which did improve her symptoms.   Discontinued medication

## 2020-06-25 ENCOUNTER — VIRTUAL VISIT (OUTPATIENT)
Dept: CARDIOLOGY CLINIC | Age: 85
End: 2020-06-25
Payer: MEDICARE

## 2020-06-25 PROCEDURE — 99441 PR PHYS/QHP TELEPHONE EVALUATION 5-10 MIN: CPT | Performed by: INTERNAL MEDICINE

## 2020-07-05 ASSESSMENT — ENCOUNTER SYMPTOMS
CHEST TIGHTNESS: 0
ROS SKIN COMMENTS: FOREHEAD ABRASION
VOMITING: 0
ABDOMINAL PAIN: 0
BACK PAIN: 1
COUGH: 0
SINUS PAIN: 0
TROUBLE SWALLOWING: 0
DIARRHEA: 0
ABDOMINAL DISTENTION: 0
EYE REDNESS: 0
EYE ITCHING: 0
CONSTIPATION: 0
NAUSEA: 0
WHEEZING: 0
SORE THROAT: 0
SHORTNESS OF BREATH: 0

## 2020-07-16 ENCOUNTER — HOSPITAL ENCOUNTER (OUTPATIENT)
Age: 85
Discharge: HOME | End: 2020-07-16
Payer: MEDICARE

## 2020-07-16 LAB
ALBUMIN SERPL-MCNC: 4.6 GM/DL (ref 3.4–5)
ANION GAP SERPL CALCULATED.3IONS-SCNC: 16 MMOL/L (ref 4–16)
BACTERIA: NEGATIVE /HPF
BASOPHILS ABSOLUTE: 0 K/CU MM
BASOPHILS RELATIVE PERCENT: 0.6 % (ref 0–1)
BILIRUBIN URINE: NEGATIVE MG/DL
BLOOD, URINE: ABNORMAL
BUN BLDV-MCNC: 17 MG/DL (ref 6–23)
CALCIUM SERPL-MCNC: 9.9 MG/DL (ref 8.3–10.6)
CHLORIDE BLD-SCNC: 101 MMOL/L (ref 99–110)
CLARITY: ABNORMAL
CO2: 26 MMOL/L (ref 21–32)
COLOR: YELLOW
CREAT SERPL-MCNC: 0.8 MG/DL (ref 0.6–1.1)
DIFFERENTIAL TYPE: ABNORMAL
EOSINOPHILS ABSOLUTE: 0.2 K/CU MM
EOSINOPHILS RELATIVE PERCENT: 2.6 % (ref 0–3)
GFR AFRICAN AMERICAN: >60 ML/MIN/1.73M2
GFR NON-AFRICAN AMERICAN: >60 ML/MIN/1.73M2
GLUCOSE BLD-MCNC: 93 MG/DL (ref 70–99)
GLUCOSE, URINE: NEGATIVE MG/DL
HCT VFR BLD CALC: 47.8 % (ref 37–47)
HEMOGLOBIN: 14.6 GM/DL (ref 12.5–16)
IMMATURE NEUTROPHIL %: 0.4 % (ref 0–0.43)
KETONES, URINE: NEGATIVE MG/DL
LEUKOCYTE ESTERASE, URINE: ABNORMAL
LYMPHOCYTES ABSOLUTE: 2.6 K/CU MM
LYMPHOCYTES RELATIVE PERCENT: 36.6 % (ref 24–44)
MCH RBC QN AUTO: 30.9 PG (ref 27–31)
MCHC RBC AUTO-ENTMCNC: 30.5 % (ref 32–36)
MCV RBC AUTO: 101.1 FL (ref 78–100)
MONOCYTES ABSOLUTE: 0.5 K/CU MM
MONOCYTES RELATIVE PERCENT: 7.5 % (ref 0–4)
NITRITE URINE, QUANTITATIVE: NEGATIVE
NUCLEATED RBC %: 0 %
PDW BLD-RTO: 14.9 % (ref 11.7–14.9)
PH, URINE: 6 (ref 5–8)
PHOSPHORUS: 4.6 MG/DL (ref 2.5–4.9)
PLATELET # BLD: 282 K/CU MM (ref 140–440)
PMV BLD AUTO: 8.9 FL (ref 7.5–11.1)
POTASSIUM SERPL-SCNC: 4.4 MMOL/L (ref 3.5–5.1)
PROTEIN UA: 30 MG/DL
RBC # BLD: 4.73 M/CU MM (ref 4.2–5.4)
RBC URINE: 64 /HPF (ref 0–6)
SEGMENTED NEUTROPHILS ABSOLUTE COUNT: 3.8 K/CU MM
SEGMENTED NEUTROPHILS RELATIVE PERCENT: 52.3 % (ref 36–66)
SODIUM BLD-SCNC: 143 MMOL/L (ref 135–145)
SPECIFIC GRAVITY UA: 1.01 (ref 1–1.03)
SQUAMOUS EPITHELIAL: 4 /HPF
TOTAL IMMATURE NEUTOROPHIL: 0.03 K/CU MM
TOTAL NUCLEATED RBC: 0 K/CU MM
TRICHOMONAS: ABNORMAL /HPF
URINE TOTAL PROTEIN: 46.4 MG/DL
UROBILINOGEN, URINE: NORMAL MG/DL (ref 0.2–1)
WBC # BLD: 7.2 K/CU MM (ref 4–10.5)
WBC CLUMP: ABNORMAL /HPF
WBC UA: 1511 /HPF (ref 0–5)

## 2020-07-16 PROCEDURE — 36415 COLL VENOUS BLD VENIPUNCTURE: CPT

## 2020-07-16 PROCEDURE — 82040 ASSAY OF SERUM ALBUMIN: CPT

## 2020-07-16 PROCEDURE — 84100 ASSAY OF PHOSPHORUS: CPT

## 2020-07-16 PROCEDURE — 80048 BASIC METABOLIC PNL TOTAL CA: CPT

## 2020-07-16 PROCEDURE — 84156 ASSAY OF PROTEIN URINE: CPT

## 2020-07-16 PROCEDURE — 81001 URINALYSIS AUTO W/SCOPE: CPT

## 2020-07-16 PROCEDURE — 85025 COMPLETE CBC W/AUTO DIFF WBC: CPT

## 2020-08-07 PROBLEM — Z95.1 S/P CABG (CORONARY ARTERY BYPASS GRAFT): Status: RESOLVED | Noted: 2017-06-01 | Resolved: 2020-08-07

## 2020-08-18 RX ORDER — ROSUVASTATIN CALCIUM 5 MG/1
TABLET, COATED ORAL
Qty: 90 TABLET | Refills: 2 | Status: SHIPPED | OUTPATIENT
Start: 2020-08-18

## 2020-08-24 ENCOUNTER — OFFICE VISIT (OUTPATIENT)
Dept: PULMONOLOGY | Age: 85
End: 2020-08-24
Payer: MEDICARE

## 2020-08-24 VITALS — HEART RATE: 109 BPM | WEIGHT: 119 LBS | OXYGEN SATURATION: 80 % | BODY MASS INDEX: 22.47 KG/M2 | HEIGHT: 61 IN

## 2020-08-24 PROCEDURE — G8420 CALC BMI NORM PARAMETERS: HCPCS | Performed by: INTERNAL MEDICINE

## 2020-08-24 PROCEDURE — G8400 PT W/DXA NO RESULTS DOC: HCPCS | Performed by: INTERNAL MEDICINE

## 2020-08-24 PROCEDURE — 99213 OFFICE O/P EST LOW 20 MIN: CPT | Performed by: INTERNAL MEDICINE

## 2020-08-24 PROCEDURE — 1090F PRES/ABSN URINE INCON ASSESS: CPT | Performed by: INTERNAL MEDICINE

## 2020-08-24 PROCEDURE — G8427 DOCREV CUR MEDS BY ELIG CLIN: HCPCS | Performed by: INTERNAL MEDICINE

## 2020-08-24 PROCEDURE — 1036F TOBACCO NON-USER: CPT | Performed by: INTERNAL MEDICINE

## 2020-08-24 PROCEDURE — 3023F SPIROM DOC REV: CPT | Performed by: INTERNAL MEDICINE

## 2020-08-24 PROCEDURE — G8926 SPIRO NO PERF OR DOC: HCPCS | Performed by: INTERNAL MEDICINE

## 2020-08-24 PROCEDURE — 4040F PNEUMOC VAC/ADMIN/RCVD: CPT | Performed by: INTERNAL MEDICINE

## 2020-08-24 PROCEDURE — 1123F ACP DISCUSS/DSCN MKR DOCD: CPT | Performed by: INTERNAL MEDICINE

## 2020-08-24 NOTE — PROGRESS NOTES
SUBJECTIVE:  Chief Complaint: Severe COPD, chronic hypoxemic respiratory failure, shortness of breath, anxiety disorder with dementia  According to Mr. Saunders, he has had no recent episodes of bronchitis or worsening shortness of breath. She does have panic attacks associated with her anxiety disorder. She continues on Symbicort, Spiriva, long-acting oral theophylline, albuterol MDI or nebulized albuterol solution at least once a day. She is wearing oxygen at nighttime but states that she does not wear it every night and does not normally use it during the day. Also Mr. Saunders mentions that they have had no COVID-19 exposure that they are aware of and that, he has had no episodes of cough associated with fever. ROS:  Constitution:  HEENT: Negative for ear, throat pain  Cardiovascular: Negative for chest pain, syncope, edema  Pulmonary: See HPI  Musculoskeletal: Negative for DVT, myalgias, arthralgias    OBJECTIVE:  Pulse 109   Ht 5' 1\" (1.549 m)   Wt 119 lb (54 kg)   LMP  (LMP Unknown)   SpO2 (!) 80%   BMI 22.48 kg/m²      Physical Exam:  Constitutional:  She appears well developed and well-nourished. Not short of breath at rest  Neck:  Supple, No palpable lymphadenopathy, No JVD  Cardiovascular:  S1, S2 Normal, Regular rhythm, no murmurs or gallops, No pericardial  rubs. Pulmonary: Diminished breath sounds bilaterally with a few scattered basilar rhonchi  Abdomen: Not examined  Extremities: no edema, No DVT  Neurologic: Oriented x3, No focal deficits    Radiology: No recent chest x-ray available  PFT: Office spirometry last obtained on 11/26/2019 demonstrated severe obstructive defect with a significant response to bronchodilators          ASSESSMENT:    1. COPD, severe ([address]    2. Shortness of breath    3. Chronic hypoxemic respiratory failure (HCC)    4. Generalized anxiety disorder          PLAN:   I will make no change in her current bronchodilator therapy.   I will continue to follow her    We have discussed the need to maintain yearly flu immunization, pneumococcal vaccination. We have discussed Coronavirus precaution including handwashing practice, wiping items touched in public such as gas pumps, door handles, shopping carts, etc. Self monitoring for infection - fever, chills, cough, SOB. Should they develop symptoms they should call office for further instructions. Return for Recheck for COPD, Recheck for Shortness of Breath, chronic hypoxemic respiratory failure. This dictation was performed with a verbal recognition program and it was checked for errors. It is possible that there are still dictated errors within this office note. Any errors should be brought immediately to my attention for correction. All efforts were made to ensure that this office note is accurate.

## 2020-09-11 RX ORDER — OMEPRAZOLE 40 MG/1
CAPSULE, DELAYED RELEASE ORAL
Qty: 30 CAPSULE | Refills: 3 | Status: SHIPPED | OUTPATIENT
Start: 2020-09-11 | End: 2021-01-08 | Stop reason: SDUPTHER

## 2020-09-24 ENCOUNTER — OFFICE VISIT (OUTPATIENT)
Dept: INTERNAL MEDICINE CLINIC | Age: 85
End: 2020-09-24
Payer: MEDICARE

## 2020-09-24 VITALS
RESPIRATION RATE: 18 BRPM | TEMPERATURE: 97.5 F | OXYGEN SATURATION: 93 % | DIASTOLIC BLOOD PRESSURE: 64 MMHG | WEIGHT: 120.6 LBS | BODY MASS INDEX: 22.79 KG/M2 | SYSTOLIC BLOOD PRESSURE: 100 MMHG | HEART RATE: 75 BPM

## 2020-09-24 PROCEDURE — G0008 ADMIN INFLUENZA VIRUS VAC: HCPCS | Performed by: FAMILY MEDICINE

## 2020-09-24 PROCEDURE — G8420 CALC BMI NORM PARAMETERS: HCPCS | Performed by: FAMILY MEDICINE

## 2020-09-24 PROCEDURE — 4040F PNEUMOC VAC/ADMIN/RCVD: CPT | Performed by: FAMILY MEDICINE

## 2020-09-24 PROCEDURE — 3023F SPIROM DOC REV: CPT | Performed by: FAMILY MEDICINE

## 2020-09-24 PROCEDURE — 1123F ACP DISCUSS/DSCN MKR DOCD: CPT | Performed by: FAMILY MEDICINE

## 2020-09-24 PROCEDURE — 0509F URINE INCON PLAN DOCD: CPT | Performed by: FAMILY MEDICINE

## 2020-09-24 PROCEDURE — G8926 SPIRO NO PERF OR DOC: HCPCS | Performed by: FAMILY MEDICINE

## 2020-09-24 PROCEDURE — 1090F PRES/ABSN URINE INCON ASSESS: CPT | Performed by: FAMILY MEDICINE

## 2020-09-24 PROCEDURE — G8400 PT W/DXA NO RESULTS DOC: HCPCS | Performed by: FAMILY MEDICINE

## 2020-09-24 PROCEDURE — 99213 OFFICE O/P EST LOW 20 MIN: CPT | Performed by: FAMILY MEDICINE

## 2020-09-24 PROCEDURE — 1036F TOBACCO NON-USER: CPT | Performed by: FAMILY MEDICINE

## 2020-09-24 PROCEDURE — 90694 VACC AIIV4 NO PRSRV 0.5ML IM: CPT | Performed by: FAMILY MEDICINE

## 2020-09-24 PROCEDURE — 96372 THER/PROPH/DIAG INJ SC/IM: CPT | Performed by: FAMILY MEDICINE

## 2020-09-24 PROCEDURE — G8427 DOCREV CUR MEDS BY ELIG CLIN: HCPCS | Performed by: FAMILY MEDICINE

## 2020-09-24 RX ORDER — CYANOCOBALAMIN 1000 UG/ML
1000 INJECTION INTRAMUSCULAR; SUBCUTANEOUS ONCE
Status: COMPLETED | OUTPATIENT
Start: 2020-09-24 | End: 2020-09-24

## 2020-09-24 RX ADMIN — CYANOCOBALAMIN 1000 MCG: 1000 INJECTION INTRAMUSCULAR; SUBCUTANEOUS at 16:23

## 2020-09-24 NOTE — PROGRESS NOTES
Subjective:      Chief Complaint: 3-month follow-up for UTI, diabetes, back pain, and    HPI:  Deja Jaeger is a 82 y.o. female who presents today for below complaint:    Recurrent UTI: Patient has a chronic history of recurrent UTI. However, from last visit or past 3 months, patient has no new episode of urine tract infection. Patient incontinence also improved after receiving letter Botox injection. Urine incontinence: Myrbetriq failed to control her urinary symptoms such as increased frequency and incontinence. However, after receiving bladder Botox injection, patient symptoms are well controlled with minimal leakage. Diabetes mellitus: Patient currently NOT on any diabetic medication patient last hemoglobin A1c was 6.8%. Denies having any urinary symptoms such as polyuria polydipsia. However earlier, it is very difficult to distinguish between patient bladder incontinence versus polyuria. However, after Botox injection, she no longer has increased urinary frequency. Hypertension: Patient blood pressure has been stable on current home medication, carvedilol. Is having any headache, chest pain or shortness of breath or lower extremity edema. Generalized anxiety disorder: Patient anxiety has been stable on Cymbalta. Patient denies any increase in anxiety or depression. Patient denies having any suicidal ideation. Skin abrasion: Patient has convention disorder scratch her forehead intermittently. I started her on her magnesium and zinc oral supplement and patient received medication from her dermatologist.  Both intervention, improved skin abrasions.     COPD: Patient currently asymptomatic at rest.  Patient denies having any wheezing.  Patient does have exertional shortness of breath.  Stable on current home medication Symbicort and Spiriva.  Patient denies having any cough or fever or chills.     Dementia: Patient dementia has been stable on current home medication memantine and donepezil. Patient medication has been held by her . Patient need help in performing some of the ADLs at home. Normocytic anemia: Patient hemoglobin level is returned to normal 14. 6. However, patient continues to have elevated MCV level of 101.1. Patient is receiving B12 shot. Gait disturbance: Patient continues to have gait disbalance and risk of fall is high. Patient using wheelchair for ambulation.           Patient Active Problem List   Diagnosis    Generalized anxiety disorder    Chronic neck pain    Graves' disease    Hyperlipidemia    Essential hypertension    Vascular dementia with behavior disturbance ([address]    Cerebrovascular accident (CVA) due to vascular stenosis ([address]    Gait disturbance    Mixed stress and urge urinary incontinence    Abnormal stress test    Subclavian artery stenosis, left (HCC)    COPD, severe ([address]    Coronary artery disease involving native coronary artery of native heart without angina pectoris    Chronic hypoxemic respiratory failure (HCC)    Type II diabetes mellitus ([address]    Skin abrasion    Normocytic anemia    Chronic midline low back pain without sciatica    Gastroesophageal reflux disease without esophagitis    Recurrent UTI       Past Medical History:   Diagnosis Date    Acute cystitis 2/10/2020    Acute ischemic colitis ([address] 2008    Colonoscopy done    CAD (coronary artery disease) 06/2017    4 vessel CABG    Cerebrovascular event ([address] 02/2016    balance, speech, leg weakness; ARU    Chronic hypoxemic respiratory failure ([address] 2/18/2019    Chronic neck pain     Dr Douglas Kong 2/2011    Cognitive impairment 09/2011    COPD, severe ([address] 11/7/2018    Depression     Diverticulitis 5/2012    Family history of colon cancer     Family history of diabetes mellitus     Fibromyalgia 1989    Gastroesophageal reflux disease without esophagitis 2/15/2020    Graves disease 2012    [place]; tapazole for a time    Hyperlipidemia     Hypertension     Lumbar spinal stenosis 2015    moderate ; MRI by Dr Kirstin Nichols Nocturnal hypoxemia 2018    Nocturnal oxygen desaturation     Obstructive sleep apnea     Osteoporosis     S/P CABG (coronary artery bypass graft) 2017    4 vessel-CABG EVH RCA Marginal, OM1, OM2,  LIMA to LAD    S/P cardiac catheterization 2017    severe multivessel disease    Subclavian artery stenosis, left ([facility] 2017    noted in hosp for CABG; to be dealt [facility] as [facility] by [facility]    Subclinical Hyperthyroidism     Dr. Cliff Zimmerman; Tapazole    Type II diabetes mellitus ([facility] 2019;DIET CONTROL    Urine incontinence 2011    Timed voiding; [place]; urology        Social History     Tobacco Use    Smoking status: Former Smoker     Packs/day: 0.50     Years: 30.00     Pack years: 15.00     Types: Cigarettes     Start date: 1957     Last attempt to quit: 1987     Years since quittin.6    Smokeless tobacco: Never Used   Substance Use Topics    Alcohol use: No     Comment: special occasion once a year [de-identified]         Family History   Problem Relation Age of Onset    Diabetes Mother     Alzheimer's Disease Mother     Cancer Father 76        colon cancer    Pituitary Disorder Brother     Diabetes Brother     Diabetes Other         [address]    High Blood Pressure Other         [address]    Stroke Other         [address]    Cancer Other         [address] colon ca       Review of Systems   Constitutional: Positive for fatigue. Negative for appetite change, chills, fever and unexpected weight change. HENT: Negative for congestion, ear pain, sinus pain, sore throat and trouble swallowing. Eyes: Negative for redness and itching. Respiratory: Negative for cough, chest tightness, shortness of breath and wheezing. Cardiovascular: Negative for chest pain and palpitations. Gastrointestinal: Negative for abdominal distention, abdominal pain, constipation, diarrhea, nausea and vomiting. leg: No edema. Left lower leg: No edema. Comments: In wheel chair   Lymphadenopathy:      Cervical: No cervical adenopathy. Skin:     General: Skin is warm and dry. Findings: Bruising present. No rash. Comments: Excoriation rash on forehead   Neurological:      General: No focal deficit present. Mental Status: She is alert and oriented to person, place, and time. Sensory: No sensory deficit. Motor: No weakness. Psychiatric:         Mood and Affect: Mood normal.         Behavior: Behavior normal.         Thought Content: Thought content normal.         Judgment: Judgment normal.            Assessment / Plan:      1. Recurrent UTI  -Stable and asymptomatic after bladder Botox injection    2. Mixed stress and urge urinary incontinence  -Stable. 3. Type 2 diabetes mellitus without complication, without long-term current use of insulin (HCC)  -Stable. Last A1c was 6.8%. 4. Essential hypertension  -Stable. 5. Generalized anxiety disorder  -Stable. 6. Skin abrasion  -Improving forehead skin abrasion    7. COPD, severe ([address]  -Stable and asymptomatic    8. Vascular dementia with behavior disturbance ([address]  -Stable. 9. Normocytic anemia  -Improving.  - cyanocobalamin injection 1,000 mcg    10. Gait disturbance  -No change from previous visit    11. Flu vaccine need  - INFLUENZA, QUADV, ADJUVANTED, 65 YRS =, IM, PF, PREFILL SYR, 0.5ML (FLUAD)          Note:   Patient understand the assessment and agreed to the plan. Medication side effects was discussed during the encounter. Before discharging the patient, all questions and concern were addressed. After visit summary was provided. Advice to call clinic or me for any further questions or concern.        Rajan Trujillo DO

## 2020-09-25 ASSESSMENT — ENCOUNTER SYMPTOMS
ABDOMINAL PAIN: 0
SHORTNESS OF BREATH: 0
EYE ITCHING: 0
NAUSEA: 0
CHEST TIGHTNESS: 0
VOMITING: 0
DIARRHEA: 0
BACK PAIN: 1
CONSTIPATION: 0
COUGH: 0
ABDOMINAL DISTENTION: 0
SINUS PAIN: 0
SORE THROAT: 0
WHEEZING: 0
TROUBLE SWALLOWING: 0
EYE REDNESS: 0

## 2020-09-26 PROBLEM — N30.00 ACUTE CYSTITIS: Status: RESOLVED | Noted: 2020-02-10 | Resolved: 2020-09-26

## 2020-09-26 PROBLEM — R06.02 SHORTNESS OF BREATH: Status: RESOLVED | Noted: 2018-11-07 | Resolved: 2020-09-26

## 2020-09-26 PROBLEM — N39.0 RECURRENT UTI: Status: ACTIVE | Noted: 2020-09-26

## 2020-09-26 PROBLEM — N30.00 ACUTE CYSTITIS WITHOUT HEMATURIA: Status: RESOLVED | Noted: 2020-05-28 | Resolved: 2020-09-26

## 2020-09-29 ENCOUNTER — OFFICE VISIT (OUTPATIENT)
Dept: CARDIOLOGY CLINIC | Age: 85
End: 2020-09-29
Payer: MEDICARE

## 2020-09-29 VITALS
BODY MASS INDEX: 23.07 KG/M2 | WEIGHT: 122.2 LBS | RESPIRATION RATE: 16 BRPM | SYSTOLIC BLOOD PRESSURE: 172 MMHG | HEART RATE: 60 BPM | HEIGHT: 61 IN | DIASTOLIC BLOOD PRESSURE: 90 MMHG

## 2020-09-29 PROCEDURE — 1123F ACP DISCUSS/DSCN MKR DOCD: CPT | Performed by: INTERNAL MEDICINE

## 2020-09-29 PROCEDURE — 1036F TOBACCO NON-USER: CPT | Performed by: INTERNAL MEDICINE

## 2020-09-29 PROCEDURE — G8427 DOCREV CUR MEDS BY ELIG CLIN: HCPCS | Performed by: INTERNAL MEDICINE

## 2020-09-29 PROCEDURE — 1090F PRES/ABSN URINE INCON ASSESS: CPT | Performed by: INTERNAL MEDICINE

## 2020-09-29 PROCEDURE — G8400 PT W/DXA NO RESULTS DOC: HCPCS | Performed by: INTERNAL MEDICINE

## 2020-09-29 PROCEDURE — 4040F PNEUMOC VAC/ADMIN/RCVD: CPT | Performed by: INTERNAL MEDICINE

## 2020-09-29 PROCEDURE — 99214 OFFICE O/P EST MOD 30 MIN: CPT | Performed by: INTERNAL MEDICINE

## 2020-09-29 PROCEDURE — G8420 CALC BMI NORM PARAMETERS: HCPCS | Performed by: INTERNAL MEDICINE

## 2020-09-29 NOTE — PROGRESS NOTES
Calderon Harden MD        OFFICE  FOLLOWUP NOTE    Chief complaints: patient is here for management of CAD,S/P CABG, HTN, DYSLPIDEMIA, post op for botox ( bladder in continence)    Subjective: patient feels better, no chest pain, no shortness of breath, no dizziness, no palpitations    POPEYE Mauro is a 82 y. o.year old who  has a past medical history of Acute cystitis, Acute ischemic colitis ([address] CAD (coronary artery disease), Cerebrovascular event ([address] Chronic hypoxemic respiratory failure ([address] Chronic neck pain, Cognitive impairment, COPD, severe ([address] Depression, Diverticulitis, Family history of colon cancer, Family history of diabetes mellitus, Fibromyalgia, Gastroesophageal reflux disease without esophagitis, Graves disease, Hyperlipidemia, Hypertension, Lumbar spinal stenosis, Nocturnal hypoxemia, Nocturnal oxygen desaturation, Obstructive sleep apnea, Osteoporosis, S/P CABG (coronary artery bypass graft), S/P cardiac catheterization, Subclavian artery stenosis, left ([address] Subclinical Hyperthyroidism, Type II diabetes mellitus ([address] and Urine incontinence.  and presents for management of ofCAD,S/P CABG, HTN, DYSLPIDEMIA, post op for botox ( bladder in continence), which are well controlled      Current Outpatient Medications   Medication Sig Dispense Refill    omeprazole (PRILOSEC) 40 MG delayed release capsule TAKE ONE CAPSULE BY MOUTH ONCE DAILY 30 capsule 3    rosuvastatin (CRESTOR) 5 MG tablet TAKE ONE TABLET BY MOUTH EVERY DAY 90 tablet 2    nitrofurantoin (MACRODANTIN) 50 MG capsule Take 50 mg by mouth daily      Lactobacillus (PROBIOTIC ACIDOPHILUS PO) Take by mouth      ONETOUCH VERIO strip TEST ONE TO TWO TIMES DAILY AS DIRECTED      Blood Glucose Monitoring Suppl (ONETOUCH VERIO FLEX SYSTEM) w/Device KIT USE AS DIRECTED      OneTouch Delica Lancets 29Q MISC USE AS DIRECTED ONCE TO TWICE DAILY      Syringe, Disposable, 3 ML MISC 1 each by Does not apply route daily 12 each 1    cyanocobalamin 1000 MCG/ML injection Inject 1 mL into the muscle every 30 days for 1 dose 6 vial 1    ferrous sulfate (IRON 325) 325 (65 Fe) MG tablet Take 1 tablet by mouth daily 90 tablet 1    doxepin (SINEQUAN) 10 MG capsule Take 10 mg by mouth nightly      tiotropium (SPIRIVA RESPIMAT) 2.5 MCG/ACT AERS inhaler INHALE TWO (2) PUFFS BY MOUTH ONCE DAILY 3 Inhaler 3    albuterol sulfate HFA (PROAIR HFA) 108 (90 Base) MCG/ACT inhaler Inhale 2 puffs into the lungs every 6 hours as needed for Wheezing 3 Inhaler 3    ipratropium-albuterol (DUONEB) 0.5-2.5 (3) MG/3ML SOLN nebulizer solution Inhale 3 mLs into the lungs every 4 hours 120 vial 11    budesonide-formoterol (SYMBICORT) 160-4.5 MCG/ACT AERO INHALE TWO (2) PUFFS BY MOUTH TWO TIMES DAILY 3 Inhaler 3    Handicap Placard MISC by Does not apply route Good for 5 years 1 each 0    carvedilol (COREG) 12.5 MG tablet TAKE 1 TABLET BY MOUTH TWO TIMES DAILY 60 tablet 11    Memantine HCl-Donepezil HCl (NAMZARIC) 28-10 MG CP24 Take 1 capsule by mouth daily      OXYGEN Inhale 2 L/min into the lungs nightly And portable; battery powered oxygen unit when needed      aspirin 81 MG chewable tablet Take 1 tablet by mouth daily 30 tablet 3     Current Facility-Administered Medications   Medication Dose Route Frequency Provider Last Rate Last Dose    cyanocobalamin injection 1,000 mcg  1,000 mcg Subcutaneous Q30 Days Rajan Lloyd DO   1,000 mcg at 02/17/20 7589     Allergies: Cipro xr; Demerol; Lipitor  [atorvastatin calcium]; Moxifloxacin; Statins;  Lipitor [atorvastatin]; and Wellbutrin [bupropion]  Past Medical History:   Diagnosis Date    Acute cystitis 2/10/2020    Acute ischemic colitis [facility]) 2008    Colonoscopy done    CAD (coronary artery disease) 06/2017    4 vessel CABG    Cerebrovascular event ([facility] 02/2016    balance, speech, leg weakness; ARU    Chronic hypoxemic respiratory failure (HCC) 2/18/2019    Chronic neck pain     Dr Mac Ching 2/2011    Cognitive impairment 09/2011    COPD, severe ([address] 11/7/2018    Depression     Diverticulitis 5/2012    Family history of colon cancer     Family history of diabetes mellitus     Fibromyalgia 1989    Gastroesophageal reflux disease without esophagitis 2/15/2020    Graves disease 2012    Leesa Abernathy; tapazole for a time    Hyperlipidemia     Hypertension     Lumbar spinal stenosis 11/2015    moderate ; MRI by Dr Ollie Elise Nocturnal hypoxemia 11/7/2018    Nocturnal oxygen desaturation 2015    Obstructive sleep apnea     Osteoporosis     S/P CABG (coronary artery bypass graft) 06/2017    4 vessel-CABG EVH RCA Marginal, OM1, OM2,  LIMA to LAD    S/P cardiac catheterization 06/07/2017    severe multivessel disease    Subclavian artery stenosis, left ([address] 06/2017    noted in hosp for CABG; to be dealt with as OP by [facility] and [facility] surgeons    Subclinical Hyperthyroidism 2010    Dr. Leesa Abernathy;  Tapazole    Type II diabetes mellitus ([address] 2019    7/2019;DIET CONTROL    Urine incontinence 6/2011    Timed voiding; [place]; urology     Past Surgical History:   Procedure Laterality Date    CARPAL TUNNEL RELEASE      Rt & Lt     CATARACT REMOVAL WITH IMPLANT Right 07/2016    CATARACT REMOVAL WITH IMPLANT Left 03/2019    iris surgery also    CERVICAL DISCECTOMY  01-    C5-6    CERVICAL LAMINECTOMY  09-    with fusion and instrumentation    CERVICAL SPINE SURGERY  4/2011    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; fusion; fixation    CORONARY ARTERY BYPASS GRAFT  06/2017    4- vessel    INCONTINENCE SURGERY  11/2018    Botox 11/2018 ; Eva Kussmaul    ROTATOR CUFF REPAIR  1992    Left    ROTATOR CUFF REPAIR  1998    Right    TOTAL HIP ARTHROPLASTY Left [id number]    Left     Family History   Problem Relation Age of Onset    Diabetes Mother     Alzheimer's Disease Mother     Cancer Father 76        colon cancer    Pituitary Disorder Brother     Diabetes Brother     Diabetes Other         [facility] Pressure Other         [facility]    Stroke Other         [facility]    Cancer Other         [facility] colon ca     Social History     Tobacco Use    Smoking status: Former Smoker     Packs/day: 0.50     Years: 30.00     Pack years: 15.00     Types: Cigarettes     Start date: 1957     Last attempt to quit: 1987     Years since quittin.7    Smokeless tobacco: Never Used   Substance Use Topics    Alcohol use: No     Comment: special occasion once a year maybe       @CHA@  Review of Systems:   · Constitutional: No Fever or Weight Loss   · Eyes: No Decreased Vision  · ENT: No Headaches, Hearing Loss or Vertigo  · Cardiovascular: No chest pain, dyspnea on exertion, palpitations or loss of consciousness  · Respiratory: No cough or wheezing    · Gastrointestinal: No abdominal pain, appetite loss, blood in stools, constipation, diarrhea or heartburn  · Genitourinary: No dysuria, trouble voiding, or hematuria  · Musculoskeletal:  No gait disturbance, weakness or joint complaints  · Integumentary: No rash or pruritis  · Neurological: No TIA or stroke symptoms  · Psychiatric: No anxiety or depression  · Endocrine: No malaise, fatigue or temperature intolerance  · Hematologic/Lymphatic: No bleeding problems, blood clots or swollen lymph nodes  · Allergic/Immunologic: No nasal congestion or hives  All systems negative except as marked. Objective:  BP (!) 172/90 (Site: Right Upper Arm, Position: Sitting, Cuff Size: Child)   Pulse 60   Resp 16   Ht 5' 1\" (1.549 m)   Wt 122 lb 3.2 oz (55.4 kg)   LMP  (LMP Unknown)   BMI 23.09 kg/m²   Wt Readings from Last 3 Encounters:   20 122 lb 3.2 oz (55.4 kg)   20 120 lb 9.6 oz (54.7 kg)   20 119 lb (54 kg)     Body mass index is 23.09 kg/m².   GENERAL - Alert, oriented, pleasant, in no apparent distress,normal grooming  HEENT - pupils are reactive to light and accomodation, cornea intact, conjunctive normal color, ears are normal in exam,throat exam in normal, y.o.year old who  has a past medical history of Acute cystitis, Acute ischemic colitis ([address] CAD (coronary artery disease), Cerebrovascular event ([address] Chronic hypoxemic respiratory failure ([address] Chronic neck pain, Cognitive impairment, COPD, severe ([address] Depression, Diverticulitis, Family history of colon cancer, Family history of diabetes mellitus, Fibromyalgia, Gastroesophageal reflux disease without esophagitis, Graves disease, Hyperlipidemia, Hypertension, Lumbar spinal stenosis, Nocturnal hypoxemia, Nocturnal oxygen desaturation, Obstructive sleep apnea, Osteoporosis, S/P CABG (coronary artery bypass graft), S/P cardiac catheterization, Subclavian artery stenosis, left ([address] Subclinical Hyperthyroidism, Type II diabetes mellitus ([address] and Urine incontinence. and presents with     Plan:  1. History of bladder botox,SHE HAD twice, its helping her, no cardiac complication noted, gets UTI  2. CAD s/p CABG and had complications post CABG with sternal infection and had to redo sternotomy, she is better now,continue coreg, aspirin and crestor, completed cardiac rehab   3. Dyslipidemia: continue crestor 5mg po daily  4. Dementia: stable  5. History of TIA:CONTINUE aspirin and crestor  6. HTN: stable, continue core  7. Dm: she is  Not Diabetuc and off metformin  8. Health maintenance: exerise and diet  All labs, medications and tests reviewed, continue all other medications of all above medical condition listed as is.

## 2020-09-30 ENCOUNTER — TELEPHONE (OUTPATIENT)
Dept: CARDIOLOGY CLINIC | Age: 85
End: 2020-09-30

## 2020-10-05 ENCOUNTER — TELEPHONE (OUTPATIENT)
Dept: CARDIOLOGY CLINIC | Age: 85
End: 2020-10-05

## 2020-11-04 ENCOUNTER — TELEPHONE (OUTPATIENT)
Dept: CARDIOLOGY CLINIC | Age: 85
End: 2020-11-04

## 2020-11-04 RX ORDER — CARVEDILOL 12.5 MG/1
TABLET ORAL
Qty: 180 TABLET | Refills: 1 | Status: SHIPPED | OUTPATIENT
Start: 2020-11-04 | End: 2021-01-29

## 2020-11-04 NOTE — TELEPHONE ENCOUNTER
Aurea Cotton with [facility] called asking for   Clearance letter faxed to [phone]  And [facility] [phone]

## 2021-01-08 RX ORDER — OMEPRAZOLE 40 MG/1
40 CAPSULE, DELAYED RELEASE ORAL DAILY
Qty: 30 CAPSULE | Refills: 3 | Status: SHIPPED | OUTPATIENT
Start: 2021-01-08

## 2021-01-28 DIAGNOSIS — I25.10 CORONARY ARTERY DISEASE INVOLVING NATIVE CORONARY ARTERY OF NATIVE HEART WITHOUT ANGINA PECTORIS: ICD-10-CM

## 2021-01-29 RX ORDER — CARVEDILOL 12.5 MG/1
TABLET ORAL
Qty: 180 TABLET | Refills: 3 | Status: SHIPPED | OUTPATIENT
Start: 2021-01-29 | End: 2022-03-29

## 2021-02-22 ENCOUNTER — OFFICE VISIT (OUTPATIENT)
Dept: PULMONOLOGY | Age: 86
End: 2021-02-22
Payer: MEDICARE

## 2021-02-22 VITALS — SYSTOLIC BLOOD PRESSURE: 102 MMHG | OXYGEN SATURATION: 94 % | HEART RATE: 90 BPM | DIASTOLIC BLOOD PRESSURE: 80 MMHG

## 2021-02-22 DIAGNOSIS — F01.518 VASCULAR DEMENTIA WITH BEHAVIOR DISTURBANCE: ICD-10-CM

## 2021-02-22 DIAGNOSIS — J96.11 CHRONIC HYPOXEMIC RESPIRATORY FAILURE (HCC): ICD-10-CM

## 2021-02-22 DIAGNOSIS — R06.02 SHORTNESS OF BREATH: ICD-10-CM

## 2021-02-22 DIAGNOSIS — J44.9 COPD, SEVERE (HCC): Primary | ICD-10-CM

## 2021-02-22 PROCEDURE — 4040F PNEUMOC VAC/ADMIN/RCVD: CPT | Performed by: INTERNAL MEDICINE

## 2021-02-22 PROCEDURE — G8926 SPIRO NO PERF OR DOC: HCPCS | Performed by: INTERNAL MEDICINE

## 2021-02-22 PROCEDURE — 1123F ACP DISCUSS/DSCN MKR DOCD: CPT | Performed by: INTERNAL MEDICINE

## 2021-02-22 PROCEDURE — G8400 PT W/DXA NO RESULTS DOC: HCPCS | Performed by: INTERNAL MEDICINE

## 2021-02-22 PROCEDURE — G8420 CALC BMI NORM PARAMETERS: HCPCS | Performed by: INTERNAL MEDICINE

## 2021-02-22 PROCEDURE — 1090F PRES/ABSN URINE INCON ASSESS: CPT | Performed by: INTERNAL MEDICINE

## 2021-02-22 PROCEDURE — G8427 DOCREV CUR MEDS BY ELIG CLIN: HCPCS | Performed by: INTERNAL MEDICINE

## 2021-02-22 PROCEDURE — 99213 OFFICE O/P EST LOW 20 MIN: CPT | Performed by: INTERNAL MEDICINE

## 2021-02-22 PROCEDURE — 3023F SPIROM DOC REV: CPT | Performed by: INTERNAL MEDICINE

## 2021-02-22 PROCEDURE — G8484 FLU IMMUNIZE NO ADMIN: HCPCS | Performed by: INTERNAL MEDICINE

## 2021-02-22 PROCEDURE — 1036F TOBACCO NON-USER: CPT | Performed by: INTERNAL MEDICINE

## 2021-02-22 NOTE — PROGRESS NOTES
SUBJECTIVE:  Chief Complaint: Severe COPD, chronic hypoxemic respiratory failure, vascular dementia, chronic anxiety, shortness of breath  Fortunately, he has had no recent bronchitic episodes and does continue on Symbicort, Spiriva, long-acting oral theophylline, albuterol MDI or nebulized albuterol solution to control her advanced COPD. She still is short of breath with minimal exertion and sometimes at rest.  She does not wear oxygen during the daytime or with ambulation but does wear it at nighttime. Her  states that her dementia is slowly progressing and he is having some issues with incontinence. She did receive both of the the [address] COVID-19 vaccination. She tolerated this well. She has had no known COVID-19 infection. She and her  live independently in a condominium. ROS:  Constitution:  HEENT: Negative for ear, throat pain  Cardiovascular: Negative for chest pain, syncope, edema  Pulmonary: See HPI  Musculoskeletal: Negative for DVT, myalgias, arthralgias    OBJECTIVE:  /80 (Site: Right Upper Arm, Position: Sitting, Cuff Size: Large Adult)   Pulse 90   LMP  (LMP Unknown)   SpO2 94%      Physical Exam:  Constitutional:  She appears well developed and well-nourished. Neck:  Supple, No palpable lymphadenopathy, No JVD  Cardiovascular:  S1, S2 Normal, Regular rhythm, no murmurs or gallops, No pericardial  rubs. Pulmonary: Diminished breath sounds bilaterally but no wheezing or rhonchi are noted  Abdomen: Not examined  Extremities: no edema, No DVT  Neurologic: Oriented x3, No focal deficits    Radiology: Chest x-ray on 6/1/2020 done at [facility] imaging  showed coarse interstitial markings in bilateral lung bases which may represent atelectasis and or fibrosis/scarring.   No focal airspace opacity or consolidation noted  PFT: Office spirometry last obtained on 11/26/2019 demonstrated a severe obstructive defect with a significant response to bronchodilators Echocardiogram: 4/3/2018  Technically difficult examination. Patient is on ventilator. Left ventricular function is hyperdynamic with small size ventricle, EF >   60%   moderate to severe concentric left ventricular hypertrophy. Grade II diastolic dysfunction. Right ventricular systolic pressure of 32 mm Hg. Mild tricuspid regurgitation. No evidence of any pericardial effusion. ASSESSMENT:    1. COPD, severe ([address]    2. Chronic hypoxemic respiratory failure (HCC)    3. Vascular dementia with behavior disturbance ([address]    4. Shortness of breath          PLAN:   Because of her dementia it would be very hard to repeat her pulmonary function test.  I will make no change in her current bronchodilator therapy. I did encourage her to wear her oxygen and will make sure they have a home oximeter that can be used to follow her during the day. I may want to repeat her chest x-ray next year. I will continue to follow her    We have discussed the need to maintain yearly flu immunization, pneumococcal vaccination. We have discussed Coronavirus precaution including handwashing practice, wiping items touched in public such as gas pumps, door handles, shopping carts, etc. Self monitoring for infection - fever, chills, cough, SOB. Should they develop symptoms they should call office for further instructions. Return in about 6 months (around 8/22/2021) for Recheck for COPD, Recheck for Shortness of Breath, chronic hypoxemic respiratory failure. This dictation was performed with a verbal recognition program and it was checked for errors. It is possible that there are still dictated errors within this office note. Any errors should be brought immediately to my attention for correction. All efforts were made to ensure that this office note is accurate.

## 2021-02-26 ENCOUNTER — TELEPHONE (OUTPATIENT)
Dept: CARDIOLOGY CLINIC | Age: 86
End: 2021-02-26

## 2021-02-26 NOTE — TELEPHONE ENCOUNTER
Faxed all medical records to [facility]  [address], [address]

## 2021-03-08 RX ORDER — BUDESONIDE AND FORMOTEROL FUMARATE DIHYDRATE 160; 4.5 UG/1; UG/1
AEROSOL RESPIRATORY (INHALATION)
Qty: 30.6 G | Refills: 3 | Status: SHIPPED | OUTPATIENT
Start: 2021-03-08

## 2021-03-24 ENCOUNTER — TELEPHONE (OUTPATIENT)
Dept: CARDIOLOGY CLINIC | Age: 86
End: 2021-03-24

## 2021-03-24 NOTE — TELEPHONE ENCOUNTER
Cardiologist: Dr. Marcelino Milian  Surgeon: Dr. Clayton Hoover  Surgery: Cystoscopy, Retrograde Pyelgram, TURB, Bladder Biopsy  Anesthesia: MAC  Date: Pending  FAX# [phone]  # [phone]     Last OV 9/29/2020 w/Karolina      1. History of bladder botox,SHE HAD twice, its helping her, no cardiac complication noted, gets UTI  2. CAD s/p CABG and had complications post CABG with sternal infection and had to redo sternotomy, she is better now,continue coreg, aspirin and crestor, completed cardiac rehab   3. Dyslipidemia: continue crestor 5mg po daily  4. Dementia: stable  5. History of TIA:CONTINUE aspirin and crestor  6. HTN: stable, continue core  7. Dm: she is  Not Diabetuc and off metformin  8. Health maintenance: exerise and diet    GXT- 3/6/2018   Reduced exercise performance with angina and ischemic EKG changes.    Schecule Lexiscan  NM not done     Echo- 4/3/2018   Technically difficult examination. Patient is on ventilator.   Left ventricular function is hyperdynamic with small size ventricle, EF >   60%   moderate to severe concentric left ventricular hypertrophy.   Grade II diastolic dysfunction.   Right ventricular systolic pressure of 32 mm Hg.   Mild tricuspid regurgitation.   No evidence of any pericardial effusion.        CABG-6/2017     Aspirin      Clearance letter for this procedure done on 10/23/2020- their  office wants an updated letter.

## 2021-03-26 ENCOUNTER — TELEPHONE (OUTPATIENT)
Dept: CARDIOLOGY CLINIC | Age: 86
End: 2021-03-26

## 2021-03-29 ENCOUNTER — OFFICE VISIT (OUTPATIENT)
Dept: CARDIOLOGY CLINIC | Age: 86
End: 2021-03-29
Payer: MEDICARE

## 2021-03-29 VITALS
BODY MASS INDEX: 22.66 KG/M2 | WEIGHT: 120 LBS | DIASTOLIC BLOOD PRESSURE: 72 MMHG | SYSTOLIC BLOOD PRESSURE: 126 MMHG | HEART RATE: 62 BPM | HEIGHT: 61 IN

## 2021-03-29 DIAGNOSIS — R06.02 SHORTNESS OF BREATH: Primary | ICD-10-CM

## 2021-03-29 PROCEDURE — G8420 CALC BMI NORM PARAMETERS: HCPCS | Performed by: INTERNAL MEDICINE

## 2021-03-29 PROCEDURE — G8400 PT W/DXA NO RESULTS DOC: HCPCS | Performed by: INTERNAL MEDICINE

## 2021-03-29 PROCEDURE — 99214 OFFICE O/P EST MOD 30 MIN: CPT | Performed by: INTERNAL MEDICINE

## 2021-03-29 PROCEDURE — 1123F ACP DISCUSS/DSCN MKR DOCD: CPT | Performed by: INTERNAL MEDICINE

## 2021-03-29 PROCEDURE — G8484 FLU IMMUNIZE NO ADMIN: HCPCS | Performed by: INTERNAL MEDICINE

## 2021-03-29 PROCEDURE — 1036F TOBACCO NON-USER: CPT | Performed by: INTERNAL MEDICINE

## 2021-03-29 PROCEDURE — G8428 CUR MEDS NOT DOCUMENT: HCPCS | Performed by: INTERNAL MEDICINE

## 2021-03-29 PROCEDURE — 1090F PRES/ABSN URINE INCON ASSESS: CPT | Performed by: INTERNAL MEDICINE

## 2021-03-29 PROCEDURE — 4040F PNEUMOC VAC/ADMIN/RCVD: CPT | Performed by: INTERNAL MEDICINE

## 2021-03-29 RX ORDER — M-VIT,TX,IRON,MINS/CALC/FOLIC 27MG-0.4MG
1 TABLET ORAL DAILY
COMMUNITY

## 2021-03-29 RX ORDER — LANOLIN ALCOHOL/MO/W.PET/CERES
5 CREAM (GRAM) TOPICAL DAILY
Status: ON HOLD | COMMUNITY
End: 2022-04-21 | Stop reason: SDUPTHER

## 2021-03-29 NOTE — PROGRESS NOTES
Tasha Walters MD        OFFICE  FOLLOWUP NOTE    Chief complaints: patient is here for management of  CAD,S/P CABG, HTN, DYSLPIDEMIA, post op for botox ( bladder in continence), COPD    Subjective: panic attacks occasionally, no chest pain, + shortness of breath, no dizziness, no palpitations    POPEYE Mcadams is a 82 y. o.year old who  has a past medical history of Acute cystitis, Acute ischemic colitis ([facility] [address] CAD (coronary artery disease), Cerebrovascular event ([facility] [address] Chronic hypoxemic respiratory failure ([facility] [address] Chronic neck pain, Cognitive impairment, COPD, severe ([address] Depression, Diverticulitis, Family history of colon cancer, Family history of diabetes mellitus, Fibromyalgia, Gastroesophageal reflux disease without esophagitis, Graves disease, Hyperlipidemia, Hypertension, Lumbar spinal stenosis, Nocturnal hypoxemia, Nocturnal oxygen desaturation, Obstructive sleep apnea, Osteoporosis, S/P CABG (coronary artery bypass graft), S/P cardiac catheterization, Subclavian artery stenosis, left ([facility] [address] Subclinical Hyperthyroidism, Type II diabetes mellitus ([facility] [address] Urine incontinence, and Vascular dementia ([facility] [address] and presents for management of CAD,S/P CABG, HTN, DYSLPIDEMIA, post op for botox ( bladder in continence), COPD which are well controlled    SHE gets short of breath with walking with the walker, she has COPD also, she will need BOTOX procedure for urinary incontinence.   Current Outpatient Medications   Medication Sig Dispense Refill    melatonin 3 MG TABS tablet Take 3 mg by mouth daily      Calcium Polycarbophil (FIBER-CAPS PO) Take by mouth      Multiple Vitamins-Minerals (THERAPEUTIC MULTIVITAMIN-MINERALS) tablet Take 1 tablet by mouth daily      Cholecalciferol (VITAMIN D3) 125 MCG (5000 UT) TABS Take by mouth      budesonide-formoterol (SYMBICORT) 160-4.5 MCG/ACT AERO inhale TWO puffs BY MOUTH TWICE DAILY 30.6 g 3    carvedilol (COREG) 12.5 MG tablet TAKE ONE TABLET BY MOUTH TWICE DAILY 180 tablet 3    omeprazole (PRILOSEC) 40 MG delayed release capsule Take 1 capsule by mouth daily 30 capsule 3    rosuvastatin (CRESTOR) 5 MG tablet TAKE ONE TABLET BY MOUTH EVERY DAY 90 tablet 2    Lactobacillus (PROBIOTIC ACIDOPHILUS PO) Take by mouth      ONETOUCH VERIO strip TEST ONE TO TWO TIMES DAILY AS DIRECTED      Blood Glucose Monitoring Suppl (ONETOUCH VERIO FLEX SYSTEM) w/Device KIT USE AS DIRECTED      OneTouch Delica Lancets 66T MISC USE AS DIRECTED ONCE TO TWICE DAILY      Syringe, Disposable, 3 ML MISC 1 each by Does not apply route daily 12 each 1    ferrous sulfate (IRON 325) 325 (65 Fe) MG tablet Take 1 tablet by mouth daily 90 tablet 1    doxepin (SINEQUAN) 10 MG capsule Take 10 mg by mouth nightly      tiotropium (SPIRIVA RESPIMAT) 2.5 MCG/ACT AERS inhaler INHALE TWO (2) PUFFS BY MOUTH ONCE DAILY 3 Inhaler 3    albuterol sulfate HFA (PROAIR HFA) 108 (90 Base) MCG/ACT inhaler Inhale 2 puffs into the lungs every 6 hours as needed for Wheezing 3 Inhaler 3    ipratropium-albuterol (DUONEB) 0.5-2.5 (3) MG/3ML SOLN nebulizer solution Inhale 3 mLs into the lungs every 4 hours 120 vial 11    Handicap Placard MISC by Does not apply route Good for 5 years 1 each 0    Memantine HCl-Donepezil HCl (NAMZARIC) 28-10 MG CP24 Take 1 capsule by mouth daily      OXYGEN Inhale 2 L/min into the lungs nightly And portable; battery powered oxygen unit when needed      aspirin 81 MG chewable tablet Take 1 tablet by mouth daily 30 tablet 3    cyanocobalamin 1000 MCG/ML injection Inject 1 mL into the muscle every 30 days for 1 dose (Patient not taking: Reported on 3/29/2021) 6 vial 1     Current Facility-Administered Medications   Medication Dose Route Frequency Provider Last Rate Last Admin    cyanocobalamin injection 1,000 mcg  1,000 mcg Subcutaneous Q30 Days Rajna Wynn DO   1,000 mcg at 02/17/20 5359     Allergies: Cipro xr, Demerol, Lipitor  [atorvastatin calcium], Moxifloxacin, Statins, Lipitor [atorvastatin], and Wellbutrin [bupropion]  Past Medical History:   Diagnosis Date    Acute cystitis 02/10/2020    Acute ischemic colitis ([facility] [address] 2008    Colonoscopy done    CAD (coronary artery disease) 06/2017    4 vessel CABG    Cerebrovascular event ([address] 02/2016    balance, speech, leg weakness; ARU    Chronic hypoxemic respiratory failure ([address] 02/18/2019    Chronic neck pain     Dr Kenyatta Tovar 2/2011    Cognitive impairment 09/2011    COPD, severe ([address] 11/07/2018    Depression     Diverticulitis 05/2012    Family history of colon cancer     Family history of diabetes mellitus     Fibromyalgia 1989    Gastroesophageal reflux disease without esophagitis 02/15/2020    Graves disease 2012    Dashawn Bobo; tapazole for a time    Hyperlipidemia     Hypertension     Lumbar spinal stenosis 11/2015    moderate ; MRI by Dr Jesús Mercedes Nocturnal hypoxemia 11/07/2018    Nocturnal oxygen desaturation 2015    Obstructive sleep apnea     Osteoporosis     S/P CABG (coronary artery bypass graft) 06/2017    4 vessel-CABG EVH RCA Marginal, OM1, OM2,  LIMA to LAD    S/P cardiac catheterization 06/07/2017    severe multivessel disease    Subclavian artery stenosis, left ([address] 06/2017    noted in hosp for CABG; to be dealt with as OP by vasc surgeons    Subclinical Hyperthyroidism 2010    Dr. Dashawn Bobo;  Tapazole    Type II diabetes mellitus ([facility] [address] 2019 7/2019;DIET CONTROL    Urine incontinence 06/2011    Timed voiding; Kegel; urology    Vascular dementia [facility])      Past Surgical History:   Procedure Laterality Date    CARPAL TUNNEL RELEASE      Rt & Lt     CATARACT REMOVAL WITH IMPLANT Right 07/2016    CATARACT REMOVAL WITH IMPLANT Left 03/2019    iris surgery also    CERVICAL DISCECTOMY  01-    C5-6    CERVICAL LAMINECTOMY  09-    with fusion and instrumentation    CERVICAL SPINE SURGERY  4/2011    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; fusion; fixation    CORONARY ARTERY BYPASS GRAFT 2017    4- vessel    INCONTINENCE SURGERY  2018    Botox 2018 ; Naida Syed    ROTATOR CUFF REPAIR      Left    ROTATOR CUFF REPAIR  1998    Right    TOTAL HIP ARTHROPLASTY Left [id number]    Left     Family History   Problem Relation Age of Onset    Diabetes Mother     Alzheimer's Disease Mother     Cancer Father 76        colon cancer    Pituitary Disorder Brother     Diabetes Brother     Diabetes Other         [address]    High Blood Pressure Other         [address]    Stroke Other         [address]    Cancer Other         [address] colon ca     Social History     Tobacco Use    Smoking status: Former Smoker     Packs/day: 0.50     Years: 30.00     Pack years: 15.00     Types: Cigarettes     Start date: 1957     Quit date: 1987     Years since quittin.1    Smokeless tobacco: Never Used   Substance Use Topics    Alcohol use: No     Comment: special occasion once a year maybe       @CHA@  Review of Systems:   · Constitutional: No Fever or Weight Loss   · Eyes: No Decreased Vision  · ENT: No Headaches, Hearing Loss or Vertigo  · Cardiovascular: No chest pain, dyspnea on exertion, palpitations or loss of consciousness  · Respiratory: No cough or wheezing    · Gastrointestinal: No abdominal pain, appetite loss, blood in stools, constipation, diarrhea or heartburn  · Genitourinary: No dysuria, trouble voiding, or hematuria  · Musculoskeletal:  No gait disturbance, weakness or joint complaints  · Integumentary: No rash or pruritis  · Neurological: No TIA or stroke symptoms  · Psychiatric: No anxiety or depression  · Endocrine: No malaise, fatigue or temperature intolerance  · Hematologic/Lymphatic: No bleeding problems, blood clots or swollen lymph nodes  · Allergic/Immunologic: No nasal congestion or hives  All systems negative except as marked.    Objective:  /72   Pulse 62   Ht 5' 1\" (1.549 m)   Wt 120 lb (54.4 kg)   LMP  (LMP Unknown)   BMI 22.67 kg/m²   Wt Readings from Last 3 Encounters:   03/29/21 120 lb (54.4 kg)   01/28/21 120 lb 12.8 oz (54.8 kg)   09/29/20 122 lb 3.2 oz (55.4 kg)     Body mass index is 22.67 kg/m². GENERAL - Alert, oriented, pleasant, in no apparent distress,normal grooming  HEENT - pupils are intact, cornea intact, conjunctive normal color, ears are normal in exam,  Neck - Supple. No jugular venous distention noted. No carotid bruits, no apical -carotid delay  Respiratory:  Normal breath sounds, No respiratory distress, No wheezing, No chest tenderness. ,no use of accessory muscles, diaphragm movement is normal  Cardiovascular: (PMI) apex non displaced,no lifts no thrills, no s3,no s4, Normal heart rate, Normal rhythm, No murmurs, No rubs, No gallops. Carotid arteries pulse and amplitude are normal no bruit, no abdominal bruit noted ( normal abdominal aorta ausculation),   Extremities - No cyanosis, clubbing, or significant edema, no varicose veins    Abdomen - No masses, tenderness, or organomegaly, no hepato-splenomegally, no bruits  Musculoskeletal - No significant edema, no kyphosis or scoliosis, no deformity in any extremity noted, muscle strength and tone are normal  Skin: no ulcer,no scar,no stasis dermatitis   Neurologic - alert oriented times 3,Cranial nerves II through XII are grossly intact. There were no gross focal neurologic abnormalities.    Psychiatric: normal mood and affect    Lab Results   Component Value Date    TROPONINI <0.006 02/25/2013     BNP:    Lab Results   Component Value Date    BNP 44 02/25/2013     PT/INR:  No results found for: PTINR  Lab Results   Component Value Date    LABA1C 6.8 02/14/2020    LABA1C 7.0 (H) 10/29/2019     Lab Results   Component Value Date    CHOL 171 10/29/2019    TRIG 159 (H) 10/29/2019    HDL 41 02/14/2020    LDLCALC 61 02/14/2020    LDLDIRECT 82 10/29/2019     Lab Results   Component Value Date    ALT 21 02/14/2020    AST 15 02/14/2020     TSH:    Lab Results   Component Value Date    TSH 0.69 02/14/2020 Impression:  Eddie Thomas is a 82 y. o.year old who  has a past medical history of Acute cystitis, Acute ischemic colitis ([facility] [address] CAD (coronary artery disease), Cerebrovascular event ([facility] [address] Chronic hypoxemic respiratory failure ([facility] [address] Chronic neck pain, Cognitive impairment, COPD, severe ([address] Depression, Diverticulitis, Family history of colon cancer, Family history of diabetes mellitus, Fibromyalgia, Gastroesophageal reflux disease without esophagitis, Graves disease, Hyperlipidemia, Hypertension, Lumbar spinal stenosis, Nocturnal hypoxemia, Nocturnal oxygen desaturation, Obstructive sleep apnea, Osteoporosis, S/P CABG (coronary artery bypass graft), S/P cardiac catheterization, Subclavian artery stenosis, left ([facility] [address] Subclinical Hyperthyroidism, Type II diabetes mellitus ([facility] [address] Urine incontinence, and Vascular dementia ([facility] [address] and presents with     Plan:  1. Shortness of breath with walking: echo ordered, she also has COPD  2. History of bladder botox,SHE HAD twice, its helping her, no cardiac complication noted, gets UTI, she is cleared for another botox procedure from cardiac stand point  3. CAD s/p CABG and had complications post CABG with sternal infection and had to redo sternotomy, she is better now,continue coreg, aspirin and crestor, completed cardiac rehab   4. Dyslipidemia: continue crestor 5mg po daily  5. Dementia: stable  6. History of TIA:CONTINUE aspirin and crestor  7. HTN: stable, continue core  8. Dm: she is  Not Diabetuc and off metformin  9. Health maintenance: exerise and dietAll labs, medications and tests reviewed, continue all other medications of all above medical condition listed as is.     [unfilled]

## 2021-03-29 NOTE — LETTER
Rebecca Courtney Serve A Shook  1935  [id number]    Have you had any Chest Pain that is not new? - No  If Yes DO EKG - How does it feel -    How long does the pain last -      How long have you been having the pain -    Did you take a    And did it relieve the pain -      DO EKG IF: Patient has a Heart Rate above 100 or below 40     CAD (Coronary Artery Disease) patient should have one on file every 6 months        Have you had any Shortness of Breath - No  If Yes - When     Have you had any dizziness - No  If Yes DO ORTHOSTATIC BP - when do you feel dizzy    How long does it last .        Sitting wait 5 minutes do supine (laying down) wait 5 minutes then do standing - log each in \"vitals\" area in Epic   Be sure to ask what symptoms they are having if they get dizzy while completing ortho stats such as: room spinning, nausea, etc.    Have you had any palpitations that are not new? - No  If Yes DO EKG - Do you feel your heart   How long does it last - . Is the patient on any of the following medications -   If Yes DO EKG - Needs done every 6 months    Do you have any edema - swelling in No    If Yes - CHECK TO SEE IF THE EDEMA IS PITTING  How long have they been having edema -   If Yes - Have they worn compression stockings   If they have worn compression stockings      Vein \"LEG PROBLEM Questionnaire\"  1. Do you have prominent leg veins? 2. Do you have any skin discoloration? 3. Do you have any healed or active sores? 4. Do you have swelling of the legs? 5. Do you have a family history of varicose veins? 6. Does your profession involve pro-longed        standing or heavy lifting? 7. Have you been fighting overweight problems? 8. Do you have restless legs? 9. Do you have any night time cramps?     10. Do you have any of the following in your legs:             When did you have your last labs drawn 2021  Where did you have them done dr. Alberto Carmona  What doctor ordered Eileen Vázquez    If we do not have these labs you are retrieve these labs for these providers! Do you have a surgery or procedure scheduled in the near future - No  If Yes- DO EKG  If Yes - Who is the surgery with? Phone number of physician   Fax number of physician   Type of surgery   GIVE THIS INFORMATION TO JULIANE HERRMANN     Ask patient if they want to sign up for [facility] if they are not already signed up     Check to see if we have an E-MAIL on file for the patient     Check medication list thoroughly!!! AND RECONCILE OUTSIDE MEDICATIONS  If dose has changed change the entire order not just the [place] At check out add to every patient's \"wrap up\" the following dot phrase AFTERHOURSEDUCATION and ensure we explain this to our patients  [address]  1935  [id number]    Have you had any Chest Pain that is not new? -   If Yes DO EKG - How does it feel -    How long does the pain last -      How long have you been having the pain -    Did you take a    And did it relieve the pain -      DO EKG IF: Patient has a Heart Rate above 100 or below 40     CAD (Coronary Artery Disease) patient should have one on file every 6 months        Have you had any Shortness of Breath -   If Yes - When     Have you had any dizziness -   If Yes DO ORTHOSTATIC BP - when do you feel dizzy    How long does it last .        Sitting wait 5 minutes do supine (laying down) wait 5 minutes then do standing - log each in \"vitals\" area in Epic   Be sure to ask what symptoms they are having if they get dizzy while completing ortho stats such as: room spinning, nausea, etc.    Have you had any palpitations that are not new? -   If Yes DO EKG - Do you feel your heart   How long does it last - .        Is the patient on any of the following medications -   If Yes DO EKG - Needs done every 6 months    Do you have any

## 2021-03-29 NOTE — PATIENT INSTRUCTIONS
Please be informed that if you contact our office outside of normal business hours the physician on call cannot help with any scheduling or rescheduling issues, procedure instruction questions or any type of medication issue. We advise you for any urgent/emergency that you go to the nearest emergency room! PLEASE CALL OUR OFFICE DURING NORMAL BUSINESS HOURS    Monday - Friday   8 am to 5 pm    SukhdevSharron Barber 12: [phone]    [place]:  [phone]      **It is YOUR responsibilty to bring medication bottles and/or updated medication list to [address] [place] [address]. This will allow us to better serve you and all your healthcare needs**        Please hold on to these instructions the  will call you within 1-9 business days when we receive authorization from your insurance. Echocardiogram    WHAT TO EXPECT:   ? This test will take approximately 45 minutes. ? It is an ultrasound of the heart. ? It can look at the valves and chambers inside the heart   ? There is no special instructions for this test.     If you are unable to keep this appointment, please notify us 24 hours prior to test at [phone].

## 2021-04-08 ENCOUNTER — PROCEDURE VISIT (OUTPATIENT)
Dept: CARDIOLOGY CLINIC | Age: 86
End: 2021-04-08
Payer: MEDICARE

## 2021-04-08 DIAGNOSIS — R06.02 SHORTNESS OF BREATH: Primary | ICD-10-CM

## 2021-04-08 LAB
LV EF: 58 %
LVEF MODALITY: NORMAL

## 2021-04-08 PROCEDURE — 93306 TTE W/DOPPLER COMPLETE: CPT | Performed by: INTERNAL MEDICINE

## 2021-04-13 ENCOUNTER — TELEPHONE (OUTPATIENT)
Dept: CARDIOLOGY CLINIC | Age: 86
End: 2021-04-13

## 2021-04-13 NOTE — TELEPHONE ENCOUNTER
Patient advised of test results. Patient voiced understanding. Technically difficult examination. Left ventricular systolic function is normal with an ejection fraction of   55-60%. Mild concentric left ventricular hypertrophy. Mild-to-moderate tricuspid regurgitation. Normal pulmonary artery pressure with a RVSP of 29mmHg.    Trace Physiological Pericardial effusion is present

## 2021-05-20 DIAGNOSIS — J41.0 SIMPLE CHRONIC BRONCHITIS (HCC): ICD-10-CM

## 2021-05-20 RX ORDER — IPRATROPIUM BROMIDE AND ALBUTEROL SULFATE 2.5; .5 MG/3ML; MG/3ML
SOLUTION RESPIRATORY (INHALATION)
Qty: 360 ML | Refills: 11 | Status: SHIPPED | OUTPATIENT
Start: 2021-05-20

## 2021-08-05 ENCOUNTER — TELEPHONE (OUTPATIENT)
Dept: PULMONOLOGY | Age: 86
End: 2021-08-05

## 2021-08-17 ENCOUNTER — TELEPHONE (OUTPATIENT)
Dept: CARDIOLOGY CLINIC | Age: 86
End: 2021-08-17

## 2021-08-17 NOTE — TELEPHONE ENCOUNTER
Cardiologist: Dr. Nisreen Pedro  Surgeon: Dr. John Paul Lorenzana  Surgery: PNE  Anesthesia:   Date: ASA  FAX# [phone]  Ph# [phone]    Last OV 3/29/2021 w/Karolina    1. Shortness of breath with walking: echo ordered, she also has COPD  2. History of bladder botox,SHE HAD twice, its helping her, no cardiac complication noted, gets UTI, she is cleared for another botox procedure from cardiac stand point  3. CAD s/p CABG and had complications post CABG with sternal infection and had to redo sternotomy, she is better now,continue coreg, aspirin and crestor, completed cardiac rehab   4. Dyslipidemia: continue crestor 5mg po daily  5. Dementia: stable  6. History of TIA:CONTINUE aspirin and crestor  7. HTN: stable, continue core  8. Dm: she is  Not Diabetuc and off metformin  9. Health maintenance: exerise and dietAll labs, medications and tests reviewed, continue all other medications of all above medical condition listed as is. GXT- 3/6/2018   Reduced exercise performance with angina and ischemic EKG changes.    Schecule Lexiscan  NM not done      Echo- 4/8/2021   Technically difficult examination. Left ventricular systolic function is normal with an ejection fraction of   55-60%. Mild concentric left ventricular hypertrophy. Mild-to-moderate tricuspid regurgitation. Normal pulmonary artery pressure with a RVSP of 29mmHg.    Trace Physiological Pericardial effusion is present         CABG-6/2017     Aspirin

## 2021-09-01 ENCOUNTER — OFFICE VISIT (OUTPATIENT)
Dept: PULMONOLOGY | Age: 86
End: 2021-09-01
Payer: MEDICARE

## 2021-09-01 VITALS
OXYGEN SATURATION: 95 % | SYSTOLIC BLOOD PRESSURE: 122 MMHG | WEIGHT: 115 LBS | DIASTOLIC BLOOD PRESSURE: 70 MMHG | HEIGHT: 61 IN | HEART RATE: 66 BPM | BODY MASS INDEX: 21.71 KG/M2

## 2021-09-01 DIAGNOSIS — F01.518 VASCULAR DEMENTIA WITH BEHAVIOR DISTURBANCE: ICD-10-CM

## 2021-09-01 DIAGNOSIS — J44.9 COPD, SEVERE (HCC): Primary | ICD-10-CM

## 2021-09-01 DIAGNOSIS — R06.02 SHORTNESS OF BREATH: ICD-10-CM

## 2021-09-01 DIAGNOSIS — J96.11 CHRONIC HYPOXEMIC RESPIRATORY FAILURE (HCC): ICD-10-CM

## 2021-09-01 DIAGNOSIS — Z99.11 ON MECHANICALLY ASSISTED VENTILATION (HCC): ICD-10-CM

## 2021-09-01 PROCEDURE — G8427 DOCREV CUR MEDS BY ELIG CLIN: HCPCS | Performed by: INTERNAL MEDICINE

## 2021-09-01 PROCEDURE — 99213 OFFICE O/P EST LOW 20 MIN: CPT | Performed by: INTERNAL MEDICINE

## 2021-09-01 PROCEDURE — G8926 SPIRO NO PERF OR DOC: HCPCS | Performed by: INTERNAL MEDICINE

## 2021-09-01 PROCEDURE — 1123F ACP DISCUSS/DSCN MKR DOCD: CPT | Performed by: INTERNAL MEDICINE

## 2021-09-01 PROCEDURE — 3023F SPIROM DOC REV: CPT | Performed by: INTERNAL MEDICINE

## 2021-09-01 PROCEDURE — G8420 CALC BMI NORM PARAMETERS: HCPCS | Performed by: INTERNAL MEDICINE

## 2021-09-01 PROCEDURE — G8400 PT W/DXA NO RESULTS DOC: HCPCS | Performed by: INTERNAL MEDICINE

## 2021-09-01 PROCEDURE — 4040F PNEUMOC VAC/ADMIN/RCVD: CPT | Performed by: INTERNAL MEDICINE

## 2021-09-01 PROCEDURE — 1036F TOBACCO NON-USER: CPT | Performed by: INTERNAL MEDICINE

## 2021-09-01 PROCEDURE — 1090F PRES/ABSN URINE INCON ASSESS: CPT | Performed by: INTERNAL MEDICINE

## 2021-09-01 NOTE — PROGRESS NOTES
SUBJECTIVE:  Chief Complaint: Severe COPD, chronic hypoxemic respiratory failure, shortness of breath,  vascular dementia with chronic anxiety  Jearline Fleischer states that her major problem is that she does not sleep at night. Mr. Sonny Wellington mentions that she is on melatonin and some other sleep aid. She is wearing oxygen day and night but apparently turned her portable oxygen back in and now is requesting it once more. She has had no recent bronchitic infections. She continues on Symbicort, Spiriva long-acting oral theophylline, albuterol MDI or nebulized albuterol solution to control her COPD. She has had no known COVID-19 exposure or infection and has received both Moderna COVID-19 vaccinations      ROS:  Constitution:  HEENT: Negative for ear, throat pain  Cardiovascular: Negative for chest pain, syncope, edema  Pulmonary: See HPI  Musculoskeletal: Negative for DVT, myalgias, arthralgias    OBJECTIVE:  /70   Pulse 66   Ht 5' 1\" (1.549 m)   Wt 115 lb (52.2 kg)   LMP  (LMP Unknown)   SpO2 95%   BMI 21.73 kg/m²      Physical Exam:  Constitutional:  She appears well developed and well-nourished. Neck:  Supple, No palpable lymphadenopathy, No JVD  Cardiovascular:  S1, S2 Normal, Regular rhythm, no murmurs or gallops, No pericardial  rubs. Pulmonary: Diminished breath sounds throughout all lung fields without wheezing or rhonchi  Abdomen: Not examined  Extremities: no edema, No DVT  Neurologic: Oriented x3, No focal deficits    Radiology: Chest x-ray last obtained on 6/1/2020 at [facility] showed coarse interstitial markings in bilateral lung bases which may represent atelectasis and/or fibrosis/scarring. No focal airspace opacity or consolidation noted. PFT: Office spirometry last obtained on 11/26/2019 demonstrated severe obstructive defect with a significant response to bronchodilators          ASSESSMENT:    1. COPD, severe ([address]    2. On mechanically assisted ventilation ([address]    3.  Chronic hypoxemic respiratory failure ([facility]    4. Shortness of breath    5. [facility]ascular dementia with behavior disturbance [facility]          PLAN:   I did not requalify her for home oxygen but I assume that since she is on it we should be able to get a portable unit for her to use for ambulation. I did recommend that they stop using long-acting theophylline since it is a very weak bronchodilator and has significant side effects. This includes the possibly could be affecting her sleep. I did recommend she get the booster COVID-19 vaccination when it becomes available to her  I will continue to follow her      We have discussed the need to maintain yearly flu immunization, pneumococcal vaccination. We have discussed Coronavirus precaution including handwashing practice, wiping items touched in public such as gas pumps, door handles, shopping carts, etc. Self monitoring for infection - fever, chills, cough, SOB. Should they develop symptoms they should call office for further instructions. Return in about 6 months (around 3/1/2022) for Recheck for COPD, Recheck for Shortness of Breath, chronic hypoxemic respiratory failure. This dictation was performed with a verbal recognition program and it was checked for errors. It is possible that there are still dictated errors within this office note. Any errors should be brought immediately to my attention for correction. All efforts were made to ensure that this office note is accurate.

## 2021-09-27 ENCOUNTER — OFFICE VISIT (OUTPATIENT)
Dept: CARDIOLOGY CLINIC | Age: 86
End: 2021-09-27
Payer: MEDICARE

## 2021-09-27 VITALS
HEART RATE: 89 BPM | SYSTOLIC BLOOD PRESSURE: 110 MMHG | WEIGHT: 118 LBS | HEIGHT: 62 IN | OXYGEN SATURATION: 92 % | DIASTOLIC BLOOD PRESSURE: 62 MMHG | BODY MASS INDEX: 21.71 KG/M2

## 2021-09-27 DIAGNOSIS — I77.1 SUBCLAVIAN ARTERY STENOSIS, LEFT (HCC): ICD-10-CM

## 2021-09-27 DIAGNOSIS — I25.10 CORONARY ARTERY DISEASE INVOLVING NATIVE CORONARY ARTERY OF NATIVE HEART WITHOUT ANGINA PECTORIS: Primary | ICD-10-CM

## 2021-09-27 PROCEDURE — 1123F ACP DISCUSS/DSCN MKR DOCD: CPT | Performed by: INTERNAL MEDICINE

## 2021-09-27 PROCEDURE — 1090F PRES/ABSN URINE INCON ASSESS: CPT | Performed by: INTERNAL MEDICINE

## 2021-09-27 PROCEDURE — G8420 CALC BMI NORM PARAMETERS: HCPCS | Performed by: INTERNAL MEDICINE

## 2021-09-27 PROCEDURE — G8427 DOCREV CUR MEDS BY ELIG CLIN: HCPCS | Performed by: INTERNAL MEDICINE

## 2021-09-27 PROCEDURE — 99214 OFFICE O/P EST MOD 30 MIN: CPT | Performed by: INTERNAL MEDICINE

## 2021-09-27 PROCEDURE — 4040F PNEUMOC VAC/ADMIN/RCVD: CPT | Performed by: INTERNAL MEDICINE

## 2021-09-27 PROCEDURE — G8400 PT W/DXA NO RESULTS DOC: HCPCS | Performed by: INTERNAL MEDICINE

## 2021-09-27 PROCEDURE — 1036F TOBACCO NON-USER: CPT | Performed by: INTERNAL MEDICINE

## 2021-09-27 NOTE — PATIENT INSTRUCTIONS
Please be informed that if you contact our office outside of normal business hours the physician on call cannot help with any scheduling or rescheduling issues, procedure instruction questions or any type of medication issue. We advise you for any urgent/emergency that you go to the nearest emergency room! PLEASE CALL OUR OFFICE DURING NORMAL BUSINESS HOURS    Monday - Friday   8 am to 5 pm    BartelsoSharron Elisabeth 12: [phone]    [place]:  [phone]    **It is YOUR responsibilty to bring medication bottles and/or updated medication list to [address] [place] [address].  This will allow us to better serve you and all your healthcare needs**

## 2021-09-27 NOTE — PROGRESS NOTES
Dalton Hudson MD        OFFICE  FOLLOWUP NOTE    Chief complaints: patient is here for management of CAD,S/P CABG, HTN, DYSLPIDEMIA, post op for botox ( bladder in continence), COPD  Subjective: patient feels better, no chest pain, no shortness of breath, no dizziness, no palpitations    HPI Yuliya Ferrer is a 82 y. o.year old who  has a past medical history of Acute cystitis, Acute ischemic colitis ([address] CAD (coronary artery disease), Cerebrovascular event ([address] Chronic hypoxemic respiratory failure ([address] Chronic neck pain, Cognitive impairment, COPD, severe ([address] Depression, Diverticulitis, Family history of colon cancer, Family history of diabetes mellitus, Fibromyalgia, Gastroesophageal reflux disease without esophagitis, Graves disease, H/O echocardiogram, Hyperlipidemia, Hypertension, Lumbar spinal stenosis, Nocturnal hypoxemia, Nocturnal oxygen desaturation, Obstructive sleep apnea, Osteoporosis, S/P CABG (coronary artery bypass graft), S/P cardiac catheterization, Subclavian artery stenosis, left ([address] Subclinical Hyperthyroidism, Type II diabetes mellitus ([address] Urine incontinence, and Vascular dementia ([address]  and presents for management of CAD,S/P CABG, HTN, DYSLPIDEMIA, post op for botox ( bladder in continence), COPD which are well controlled      Current Outpatient Medications   Medication Sig Dispense Refill    ipratropium-albuterol (DUONEB) 0.5-2.5 (3) MG/3ML SOLN nebulizer solution inhale ONE vial PER nebulizer EVERY FOUR Hours 360 mL 11    PROAIR  (90 Base) MCG/ACT inhaler inhale TWO puffs BY MOUTH EVERY SIX Hours AS NEEDED FOR wheezing 25.5 g 3    tiotropium (SPIRIVA RESPIMAT) 2.5 MCG/ACT AERS inhaler inhale TWO puffs BY MOUTH DAILY 12 g 3    melatonin 3 MG TABS tablet Take 3 mg by mouth daily      Calcium Polycarbophil (FIBER-CAPS PO) Take by mouth      Multiple Vitamins-Minerals (THERAPEUTIC MULTIVITAMIN-MINERALS) tablet Take 1 tablet by mouth daily      Cholecalciferol (VITAMIN D3) 125 MCG (5000 UT) TABS Take by mouth      budesonide-formoterol (SYMBICORT) 160-4.5 MCG/ACT AERO inhale TWO puffs BY MOUTH TWICE DAILY 30.6 g 3    carvedilol (COREG) 12.5 MG tablet TAKE ONE TABLET BY MOUTH TWICE DAILY 180 tablet 3    omeprazole (PRILOSEC) 40 MG delayed release capsule Take 1 capsule by mouth daily 30 capsule 3    rosuvastatin (CRESTOR) 5 MG tablet TAKE ONE TABLET BY MOUTH EVERY DAY 90 tablet 2    Lactobacillus (PROBIOTIC ACIDOPHILUS PO) Take by mouth      ONETOUCH VERIO strip TEST ONE TO TWO TIMES DAILY AS DIRECTED      Blood Glucose Monitoring Suppl (ONETOUCH VERIO FLEX SYSTEM) w/Device KIT USE AS DIRECTED      OneTouch Delica Lancets 04K MISC USE AS DIRECTED ONCE TO TWICE DAILY      Syringe, Disposable, 3 ML MISC 1 each by Does not apply route daily 12 each 1    ferrous sulfate (IRON 325) 325 (65 Fe) MG tablet Take 1 tablet by mouth daily 90 tablet 1    doxepin (SINEQUAN) 10 MG capsule Take 10 mg by mouth nightly      Handicap Placard MISC by Does not apply route Good for 5 years 1 each 0    Memantine HCl-Donepezil HCl (NAMZARIC) 28-10 MG CP24 Take 1 capsule by mouth daily      OXYGEN Inhale 2 L/min into the lungs nightly And portable; battery powered oxygen unit when needed      aspirin 81 MG chewable tablet Take 1 tablet by mouth daily 30 tablet 3    cyanocobalamin 1000 MCG/ML injection Inject 1 mL into the muscle every 30 days for 1 dose (Patient not taking: Reported on 3/29/2021) 6 vial 1     Current Facility-Administered Medications   Medication Dose Route Frequency Provider Last Rate Last Admin    cyanocobalamin injection 1,000 mcg  1,000 mcg SubCUTAneous Q30 Days Rajan Lloyd DO   1,000 mcg at 02/17/20 9414     Allergies: Cipro xr, Demerol, Lipitor  [atorvastatin calcium], Moxifloxacin, Statins, Lipitor [atorvastatin], and Wellbutrin [bupropion]  Past Medical History:   Diagnosis Date    Acute cystitis 02/10/2020    Acute ischemic colitis ([facility] [address] 2008    Colonoscopy done    CAD (coronary artery disease) 06/2017    4 vessel CABG    Cerebrovascular event ([address] 02/2016    balance, speech, leg weakness; ARU    Chronic hypoxemic respiratory failure ([address] 02/18/2019    Chronic neck pain     Dr Del Tamez 2/2011    Cognitive impairment 09/2011    COPD, severe ([address] 11/07/2018    Depression     Diverticulitis 05/2012    Family history of colon cancer     Family history of diabetes mellitus     Fibromyalgia 1989    Gastroesophageal reflux disease without esophagitis 02/15/2020    Graves disease 2012    Riley Ball; tapazole for a time    H/O echocardiogram 04/08/2021    Normal EF 55-60%.  Hyperlipidemia     Hypertension     Lumbar spinal stenosis 11/2015    moderate ; MRI by Dr Lerner Flatness Nocturnal hypoxemia 11/07/2018    Nocturnal oxygen desaturation 2015    Obstructive sleep apnea     Osteoporosis     S/P CABG (coronary artery bypass graft) 06/2017    4 vessel-CABG EVH RCA Marginal, OM1, OM2,  LIMA to LAD    S/P cardiac catheterization 06/07/2017    severe multivessel disease    Subclavian artery stenosis, left ([address] 06/2017    noted in hosp for CABG; to be dealt with as OP by vasc surgeons    Subclinical Hyperthyroidism 2010    Dr. Riley Ball;  Tapazole    Type II diabetes mellitus ([address] 2019 7/2019;DIET CONTROL    Urine incontinence 06/2011    Timed voiding; Isidro; urology    Vascular dementia [facility])      Past Surgical History:   Procedure Laterality Date    CARPAL TUNNEL RELEASE      Rt & Lt     CATARACT REMOVAL WITH IMPLANT Right 07/2016    CATARACT REMOVAL WITH IMPLANT Left 03/2019    iris surgery also    CERVICAL DISCECTOMY  01-    C5-6    CERVICAL LAMINECTOMY  09-    with fusion and instrumentation    CERVICAL SPINE SURGERY  4/2011    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; fusion; fixation    CORONARY ARTERY BYPASS GRAFT  06/2017    4- vessel    INCONTINENCE SURGERY  11/2018    Botox 11/2018 ; Pancho Ramos    ROTATOR (54.4 kg)     Body mass index is 21.93 kg/m². GENERAL - Alert, oriented, pleasant, in no apparent distress,normal grooming  HEENT - pupils are intact, cornea intact, conjunctive normal color, ears are normal in exam,  Neck - Supple. No jugular venous distention noted. No carotid bruits, no apical -carotid delay  Respiratory:  Normal breath sounds, No respiratory distress, No wheezing, No chest tenderness. ,no use of accessory muscles, diaphragm movement is normal  Cardiovascular: (PMI) apex non displaced,no lifts no thrills, no s3,no s4, Normal heart rate, Normal rhythm, No murmurs, No rubs, No gallops. Carotid arteries pulse and amplitude are normal no bruit, no abdominal bruit noted ( normal abdominal aorta ausculation),   Extremities - No cyanosis, clubbing, or significant edema, no varicose veins    Abdomen - No masses, tenderness, or organomegaly, no hepato-splenomegally, no bruits  Musculoskeletal - No significant edema, no kyphosis or scoliosis, no deformity in any extremity noted, muscle strength and tone are normal  Skin: no ulcer,no scar,no stasis dermatitis   Neurologic - alert oriented times 3,Cranial nerves II through XII are grossly intact. There were no gross focal neurologic abnormalities. Psychiatric: normal mood and affect    Lab Results   Component Value Date    TROPONINI <0.006 02/25/2013     BNP:    Lab Results   Component Value Date    BNP 44 02/25/2013     PT/INR:  No results found for: PTINR  Lab Results   Component Value Date    LABA1C 6.8 02/14/2020    LABA1C 7.0 (H) 10/29/2019     Lab Results   Component Value Date    CHOL 171 10/29/2019    TRIG 159 (H) 10/29/2019    HDL 41 02/14/2020    LDLCALC 61 02/14/2020    LDLDIRECT 82 10/29/2019     Lab Results   Component Value Date    ALT 21 02/14/2020    AST 15 02/14/2020     TSH:    Lab Results   Component Value Date    TSH 0.69 02/14/2020       Impression:  Cresencio Schilder is a 82 y. o.year old who  has a past medical history of Acute cystitis, Acute ischemic colitis ([address] CAD (coronary artery disease), Cerebrovascular event ([address] Chronic hypoxemic respiratory failure ([address] Chronic neck pain, Cognitive impairment, COPD, severe ([address] Depression, Diverticulitis, Family history of colon cancer, Family history of diabetes mellitus, Fibromyalgia, Gastroesophageal reflux disease without esophagitis, Graves disease, H/O echocardiogram, Hyperlipidemia, Hypertension, Lumbar spinal stenosis, Nocturnal hypoxemia, Nocturnal oxygen desaturation, Obstructive sleep apnea, Osteoporosis, S/P CABG (coronary artery bypass graft), S/P cardiac catheterization, Subclavian artery stenosis, left ([address] Subclinical Hyperthyroidism, Type II diabetes mellitus ([address] Urine incontinence, and Vascular dementia ([address] and presents with     Plan:  1. Shortness of breath with walking: echo showed normal  LVEF she also has COPD  2. PANIC ATTACK and insomnia: recommend to see psychiatory  3. History of bladder botox,SHE HAD twice, its helping her, no cardiac complication noted, gets UTI, she is cleared for another botox procedure from cardiac stand point  4. CAD s/p CABG and had complications post CABG with sternal infection and had to redo sternotomy, she is better now,continue coreg, aspirin and crestor, completed cardiac rehab   5. Dyslipidemia: continue crestor 5mg po daily  6. Dementia: stable  7. History of TIA:CONTINUE aspirin and crestor  8. HTN: stable, continue core  9. Dm: she is  Not Diabetuc and off metformin  All labs, medications and tests reviewed, continue all other medications of all above medical condition listed as is.

## 2021-09-30 ENCOUNTER — OFFICE VISIT (OUTPATIENT)
Dept: PULMONOLOGY | Age: 86
End: 2021-09-30
Payer: MEDICARE

## 2021-09-30 VITALS
DIASTOLIC BLOOD PRESSURE: 79 MMHG | SYSTOLIC BLOOD PRESSURE: 115 MMHG | OXYGEN SATURATION: 86 % | HEART RATE: 92 BPM | WEIGHT: 118 LBS | BODY MASS INDEX: 21.71 KG/M2 | HEIGHT: 62 IN

## 2021-09-30 DIAGNOSIS — J44.9 COPD, SEVERE (HCC): Primary | ICD-10-CM

## 2021-09-30 DIAGNOSIS — R06.02 SHORTNESS OF BREATH: ICD-10-CM

## 2021-09-30 DIAGNOSIS — F01.518 VASCULAR DEMENTIA WITH BEHAVIOR DISTURBANCE: ICD-10-CM

## 2021-09-30 DIAGNOSIS — J96.11 CHRONIC HYPOXEMIC RESPIRATORY FAILURE (HCC): ICD-10-CM

## 2021-09-30 PROCEDURE — 1036F TOBACCO NON-USER: CPT | Performed by: INTERNAL MEDICINE

## 2021-09-30 PROCEDURE — 3023F SPIROM DOC REV: CPT | Performed by: INTERNAL MEDICINE

## 2021-09-30 PROCEDURE — 1090F PRES/ABSN URINE INCON ASSESS: CPT | Performed by: INTERNAL MEDICINE

## 2021-09-30 PROCEDURE — G8400 PT W/DXA NO RESULTS DOC: HCPCS | Performed by: INTERNAL MEDICINE

## 2021-09-30 PROCEDURE — G8926 SPIRO NO PERF OR DOC: HCPCS | Performed by: INTERNAL MEDICINE

## 2021-09-30 PROCEDURE — 99213 OFFICE O/P EST LOW 20 MIN: CPT | Performed by: INTERNAL MEDICINE

## 2021-09-30 PROCEDURE — 4040F PNEUMOC VAC/ADMIN/RCVD: CPT | Performed by: INTERNAL MEDICINE

## 2021-09-30 PROCEDURE — G8420 CALC BMI NORM PARAMETERS: HCPCS | Performed by: INTERNAL MEDICINE

## 2021-09-30 PROCEDURE — 1123F ACP DISCUSS/DSCN MKR DOCD: CPT | Performed by: INTERNAL MEDICINE

## 2021-09-30 PROCEDURE — G8427 DOCREV CUR MEDS BY ELIG CLIN: HCPCS | Performed by: INTERNAL MEDICINE

## 2021-09-30 NOTE — PROGRESS NOTES
SUBJECTIVE:  Chief Complaint: Severe COPD, acute on chronic hypoxemic respiratory failure, shortness of breath, vascular dementia  Krysta Melgar does have oxygen at home but does not have a portable unit for ambulation or when she is outside of the home. She wears it most of the day and night. She continues on Symbicort, Spiriva, theophylline, albuterol MDI or nebulized albuterol solution to control her COPD. She has had no recent bronchitic exacerbation. She has had no known COVID-19 exposure or infection and has received both Moderna 19 vaccinations      ROS:  Constitution:  HEENT: Negative for ear, throat pain  Cardiovascular: Negative for chest pain, syncope, edema  Pulmonary: See HPI  Musculoskeletal: Negative for DVT, myalgias, arthralgias    OBJECTIVE:  /79   Pulse 92   Ht 5' 1.5\" (1.562 m)   Wt 118 lb (53.5 kg)   LMP  (LMP Unknown)   SpO2 (!) 86%   BMI 21.93 kg/m²      Physical Exam:  Constitutional:  She appears well developed and well-nourished. Neck:  Supple, No palpable lymphadenopathy, No JVD  Cardiovascular:  S1, S2 Normal, Regular rhythm, no murmurs or gallops, No pericardial  rubs. Pulmonary: Diminished breath sounds throughout all lung fields  Abdomen: Not examined  Extremities: no edema, No DVT  Neurologic: Oriented x3, No focal deficits    Radiology: Chest x-ray last obtained at [facility] imaging on 6/1/2020 showed coarse interstitial markings in bilateral lung bases which may represent atelectasis and/or fibrosis/scarring. No focal airspace opacity or consolidation noted  PFT: Office spirometry last obtained on 11/26/2019 demonstrated severe obstructive defect with significant response to bronchodilators      Home oxygen evaluation: O2 saturation on room air at rest-86%    ASSESSMENT:    1. COPD, severe ([address]    2. Shortness of breath    3. Chronic hypoxemic respiratory failure (HCC)    4.  Vascular dementia with behavior disturbance ([address]          PLAN:   She does qualify and will benefit from a portable oxygen tank and continue to wear her oxygen 24 hours a day. I will continue to follow her    We have discussed the need to maintain yearly flu immunization, pneumococcal vaccination. We have discussed Coronavirus precaution including handwashing practice, wiping items touched in public such as gas pumps, door handles, shopping carts, etc. Self monitoring for infection - fever, chills, cough, SOB. Should they develop symptoms they should call office for further instructions. Return in about 22 weeks (around 3/3/2022) for Recheck for COPD, Recheck for Shortness of Breath, chronic hypoxemic respiratory failure. This dictation was performed with a verbal recognition program and it was checked for errors. It is possible that there are still dictated errors within this office note. Any errors should be brought immediately to my attention for correction. All efforts were made to ensure that this office note is accurate.

## 2021-10-27 ENCOUNTER — TELEPHONE (OUTPATIENT)
Dept: CARDIOLOGY CLINIC | Age: 86
End: 2021-10-27

## 2021-10-27 NOTE — TELEPHONE ENCOUNTER
Cardiologist: Dr. Adrien Valdes  Surgeon: Dr. Jacque Zapien  Surgery: Interstim Stage 1 & 2  Anesthesia: MAC  Date: 12/9/2021  FAX# [phone]  # [phone]    Last OV 3/29/2021 w/Karolina     1. Shortness of breath with walking: echo ordered, she also has COPD  2. History of bladder botox,SHE HAD twice, its helping her, no cardiac complication noted, gets UTI, she is cleared for another botox procedure from cardiac stand point  3. CAD s/p CABG and had complications post CABG with sternal infection and had to redo sternotomy, she is better now,continue coreg, aspirin and crestor, completed cardiac rehab   4. Dyslipidemia: continue crestor 5mg po daily  5. Dementia: stable  6. History of TIA:CONTINUE aspirin and crestor  7. HTN: stable, continue core  8. Dm: she is  Not Diabetuc and off metformin  9. Health maintenance: exerise and dietAll labs, medications and tests reviewed, continue all other medications of all above medical condition listed as is.        GXT- 3/6/2018   Reduced exercise performance with angina and ischemic EKG changes.    Nicholas Palacios  NM not done        Echo- 4/8/2021   Technically difficult examination.   Left ventricular systolic function is normal with an ejection fraction of   55-60%.    Mild concentric left ventricular hypertrophy.   Mild-to-moderate tricuspid regurgitation.   Normal pulmonary artery pressure with a RVSP of 29mmHg.   Trace Physiological Pericardial effusion is present           CABG-6/2017     Aspirin

## 2021-12-22 ENCOUNTER — APPOINTMENT (OUTPATIENT)
Dept: GENERAL RADIOLOGY | Age: 86
DRG: 689 | End: 2021-12-22
Payer: MEDICARE

## 2021-12-22 ENCOUNTER — HOSPITAL ENCOUNTER (INPATIENT)
Age: 86
LOS: 4 days | Discharge: NURSING, REHAB OR LONG-TERM CARE | DRG: 689 | End: 2021-12-31
Attending: EMERGENCY MEDICINE | Admitting: HOSPITALIST
Payer: MEDICARE

## 2021-12-22 DIAGNOSIS — R63.0 ANOREXIA: ICD-10-CM

## 2021-12-22 DIAGNOSIS — R53.1 GENERAL WEAKNESS: Primary | ICD-10-CM

## 2021-12-22 PROBLEM — R62.7 FAILURE TO THRIVE IN ADULT: Status: ACTIVE | Noted: 2021-12-22

## 2021-12-22 LAB
ADENOVIRUS DETECTION BY PCR: NOT DETECTED
ALBUMIN SERPL-MCNC: 3.8 GM/DL (ref 3.4–5)
ALP BLD-CCNC: 47 IU/L (ref 40–129)
ALT SERPL-CCNC: 18 U/L (ref 10–40)
ANION GAP SERPL CALCULATED.3IONS-SCNC: 18 MMOL/L (ref 4–16)
AST SERPL-CCNC: 28 IU/L (ref 15–37)
BASOPHILS ABSOLUTE: 0 K/CU MM
BASOPHILS RELATIVE PERCENT: 0.2 % (ref 0–1)
BILIRUB SERPL-MCNC: 0.9 MG/DL (ref 0–1)
BILIRUBIN DIRECT: 0.2 MG/DL (ref 0–0.3)
BILIRUBIN, INDIRECT: 0.7 MG/DL (ref 0–0.7)
BORDETELLA PARAPERTUSSIS BY PCR: NOT DETECTED
BORDETELLA PERTUSSIS PCR: NOT DETECTED
BUN BLDV-MCNC: 13 MG/DL (ref 6–23)
C-REACTIVE PROTEIN, HIGH SENSITIVITY: 31.5 MG/L
CALCIUM SERPL-MCNC: 9.2 MG/DL (ref 8.3–10.6)
CHLAMYDOPHILA PNEUMONIA PCR: NOT DETECTED
CHLORIDE BLD-SCNC: 99 MMOL/L (ref 99–110)
CO2: 23 MMOL/L (ref 21–32)
CORONAVIRUS 229E PCR: NOT DETECTED
CORONAVIRUS HKU1 PCR: NOT DETECTED
CORONAVIRUS NL63 PCR: NOT DETECTED
CORONAVIRUS OC43 PCR: NOT DETECTED
CREAT SERPL-MCNC: 0.8 MG/DL (ref 0.6–1.1)
DIFFERENTIAL TYPE: ABNORMAL
EKG ATRIAL RATE: 91 BPM
EKG DIAGNOSIS: NORMAL
EKG P AXIS: 76 DEGREES
EKG P-R INTERVAL: 144 MS
EKG Q-T INTERVAL: 370 MS
EKG QRS DURATION: 70 MS
EKG QTC CALCULATION (BAZETT): 455 MS
EKG R AXIS: -30 DEGREES
EKG T AXIS: 62 DEGREES
EKG VENTRICULAR RATE: 91 BPM
EOSINOPHILS ABSOLUTE: 0.1 K/CU MM
EOSINOPHILS RELATIVE PERCENT: 0.6 % (ref 0–3)
GFR AFRICAN AMERICAN: >60 ML/MIN/1.73M2
GFR NON-AFRICAN AMERICAN: >60 ML/MIN/1.73M2
GLUCOSE BLD-MCNC: 118 MG/DL (ref 70–99)
HCT VFR BLD CALC: 41.2 % (ref 37–47)
HEMOGLOBIN: 13.7 GM/DL (ref 12.5–16)
HUMAN METAPNEUMOVIRUS PCR: NOT DETECTED
IMMATURE NEUTROPHIL %: 0.4 % (ref 0–0.43)
INFLUENZA A BY PCR: NOT DETECTED
INFLUENZA A H1 (2009) PCR: NOT DETECTED
INFLUENZA A H1 PANDEMIC PCR: NOT DETECTED
INFLUENZA A H3 PCR: NOT DETECTED
INFLUENZA B BY PCR: NOT DETECTED
LIPASE: 17 IU/L (ref 13–60)
LYMPHOCYTES ABSOLUTE: 0.9 K/CU MM
LYMPHOCYTES RELATIVE PERCENT: 7.9 % (ref 24–44)
MCH RBC QN AUTO: 34.3 PG (ref 27–31)
MCHC RBC AUTO-ENTMCNC: 33.3 % (ref 32–36)
MCV RBC AUTO: 103.3 FL (ref 78–100)
MONOCYTES ABSOLUTE: 0.5 K/CU MM
MONOCYTES RELATIVE PERCENT: 4.9 % (ref 0–4)
MYCOPLASMA PNEUMONIAE PCR: NOT DETECTED
NUCLEATED RBC %: 0 %
PARAINFLUENZA 1 PCR: NOT DETECTED
PARAINFLUENZA 2 PCR: NOT DETECTED
PARAINFLUENZA 3 PCR: NOT DETECTED
PARAINFLUENZA 4 PCR: NOT DETECTED
PDW BLD-RTO: 12.2 % (ref 11.7–14.9)
PLATELET # BLD: 230 K/CU MM (ref 140–440)
PMV BLD AUTO: 8.9 FL (ref 7.5–11.1)
POTASSIUM SERPL-SCNC: 4.3 MMOL/L (ref 3.5–5.1)
RBC # BLD: 3.99 M/CU MM (ref 4.2–5.4)
RHINOVIRUS ENTEROVIRUS PCR: NOT DETECTED
RSV PCR: NOT DETECTED
SARS-COV-2, NAAT: ABNORMAL
SARS-COV-2, NAAT: ABNORMAL
SARS-COV-2: NOT DETECTED
SEGMENTED NEUTROPHILS ABSOLUTE COUNT: 9.6 K/CU MM
SEGMENTED NEUTROPHILS RELATIVE PERCENT: 86 % (ref 36–66)
SODIUM BLD-SCNC: 140 MMOL/L (ref 135–145)
SOURCE: ABNORMAL
TOTAL IMMATURE NEUTOROPHIL: 0.04 K/CU MM
TOTAL NUCLEATED RBC: 0 K/CU MM
TOTAL PROTEIN: 6.9 GM/DL (ref 6.4–8.2)
TROPONIN T: <0.01 NG/ML
WBC # BLD: 11.1 K/CU MM (ref 4–10.5)

## 2021-12-22 PROCEDURE — 2580000003 HC RX 258: Performed by: NURSE PRACTITIONER

## 2021-12-22 PROCEDURE — 96372 THER/PROPH/DIAG INJ SC/IM: CPT

## 2021-12-22 PROCEDURE — 83690 ASSAY OF LIPASE: CPT

## 2021-12-22 PROCEDURE — 84484 ASSAY OF TROPONIN QUANT: CPT

## 2021-12-22 PROCEDURE — 93005 ELECTROCARDIOGRAM TRACING: CPT | Performed by: EMERGENCY MEDICINE

## 2021-12-22 PROCEDURE — G0378 HOSPITAL OBSERVATION PER HR: HCPCS

## 2021-12-22 PROCEDURE — 80053 COMPREHEN METABOLIC PANEL: CPT

## 2021-12-22 PROCEDURE — 6370000000 HC RX 637 (ALT 250 FOR IP): Performed by: NURSE PRACTITIONER

## 2021-12-22 PROCEDURE — 81001 URINALYSIS AUTO W/SCOPE: CPT

## 2021-12-22 PROCEDURE — 86140 C-REACTIVE PROTEIN: CPT

## 2021-12-22 PROCEDURE — 93010 ELECTROCARDIOGRAM REPORT: CPT | Performed by: INTERNAL MEDICINE

## 2021-12-22 PROCEDURE — 36415 COLL VENOUS BLD VENIPUNCTURE: CPT

## 2021-12-22 PROCEDURE — 71045 X-RAY EXAM CHEST 1 VIEW: CPT

## 2021-12-22 PROCEDURE — 82248 BILIRUBIN DIRECT: CPT

## 2021-12-22 PROCEDURE — 99284 EMERGENCY DEPT VISIT MOD MDM: CPT

## 2021-12-22 PROCEDURE — 87635 SARS-COV-2 COVID-19 AMP PRB: CPT

## 2021-12-22 PROCEDURE — 94640 AIRWAY INHALATION TREATMENT: CPT

## 2021-12-22 PROCEDURE — 85025 COMPLETE CBC W/AUTO DIFF WBC: CPT

## 2021-12-22 PROCEDURE — 6360000002 HC RX W HCPCS: Performed by: NURSE PRACTITIONER

## 2021-12-22 PROCEDURE — 0202U NFCT DS 22 TRGT SARS-COV-2: CPT

## 2021-12-22 RX ORDER — DEXTROSE MONOHYDRATE 50 MG/ML
1000 INJECTION, SOLUTION INTRAVENOUS CONTINUOUS
Status: DISPENSED | OUTPATIENT
Start: 2021-12-22 | End: 2021-12-23

## 2021-12-22 RX ORDER — SODIUM CHLORIDE 0.9 % (FLUSH) 0.9 %
5-40 SYRINGE (ML) INJECTION EVERY 12 HOURS SCHEDULED
Status: DISCONTINUED | OUTPATIENT
Start: 2021-12-22 | End: 2021-12-31 | Stop reason: HOSPADM

## 2021-12-22 RX ORDER — SODIUM CHLORIDE 9 MG/ML
25 INJECTION, SOLUTION INTRAVENOUS PRN
Status: DISCONTINUED | OUTPATIENT
Start: 2021-12-22 | End: 2021-12-31 | Stop reason: HOSPADM

## 2021-12-22 RX ORDER — CARVEDILOL 12.5 MG/1
12.5 TABLET ORAL 2 TIMES DAILY WITH MEALS
Status: DISCONTINUED | OUTPATIENT
Start: 2021-12-22 | End: 2021-12-31 | Stop reason: HOSPADM

## 2021-12-22 RX ORDER — DOXEPIN HYDROCHLORIDE 10 MG/1
10 CAPSULE ORAL NIGHTLY
Status: DISCONTINUED | OUTPATIENT
Start: 2021-12-22 | End: 2021-12-31 | Stop reason: HOSPADM

## 2021-12-22 RX ORDER — ONDANSETRON 2 MG/ML
4 INJECTION INTRAMUSCULAR; INTRAVENOUS EVERY 6 HOURS PRN
Status: DISCONTINUED | OUTPATIENT
Start: 2021-12-22 | End: 2021-12-31 | Stop reason: HOSPADM

## 2021-12-22 RX ORDER — ACETAMINOPHEN 325 MG/1
650 TABLET ORAL EVERY 6 HOURS PRN
Status: DISCONTINUED | OUTPATIENT
Start: 2021-12-22 | End: 2021-12-31 | Stop reason: HOSPADM

## 2021-12-22 RX ORDER — ROSUVASTATIN CALCIUM 5 MG/1
5 TABLET, COATED ORAL NIGHTLY
Status: DISCONTINUED | OUTPATIENT
Start: 2021-12-22 | End: 2021-12-31 | Stop reason: HOSPADM

## 2021-12-22 RX ORDER — SODIUM CHLORIDE 0.9 % (FLUSH) 0.9 %
10 SYRINGE (ML) INJECTION PRN
Status: DISCONTINUED | OUTPATIENT
Start: 2021-12-22 | End: 2021-12-31 | Stop reason: HOSPADM

## 2021-12-22 RX ORDER — ACETAMINOPHEN 650 MG/1
650 SUPPOSITORY RECTAL EVERY 6 HOURS PRN
Status: DISCONTINUED | OUTPATIENT
Start: 2021-12-22 | End: 2021-12-31 | Stop reason: HOSPADM

## 2021-12-22 RX ORDER — BUDESONIDE AND FORMOTEROL FUMARATE DIHYDRATE 160; 4.5 UG/1; UG/1
1 AEROSOL RESPIRATORY (INHALATION) 2 TIMES DAILY
Status: DISCONTINUED | OUTPATIENT
Start: 2021-12-22 | End: 2021-12-31 | Stop reason: HOSPADM

## 2021-12-22 RX ORDER — ASPIRIN 81 MG/1
81 TABLET ORAL DAILY
Status: DISCONTINUED | OUTPATIENT
Start: 2021-12-23 | End: 2021-12-31 | Stop reason: HOSPADM

## 2021-12-22 RX ORDER — PANTOPRAZOLE SODIUM 40 MG/1
40 TABLET, DELAYED RELEASE ORAL
Status: DISCONTINUED | OUTPATIENT
Start: 2021-12-23 | End: 2021-12-31 | Stop reason: HOSPADM

## 2021-12-22 RX ORDER — LANOLIN ALCOHOL/MO/W.PET/CERES
3 CREAM (GRAM) TOPICAL NIGHTLY
Status: DISCONTINUED | OUTPATIENT
Start: 2021-12-22 | End: 2021-12-31 | Stop reason: HOSPADM

## 2021-12-22 RX ORDER — POLYETHYLENE GLYCOL 3350 17 G/17G
17 POWDER, FOR SOLUTION ORAL DAILY PRN
Status: DISCONTINUED | OUTPATIENT
Start: 2021-12-22 | End: 2021-12-31 | Stop reason: HOSPADM

## 2021-12-22 RX ORDER — POLYETHYLENE GLYCOL 3350 17 G
2 POWDER IN PACKET (EA) ORAL
Status: DISCONTINUED | OUTPATIENT
Start: 2021-12-22 | End: 2021-12-31 | Stop reason: HOSPADM

## 2021-12-22 RX ADMIN — BUDESONIDE AND FORMOTEROL FUMARATE DIHYDRATE 1 PUFF: 160; 4.5 AEROSOL RESPIRATORY (INHALATION) at 19:55

## 2021-12-22 RX ADMIN — ENOXAPARIN SODIUM 40 MG: 100 INJECTION SUBCUTANEOUS at 22:59

## 2021-12-22 RX ADMIN — MELATONIN TAB 3 MG 3 MG: 3 TAB at 23:00

## 2021-12-22 RX ADMIN — DEXTROSE MONOHYDRATE 1000 ML: 50 INJECTION, SOLUTION INTRAVENOUS at 20:11

## 2021-12-22 RX ADMIN — CARVEDILOL 12.5 MG: 12.5 TABLET, FILM COATED ORAL at 23:00

## 2021-12-22 RX ADMIN — ROSUVASTATIN CALCIUM 5 MG: 5 TABLET, COATED ORAL at 23:00

## 2021-12-22 NOTE — ED NOTES
Bed: ED-31  Expected date: 12/22/21  Expected time:   Means of arrival:   Comments:  EMS     Maria Elena Garvin RN  12/22/21 2122

## 2021-12-22 NOTE — ED PROVIDER NOTES
CHIEF COMPLAINT    Chief Complaint   Patient presents with    Failure To Thrive     not eating or drinking      HPI  Rere Anne is a 82 y.o. female with history of coronary artery disease, COPD, and dementia who presents to the ED by her  who reports worsening weakness and refusing to eat or drink. Patient has dementia and the  is primary caretaker for her. He states that over the last 3 days she is refused to eat or drink and is refusing to walk around the house. She normally walks around with a walker. Patient is currently refusing to eat or drink. And speak with the patient she tells me she simply does not feel like eating or drinking. Symptoms are constant. Nothing seems to make symptoms better or worse.  is unaware of any recent fevers or vomiting. No recent falls. She is vaccinated against COVID-19. Paramedics found patient to be hypoglycemic in the 50s but she did eat oral glucose. REVIEW OF SYSTEMS  Constitutional: No fever, chills or recent illness. Eye: No visual changes  HENT: No earache or sore throat. Resp: No SOB or productive cough. Cardio: No chest pain or palpitations. GI: No abdominal pain, nausea, vomiting, constipation or diarrhea. No melena. : No dysuria, urgency or frequency. Endocrine: No heat intolerance, no cold intolerance, no polydipsia   Lymphatics: No adenopathy  Musculoskeletal: No new muscle aches or joint pain. Neuro: No headaches. Psych: No homicidal or suicidal thoughts  Skin: No rash, No itching. ?  ?   PAST MEDICAL HISTORY  Past Medical History:   Diagnosis Date    Acute cystitis 02/10/2020    Acute ischemic colitis [facility]) 2008    Colonoscopy done    CAD (coronary artery disease) 06/2017    4 vessel CABG    Cerebrovascular event ([facility] [address] 02/2016    balance, speech, leg weakness; ARU    Chronic hypoxemic respiratory failure ([facility] [address] 02/18/2019    Chronic neck pain     Dr Larson Patient 2/2011    Cognitive impairment 09/2011    COPD, Use    Vaping Use: Never used   Substance and Sexual Activity    Alcohol use: No     Comment: special occasion once a year maybe     Drug use: No    Sexual activity: Not Currently     Partners: Male   Other Topics Concern    None   Social History Narrative    None     Social Determinants of Health     Financial Resource Strain:     Difficulty of Paying Living Expenses: Not on file   Food Insecurity:     Worried About Running Out of Food in the Last Year: Not on file    Edith of Food in the Last Year: Not on file   Transportation Needs:     Lack of Transportation (Medical): Not on file    Lack of Transportation (Non-Medical):  Not on file   Physical Activity:     Days of Exercise per Week: Not on file    Minutes of Exercise per Session: Not on file   Stress:     Feeling of Stress : Not on file   Social Connections:     Frequency of Communication with Friends and Family: Not on file    Frequency of Social Gatherings with Friends and Family: Not on file    Attends Druze Services: Not on file    Active Member of [address] [place] [address] or Organizations: Not on file    Attends Club or Organization Meetings: Not on file    Marital Status: Not on file   Intimate Partner Violence:     Fear of Current or Ex-Partner: Not on file    Emotionally Abused: Not on file    Physically Abused: Not on file    Sexually Abused: Not on file   Housing Stability:     Unable to Pay for Housing in the Last Year: Not on file    Number of [place] in the Last Year: Not on file    Unstable Housing in the Last Year: Not on file       SURGICAL HISTORY  Past Surgical History:   Procedure Laterality Date    CARPAL TUNNEL RELEASE      Rt & Lt     CATARACT REMOVAL WITH IMPLANT Right 07/2016    CATARACT REMOVAL WITH IMPLANT Left 03/2019    iris surgery also    CERVICAL DISCECTOMY  01-    C5-6    CERVICAL LAMINECTOMY  09-    with fusion and instrumentation    CERVICAL SPINE SURGERY  4/2011    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; fusion; fixation    CORONARY ARTERY BYPASS GRAFT  06/2017    4- vessel    INCONTINENCE SURGERY  11/2018    Botox 11/2018 ; Klaudia Jade    ROTATOR CUFF REPAIR  1992    Left    ROTATOR CUFF REPAIR  1998    Right    TOTAL HIP ARTHROPLASTY Left [id number]    Left     CURRENT MEDICATIONS  Previous Medications    ASPIRIN 81 MG CHEWABLE TABLET    Take 1 tablet by mouth daily    BLOOD GLUCOSE MONITORING SUPPL ([address]) W/DEVICE KIT    USE AS DIRECTED    BUDESONIDE-FORMOTEROL (SYMBICORT) 160-4.5 MCG/ACT AERO    inhale TWO puffs BY MOUTH TWICE DAILY    CALCIUM POLYCARBOPHIL (FIBER-CAPS PO)    Take by mouth    CARVEDILOL (COREG) 12.5 MG TABLET    TAKE ONE TABLET BY MOUTH TWICE DAILY    CHOLECALCIFEROL (VITAMIN D3) 125 MCG (5000 UT) TABS    Take by mouth    CYANOCOBALAMIN 1000 MCG/ML INJECTION    Inject 1 mL into the muscle every 30 days for 1 dose    DOXEPIN (SINEQUAN) 10 MG CAPSULE    Take 10 mg by mouth nightly    FERROUS SULFATE (IRON 325) 325 (65 FE) MG TABLET    Take 1 tablet by mouth daily    HANDICAP PLACARD MISC    by Does not apply route Good for 5 years    IPRATROPIUM-ALBUTEROL (DUONEB) 0.5-2.5 (3) MG/3ML SOLN NEBULIZER SOLUTION    inhale ONE vial PER nebulizer EVERY FOUR Hours    LACTOBACILLUS (PROBIOTIC ACIDOPHILUS PO)    Take by mouth    MELATONIN 3 MG TABS TABLET    Take 3 mg by mouth daily    MEMANTINE HCL-DONEPEZIL HCL (NAMZARIC) 28-10 MG CP24    Take 1 capsule by mouth daily    MULTIPLE VITAMINS-MINERALS (THERAPEUTIC MULTIVITAMIN-MINERALS) TABLET    Take 1 tablet by mouth daily    OMEPRAZOLE (PRILOSEC) 40 MG DELAYED RELEASE CAPSULE    Take 1 capsule by mouth daily    ONETOUCH DELICA LANCETS 00N MISC    USE AS DIRECTED ONCE TO TWICE DAILY    ONETOUCH VERIO STRIP    TEST ONE TO TWO TIMES DAILY AS DIRECTED    OXYGEN    Inhale 2 L/min into the lungs nightly And portable; battery powered oxygen unit when needed    PROAIR  (90 BASE) MCG/ACT INHALER    inhale TWO puffs BY MOUTH EVERY SIX Hours AS NEEDED FOR wheezing    ROSUVASTATIN (CRESTOR) 5 MG TABLET    TAKE ONE TABLET BY MOUTH EVERY DAY    SYRINGE, DISPOSABLE, 3 ML MISC    1 each by Does not apply route daily    TIOTROPIUM (SPIRIVA RESPIMAT) 2.5 MCG/ACT AERS INHALER    inhale TWO puffs BY MOUTH DAILY     ALLERGIES  Allergies   Allergen Reactions    Cipro Xr     Demerol     Lipitor  [Atorvastatin Calcium]     Moxifloxacin Other (See Comments)     Phlebitis, urticaria    Statins      Myalgia      Lipitor [Atorvastatin] Other (See Comments)     myalgia    Wellbutrin [Bupropion] Other (See Comments)     \"zonked\"       Nursing notes reviewed by myself for past medical history, family history, social history, surgical history, current medications, and allergies. PHYSICAL EXAM  VITAL SIGNS: Triage VS:    ED Triage Vitals   Enc Vitals Group      BP       Pulse       Resp       Temp       Temp src       SpO2       Weight       Height       Head Circumference       Peak Flow       Pain Score       Pain Loc       Pain Edu? Excl. in [address]? Constitutional: Well developed, Well nourished, non-toxic appearing  HENT: Normocephalic, Atraumatic, Bilateral external ears normal, Oropharynx moist, No oral exudates, Nose normal.   Eyes: PERRL, EOMI, Conjunctiva normal, No discharge. No scleral icterus. Neck: Normal range of motion, No tenderness, Supple. Lymphatic: No lymphadenopathy noted. Cardiovascular: Normal heart rate, Normal rhythm, No murmurs, gallops or rubs. Thorax & Lungs: Normal breath sounds, No respiratory distress, No wheezing. Abdomen: Soft, No tenderness, No masses, No pulsatile masses, No distention, Normal bowel sounds  Skin: Warm, Dry, Pink, No mottling, No erythema, No rash. Back: No tenderness, No CVA tenderness. Extremities: No edema, No tenderness, No cyanosis, Normal perfusion, No clubbing. Musculoskeletal: Good range of motion in all major joints as observed. No major deformities noted.    Neurologic: Alert & oriented to person but not place or time, Normal motor function, Normal sensory function, CN II-XII grossly intact as tested, No focal deficits noted. Psychiatric: Affect normal, Judgment normal, Mood normal.     EKG  NA  RADIOLOGY  Labs Reviewed   COVID-19, RAPID - Abnormal; Notable for the following components:       Result Value    SARS-CoV-2, NAAT INDETERMINATE (*)     All other components within normal limits   CBC WITH AUTO DIFFERENTIAL - Abnormal; Notable for the following components:    WBC 11.1 (*)     RBC 3.99 (*)     .3 (*)     MCH 34.3 (*)     Segs Relative 86.0 (*)     Lymphocytes % 7.9 (*)     Monocytes % 4.9 (*)     All other components within normal limits   BASIC METABOLIC PANEL - Abnormal; Notable for the following components:    Anion Gap 18 (*)     Glucose 118 (*)     All other components within normal limits   COVID-19, RAPID   HEPATIC FUNCTION PANEL   LIPASE   TROPONIN   URINALYSIS WITH MICROSCOPIC     I personally reviewed the images. The radiologist's interpretation reveals:  Last Imaging results   XR CHEST PORTABLE   Final Result   1. Question small right effusion versus scarring. No focal consolidation   identified. MEDS GIVEN IN ED:  Medications - No data to display  [facility]  82year-old female presents to the emergency department accompanied by  and caretaker who states that she has poor appetite over the last 2 to 3 days and is unwilling to eat or drink. Medics found her to be hypoglycemic in the 50s but she was awake and talking and did eat 1 tube of oral glucose. She arrives here with vital signs demonstrate a mild tachycardia rate of 102. She is nontoxic-appearing otherwise. She is alert to person place but not time. Her neurological exam is otherwise nonfocal.  Abdomen is soft nontender. Lungs are clear to auscultation bilaterally.   At this time we will obtain CBC, BMP, EKG, troponin, urinalysis, COVID-19 testing, and chest x-ray.  CBC, BMP, hepatic panel, lipase are reassuring. COVID-19 test initially is indeterminate. Chest x-ray shows a questionable right-sided pleural effusion. At this time we will repeat Covid testing and ambulate the patient with pulse oximetry to evaluate for any evidence of hypoxia or tachycardia. Patient signed out to oncoming physician for final disposition although I suspect she may require hospitalization secondary to debility. Amount and/or Complexity of Data Reviewed  Clinical lab tests: reviewed  Decide to obtain previous medical records or to obtain history from someone other than the patient: yes       -  Patient seen and evaluated in the emergency department. -  Triage and nursing notes reviewed and incorporated. -  Old chart records reviewed and incorporated. -  Work-up included:  See above  -  Results discussed with patient. Appropriate PPE utilized as indicated for entire patient encounter? Time of Disposition: See timeline  ? New Prescriptions    No medications on file     FINAL IMPRESSION  1. General weakness    2. Anorexia        Electronically signed by:  [address], DO, 12/22/2021         [address], DO  12/22/21 2160

## 2021-12-22 NOTE — ED NOTES
[address] paged hospitalist     Asia Thacker  12/22/21 [phone] hospitalist returned call      Asia Thacker  12/22/21 [id number]

## 2021-12-22 NOTE — ED NOTES
EKG done at bedside. Printed report given to Dr. Feli Salvador.       Isabelle Ramirez  12/22/21 [id number]

## 2021-12-22 NOTE — H&P
History and Physical      Name:  Mirta Lainez /Age/Sex: 1935  (82 y.o. female)   MRN & CSN:  [id number] & [id number] Admission Date/Time: 2021 11:38 AM   Location:  ED31/ED-31 PCP: Lester Martinez MD       Hospital Day: 1    Assessment and Plan:   Mirta Lainez is a 82 y.o.  female with history of dementia, COPD on 2 L NC home oxygen, CAD with CABG 6 months ago, irritable bladder presents for failure to thrive. Failure to thrive  -Admit to observation  -Due to possible UTI or Covid infection  -Encourage oral hydration and nutrition  -D5W if patient does not eat well  -PT OT evaluation, case management consult for possible placement    Covid 19 infection  -Patient received Covid vaccination including booster shot a month ago  -No shortness of breath or cough  -Covid test indeterminate positive twice  -Respiratory virus CRP test pending    Possible UTI  Incontinent of urine  -Chronic irritable bladder  -Had procedure to set up a bladder stimulant on right back a week ago  -Incontinency of urinating again last night  -concerning of UTI  -UA pending    History of CAD  -CABG 6 month ago  -Continue statin, aspirin    DVT prophylaxis  -Lovenox 40 mg daily        Diet No diet orders on file   DVT Prophylaxis [x] Lovenox, []  Heparin, [] SCDs, [] Ambulation   GI Prophylaxis [x] PPI,  [] H2 Blocker,  [] Carafate,  [] Diet/Tube Feeds   Code Status Prior   Disposition Patient requires continued admission due to PT OT evaluation and recommendation     History of Present Illness:     Chief Complaint: Not eating and drinking for 3 days    Mirta Lainez is a 82 y.o.  female  history of dementia, COPD on 2 L NC home oxygen, CAD with CABG 6 months ago, irritable bladder presents for failure to thrive. Patient lives at home with her 40-year-old .  stated patient has been not eating and drinking for 3 days. Patient lay on the bed all day. She used to be able to walk with walker.  But she is not able to walk now. Patient had procedure 6 days ago to have bladder stimulant in right back.  stated patient's incontinence of urinating resolved initially. However patient wet the bed last night. Patient was sent to ED for evaluation. Her glucose was low at 50s in ambulance. Patient was able to drink glucose today. Her glucose increased to 118. Patient was found Covid test positive twice. Patient completed Covid vaccination including booster shot a month ago. Patient's  stated patient is not able to eat, drink, take medications and walk right now. He has difficulty to take care of her. Patient's  would like to take patient back home if she is able to do the above things. Ten point ROS reviewed negative, unless as noted above    Objective:   No intake or output data in the 24 hours ending 12/22/21 1701   Vitals:   Vitals:    12/22/21 1153   BP:    Pulse: 102   Resp: 18   Temp: 98.1 °F (36.7 °C)   SpO2:      Physical Exam:   GEN Awake female, sitting upright in bed in no apparent distress. Appears given age. EYES Pupils are equally round. No scleral erythema, discharge, or conjunctivitis. HENT Mucous membranes are moist. Oral pharynx without exudates, no evidence of thrush. NECK Supple, no apparent thyromegaly or masses. RESP bilateral lungs are clear, on 2 L NC (baseline)  CARDIO/VASC S1/S2 auscultated. Regular rate without appreciable murmurs, rubs, or gallops. No JVD or carotid bruits. Peripheral pulses equal bilaterally and palpable. No peripheral edema. GI Abdomen is soft without significant tenderness, masses, or guarding. Bowel sounds are normoactive. Rectal exam deferred.  No costovertebral angle tenderness. Normal appearing external genitalia. Zayas catheter is not present. HEME/LYMPH No palpable cervical lymphadenopathy and no hepatosplenomegaly. No petechiae or ecchymoses. MSK No gross joint deformities.   SKIN Normal coloration, warm, dry.  NEURO Cranial nerves appear grossly intact, normal speech, no lateralizing weakness. PSYCH Awake, alert, oriented to people only, baseline mental status due to dementia per patient's  . affect appropriate. Past Medical History:      Past Medical History:   Diagnosis Date    Acute cystitis 02/10/2020    Acute ischemic colitis ([address] 2008    Colonoscopy done    CAD (coronary artery disease) 06/2017    4 vessel CABG    Cerebrovascular event ([address] 02/2016    balance, speech, leg weakness; ARU    Chronic hypoxemic respiratory failure ([address] 02/18/2019    Chronic neck pain     Dr Amrik De Luna 2/2011    Cognitive impairment 09/2011    COPD, severe ([address] 11/07/2018    Depression     Diverticulitis 05/2012    Family history of colon cancer     Family history of diabetes mellitus     Fibromyalgia 1989    Gastroesophageal reflux disease without esophagitis 02/15/2020    Graves disease 2012    Kadi Argueta; tapazole for a time    H/O echocardiogram 04/08/2021    Normal EF 55-60%.  Hyperlipidemia     Hypertension     Lumbar spinal stenosis 11/2015    moderate ; MRI by Dr Jody Pedro Nocturnal hypoxemia 11/07/2018    Nocturnal oxygen desaturation 2015    Obstructive sleep apnea     Osteoporosis     S/P CABG (coronary artery bypass graft) 06/2017    4 vessel-CABG EVH RCA Marginal, OM1, OM2,  LIMA to LAD    S/P cardiac catheterization 06/07/2017    severe multivessel disease    Subclavian artery stenosis, left ([address] 06/2017    noted in hosp for CABG; to be dealt with as OP by vasc surgeons    Subclinical Hyperthyroidism 2010    Dr. Kadi Argueta; Tapazole    Type II diabetes mellitus ([address] 2019 7/2019;DIET CONTROL    Urine incontinence 06/2011    Timed voiding; [place]egel; urology    Vascular dementia [facility])      PSHX:  has a past surgical history that includes Carpal tunnel release; Total hip arthroplasty (Left, [id number]); Cervical discectomy (01-); cervical laminectomy (09-);  Rotator cuff repair (); Rotator cuff repair (); Cervical spine surgery (2011); Cataract removal with implant (Right, 2016); Coronary artery bypass graft (2017); Incontinence surgery (2018); and Cataract removal with implant (Left, 2019). Allergies: Allergies   Allergen Reactions    Cipro Xr     Demerol     Lipitor  [Atorvastatin Calcium]     Moxifloxacin Other (See Comments)     Phlebitis, urticaria    Statins      Myalgia      Lipitor [Atorvastatin] Other (See Comments)     myalgia    Wellbutrin [Bupropion] Other (See Comments)     \"zonked\"       FAM HX: family history includes Alzheimer's Disease in her mother; Cancer in an other family member; Cancer (age of onset: 76) in her father; Diabetes in her brother, mother, and another family member; High Blood Pressure in an other family member; Pituitary Disorder in her brother; Stroke in an other family member.   Soc HX:   Social History     Socioeconomic History    Marital status:      Spouse name: None    Number of children: 2    Years of education: 15    Highest education level: None   Occupational History    Occupation: retired   Tobacco Use    Smoking status: Former Smoker     Packs/day: 0.50     Years: 30.00     Pack years: 15.00     Types: Cigarettes     Start date: 1957     Quit date: 1987     Years since quittin.9    Smokeless tobacco: Never Used   Vaping Use    Vaping Use: Never used   Substance and Sexual Activity    Alcohol use: No     Comment: special occasion once a year maybe     Drug use: No    Sexual activity: Not Currently     Partners: Male   Other Topics Concern    None   Social History Narrative    None     Social Determinants of Health     Financial Resource Strain:     Difficulty of Paying Living Expenses: Not on file   Food Insecurity:     Worried About Running Out of Food in the Last Year: Not on file    Edith of Food in the Last Year: Not on file   Transportation Needs:    

## 2021-12-22 NOTE — ED PROVIDER NOTES
Emergency Department Encounter    Patient: Cb Harris  MRN: [id number]  : 1935  Date of Evaluation: 2022  ED Provider:  Rahcel Dean MD    Briefly, Cb Harris is a 82 y.o. female presenting with  who is caretaker states patient has had poor appetite over the past 2 to 3 days and is unwilling to eat or drink. On presentation EMS found patient hypoglycemic with glucose in the 50s. She was awake and talking and took 1 tube of oral glucose. On arrival her vital signs showed mild tachycardia at 102. Patient was nontoxic-appearing and is alert to person place but not time. She has a nonfocal neuro exam.  Abdomen soft and nontender lungs are clear to auscultation bilaterally. CBC, BMP, LFTs, lipase were reassuring. Covid testing initially was indeterminate. Chest x-ray showed a questionable right-sided pleural effusion. At time of transfer of care repeat rapid Covid is pending with plan to admit after rapid Covid returns due to debility.      I have reviewed and interpreted all of the currently available lab results from this visit (if applicable)  Results for orders placed or performed during the hospital encounter of 21   COVID-19, Rapid    Specimen: Nasopharyngeal   Result Value Ref Range    SARS-CoV-2, NAAT INDETERMINATE (A) NOT DETECTED   COVID-19, Rapid    Specimen: Nasopharyngeal   Result Value Ref Range    Source THROAT     SARS-CoV-2, NAAT INDETERMINATE (A) NOT DETECTED   Respiratory Panel, Molecular, with COVID-19 (Restricted: peds pts or suitable admitted adults)    Specimen: Nasopharyngeal   Result Value Ref Range    Adenovirus Detection by PCR NOT DETECTED NOT DETECTED    Coronavirus 229E PCR NOT DETECTED NOT DETECTED    Coronavirus HKU1 PCR NOT DETECTED NOT DETECTED    Coronavirus NL63 PCR NOT DETECTED NOT DETECTED    Coronavirus OC43 PCR NOT DETECTED NOT DETECTED    SARS-CoV-2 NOT DETECTED NOT DETECTED    Human Metapneumovirus PCR NOT DETECTED NOT DETECTED    Rhinovirus Enterovirus PCR NOT DETECTED NOT DETECTED    Influenza A by PCR NOT DETECTED NOT DETECTED    Influenza A H1 Pandemic PCR NOT DETECTED NOT DETECTED    Influenza A H1 (2009) PCR NOT DETECTED NOT DETECTED    Influenza A H3 PCR NOT DETECTED NOT DETECTED    Influenza B by PCR NOT DETECTED NOT DETECTED    Parainfluenza 1 PCR NOT DETECTED NOT DETECTED    Parainfluenza 2 PCR NOT DETECTED NOT DETECTED    Parainfluenza 3 PCR NOT DETECTED NOT DETECTED    Parainfluenza 4 PCR NOT DETECTED NOT DETECTED    RSV PCR NOT DETECTED NOT DETECTED    Bordetella parapertussis by PCR NOT DETECTED NOT DETECTED    B Pertussis by PCR NOT DETECTED NOT DETECTED    Chlamydophila Pneumonia PCR NOT DETECTED NOT DETECTED    Mycoplasma pneumo by PCR NOT DETECTED NOT DETECTED   COVID-19, Rapid    Specimen: Nasopharyngeal   Result Value Ref Range    Source THROAT     SARS-CoV-2, NAAT NOT DETECTED NOT DETECTED   Culture, Urine    Specimen: Urine   Result Value Ref Range    Specimen URINE     Special Requests       PERFECT SERVE DR Lesly Yousif ON [id number] BY ASHLYN MLT    Culture Final Report     Culture (A)      ESCHERICHIA COLI ESBL 75,000 CFU/ml Second organism same as first CONTACT PRECAUTIONS INDICATED Carbapenem antibiotics are the best option for infections caused by ESBL producing organisms. Other antibiotic classes are likely to result in treatment failure, even for organisms demonstrating in vitro susceptibility.        Susceptibility    Escherichia coli (esbl) - BACTERIAL SUSCEPTIBILITY PANEL MIRZA     ampicillin >16 Resistant      ampicillin-sulbactam >16/8 Resistant      amoxicillin-clavulanate (f) 16/8 Intermediate      ceFAZolin >16 Resistant      ciprofloxacin <=1 Sensitive      ertapenem <=0.5 Sensitive      cefepime >16 Resistant      cefuroxime >16 Resistant      gentamicin <=4 Sensitive      meropenem <=1 Sensitive      nitrofurantoin >64 Resistant      piperacillin-tazobactam <=16 Sensitive CRP High Sensitivity 31.5 (H) <5.0 mg/L   High sensitivity CRP   Result Value Ref Range    CRP High Sensitivity 39.1 (H) <5.0 mg/L   Basic Metabolic Panel w/ Reflex to MG   Result Value Ref Range    Sodium 134 (L) 135 - 145 MMOL/L    Potassium 4.0 3.5 - 5.1 MMOL/L    Chloride 95 (L) 99 - 110 mMol/L    CO2 24 21 - 32 MMOL/L    Anion Gap 15 4 - 16    BUN 14 6 - 23 MG/DL    CREATININE 0.7 0.6 - 1.1 MG/DL    Glucose 88 70 - 99 MG/DL    Calcium 8.8 8.3 - 10.6 MG/DL    GFR Non-African American >60 >60 mL/min/1.73m2    GFR African American >60 >60 mL/min/1.73m2   CBC   Result Value Ref Range    WBC 8.9 4.0 - 10.5 K/CU MM    RBC 3.82 (L) 4.2 - 5.4 M/CU MM    Hemoglobin 13.1 12.5 - 16.0 GM/DL    Hematocrit 39.3 37 - 47 %    .9 (H) 78 - 100 FL    MCH 34.3 (H) 27 - 31 PG    MCHC 33.3 32.0 - 36.0 %    RDW 12.4 11.7 - 14.9 %    Platelets 846 299 - 771 K/CU MM    MPV 9.2 7.5 - 11.1 FL   Urinalysis Reflex to Culture    Specimen: Urine   Result Value Ref Range    Color, UA YELLOW YELLOW    Clarity, UA SLIGHTLY CLOUDY (A) CLEAR    Glucose, Urine NEGATIVE NEGATIVE MG/DL    Bilirubin Urine NEGATIVE NEGATIVE MG/DL    Ketones, Urine NEGATIVE NEGATIVE MG/DL    Specific Gravity, UA 1.010 1.001 - 1.035    Blood, Urine LARGE (A) NEGATIVE    pH, Urine 7.0 5.0 - 8.0    Protein, UA 30 (A) NEGATIVE MG/DL    Urobilinogen, Urine NEGATIVE 0.2 - 1.0 MG/DL    Nitrite Urine, Quantitative POSITIVE (A) NEGATIVE    Leukocyte Esterase, Urine LARGE (A) NEGATIVE    RBC, UA 56 (H) 0 - 6 /HPF    WBC,  (H) 0 - 5 /HPF    Bacteria, UA OCCASIONAL (A) NEGATIVE /HPF    WBC Clumps, UA MANY /HPF    Squam Epithel, UA 5 /HPF    Trans Epithel, UA 6 /HPF    Renal Epithelial, UA 1 /HPF    Trichomonas, UA NONE SEEN NONE SEEN /HPF   High sensitivity CRP   Result Value Ref Range    CRP High Sensitivity 23.8 (H) <5.0 mg/L   Basic Metabolic Panel w/ Reflex to MG   Result Value Ref Range    Sodium 138 135 - 145 MMOL/L    Potassium 4.2 3.5 - 5.1 MMOL/L    Chloride 101 99 - 110 mMol/L    CO2 27 21 - 32 MMOL/L    Anion Gap 10 4 - 16    BUN 12 6 - 23 MG/DL    CREATININE 0.7 0.6 - 1.1 MG/DL    Glucose 119 (H) 70 - 99 MG/DL    Calcium 9.0 8.3 - 10.6 MG/DL    GFR Non-African American >60 >60 mL/min/1.73m2    GFR African American >60 >60 mL/min/1.73m2   High sensitivity CRP   Result Value Ref Range    CRP High Sensitivity 21.1 (H) <5.0 mg/L   Basic Metabolic Panel w/ Reflex to MG   Result Value Ref Range    Sodium 141 135 - 145 MMOL/L    Potassium 4.4 3.5 - 5.1 MMOL/L    Chloride 102 99 - 110 mMol/L    CO2 32 21 - 32 MMOL/L    Anion Gap 7 4 - 16    BUN 17 6 - 23 MG/DL    CREATININE 0.6 0.6 - 1.1 MG/DL    Glucose 123 (H) 70 - 99 MG/DL    Calcium 9.3 8.3 - 10.6 MG/DL    GFR Non-African American >60 >60 mL/min/1.73m2    GFR African American >60 >60 mL/min/1.73m2   High sensitivity CRP   Result Value Ref Range    CRP High Sensitivity 15.2 (H) <5.0 mg/L   Basic Metabolic Panel w/ Reflex to MG   Result Value Ref Range    Sodium 142 135 - 145 MMOL/L    Potassium 4.5 3.5 - 5.1 MMOL/L    Chloride 102 99 - 110 mMol/L    CO2 33 (H) 21 - 32 MMOL/L    Anion Gap 7 4 - 16    BUN 15 6 - 23 MG/DL    CREATININE 0.5 (L) 0.6 - 1.1 MG/DL    Glucose 119 (H) 70 - 99 MG/DL    Calcium 9.2 8.3 - 10.6 MG/DL    GFR Non-African American >60 >60 mL/min/1.73m2    GFR African American >60 >60 mL/min/1.73m2   CBC auto differential   Result Value Ref Range    WBC 8.9 4.0 - 10.5 K/CU MM    RBC 3.80 (L) 4.2 - 5.4 M/CU MM    Hemoglobin 12.7 12.5 - 16.0 GM/DL    Hematocrit 39.8 37 - 47 %    .7 (H) 78 - 100 FL    MCH 33.4 (H) 27 - 31 PG    MCHC 31.9 (L) 32.0 - 36.0 %    RDW 12.2 11.7 - 14.9 %    Platelets 981 577 - 061 K/CU MM    MPV 9.4 7.5 - 11.1 FL    Differential Type AUTOMATED DIFFERENTIAL     Segs Relative 62.9 36 - 66 %    Lymphocytes % 23.9 (L) 24 - 44 %    Monocytes % 9.1 (H) 0 - 4 %    Eosinophils % 3.4 (H) 0 - 3 %    Basophils % 0.5 0 - 1 %    Segs Absolute 5.6 K/CU MM    Lymphocytes Absolute 2.1 K/CU MM    Monocytes Absolute 0.8 K/CU MM    Eosinophils Absolute 0.3 K/CU MM    Basophils Absolute 0.0 K/CU MM    Nucleated RBC % 0.0 %    Total Nucleated RBC 0.0 K/CU MM    Total Immature Neutrophil 0.02 K/CU MM    Immature Neutrophil % 0.2 0 - 0.43 %   Hemoglobin A1c   Result Value Ref Range    Hemoglobin A1C 6.1 4.2 - 6.3 %    eAG 128 mg/dL   TSH without Reflex   Result Value Ref Range    TSH, High Sensitivity 0.395 0.270 - 4.20 uIu/ml   High sensitivity CRP   Result Value Ref Range    CRP High Sensitivity 11.2 (H) <5.0 mg/L   Basic Metabolic Panel w/ Reflex to MG   Result Value Ref Range    Sodium 141 135 - 145 MMOL/L    Potassium 4.5 3.5 - 5.1 MMOL/L    Chloride 100 99 - 110 mMol/L    CO2 34 (H) 21 - 32 MMOL/L    Anion Gap 7 4 - 16    BUN 13 6 - 23 MG/DL    CREATININE 0.5 (L) 0.6 - 1.1 MG/DL    Glucose 129 (H) 70 - 99 MG/DL    Calcium 9.1 8.3 - 10.6 MG/DL    GFR Non-African American >60 >60 mL/min/1.73m2    GFR African American >60 >60 mL/min/1.73m2   CBC auto differential   Result Value Ref Range    WBC 8.3 4.0 - 10.5 K/CU MM    RBC 3.71 (L) 4.2 - 5.4 M/CU MM    Hemoglobin 12.7 12.5 - 16.0 GM/DL    Hematocrit 37.7 37 - 47 %    .6 (H) 78 - 100 FL    MCH 34.2 (H) 27 - 31 PG    MCHC 33.7 32.0 - 36.0 %    RDW 12.1 11.7 - 14.9 %    Platelets 339 861 - 527 K/CU MM    MPV 9.2 7.5 - 11.1 FL    Differential Type AUTOMATED DIFFERENTIAL     Segs Relative 61.1 36 - 66 %    Lymphocytes % 26.5 24 - 44 %    Monocytes % 8.3 (H) 0 - 4 %    Eosinophils % 3.2 (H) 0 - 3 %    Basophils % 0.5 0 - 1 %    Segs Absolute 5.1 K/CU MM    Lymphocytes Absolute 2.2 K/CU MM    Monocytes Absolute 0.7 K/CU MM    Eosinophils Absolute 0.3 K/CU MM    Basophils Absolute 0.0 K/CU MM    Nucleated RBC % 0.0 %    Total Nucleated RBC 0.0 K/CU MM    Total Immature Neutrophil 0.03 K/CU MM    Immature Neutrophil % 0.4 0 - 0.43 %   High sensitivity CRP   Result Value Ref Range    CRP High Sensitivity 7.7 (H) <5.0 mg/L   Basic Metabolic Panel w/ Reflex to MG   Result Value Ref Range    Sodium 141 135 - 145 MMOL/L    Potassium 4.6 3.5 - 5.1 MMOL/L    Chloride 100 99 - 110 mMol/L    CO2 32 21 - 32 MMOL/L    Anion Gap 9 4 - 16    BUN 13 6 - 23 MG/DL    CREATININE 0.6 0.6 - 1.1 MG/DL    Glucose 129 (H) 70 - 99 MG/DL    Calcium 9.5 8.3 - 10.6 MG/DL    GFR Non-African American >60 >60 mL/min/1.73m2    GFR African American >60 >60 mL/min/1.73m2   CBC auto differential   Result Value Ref Range    WBC 8.2 4.0 - 10.5 K/CU MM    RBC 3.69 (L) 4.2 - 5.4 M/CU MM    Hemoglobin 12.5 12.5 - 16.0 GM/DL    Hematocrit 38.4 37 - 47 %    .1 (H) 78 - 100 FL    MCH 33.9 (H) 27 - 31 PG    MCHC 32.6 32.0 - 36.0 %    RDW 12.1 11.7 - 14.9 %    Platelets 420 656 - 988 K/CU MM    MPV 9.6 7.5 - 11.1 FL    Differential Type AUTOMATED DIFFERENTIAL     Segs Relative 67.1 (H) 36 - 66 %    Lymphocytes % 20.7 (L) 24 - 44 %    Monocytes % 8.6 (H) 0 - 4 %    Eosinophils % 2.8 0 - 3 %    Basophils % 0.4 0 - 1 %    Segs Absolute 5.5 K/CU MM    Lymphocytes Absolute 1.7 K/CU MM    Monocytes Absolute 0.7 K/CU MM    Eosinophils Absolute 0.2 K/CU MM    Basophils Absolute 0.0 K/CU MM    Nucleated RBC % 0.0 %    Total Nucleated RBC 0.0 K/CU MM    Total Immature Neutrophil 0.03 K/CU MM    Immature Neutrophil % 0.4 0 - 0.43 %   High sensitivity CRP   Result Value Ref Range    CRP High Sensitivity 4.7 <5.0 mg/L   Basic Metabolic Panel w/ Reflex to MG   Result Value Ref Range    Sodium 145 135 - 145 MMOL/L    Potassium 4.6 3.5 - 5.1 MMOL/L    Chloride 103 99 - 110 mMol/L    CO2 35 (H) 21 - 32 MMOL/L    Anion Gap 7 4 - 16    BUN 18 6 - 23 MG/DL    CREATININE 0.5 (L) 0.6 - 1.1 MG/DL    Glucose 151 (H) 70 - 99 MG/DL    Calcium 9.5 8.3 - 10.6 MG/DL    GFR Non-African American >60 >60 mL/min/1.73m2    GFR African American >60 >60 mL/min/1.73m2   CBC auto differential   Result Value Ref Range    WBC 8.1 4.0 - 10.5 K/CU MM    RBC 3.66 (L) 4.2 - 5.4 M/CU MM    Hemoglobin 12.2 (L) 12.5 - 16.0 GM/DL    Hematocrit 38.3 37 - 47 %    .6 (H) 78 - 100 FL    MCH 33.3 (H) 27 - 31 PG    MCHC 31.9 (L) 32.0 - 36.0 %    RDW 12.0 11.7 - 14.9 %    Platelets 935 845 - 811 K/CU MM    MPV 9.4 7.5 - 11.1 FL    Differential Type AUTOMATED DIFFERENTIAL     Segs Relative 63.2 36 - 66 %    Lymphocytes % 24.1 24 - 44 %    Monocytes % 9.4 (H) 0 - 4 %    Eosinophils % 2.6 0 - 3 %    Basophils % 0.5 0 - 1 %    Segs Absolute 5.1 K/CU MM    Lymphocytes Absolute 2.0 K/CU MM    Monocytes Absolute 0.8 K/CU MM    Eosinophils Absolute 0.2 K/CU MM    Basophils Absolute 0.0 K/CU MM    Nucleated RBC % 0.0 %    Total Nucleated RBC 0.0 K/CU MM    Total Immature Neutrophil 0.02 K/CU MM    Immature Neutrophil % 0.2 0 - 0.43 %   High sensitivity CRP   Result Value Ref Range    CRP High Sensitivity 3.8 <5.0 mg/L   Basic Metabolic Panel w/ Reflex to MG   Result Value Ref Range    Sodium 147 (H) 135 - 145 MMOL/L    Potassium 5.0 3.5 - 5.1 MMOL/L    Chloride 104 99 - 110 mMol/L    CO2 33 (H) 21 - 32 MMOL/L    Anion Gap 10 4 - 16    BUN 21 6 - 23 MG/DL    CREATININE 0.6 0.6 - 1.1 MG/DL    Glucose 143 (H) 70 - 99 MG/DL    Calcium 9.4 8.3 - 10.6 MG/DL    GFR Non-African American >60 >60 mL/min/1.73m2    GFR African American >60 >60 mL/min/1.73m2   CBC auto differential   Result Value Ref Range    WBC 8.4 4.0 - 10.5 K/CU MM    RBC 3.78 (L) 4.2 - 5.4 M/CU MM    Hemoglobin 12.7 12.5 - 16.0 GM/DL    Hematocrit 40.4 37 - 47 %    .9 (H) 78 - 100 FL    MCH 33.6 (H) 27 - 31 PG    MCHC 31.4 (L) 32.0 - 36.0 %    RDW 12.0 11.7 - 14.9 %    Platelets 964 304 - 147 K/CU MM    MPV 9.3 7.5 - 11.1 FL    Differential Type AUTOMATED DIFFERENTIAL     Segs Relative 59.6 36 - 66 %    Lymphocytes % 28.1 24 - 44 %    Monocytes % 7.8 (H) 0 - 4 %    Eosinophils % 3.6 (H) 0 - 3 %    Basophils % 0.5 0 - 1 %    Segs Absolute 5.0 K/CU MM    Lymphocytes Absolute 2.4 K/CU MM    Monocytes Absolute 0.7 K/CU MM    Eosinophils Absolute 0.3 K/CU MM    Basophils Absolute 0.0 K/CU MM    Nucleated RBC POCT Glucose   Result Value Ref Range    POC Glucose 135 (H) 70 - 99 MG/DL   POCT Glucose   Result Value Ref Range    POC Glucose 161 (H) 70 - 99 MG/DL   POCT Glucose   Result Value Ref Range    POC Glucose 123 (H) 70 - 99 MG/DL   POCT Glucose   Result Value Ref Range    POC Glucose 187 (H) 70 - 99 MG/DL   EKG 12 Lead   Result Value Ref Range    Ventricular Rate 91 BPM    Atrial Rate 91 BPM    P-R Interval 144 ms    QRS Duration 70 ms    Q-T Interval 370 ms    QTc Calculation (Bazett) 455 ms    P Axis 76 degrees    R Axis -30 degrees    T Axis 62 degrees    Diagnosis       Normal sinus rhythm  Possible Left atrial enlargement  Left axis deviation  Nonspecific ST and T wave abnormality  Abnormal ECG  When compared with ECG of 07-SEP-2018 05:42,  Significant changes have occurred  Confirmed by [facility] MD, Winthrop Merlin ([id number]) on 12/22/2021 6:15:58 PM        Radiographs (if obtained):    [] Radiologist's Report Reviewed:  XR CHEST PORTABLE   Final Result   1. Question small right effusion versus scarring. No focal consolidation   identified. MDM:    61-year-old female presenting with 2 to 3 days of generalized weakness and anorexia he was hypoglycemic to 50 on EMS presentation. History and be seen above. Transfer of care currently awaiting rapid Covid which was indeterminate. Respiratory panel was ordered. Due to debility and inability to take care of self at home patient admitted to hospital service for further evaluation and treatment. Clinical Impression:  1. General weakness    2. Anorexia      Disposition referral (if applicable):  No follow-up provider specified.   Disposition medications (if applicable):  Discharge Medication List as of 12/31/2021  4:29 PM          Comment: Please note this report has been produced using speech recognition software and may contain errors related to that system including errors in grammar, punctuation, and spelling, as well as words and phrases that may be inappropriate. Efforts were made to edit the dictations.        Melissa Ortega MD  01/02/22 0683

## 2021-12-23 PROBLEM — E43 SEVERE MALNUTRITION (HCC): Chronic | Status: ACTIVE | Noted: 2021-12-23

## 2021-12-23 LAB
ANION GAP SERPL CALCULATED.3IONS-SCNC: 15 MMOL/L (ref 4–16)
BUN BLDV-MCNC: 14 MG/DL (ref 6–23)
C-REACTIVE PROTEIN, HIGH SENSITIVITY: 39.1 MG/L
CALCIUM SERPL-MCNC: 8.8 MG/DL (ref 8.3–10.6)
CHLORIDE BLD-SCNC: 95 MMOL/L (ref 99–110)
CO2: 24 MMOL/L (ref 21–32)
CREAT SERPL-MCNC: 0.7 MG/DL (ref 0.6–1.1)
GFR AFRICAN AMERICAN: >60 ML/MIN/1.73M2
GFR NON-AFRICAN AMERICAN: >60 ML/MIN/1.73M2
GLUCOSE BLD-MCNC: 88 MG/DL (ref 70–99)
HCT VFR BLD CALC: 39.3 % (ref 37–47)
HEMOGLOBIN: 13.1 GM/DL (ref 12.5–16)
MCH RBC QN AUTO: 34.3 PG (ref 27–31)
MCHC RBC AUTO-ENTMCNC: 33.3 % (ref 32–36)
MCV RBC AUTO: 102.9 FL (ref 78–100)
PDW BLD-RTO: 12.4 % (ref 11.7–14.9)
PLATELET # BLD: 245 K/CU MM (ref 140–440)
PMV BLD AUTO: 9.2 FL (ref 7.5–11.1)
POTASSIUM SERPL-SCNC: 4 MMOL/L (ref 3.5–5.1)
RBC # BLD: 3.82 M/CU MM (ref 4.2–5.4)
SARS-COV-2, NAAT: NOT DETECTED
SODIUM BLD-SCNC: 134 MMOL/L (ref 135–145)
SOURCE: NORMAL
WBC # BLD: 8.9 K/CU MM (ref 4–10.5)

## 2021-12-23 PROCEDURE — 36415 COLL VENOUS BLD VENIPUNCTURE: CPT

## 2021-12-23 PROCEDURE — 2580000003 HC RX 258: Performed by: NURSE PRACTITIONER

## 2021-12-23 PROCEDURE — 6370000000 HC RX 637 (ALT 250 FOR IP): Performed by: NURSE PRACTITIONER

## 2021-12-23 PROCEDURE — 85027 COMPLETE CBC AUTOMATED: CPT

## 2021-12-23 PROCEDURE — G0378 HOSPITAL OBSERVATION PER HR: HCPCS

## 2021-12-23 PROCEDURE — 80048 BASIC METABOLIC PNL TOTAL CA: CPT

## 2021-12-23 PROCEDURE — 87635 SARS-COV-2 COVID-19 AMP PRB: CPT

## 2021-12-23 PROCEDURE — 97530 THERAPEUTIC ACTIVITIES: CPT

## 2021-12-23 PROCEDURE — 97162 PT EVAL MOD COMPLEX 30 MIN: CPT

## 2021-12-23 PROCEDURE — 97166 OT EVAL MOD COMPLEX 45 MIN: CPT

## 2021-12-23 PROCEDURE — 94761 N-INVAS EAR/PLS OXIMETRY MLT: CPT

## 2021-12-23 PROCEDURE — 6360000002 HC RX W HCPCS: Performed by: NURSE PRACTITIONER

## 2021-12-23 PROCEDURE — 86140 C-REACTIVE PROTEIN: CPT

## 2021-12-23 PROCEDURE — 2700000000 HC OXYGEN THERAPY PER DAY

## 2021-12-23 RX ADMIN — ASPIRIN 81 MG: 81 TABLET, COATED ORAL at 10:23

## 2021-12-23 RX ADMIN — MELATONIN TAB 3 MG 3 MG: 3 TAB at 20:35

## 2021-12-23 RX ADMIN — SODIUM CHLORIDE, PRESERVATIVE FREE 10 ML: 5 INJECTION INTRAVENOUS at 20:36

## 2021-12-23 RX ADMIN — CARVEDILOL 12.5 MG: 12.5 TABLET, FILM COATED ORAL at 10:23

## 2021-12-23 RX ADMIN — ROSUVASTATIN CALCIUM 5 MG: 5 TABLET, COATED ORAL at 20:35

## 2021-12-23 RX ADMIN — PANTOPRAZOLE SODIUM 40 MG: 40 TABLET, DELAYED RELEASE ORAL at 06:13

## 2021-12-23 ASSESSMENT — PAIN SCALES - GENERAL: PAINLEVEL_OUTOF10: 0

## 2021-12-23 NOTE — CARE COORDINATION
Reviewed chart for discharge planning. Pt/spouse had wanted [facility] if pt Covid negative, TC to [facility] and they are on an admission hold until at least Wednesday of next week. At this time pt is being tested again for Covid as first test came back INDETERMINATE. If pt Covid positive, SNF choices will be limited. CM to cont to follow. A Third Covid test still came back INDETERMINATE. Pt will NOT be able to go to SNF until clear results available. Sent PS to Dr. Scarlett Wynn requesting Respiratory Panel to be ordered.

## 2021-12-23 NOTE — PROGRESS NOTES
Hospitalist Progress Note      Name:  Rocky Robb /Age/Sex: 1935  (82 y.o. female)   MRN & CSN:  [id number] & [id number] Admission Date/Time: 2021 11:38 AM   Location:  [address] [place] [address]- PCP: Matthew Adam MD         Hospital Day: 2    Assessment and Plan:   Rocky Robb is a 82 y.o.  female with history of dementia, COPD on 2 L NC home oxygen, CAD with CABG 6 months ago, irritable bladder presents for failure to thrive. Failure to thrive-patient reports generalized weakness unable to get out of bed for multiple days. -Admit to observation  -Due to possible UTI or Covid infection-UA still pending. Infectious control requested repeat Covid PCR test  -Encourage oral hydration and nutrition  -D5W if patient does not eat well  -PT OT evaluation, case management consult for possible placement     Covid 19 infection  -Patient received Covid vaccination including booster shot a month ago  -No shortness of breath or cough  -Covid test indeterminate positive twice  -Respiratory virus COVID-19 indeterminate  -CRP 39.1  -Infectious control message for repeat Covid PCR this a.m. Possible UTI  Incontinent of urine  -Chronic irritable bladder  -Had procedure to set up a bladder stimulant on right back a week ago  -Incontinency of urinating again last night  -concerning of UTI  -UA pending urine sample not obtained please obtain urine sample     History of CAD  -CABG 6 month ago  -Continue statin, aspirin    Diet: ADULT DIET; Regular  DVT prophylaxis: Lovenox   GI prophylaxis: PPI  CODE STATUS:Full Code    Treatment and discharge plan discussed with patient/family. Patient/family voiced understanding. This patient was seen examined and treatment plan discussed with/supervising physician.   .A hospitalist attending physician was available for questions/consultation as needed          History of Present Illness:     Chief Complaint: failure to thrive    Subjective  Patient examined at bedside she appears comfortable resting. Patient denies any pain or new concerns this morning. Patient does endorse she just feels generally weak. Denies any lateralizing weakness or or focal numbness or tingling in extremities. Patient denies any recent falls. Patient does endorse urinary incontinence. Denies any dysuria urgency or frequency. PT OT eval's are pending. ROS reviewed negative, unless as noted above    Objective:   No intake or output data in the 24 hours ending 12/23/21 0759   Vitals:   Vitals:    12/23/21 0000   BP: 130/71   Pulse: 77   Resp: 18   Temp: 97.8 °F (36.6 °C)   SpO2: 100%     Physical Exam:   Physical Exam  Constitutional:       Appearance: Normal appearance. HENT:      Mouth/Throat:      Mouth: Mucous membranes are moist.   Eyes:      Extraocular Movements: Extraocular movements intact. Conjunctiva/sclera: Conjunctivae normal.   Cardiovascular:      Rate and Rhythm: Normal rate and regular rhythm. Pulses: Normal pulses. Heart sounds: Normal heart sounds. Pulmonary:      Effort: Pulmonary effort is normal.      Breath sounds: Normal breath sounds. Abdominal:      General: Abdomen is flat. Palpations: Abdomen is soft. Musculoskeletal:         General: Normal range of motion. Skin:     General: Skin is warm and dry. Neurological:      General: No focal deficit present. Mental Status: She is alert and oriented to person, place, and time.    Psychiatric:         Mood and Affect: Mood normal.         Medications:   Medications:    aspirin  81 mg Oral Daily    budesonide-formoterol  1 puff Inhalation BID    carvedilol  12.5 mg Oral BID WC    doxepin  10 mg Oral Nightly    melatonin  3 mg Oral Nightly    pantoprazole  40 mg Oral QAM AC    rosuvastatin  5 mg Oral Nightly    sodium chloride flush  5-40 mL IntraVENous 2 times per day    enoxaparin  40 mg SubCUTAneous Nightly      Infusions:    sodium chloride      dextrose 1,000 mL (12/22/21 2011) PRN Meds: sodium chloride flush, 10 mL, PRN  sodium chloride, 25 mL, PRN  polyethylene glycol, 17 g, Daily PRN  nicotine polacrilex, 2 mg, Q1H PRN  acetaminophen, 650 mg, Q6H PRN   Or  acetaminophen, 650 mg, Q6H PRN  ondansetron, 4 mg, Q6H PRN        Recent Labs     12/22/21  1250 12/23/21  0409   WBC 11.1* 8.9   HGB 13.7 13.1   HCT 41.2 39.3    245      Recent Labs     12/22/21  1250 12/23/21  0409    134*   K 4.3 4.0   CL 99 95*   CO2 23 24   BUN 13 14   CREATININE 0.8 0.7     Recent Labs     12/22/21  1250   AST 28   ALT 18   BILIDIR 0.2   BILITOT 0.9   ALKPHOS 47     No results for input(s): INR in the last 72 hours. Recent Labs     12/22/21  1250   TROPONINT <0.010        Imaging reviewed      Electronically signed by ERMELINDA Lee CNP on 12/23/2021 at 7:59 AM    Patient was not seen or evaluated by me. Cosigning as part of follow-up protocol. Pinky Arndt.  Jenny Wells

## 2021-12-23 NOTE — CONSULTS
[address] [place] [address] Rd A Chip, 1935, 4110/4110-A, 12/23/2021    History  [place]:  The primary encounter diagnosis was General weakness. A diagnosis of Anorexia was also pertinent to this visit. Patient  has a past medical history of Acute cystitis, Acute ischemic colitis ([address] CAD (coronary artery disease), Cerebrovascular event ([address] Chronic hypoxemic respiratory failure ([address] Chronic neck pain, Cognitive impairment, COPD, severe ([address] Depression, Diverticulitis, Family history of colon cancer, Family history of diabetes mellitus, Fibromyalgia, Gastroesophageal reflux disease without esophagitis, Graves disease, H/O echocardiogram, Hyperlipidemia, Hypertension, Lumbar spinal stenosis, Nocturnal hypoxemia, Nocturnal oxygen desaturation, Obstructive sleep apnea, Osteoporosis, S/P CABG (coronary artery bypass graft), S/P cardiac catheterization, Subclavian artery stenosis, left ([address] Subclinical Hyperthyroidism, Type II diabetes mellitus ([address] Urine incontinence, and Vascular dementia ([address] Patient  has a past surgical history that includes Carpal tunnel release; Total hip arthroplasty (Left, [id number]); Cervical discectomy (01-); cervical laminectomy (09-); Rotator cuff repair (1992); Rotator cuff repair (1998); Cervical spine surgery (4/2011); Cataract removal with implant (Right, 07/2016); Coronary artery bypass graft (06/2017); Incontinence surgery (11/2018); and Cataract removal with implant (Left, 03/2019). Subjective:  Patient states:  \"I'm scared to fall. \"    Pain:  Denies pain.     Communication with other providers:  Handoff to RN, OT  Restrictions: general precautions, fall risk    Home Setup/Prior level of function  Social/Functional History  Lives With: Spouse  Type of Home:  (Condo)  Home Layout: Two level,Able to Live on Main level with bedroom/bathroom  Home Access: Level entry  Bathroom Shower/Tub: Walk-in shower,Tub/Shower unit,Shower chair with back  Bathroom Toilet: Standard  Bathroom Equipment: Grab bars in [address]: 4 wheeled walker  ADL Assistance: Needs assistance (DEP assist for bathing and dressing. Pt is incontinent and wears depends.)  Homemaking Assistance: Needs assistance  Homemaking Responsibilities: No  Ambulation Assistance: Independent  Transfer Assistance: Independent  Active : No  Patient's  Info: Spouse    Examination of body systems (includes body structures/functions, activity/participation limitations):  · Observation:  Pt supine in bed with  in room upon arrival and agreeable to therapy  · [facility]  · Hearing:  [facility]  · Cardiopulmonary:  2L O2, wears at baseline  · Cognition: impaired- oriented to self only, see OT/SLP note for further evaluation. Musculoskeletal  · ROM R/L:  WFL. · Strength R/L:  3+/5, moderate impairment in function and endurance. · [facility]      Mobility:  · Rolling L/R:  Mod A  · Supine <-> sit:  Max A, performed x2 during session. Cues provided for sequencing  · Transfers: Pt completed STS transfer to/from EOB max A  · Sitting balance:  Fair, pt sat EOB ~2 minutes with one LOB backward requiring mod A to correct. · Standing balance:  Poor, pt stood at RW ~30 seconds max A with retropulsion. · Gait: NT due to safety concerns     [facility] 6 Clicks Inpatient Mobility:  AM-PAC Inpatient Mobility Raw Score : 10    Safety: patient left supine in bed with alarm on,  in room, call light within reach, RN notified, gait belt used. Assessment:  Pt is an 82 y.o. female admitted to the hospital for failure to thrive in an adult. Pt is currently performing bed mobility max A, transferring max A, and unable to ambulate at this time. Pt is presenting with decreased endurance, impaired balance, impaired transfers, impaired bed mobility, impaired strength, impaired gait.  Pt would benefit from continued acute care PT as well as SNF placement upon discharge to continue to address impairments. Complexity: moderate    Prognosis: Good, no significant barriers to participation at this time.      Plan Times per week: 3+/week     Equipment: TBD at next level of care    Goals:  Short term goals  Time Frame for Short term goals: 1 week  Short term goal 1: pt to complete all bed mobility min A  Short term goal 2: Pt to complete STS transfers to/from bed, commode, and chair mod A  Short term goal 3: Pt to complete all stand pivot transfer with LRAD mod A  Short term goal 4: Pt to ambulate 13' with LRAD mod A       Treatment plan:  Bed mobility, transfers, balance, gait, TA, TX    Recommendations for NURSING mobility: BRISEIDA    Time:   Time in: 1305  Time out: 1326  Timed treatment minutes: 11  Total time: 21    Electronically signed by:    Ranjan Barrett PT  12/23/2021, 1:55 PM

## 2021-12-23 NOTE — PROGRESS NOTES
Comprehensive Nutrition Assessment    Type and Reason for Visit:  Initial (Low BMI for age)    Nutrition Recommendations/Plan:   · Continue current diet   · Please feed assist PRN and aid with Meal Set Up Assistance   · Begin Standard High Protein oral nutrition supplement TID with snacks   · Document all PO intakes in flow sheet   · Will monitor PO intake/feeding behavior, and goals of care     Nutrition Assessment:  Admitted with failure to thrive and generalized weakness, possible UTI/COVID. Hx of dementia, recurrent UTI, cystitis, CAD, HTN. Pt on regular diet, eating lunch meal. SO at bedside reports the meal has been her first good and only meal since Sunday. Poor intake/appetite x 4 days PTA. UBW of 115 lb. Unsure if current wt, SO believes that pt has unintentional wt loss. SO requests feeding assistance during stay. Performed NFPE. Meets criteria for severe malnutrition. Agreed to trial supplements. High nutrition risk. Malnutrition Assessment:  Malnutrition Status:  Severe malnutrition    Context:  Chronic Illness     Findings of the 6 clinical characteristics of malnutrition:  Energy Intake:  Mild decrease in energy intake (Comment)  Weight Loss:  1 - Mild weight loss (specify amount and time period) (4% in 7 months)     Body Fat Loss:  7 - Severe body fat loss Triceps,Buccal region   Muscle Mass Loss:  7 - Severe muscle mass loss Clavicles (pectoralis & deltoids),Thigh (quadraceps),Calf (gastrocnemius),Hand (interosseous),Scapula (trapezius)  Fluid Accumulation:  No significant fluid accumulation Extremities   Strength:  Not Performed    Estimated Daily Nutrient Needs:  Energy (kcal):  6362-3376 (27-33 kcals/kg); Weight Used for Energy Requirements:  Other (Comment) (115 lb most recent office visit)     Protein (g):  62-78 (1.2-1.5 g/kg IBW);  Weight Used for Protein Requirements:  Other (Comment)        Fluid (ml/day):  1500; Method Used for Fluid Requirements:  1 ml/kcal      Nutrition Related Findings:  SO discussed pt's hx of UTI and GI issues. Reports some diarrhea and urine incontinence PTA. Na 134, CRP 39.1      Wounds:  None   Noted some of her skin were yellow near fingers and face     Current Nutrition Therapies:    ADULT DIET; Regular    Anthropometric Measures:  · Height: 5' 1.5\" (156.2 cm)  · Current Body Weight: 115 lb (52.2 kg) (December 16, 2021 per office visit)   · Admission Body Weight:  (none yet documented)    · Usual Body Weight: 120 lb (54.4 kg) (5/6/21; 4.2% wt loss over 7 months)     · Ideal Body Weight: 108 lbs; % Ideal Body Weight 106.5 %   · BMI: 21.4  · BMI Categories: Underweight (BMI less than 22) age over 72       Nutrition Diagnosis:   · Severe malnutrition,In context of chronic illness related to inadequate protein-energy intake,cognitive or neurological impairment,early satiety as evidenced by severe muscle loss,severe loss of subcutaneous fat,weight loss (Loss of appetite PTA, 4% over 7 months)    Nutrition Interventions:   Food and/or Nutrient Delivery:  Continue Current Diet,Start Oral Nutrition Supplement  Nutrition Education/Counseling:  No recommendation at this time   Coordination of Nutrition Care:  Feeding Assistance/Environment Change,Continue to monitor while inpatient    Goals:  Pt will consume at least half of meals during stay consistently       Nutrition Monitoring and Evaluation:   Behavioral-Environmental Outcomes:  None Identified   Food/Nutrient Intake Outcomes:  Food and Nutrient Intake,Supplement Intake  Physical Signs/Symptoms Outcomes:  Biochemical Data,Weight,Chewing or Swallowing,Diarrhea,GI Status,Fluid Status or Edema,Meal Time Behavior,Nutrition Focused Physical Findings     Discharge Planning:     Too soon to determine     Electronically signed by Slime Shipmna RD, LD on 12/23/21 at 3:06 PM EST    Contact: [phone]

## 2021-12-23 NOTE — PLAN OF CARE
Nutrition Problem #1: Severe malnutrition,In context of chronic illness  Intervention: Food and/or Nutrient Delivery: Continue Current Diet,Start Oral Nutrition Supplement  Nutritional Goals: Pt will consume at least half of meals during stay consistently

## 2021-12-23 NOTE — CARE COORDINATION
CM consulted for SNF placement. Awaiting COVID results. Two Covid rapids came back Indeterminate. CM does not want to offer choice till COVID results are in.     20:53  CM spoke with pt and gained permission to speak to . CM called  [phone] Mr. Kendra Macedo. Mr. Angelita Marley understood that we were still waiting on the respiratory panel to come back for the COVID results.  is interested in pt going to SNF. CM explained that choices were very limited for COVID positive cases. IF test come backs negative pt's  request [facility]. States pt has been there a few times for various things. Pt and  both satisfied with care. Also very close to home for pt's  to come check on pt. CM expressed that another CM would be calling tomorrow to discuss choices depending on test results. Unsure of when test results would come back. CM gave hospital number for pt's  to call in the morning to get update on pt's condition. PT/OT has been ordered. CM place whiteboard note. CM also wrote sticky note. 20:47 results posted COVID not found. [facility] for SNF choice.

## 2021-12-23 NOTE — CONSULTS
[address] [facility] A Chip, 1935, 4110/4110-A, 12/23/2021    Discharge Recommendation: Ziyad Abdelrahman    History:  [place]:  The primary encounter diagnosis was General weakness. A diagnosis of Anorexia was also pertinent to this visit. Subjective:  Patient states: Agreeable to therapy. Pain: Pt denied pain this date (0/10). Communication with other providers: PT, RN, LSW  Restrictions: General Precautions, Fall Risk    Home Setup/Prior level of function:  Social/Functional History  Lives With: Spouse  Type of Home:  (Condo)  Home Layout: Two level,Able to Live on Main level with bedroom/bathroom  Home Access: Level entry  Bathroom Shower/Tub: Walk-in shower,Tub/Shower unit,Shower chair with back  Bathroom Toilet: Standard  Bathroom Equipment: Grab bars in [address] [place] [address] Rd: 4 wheeled walker  ADL Assistance: Needs assistance (DEP assist for bathing and dressing. Pt is incontinent and wears depends.)  Homemaking Assistance: Needs assistance  Homemaking Responsibilities: No  Ambulation Assistance: Independent  Transfer Assistance: Independent  Active : No  Patient's  Info: Spouse    Examination:  · Observation: Supine in bed upon arrival  · [facility]  · Hearing: WFL  · Vitals: Stable vitals throughout session    Body Systems and functions:  · ROM: WFL   · Strength: WFL   · Sensation: WFL  · Tone: Normal  · Coordination: WFL  · Perception: WNL    Activities of Daily Living (ADLs):  · Feeding: Dayana pt's  assisting with feeding at time of arrival. Setup, increased time, VC. Assist d/t decreased cognition this date. · Grooming: Dayana in seated with setup, increased time, VC, and assistance for initiation, sequencing, and attention to task. · UB bathing: ModA in seated with setup, increased time, VC and assistance to ensure task if performed thoroughly d/t decreased cognition.   reports pt has recently been refusing bathing and has required DEP assist.   · LB bathing: DEP in seated with setup, increased time, VC, and assistance for distal reaching, as well as assistance for sequencing of task and to ensure task is performed thoroughly. · UB dressing: Dayana in seated with setup, increased time, VC for sequencing of task d/t decreased cognition. · LB dressing: DEP in seated with setup, increased time, VC and assistance for distal reaching. · Toileting: DEP anticipate pt could complete SPT to [facility] with assist for transfer, arianna care, and LB clothing management. Cognitive and Psychosocial Functioning:  · Overall cognitive status: Oriented to person and place. Disoriented to time. Pt has a dx of dementia. · Affect: Normal     Balance:   · Sitting: CGA (pt sat EOB demo fair dynamic sitting balance with 1 posterior LOB). · Standing: MaxA (pt stood with RW. Demo poor dynamic standing balance). Functional Mobility:   · Bed Mobility: MaxA (pt performed supine to seated bed mobility (2x) with assist for BLE positioning, trunk support, and VC for sequencing throughout). · Transfers: MaxA (pt performed STS from EOB with RW. Required VC throughout for sequencing and increased time). · Ambulation: NT this date. AM-PAC 6 click short form for inpatient daily activity:   How much help from another person does the patient currently need. .. Unable  Dep A Lot  Max A A Lot   Mod A A Little  Min A A Little   CGA  SBA None   Mod I  Indep  Sup   1. Putting on and taking off regular lower body clothing? [x] 1    [] 2   [] 2   [] 3   [] 3   [] 4      2. Bathing (including washing, rinsing, drying)? [] 1   [] 2   [x] 2 [] 3 [] 3 [] 4   3. Toileting, which includes using toilet, bedpan, or urinal? [x] 1    [] 2   [] 2   [] 3   [] 3   [] 4     4. Putting on and taking off regular upper body clothing? [] 1   [] 2   [] 2   [x] 3   [] 3    [] 4      5. Taking care of personal grooming such as brushing teeth?  [] 1   [] 2    [] 2 [x] 3    [] 3   [] 4      6. Eating meals? [] 1   [] 2   [] 2   [x] 3   [] 3   [] 4      Raw Score:  13     [24=0% impaired(CH), 23=1-19%(CI), 20-22=20-39%(CJ), 15-19=40-59%(CK), 10-14=60-79%(CL), 7-9=80-99%(CM), 6=100%(CN)]    Treatment:  Therapeutic Activity Training:   Therapeutic activity training was instructed today. Cues were given for safety, sequence, UE/LE placement, awareness, and balance. Activities performed today included bed mobility training, sup-sit, sit-stand, stand-sit, sit-sup. Safety Measures: Gait belt used, Left in bed, Alarm in place    Assessment:  Assessment  Performance deficits / Impairments: Decreased functional mobility ,Decreased strength,Decreased ADL status,Decreased high-level IADLs,Decreased balance,Decreased cognition,Decreased posture  Treatment Diagnosis: failure to thrive in adult  Prognosis: Good  Decision Making: Medium Complexity  REQUIRES OT FOLLOW UP: Yes  Discharge Recommendations: [address]    Pt is an 82year old F admitted for failure to thrive in adult. Pt's  present upon evaluation and reports that prior to admission pt was receiving assistance in ADLs, was not responsible for IADLs, and was Capo for functional mobility. This date, pt demo decreased cognition, balance, strength, activity tolerance, and overall functional mobility impacting ADL status. Pt is currently functioning below baseline and would benefit from skilled OT services at SNF. Plan:  Plan  Times per week: 2+  Times per day: Daily      Goals:  1. Pt will complete all aspects of bed mobility for EOB/OOB ADLs ModA. 2. Pt will complete LB bathing with ModA and AE as needed. 3. Pt will complete all aspects of LB dressing with ModA and AE as needed. 4. Pt will complete all functional transfers to and from bed, chair, toilet, shower chair with ModA and AD. 5. Pt will ambulate HH distance to bathroom for toileting with ModA and AD.   6. Pt will complete all aspects of toileting task with ModA. 7. Pt will complete oral hygiene/grooming routine in standing at sink with CGA demo good dynamic standing balance for approx 8 minutes. 8. Pt will complete ther ex/ther act with focus on UB strengthening. Time:   Time in: 1305  Time out: 1326  Timed treatment minutes: 11  Total time: 21      Electronically signed by:       KRISTAL Whitaker/MARILIA PEREZ, [id number]

## 2021-12-24 LAB
ANION GAP SERPL CALCULATED.3IONS-SCNC: 10 MMOL/L (ref 4–16)
BUN BLDV-MCNC: 12 MG/DL (ref 6–23)
C-REACTIVE PROTEIN, HIGH SENSITIVITY: 23.8 MG/L
CALCIUM SERPL-MCNC: 9 MG/DL (ref 8.3–10.6)
CHLORIDE BLD-SCNC: 101 MMOL/L (ref 99–110)
CO2: 27 MMOL/L (ref 21–32)
CREAT SERPL-MCNC: 0.7 MG/DL (ref 0.6–1.1)
GFR AFRICAN AMERICAN: >60 ML/MIN/1.73M2
GFR NON-AFRICAN AMERICAN: >60 ML/MIN/1.73M2
GLUCOSE BLD-MCNC: 119 MG/DL (ref 70–99)
POTASSIUM SERPL-SCNC: 4.2 MMOL/L (ref 3.5–5.1)
SODIUM BLD-SCNC: 138 MMOL/L (ref 135–145)

## 2021-12-24 PROCEDURE — 6370000000 HC RX 637 (ALT 250 FOR IP): Performed by: NURSE PRACTITIONER

## 2021-12-24 PROCEDURE — 80048 BASIC METABOLIC PNL TOTAL CA: CPT

## 2021-12-24 PROCEDURE — 36415 COLL VENOUS BLD VENIPUNCTURE: CPT

## 2021-12-24 PROCEDURE — 94761 N-INVAS EAR/PLS OXIMETRY MLT: CPT

## 2021-12-24 PROCEDURE — G0378 HOSPITAL OBSERVATION PER HR: HCPCS

## 2021-12-24 PROCEDURE — 6360000002 HC RX W HCPCS: Performed by: NURSE PRACTITIONER

## 2021-12-24 PROCEDURE — 96372 THER/PROPH/DIAG INJ SC/IM: CPT

## 2021-12-24 PROCEDURE — 2580000003 HC RX 258: Performed by: NURSE PRACTITIONER

## 2021-12-24 PROCEDURE — 2700000000 HC OXYGEN THERAPY PER DAY

## 2021-12-24 PROCEDURE — 94640 AIRWAY INHALATION TREATMENT: CPT

## 2021-12-24 PROCEDURE — 86140 C-REACTIVE PROTEIN: CPT

## 2021-12-24 RX ADMIN — ENOXAPARIN SODIUM 40 MG: 100 INJECTION SUBCUTANEOUS at 22:21

## 2021-12-24 RX ADMIN — ROSUVASTATIN CALCIUM 5 MG: 5 TABLET, COATED ORAL at 22:21

## 2021-12-24 RX ADMIN — CARVEDILOL 12.5 MG: 12.5 TABLET, FILM COATED ORAL at 18:18

## 2021-12-24 RX ADMIN — MELATONIN TAB 3 MG 3 MG: 3 TAB at 22:21

## 2021-12-24 RX ADMIN — SODIUM CHLORIDE, PRESERVATIVE FREE 10 ML: 5 INJECTION INTRAVENOUS at 09:41

## 2021-12-24 RX ADMIN — DOXEPIN HYDROCHLORIDE 10 MG: 10 CAPSULE ORAL at 22:22

## 2021-12-24 RX ADMIN — SODIUM CHLORIDE, PRESERVATIVE FREE 10 ML: 5 INJECTION INTRAVENOUS at 22:22

## 2021-12-24 RX ADMIN — BUDESONIDE AND FORMOTEROL FUMARATE DIHYDRATE 1 PUFF: 160; 4.5 AEROSOL RESPIRATORY (INHALATION) at 08:00

## 2021-12-24 RX ADMIN — ASPIRIN 81 MG: 81 TABLET, COATED ORAL at 09:30

## 2021-12-24 RX ADMIN — PANTOPRAZOLE SODIUM 40 MG: 40 TABLET, DELAYED RELEASE ORAL at 05:39

## 2021-12-24 RX ADMIN — CARVEDILOL 12.5 MG: 12.5 TABLET, FILM COATED ORAL at 09:31

## 2021-12-24 ASSESSMENT — PAIN SCALES - GENERAL: PAINLEVEL_OUTOF10: 0

## 2021-12-24 NOTE — PROGRESS NOTES
Hospitalist Progress Note      Name:  Harman Latham /Age/Sex: 1935  (82 y.o. female)   MRN & CSN:  [id number] & [id number] Admission Date/Time: 2021 11:38 AM   Location:  [address] [place] [address]- PCP: Binta Tran MD         Hospital Day: 3    Assessment and Plan:   Harman Latham is a 82 y.o.  female  who presents with generalized weakness      Failure to thrive-patient reports generalized weakness unable to get out of bed for multiple days. -Admit to observation  -Due to possible UTI or Covid infection-UA still pending.    -Encourage oral hydration and nutrition  -D5W if patient does not eat well  -PT OT evaluation, case management consult for possible placement  - evaluations pending  -SNF placement at discharge    Covid 19 infection  -Patient received Covid vaccination including booster shot a month ago  -No shortness of breath or cough  -Covid test indeterminate positive twice  -Respiratory virus COVID-19 indeterminate-specific Covid PCR test-negative () requested by infectious disease  -CRP 39.1 downtrending to 23.8 ()     Possible UTI  Incontinent of urine  -Chronic irritable bladder  -Had procedure to set up a bladder stimulant on right back a week ago  -Incontinency of urinating again last night  -concerning of UTI  -UA pending -nursing communication sent to obtain urine sample     History of CAD  -CABG 6 month ago  -Continue statin, aspirin    BMI 20.28    Diet: ADULT DIET; Regular  ADULT ORAL NUTRITION SUPPLEMENT; Breakfast, Lunch, Dinner; Standard High Calorie/High Protein Oral Supplement  DVT prophylaxis: Lovenox  GI prophylaxis: Protonix  CODE STATUS:Full Code    Treatment and discharge plan discussed with patient/family. Patient/family voiced understanding. This patient was seen examined and treatment plan discussed with/supervising physician.   .A hospitalist attending physician was available for questions/consultation as needed      History of Present Illness: Chief Complaint: Failure to thrive in adult    Subjective  Patient evaluated at bedside this morning. Spouse is at bedside. Patient endorses continued generalized weakness denies any focal weaknesses. Patient reports decreased appetite. Spouse at bedside states patient cannot eat much for breakfast.  Patient denies any nausea, vomiting or abdominal pain. ROS reviewed negative, unless as noted above    Objective:   No intake or output data in the 24 hours ending 12/24/21 1238   Vitals:   Vitals:    12/24/21 0800   BP:  132/74   Pulse:  83   Resp:  18   Temp:  98.1   SpO2: 100%     Physical Exam:   Physical Exam  HENT:      Mouth/Throat:      Mouth: Mucous membranes are moist.   Eyes:      Extraocular Movements: Extraocular movements intact. Conjunctiva/sclera: Conjunctivae normal.   Cardiovascular:      Rate and Rhythm: Normal rate and regular rhythm. Pulses: Normal pulses. Heart sounds: Normal heart sounds. Pulmonary:      Effort: Pulmonary effort is normal.      Breath sounds: Normal breath sounds. Abdominal:      General: Abdomen is flat. Palpations: Abdomen is soft. Skin:     General: Skin is warm and dry. Neurological:      General: No focal deficit present. Mental Status: She is alert.    Psychiatric:         Mood and Affect: Mood normal.         Medications:   Medications:    aspirin  81 mg Oral Daily    budesonide-formoterol  1 puff Inhalation BID    carvedilol  12.5 mg Oral BID WC    doxepin  10 mg Oral Nightly    melatonin  3 mg Oral Nightly    pantoprazole  40 mg Oral QAM AC    rosuvastatin  5 mg Oral Nightly    sodium chloride flush  5-40 mL IntraVENous 2 times per day    enoxaparin  40 mg SubCUTAneous Nightly      Infusions:    sodium chloride       PRN Meds: sodium chloride flush, 10 mL, PRN  sodium chloride, 25 mL, PRN  polyethylene glycol, 17 g, Daily PRN  nicotine polacrilex, 2 mg, Q1H PRN  acetaminophen, 650 mg, Q6H PRN   Or  acetaminophen, 650 mg, Q6H PRN  ondansetron, 4 mg, Q6H PRN        Recent Labs     12/22/21  1250 12/23/21  0409   WBC 11.1* 8.9   HGB 13.7 13.1   HCT 41.2 39.3    245      Recent Labs     12/22/21  1250 12/23/21  0409 12/24/21  0553    134* 138   K 4.3 4.0 4.2   CL 99 95* 101   CO2 23 24 27   BUN 13 14 12   CREATININE 0.8 0.7 0.7     Recent Labs     12/22/21  1250   AST 28   ALT 18   BILIDIR 0.2   BILITOT 0.9   ALKPHOS 47     No results for input(s): INR in the last 72 hours.   Recent Labs     12/22/21  1250   TROPONINT <0.010        Imaging reviewed      Electronically signed by ERMELINDA Solomon CNP on 12/24/2021 at 12:38 PM

## 2021-12-25 LAB
ANION GAP SERPL CALCULATED.3IONS-SCNC: 7 MMOL/L (ref 4–16)
BUN BLDV-MCNC: 17 MG/DL (ref 6–23)
C-REACTIVE PROTEIN, HIGH SENSITIVITY: 21.1 MG/L
CALCIUM SERPL-MCNC: 9.3 MG/DL (ref 8.3–10.6)
CHLORIDE BLD-SCNC: 102 MMOL/L (ref 99–110)
CO2: 32 MMOL/L (ref 21–32)
CREAT SERPL-MCNC: 0.6 MG/DL (ref 0.6–1.1)
GFR AFRICAN AMERICAN: >60 ML/MIN/1.73M2
GFR NON-AFRICAN AMERICAN: >60 ML/MIN/1.73M2
GLUCOSE BLD-MCNC: 123 MG/DL (ref 70–99)
POTASSIUM SERPL-SCNC: 4.4 MMOL/L (ref 3.5–5.1)
SODIUM BLD-SCNC: 141 MMOL/L (ref 135–145)

## 2021-12-25 PROCEDURE — 80048 BASIC METABOLIC PNL TOTAL CA: CPT

## 2021-12-25 PROCEDURE — 2580000003 HC RX 258: Performed by: NURSE PRACTITIONER

## 2021-12-25 PROCEDURE — 6370000000 HC RX 637 (ALT 250 FOR IP): Performed by: NURSE PRACTITIONER

## 2021-12-25 PROCEDURE — 94640 AIRWAY INHALATION TREATMENT: CPT

## 2021-12-25 PROCEDURE — 94761 N-INVAS EAR/PLS OXIMETRY MLT: CPT

## 2021-12-25 PROCEDURE — 2700000000 HC OXYGEN THERAPY PER DAY

## 2021-12-25 PROCEDURE — G0378 HOSPITAL OBSERVATION PER HR: HCPCS

## 2021-12-25 PROCEDURE — 86140 C-REACTIVE PROTEIN: CPT

## 2021-12-25 PROCEDURE — 36415 COLL VENOUS BLD VENIPUNCTURE: CPT

## 2021-12-25 RX ADMIN — SODIUM CHLORIDE, PRESERVATIVE FREE 10 ML: 5 INJECTION INTRAVENOUS at 09:36

## 2021-12-25 RX ADMIN — ASPIRIN 81 MG: 81 TABLET, COATED ORAL at 09:36

## 2021-12-25 RX ADMIN — CARVEDILOL 12.5 MG: 12.5 TABLET, FILM COATED ORAL at 10:37

## 2021-12-25 RX ADMIN — PANTOPRAZOLE SODIUM 40 MG: 40 TABLET, DELAYED RELEASE ORAL at 06:42

## 2021-12-25 RX ADMIN — BUDESONIDE AND FORMOTEROL FUMARATE DIHYDRATE 1 PUFF: 160; 4.5 AEROSOL RESPIRATORY (INHALATION) at 08:17

## 2021-12-25 ASSESSMENT — PAIN SCALES - GENERAL: PAINLEVEL_OUTOF10: 0

## 2021-12-25 NOTE — PROGRESS NOTES
Hospitalist Progress Note      Name:  Too Matta /Age/Sex: 1935  (82 y.o. female)   MRN & CSN:  [id number] & [id number] Admission Date/Time: 2021 11:38 AM   Location:  [address] [place] [address]-A PCP: Sherie Morejon MD         Hospital Day: 4    Assessment and Plan:   Too Matta is a 82 y.o.  female  who presents with Failure to thrive in adult    Failure to thrive-patient reports generalized weakness unable to get out of bed for multiple days. -Admit to observation  -Due to possible UTI or Covid infection-UA still pending.    -Encourage oral hydration and nutrition  -D5W if patient does not eat well  -PT OT evaluation, case management consult for placement  -SNF placement at discharge     Negative for Covid 19 infection  -Patient received Covid vaccination including booster shot a month ago  -No shortness of breath or cough  -Covid test indeterminate positive twice  -Respiratory virus COVID-19 indeterminate-specific Covid PCR test-negative () requested by infectious disease  -CRP 39.1 downtrending to 23.8 ()     Possible UTI  Incontinent of urine  -Chronic irritable bladder  -Had procedure to set up a bladder stimulant on right back a week ago  -Incontinency of urinating again last night  -concerning of UTI  -UA pending -nursing communication sent to obtain urine sample () still pending     Severe malnutrition secondary to chronic illness. Dietitian consulted  -Dietary recommendations as per nutrition    History of CAD  -CABG 6 month ago  -Continue statin, aspirin     BMI 20.28    Diet: ADULT DIET; Regular  ADULT ORAL NUTRITION SUPPLEMENT; Breakfast, Lunch, Dinner; Standard High Calorie/High Protein Oral Supplement  DVT prophylaxis: Lovenox  GI prophylaxis: Protonix  CODE STATUS:Full Code    Treatment and discharge plan discussed with patient/family. Patient/family voiced understanding.     This patient was seen examined and treatment plan discussed with/supervising physician. A hospitalist attending physician was available for questions/consultation as needed    Dispo:  pending SNF placement    History of Present Illness:     Chief Complaint: Failure to thrive in adult    Subjective  Patient seen at bedside she is resting in bed. Patient answers questions but does not make eye contact. She reports having a decreased appetite as she is not very hungry. No acute distress noted. ROS reviewed negative, unless as noted above    Objective:   No intake or output data in the 24 hours ending 12/25/21 1340   Vitals:   Vitals:    12/25/21 0818   BP:  127/61   Pulse:  82   Resp:  16   Temp:  97.8   SpO2: 97%     Physical Exam:   Physical Exam  HENT:      Mouth/Throat:      Mouth: Mucous membranes are moist.   Eyes:      Extraocular Movements: Extraocular movements intact. Conjunctiva/sclera: Conjunctivae normal.   Cardiovascular:      Rate and Rhythm: Normal rate and regular rhythm. Pulses: Normal pulses. Heart sounds: Normal heart sounds. Pulmonary:      Effort: Pulmonary effort is normal.      Breath sounds: Normal breath sounds. Abdominal:      General: Abdomen is flat. Palpations: Abdomen is soft. Skin:     General: Skin is warm and dry. Neurological:      General: No focal deficit present. Mental Status: She is alert and oriented to person, place, and time.    Psychiatric:      Comments: Flat affect         Medications:   Medications:    aspirin  81 mg Oral Daily    budesonide-formoterol  1 puff Inhalation BID    carvedilol  12.5 mg Oral BID WC    doxepin  10 mg Oral Nightly    melatonin  3 mg Oral Nightly    pantoprazole  40 mg Oral QAM AC    rosuvastatin  5 mg Oral Nightly    sodium chloride flush  5-40 mL IntraVENous 2 times per day    enoxaparin  40 mg SubCUTAneous Nightly      Infusions:    sodium chloride       PRN Meds: sodium chloride flush, 10 mL, PRN  sodium chloride, 25 mL, PRN  polyethylene glycol, 17 g, Daily PRN  nicotine polacrilex, 2 mg, Q1H PRN  acetaminophen, 650 mg, Q6H PRN   Or  acetaminophen, 650 mg, Q6H PRN  ondansetron, 4 mg, Q6H PRN        Recent Labs     12/23/21  0409   WBC 8.9   HGB 13.1   HCT 39.3         Recent Labs     12/23/21  0409 12/24/21  0553 12/25/21  0508   * 138 141   K 4.0 4.2 4.4   CL 95* 101 102   CO2 24 27 32   BUN 14 12 17   CREATININE 0.7 0.7 0.6     No results for input(s): AST, ALT, ALB, BILIDIR, BILITOT, ALKPHOS in the last 72 hours. No results for input(s): INR in the last 72 hours. No results for input(s): CKTOTAL, CKMB, CKMBINDEX, TROPONINT in the last 72 hours.      Imaging reviewed      Electronically signed by ERMELINDA Julien CNP on 12/25/2021 at 1:40 PM

## 2021-12-26 LAB
ANION GAP SERPL CALCULATED.3IONS-SCNC: 7 MMOL/L (ref 4–16)
BASOPHILS ABSOLUTE: 0 K/CU MM
BASOPHILS RELATIVE PERCENT: 0.5 % (ref 0–1)
BUN BLDV-MCNC: 15 MG/DL (ref 6–23)
C-REACTIVE PROTEIN, HIGH SENSITIVITY: 15.2 MG/L
CALCIUM SERPL-MCNC: 9.2 MG/DL (ref 8.3–10.6)
CHLORIDE BLD-SCNC: 102 MMOL/L (ref 99–110)
CO2: 33 MMOL/L (ref 21–32)
CREAT SERPL-MCNC: 0.5 MG/DL (ref 0.6–1.1)
DIFFERENTIAL TYPE: ABNORMAL
EOSINOPHILS ABSOLUTE: 0.3 K/CU MM
EOSINOPHILS RELATIVE PERCENT: 3.4 % (ref 0–3)
ESTIMATED AVERAGE GLUCOSE: 128 MG/DL
GFR AFRICAN AMERICAN: >60 ML/MIN/1.73M2
GFR NON-AFRICAN AMERICAN: >60 ML/MIN/1.73M2
GLUCOSE BLD-MCNC: 119 MG/DL (ref 70–99)
GLUCOSE BLD-MCNC: 139 MG/DL (ref 70–99)
GLUCOSE BLD-MCNC: 162 MG/DL (ref 70–99)
GLUCOSE BLD-MCNC: 183 MG/DL (ref 70–99)
HBA1C MFR BLD: 6.1 % (ref 4.2–6.3)
HCT VFR BLD CALC: 39.8 % (ref 37–47)
HEMOGLOBIN: 12.7 GM/DL (ref 12.5–16)
IMMATURE NEUTROPHIL %: 0.2 % (ref 0–0.43)
LYMPHOCYTES ABSOLUTE: 2.1 K/CU MM
LYMPHOCYTES RELATIVE PERCENT: 23.9 % (ref 24–44)
MCH RBC QN AUTO: 33.4 PG (ref 27–31)
MCHC RBC AUTO-ENTMCNC: 31.9 % (ref 32–36)
MCV RBC AUTO: 104.7 FL (ref 78–100)
MONOCYTES ABSOLUTE: 0.8 K/CU MM
MONOCYTES RELATIVE PERCENT: 9.1 % (ref 0–4)
NUCLEATED RBC %: 0 %
PDW BLD-RTO: 12.2 % (ref 11.7–14.9)
PLATELET # BLD: 261 K/CU MM (ref 140–440)
PMV BLD AUTO: 9.4 FL (ref 7.5–11.1)
POTASSIUM SERPL-SCNC: 4.5 MMOL/L (ref 3.5–5.1)
RBC # BLD: 3.8 M/CU MM (ref 4.2–5.4)
SEGMENTED NEUTROPHILS ABSOLUTE COUNT: 5.6 K/CU MM
SEGMENTED NEUTROPHILS RELATIVE PERCENT: 62.9 % (ref 36–66)
SODIUM BLD-SCNC: 142 MMOL/L (ref 135–145)
TOTAL IMMATURE NEUTOROPHIL: 0.02 K/CU MM
TOTAL NUCLEATED RBC: 0 K/CU MM
TSH HIGH SENSITIVITY: 0.4 UIU/ML (ref 0.27–4.2)
WBC # BLD: 8.9 K/CU MM (ref 4–10.5)

## 2021-12-26 PROCEDURE — 6370000000 HC RX 637 (ALT 250 FOR IP): Performed by: NURSE PRACTITIONER

## 2021-12-26 PROCEDURE — 2700000000 HC OXYGEN THERAPY PER DAY

## 2021-12-26 PROCEDURE — G0378 HOSPITAL OBSERVATION PER HR: HCPCS

## 2021-12-26 PROCEDURE — 36415 COLL VENOUS BLD VENIPUNCTURE: CPT

## 2021-12-26 PROCEDURE — 85025 COMPLETE CBC W/AUTO DIFF WBC: CPT

## 2021-12-26 PROCEDURE — 94640 AIRWAY INHALATION TREATMENT: CPT

## 2021-12-26 PROCEDURE — 94761 N-INVAS EAR/PLS OXIMETRY MLT: CPT

## 2021-12-26 PROCEDURE — 83036 HEMOGLOBIN GLYCOSYLATED A1C: CPT

## 2021-12-26 PROCEDURE — 96372 THER/PROPH/DIAG INJ SC/IM: CPT

## 2021-12-26 PROCEDURE — 84443 ASSAY THYROID STIM HORMONE: CPT

## 2021-12-26 PROCEDURE — 86140 C-REACTIVE PROTEIN: CPT

## 2021-12-26 PROCEDURE — 80048 BASIC METABOLIC PNL TOTAL CA: CPT

## 2021-12-26 PROCEDURE — 2580000003 HC RX 258: Performed by: NURSE PRACTITIONER

## 2021-12-26 PROCEDURE — 6360000002 HC RX W HCPCS: Performed by: NURSE PRACTITIONER

## 2021-12-26 PROCEDURE — 82962 GLUCOSE BLOOD TEST: CPT

## 2021-12-26 RX ORDER — DEXTROSE MONOHYDRATE 50 MG/ML
100 INJECTION, SOLUTION INTRAVENOUS PRN
Status: DISCONTINUED | OUTPATIENT
Start: 2021-12-26 | End: 2021-12-31 | Stop reason: HOSPADM

## 2021-12-26 RX ORDER — SODIUM CHLORIDE 9 MG/ML
INJECTION, SOLUTION INTRAVENOUS CONTINUOUS
Status: DISCONTINUED | OUTPATIENT
Start: 2021-12-26 | End: 2021-12-26

## 2021-12-26 RX ORDER — DEXTROSE MONOHYDRATE 25 G/50ML
12.5 INJECTION, SOLUTION INTRAVENOUS PRN
Status: DISCONTINUED | OUTPATIENT
Start: 2021-12-26 | End: 2021-12-31 | Stop reason: HOSPADM

## 2021-12-26 RX ORDER — NICOTINE POLACRILEX 4 MG
15 LOZENGE BUCCAL PRN
Status: DISCONTINUED | OUTPATIENT
Start: 2021-12-26 | End: 2021-12-31 | Stop reason: HOSPADM

## 2021-12-26 RX ADMIN — ROSUVASTATIN CALCIUM 5 MG: 5 TABLET, COATED ORAL at 22:55

## 2021-12-26 RX ADMIN — DOXEPIN HYDROCHLORIDE 10 MG: 10 CAPSULE ORAL at 00:00

## 2021-12-26 RX ADMIN — BUDESONIDE AND FORMOTEROL FUMARATE DIHYDRATE 1 PUFF: 160; 4.5 AEROSOL RESPIRATORY (INHALATION) at 07:44

## 2021-12-26 RX ADMIN — SODIUM CHLORIDE, PRESERVATIVE FREE 10 ML: 5 INJECTION INTRAVENOUS at 00:03

## 2021-12-26 RX ADMIN — SODIUM CHLORIDE, PRESERVATIVE FREE 10 ML: 5 INJECTION INTRAVENOUS at 09:47

## 2021-12-26 RX ADMIN — MELATONIN TAB 3 MG 3 MG: 3 TAB at 00:01

## 2021-12-26 RX ADMIN — ENOXAPARIN SODIUM 40 MG: 100 INJECTION SUBCUTANEOUS at 22:56

## 2021-12-26 RX ADMIN — ROSUVASTATIN CALCIUM 5 MG: 5 TABLET, COATED ORAL at 00:02

## 2021-12-26 RX ADMIN — MELATONIN TAB 3 MG 3 MG: 3 TAB at 22:55

## 2021-12-26 RX ADMIN — ASPIRIN 81 MG: 81 TABLET, COATED ORAL at 09:48

## 2021-12-26 RX ADMIN — DOXEPIN HYDROCHLORIDE 10 MG: 10 CAPSULE ORAL at 22:56

## 2021-12-26 RX ADMIN — PANTOPRAZOLE SODIUM 40 MG: 40 TABLET, DELAYED RELEASE ORAL at 09:48

## 2021-12-26 RX ADMIN — SODIUM CHLORIDE, PRESERVATIVE FREE 10 ML: 5 INJECTION INTRAVENOUS at 23:22

## 2021-12-26 RX ADMIN — CARVEDILOL 12.5 MG: 12.5 TABLET, FILM COATED ORAL at 09:47

## 2021-12-26 RX ADMIN — CARVEDILOL 12.5 MG: 12.5 TABLET, FILM COATED ORAL at 17:18

## 2021-12-26 RX ADMIN — ENOXAPARIN SODIUM 40 MG: 100 INJECTION SUBCUTANEOUS at 00:01

## 2021-12-26 RX ADMIN — BUDESONIDE AND FORMOTEROL FUMARATE DIHYDRATE 1 PUFF: 160; 4.5 AEROSOL RESPIRATORY (INHALATION) at 19:46

## 2021-12-26 ASSESSMENT — PAIN SCALES - GENERAL: PAINLEVEL_OUTOF10: 0

## 2021-12-26 NOTE — PROGRESS NOTES
Hospitalist Progress Note      Name:  Aldo Jones /Age/Sex: 1935  (82 y.o. female)   MRN & CSN:  [id number] & [id number] Admission Date/Time: 2021 11:38 AM   Location:  [facility]-A PCP: Rocky Snow MD         Hospital Day: 5    Assessment and Plan:   Aldo Jones is a 82 y.o.  female  who presents with Failure to thrive in adult    Failure to thrive-patient reports generalized weakness unable to get out of bed for multiple days. -Admit to observation  -Due to possible UTI or Covid infection-UA still pending.    -Encourage oral hydration and nutrition  -D5W if patient does not eat well  -PT OT evaluation, case management consult for placement  -SNF placement at discharge  -Hemoglobin A1c and TSH pending     Negative for Covid 19 infection  -Patient received Covid vaccination including booster shot a month ago  -No shortness of breath or cough  -Covid test indeterminate positive twice  -Respiratory virus COVID-19 indeterminate-specific Covid PCR test-negative () requested by infectious disease  -CRP 39.1 downtrending to 15.2 ()     Possible UTI  Incontinent of urine  -Chronic irritable bladder  -Had procedure to set up a bladder stimulant on right back a week ago  -Incontinency of urinating again last night  -concerning of UTI  -UA pending -nursing communication sent to obtain urine sample () still pending     Diabetes mellitus type 2 diet controlled not on medication as outpatient. Last A1c on file 6.8-2020. Blood sugars have been stable sitter D5W fluids of patient decrease p.o. intake.  -Sliding scale insulin  -Accu-Cheks AC at bedtime  -A1c pending    Severe malnutrition secondary to chronic illness.   Dietitian consulted  -Dietary recommendations as per nutrition     History of CAD-status post CABG follows cardiology outpatient  -Continue statin, aspirin    Other chronic conditions medications ordered otherwise specified above   History of Graves' disease: Tapazole treatment in the past-TSH pending  Osteoporosis  Depression     BMI 20.28    Diet: ADULT DIET; Regular  ADULT ORAL NUTRITION SUPPLEMENT; Breakfast, Lunch, Dinner; Standard High Calorie/High Protein Oral Supplement  DVT prophylaxis: Lovenox  GI prophylaxis: Protonix  CODE STATUS:Full Code    Treatment and discharge plan discussed with patient/family. Patient/family voiced understanding. This patient was seen examined and treatment plan discussed with/supervising physician. Patient was seen and examined autonomously. A hospitalist attending physician was available for questions/consultation as needed          History of Present Illness:     Chief Complaint: Failure to thrive in adult    Subjective  Patient examined resting in bed this morning. Patient's full breakfast tray set up front of her states \"I am not hungry \". She did request I assist her with her orange juice. Patient will answer questions directly but does not make eye contact. Denies nausea or vomiting. Denies any body aches. Denies any chest pain.      ROS reviewed negative, unless as noted above    Objective:   No intake or output data in the 24 hours ending 12/26/21 1036   Vitals:   Vitals:    12/26/21 0815   BP: 122/72   Pulse: 74   Resp: 16   Temp: 98.2 °F (36.8 °C)   SpO2: 95%     Physical Exam:   Physical Exam    Medications:   Medications:    aspirin  81 mg Oral Daily    budesonide-formoterol  1 puff Inhalation BID    carvedilol  12.5 mg Oral BID WC    doxepin  10 mg Oral Nightly    melatonin  3 mg Oral Nightly    pantoprazole  40 mg Oral QAM AC    rosuvastatin  5 mg Oral Nightly    sodium chloride flush  5-40 mL IntraVENous 2 times per day    enoxaparin  40 mg SubCUTAneous Nightly      Infusions:    sodium chloride       PRN Meds: sodium chloride flush, 10 mL, PRN  sodium chloride, 25 mL, PRN  polyethylene glycol, 17 g, Daily PRN  nicotine polacrilex, 2 mg, Q1H PRN  acetaminophen, 650 mg, Q6H PRN Or  acetaminophen, 650 mg, Q6H PRN  ondansetron, 4 mg, Q6H PRN        Recent Labs     12/26/21  0435   WBC 8.9   HGB 12.7   HCT 39.8         Recent Labs     12/24/21  0553 12/25/21  0508 12/26/21  0435    141 142   K 4.2 4.4 4.5    102 102   CO2 27 32 33*   BUN 12 17 15   CREATININE 0.7 0.6 0.5*     No results for input(s): AST, ALT, ALB, BILIDIR, BILITOT, ALKPHOS in the last 72 hours. No results for input(s): INR in the last 72 hours. No results for input(s): CKTOTAL, CKMB, CKMBINDEX, TROPONINT in the last 72 hours.      Imaging reviewed      Electronically signed by ERMELINDA Cardoso CNP on 12/26/2021 at 10:36 AM

## 2021-12-27 LAB
ANION GAP SERPL CALCULATED.3IONS-SCNC: 7 MMOL/L (ref 4–16)
BASOPHILS ABSOLUTE: 0 K/CU MM
BASOPHILS RELATIVE PERCENT: 0.5 % (ref 0–1)
BUN BLDV-MCNC: 13 MG/DL (ref 6–23)
C-REACTIVE PROTEIN, HIGH SENSITIVITY: 11.2 MG/L
CALCIUM SERPL-MCNC: 9.1 MG/DL (ref 8.3–10.6)
CHLORIDE BLD-SCNC: 100 MMOL/L (ref 99–110)
CO2: 34 MMOL/L (ref 21–32)
CREAT SERPL-MCNC: 0.5 MG/DL (ref 0.6–1.1)
DIFFERENTIAL TYPE: ABNORMAL
EOSINOPHILS ABSOLUTE: 0.3 K/CU MM
EOSINOPHILS RELATIVE PERCENT: 3.2 % (ref 0–3)
GFR AFRICAN AMERICAN: >60 ML/MIN/1.73M2
GFR NON-AFRICAN AMERICAN: >60 ML/MIN/1.73M2
GLUCOSE BLD-MCNC: 113 MG/DL (ref 70–99)
GLUCOSE BLD-MCNC: 129 MG/DL (ref 70–99)
GLUCOSE BLD-MCNC: 158 MG/DL (ref 70–99)
GLUCOSE BLD-MCNC: 161 MG/DL (ref 70–99)
GLUCOSE BLD-MCNC: 201 MG/DL (ref 70–99)
HCT VFR BLD CALC: 37.7 % (ref 37–47)
HEMOGLOBIN: 12.7 GM/DL (ref 12.5–16)
IMMATURE NEUTROPHIL %: 0.4 % (ref 0–0.43)
LYMPHOCYTES ABSOLUTE: 2.2 K/CU MM
LYMPHOCYTES RELATIVE PERCENT: 26.5 % (ref 24–44)
MCH RBC QN AUTO: 34.2 PG (ref 27–31)
MCHC RBC AUTO-ENTMCNC: 33.7 % (ref 32–36)
MCV RBC AUTO: 101.6 FL (ref 78–100)
MONOCYTES ABSOLUTE: 0.7 K/CU MM
MONOCYTES RELATIVE PERCENT: 8.3 % (ref 0–4)
NUCLEATED RBC %: 0 %
PDW BLD-RTO: 12.1 % (ref 11.7–14.9)
PLATELET # BLD: 245 K/CU MM (ref 140–440)
PMV BLD AUTO: 9.2 FL (ref 7.5–11.1)
POTASSIUM SERPL-SCNC: 4.5 MMOL/L (ref 3.5–5.1)
RBC # BLD: 3.71 M/CU MM (ref 4.2–5.4)
SEGMENTED NEUTROPHILS ABSOLUTE COUNT: 5.1 K/CU MM
SEGMENTED NEUTROPHILS RELATIVE PERCENT: 61.1 % (ref 36–66)
SODIUM BLD-SCNC: 141 MMOL/L (ref 135–145)
TOTAL IMMATURE NEUTOROPHIL: 0.03 K/CU MM
TOTAL NUCLEATED RBC: 0 K/CU MM
WBC # BLD: 8.3 K/CU MM (ref 4–10.5)

## 2021-12-27 PROCEDURE — 94150 VITAL CAPACITY TEST: CPT

## 2021-12-27 PROCEDURE — 2580000003 HC RX 258: Performed by: NURSE PRACTITIONER

## 2021-12-27 PROCEDURE — 82962 GLUCOSE BLOOD TEST: CPT

## 2021-12-27 PROCEDURE — 85025 COMPLETE CBC W/AUTO DIFF WBC: CPT

## 2021-12-27 PROCEDURE — 1200000000 HC SEMI PRIVATE

## 2021-12-27 PROCEDURE — 80048 BASIC METABOLIC PNL TOTAL CA: CPT

## 2021-12-27 PROCEDURE — 94640 AIRWAY INHALATION TREATMENT: CPT

## 2021-12-27 PROCEDURE — 36415 COLL VENOUS BLD VENIPUNCTURE: CPT

## 2021-12-27 PROCEDURE — 6360000002 HC RX W HCPCS: Performed by: NURSE PRACTITIONER

## 2021-12-27 PROCEDURE — 94761 N-INVAS EAR/PLS OXIMETRY MLT: CPT

## 2021-12-27 PROCEDURE — 6370000000 HC RX 637 (ALT 250 FOR IP): Performed by: NURSE PRACTITIONER

## 2021-12-27 PROCEDURE — 2700000000 HC OXYGEN THERAPY PER DAY

## 2021-12-27 PROCEDURE — 86140 C-REACTIVE PROTEIN: CPT

## 2021-12-27 RX ADMIN — ASPIRIN 81 MG: 81 TABLET, COATED ORAL at 09:25

## 2021-12-27 RX ADMIN — BUDESONIDE AND FORMOTEROL FUMARATE DIHYDRATE 1 PUFF: 160; 4.5 AEROSOL RESPIRATORY (INHALATION) at 07:39

## 2021-12-27 RX ADMIN — PANTOPRAZOLE SODIUM 40 MG: 40 TABLET, DELAYED RELEASE ORAL at 05:33

## 2021-12-27 RX ADMIN — ROSUVASTATIN CALCIUM 5 MG: 5 TABLET, COATED ORAL at 20:41

## 2021-12-27 RX ADMIN — SODIUM CHLORIDE, PRESERVATIVE FREE 10 ML: 5 INJECTION INTRAVENOUS at 09:25

## 2021-12-27 RX ADMIN — MELATONIN TAB 3 MG 3 MG: 3 TAB at 20:40

## 2021-12-27 RX ADMIN — CARVEDILOL 12.5 MG: 12.5 TABLET, FILM COATED ORAL at 17:39

## 2021-12-27 RX ADMIN — CARVEDILOL 12.5 MG: 12.5 TABLET, FILM COATED ORAL at 09:25

## 2021-12-27 RX ADMIN — DOXEPIN HYDROCHLORIDE 10 MG: 10 CAPSULE ORAL at 20:43

## 2021-12-27 RX ADMIN — BUDESONIDE AND FORMOTEROL FUMARATE DIHYDRATE 1 PUFF: 160; 4.5 AEROSOL RESPIRATORY (INHALATION) at 19:35

## 2021-12-27 ASSESSMENT — PAIN SCALES - GENERAL: PAINLEVEL_OUTOF10: 0

## 2021-12-27 NOTE — PROGRESS NOTES
Hospitalist Progress Note      Name:  Grace Manzano /Age/Sex: 1935  (82 y.o. female)   MRN & CSN:  [id number] & [id number] Admission Date/Time: 2021 11:38 AM   [facility] PCP: Nedra Moulton MD         Hospital Day: 6    Assessment and Plan:   Grace Manzano is a 82 y.o.  female  who presents with Failure to thrive in adult    Failure to thrive-patient reports generalized weakness unable to get out of bed for multiple days. -Admit to observation  -Due to possible UTI or Covid infection-UA still pending.    -Encourage oral hydration and nutrition  -D5W if patient does not eat well  -PT OT evaluation, case management consult for placement  -SNF placement at discharge  -Hemoglobin A1c resulted 6.1  -TSH 0.395     Negative for Covid 19 infection  -Patient received Covid vaccination including booster shot a month ago  -No shortness of breath or cough  -Covid test indeterminate positive twice  -Respiratory virus COVID-19 indeterminate-specific Covid PCR test-negative () requested by infectious disease  -CRP 39.1 downtrending to 15.2 ()     Possible UTI  Incontinent of urine  -Chronic irritable bladder  -Had procedure to set up a bladder stimulant on right back a week ago  -concerning of UTI- straight catheter urine sample  -UA pending -nursing communication sent to obtain urine sample () still pending     Diabetes mellitus type 2 diet controlled not on medication as outpatient. Last A1c on file 6.8-2020.   Blood sugars have been stable sitter D5W fluids of patient decrease p.o. intake.  -Sliding scale insulin  -Accu-Cheks AC at bedtime  -A1c pending     Severe malnutrition secondary to chronic illness.  Dietitian consulted  -Dietary recommendations as per nutrition     History of CAD-status post CABG follows cardiology outpatient  -Continue statin, aspirin     Other chronic conditions medications ordered otherwise specified above   History of Graves' disease: Tapazole treatment in the past-TSH pending  Osteoporosis  Depression     BMI 20.28     Dispo continue admission pending SNF placement      Diet: ADULT DIET; Regular  ADULT ORAL NUTRITION SUPPLEMENT; Breakfast, Lunch, Dinner; Standard High Calorie/High Protein Oral Supplement  ADULT ORAL NUTRITION SUPPLEMENT; Dinner, Lunch; Frozen Oral Supplement  DVT prophylaxis: Lovenox  GI prophylaxis Protonix   CODE STATUS:Full Code    Treatment and discharge plan discussed with patient/family. Patient/family voiced understanding. This patient was seen examined and treatment plan discussed with/supervising physician. A hospitalist attending physician was available for questions/consultation as needed      History of Present Illness:     Chief Complaint: Failure to thrive in adult    Subjective  Patient lying in bed  is at bedside feeding patient lunch. Patient is alert and oriented x4 very friendly and interactive today. Making eye contact. Denies any nausea or vomiting. Denies any new weaknesses.  states she about a third of her lunch. ROS reviewed negative, unless as noted above    Objective: Intake/Output Summary (Last 24 hours) at 12/27/2021 1411  Last data filed at 12/27/2021 0925  Gross per 24 hour   Intake 10 ml   Output    Net 10 ml      Vitals:   Vitals:    12/27/21 1404   BP: 109/67   Pulse: 78   Resp:    Temp: 98.8 °F (37.1 °C)   SpO2: 97%     Physical Exam:   Physical Exam  HENT:      Mouth/Throat:      Mouth: Mucous membranes are moist.   Eyes:      Extraocular Movements: Extraocular movements intact. Conjunctiva/sclera: Conjunctivae normal.   Cardiovascular:      Rate and Rhythm: Normal rate and regular rhythm. Pulses: Normal pulses. Heart sounds: Normal heart sounds. Pulmonary:      Effort: Pulmonary effort is normal.      Breath sounds: Normal breath sounds. Abdominal:      General: Abdomen is flat. Palpations: Abdomen is soft.    Musculoskeletal: General: Normal range of motion. Skin:     General: Skin is warm. Neurological:      General: No focal deficit present. Mental Status: She is alert and oriented to person, place, and time. Medications:   Medications:    insulin lispro  0-6 Units SubCUTAneous TID WC    insulin lispro  0-3 Units SubCUTAneous Nightly    aspirin  81 mg Oral Daily    budesonide-formoterol  1 puff Inhalation BID    carvedilol  12.5 mg Oral BID WC    doxepin  10 mg Oral Nightly    melatonin  3 mg Oral Nightly    pantoprazole  40 mg Oral QAM AC    rosuvastatin  5 mg Oral Nightly    sodium chloride flush  5-40 mL IntraVENous 2 times per day    enoxaparin  40 mg SubCUTAneous Nightly      Infusions:    dextrose      sodium chloride       PRN Meds: glucose, 15 g, PRN  dextrose, 12.5 g, PRN  glucagon (rDNA), 1 mg, PRN  dextrose, 100 mL/hr, PRN  sodium chloride flush, 10 mL, PRN  sodium chloride, 25 mL, PRN  polyethylene glycol, 17 g, Daily PRN  nicotine polacrilex, 2 mg, Q1H PRN  acetaminophen, 650 mg, Q6H PRN   Or  acetaminophen, 650 mg, Q6H PRN  ondansetron, 4 mg, Q6H PRN        Recent Labs     12/26/21  0435 12/27/21  0157   WBC 8.9 8.3   HGB 12.7 12.7   HCT 39.8 37.7    245      Recent Labs     12/25/21  0508 12/26/21  0435 12/27/21  0157    142 141   K 4.4 4.5 4.5    102 100   CO2 32 33* 34*   BUN 17 15 13   CREATININE 0.6 0.5* 0.5*     No results for input(s): AST, ALT, ALB, BILIDIR, BILITOT, ALKPHOS in the last 72 hours. No results for input(s): INR in the last 72 hours. No results for input(s): CKTOTAL, CKMB, CKMBINDEX, TROPONINT in the last 72 hours.      Imaging reviewed      Electronically signed by ERMELINDA Boyer CNP on 12/27/2021 at 2:11 PM

## 2021-12-27 NOTE — PLAN OF CARE
LMOM for mom that pt will not need his labs drawn prior to appt Nutrition Problem #1: Severe malnutrition,In context of chronic illness  Intervention: Food and/or Nutrient Delivery: Continue Current Diet,Modify Oral Nutrition Supplement  Nutritional Goals: Pt will consume at least half of meals during stay consistently

## 2021-12-27 NOTE — CARE COORDINATION
Reviewed chart and pt's 1st choice is [facility], left vm with admissions to see if things have changed and they can take pt.       [phone] with Mireille Carmona and [facility] is not able to take any admissions this week. Spoke with pt's  and next choice is [facility] , Will leave Julio a VM.

## 2021-12-27 NOTE — PROGRESS NOTES
Physical Therapy Treatment Note  Name: Ann Armas MRN: [id number] :   1935   Date:  2021   Admission Date: 2021 Room:  [address] [place] [address]     Restrictions/Precautions:        general precautions, fall risk    Communication with other providers:  RN    Subjective:  Patient states:  Pt crying out for help when therapist passes by  Pain:   Location, Type, Intensity (0/10 to 10/10): Denies pain    Objective:    Observation:  Pt supine in bed with soiled depends upon entry. Treatment, including education/measures:    Bed mobility: pt completed rolling bilaterally x2 mod A with cues for sequencing and hand placement    Assessment / Impression:    Pt supine in bed with alarm on and needs in reach at end of session    Patient's tolerance of treatment:  fair   Adverse Reaction: n/a  Significant change in status and impact:  n/a  Barriers to improvement:  Decreased endurance, impaired transfers    Plan for Next Session:    Continue to address bed mobility and transfer training    Time in:  1036  Time out:  1044  Timed treatment minutes:  8  Total treatment time:  8    Previously filed items:  Social/Functional History  Lives With: Spouse  Type of Home:  (Condo)  Home Layout: Two level,Able to Live on Main level with bedroom/bathroom  Home Access: Level entry  Bathroom Shower/Tub: Walk-in shower,Tub/Shower unit,Shower chair with back  Bathroom Toilet: Standard  Bathroom Equipment: Grab bars in [address]: 4 wheeled walker  ADL Assistance: Needs assistance (DEP assist for bathing and dressing.  Pt is incontinent and wears depends.)  Homemaking Assistance: Needs assistance  Homemaking Responsibilities: No  Ambulation Assistance: Independent  Transfer Assistance: Independent  Active : No  Patient's  Info: Spouse  Short term goals  Time Frame for Short term goals: 1 week  Short term goal 1: pt to complete all bed mobility min A  Short term goal 2: Pt to complete STS transfers to/from bed, commode, and chair mod A  Short term goal 3: Pt to complete all stand pivot transfer with LRAD mod A  Short term goal 4: Pt to ambulate 13' with LRAD mod A       Electronically signed by:    Gerald Costello, PT  12/27/2021, 11:52 AM

## 2021-12-27 NOTE — DISCHARGE INSTR - COC
(Zostavax) 12/10/2010    Zoster Recombinant (Shingrix) 07/12/2019, 09/13/2019       Active Problems:  Patient Active Problem List   Diagnosis Code    Generalized anxiety disorder F41.1    Chronic neck pain M54.2, G89.29    Graves' disease E05.00    Hyperlipidemia E78.5    Essential hypertension I10    Vascular dementia with behavior disturbance ([facility]) F01.51    Cerebrovascular accident (CVA) due to vascular stenosis ([facility] [address] I63.9    Gait disturbance R26.9    Mixed stress and urge urinary incontinence N39.46    Abnormal stress test R94.39    Subclavian artery stenosis, left ([facility]) I77.1    COPD, severe ([facility]) J44.9    Shortness of breath R06.02    Coronary artery disease involving native coronary artery of native heart without angina pectoris I25.10    Chronic hypoxemic respiratory failure ([facility]) J96.11    Type II diabetes mellitus ([facility] [address] E11.9    Skin abrasion T14. 8XXA    Normocytic anemia D64.9    Chronic midline low back pain without sciatica M54.50, G89.29    Gastroesophageal reflux disease without esophagitis K21.9    Recurrent UTI N39.0    On mechanically assisted ventilation ([facility]) Z99.11    Failure to thrive in adult R62.7    Severe malnutrition ([facility]) E43       Isolation/Infection:   Isolation            Contact          Patient Infection Status       Infection Onset Added Last Indicated Last Indicated By Review Planned Expiration Resolved Resolved By    MRSA  04/06/18 04/06/18 Marion Falk LPN        2/9/19 sputum    Resolved    COVID-19 (Rule Out) 12/23/21 12/23/21 12/23/21 COVID-19, Rapid (Ordered)   12/23/21 Rule-Out Test Resulted    COVID-19 (Rule Out) 12/23/21 12/23/21 12/23/21 COVID-19 (Ordered)   12/23/21 Violetta Shepherd RN    COVID-19 (Rule Out) 12/22/21 12/22/21 12/22/21 Respiratory Panel, Molecular, with COVID-19 (Restricted: peds pts or suitable admitted adults) (Ordered)   12/22/21 Rule-Out Test Resulted    COVID-19 (Rule Out) 12/22/21 12/22/21 12/22/21 COVID-19, Rapid (Ordered)   12/22/21 Rule-Out Test Resulted    COVID-19 12/22/21 12/22/21 12/22/21 COVID-19, Rapid   12/23/21 Nathan Paulson RN    COVID-19 (Rule Out) 12/22/21 12/22/21 12/22/21 COVID-19, Rapid (Ordered)   12/22/21 Rule-Out Test Resulted            Nurse Assessment:  Last Vital Signs: /67   Pulse 78   Temp 98.8 °F (37.1 °C) (Oral)   Resp 16   Ht 5' 1.5\" (1.562 m)   Wt 109 lb 9.1 oz (49.7 kg)   LMP  (LMP Unknown)   SpO2 97%   BMI 20.37 kg/m²     Last documented pain score (0-10 scale): Pain Level: 0  Last Weight:   Wt Readings from Last 1 Encounters:   12/27/21 109 lb 9.1 oz (49.7 kg)     Mental Status:  alert    IV Access:  - None    Nursing Mobility/ADLs:  Walking   Assisted  Transfer  {[facility] DME LBKV:635867527}  Bathing  {[facility] DME XGRP:500075084}  Dressing  {[facility] DME DUVA:025021213}  Toileting  {[facility] DME EJNA:049769046}  Feeding  {[facility] DME XHRT:821485176}  Med Admin  {[facility] DME RFVV:813163292}  Med Delivery   { TARIK MED Delivery:973685791}    Wound Care Documentation and Therapy:        Elimination:  Continence: Bowel: {YES / SC:15843}  Bladder: {YES / WS:95400}  Urinary Catheter: {Urinary Catheter:016220750}   Colostomy/Ileostomy/Ileal Conduit: {YES / NJ:94029}       Date of Last BM: ***    Intake/Output Summary (Last 24 hours) at 12/27/2021 1555  Last data filed at 12/27/2021 0925  Gross per 24 hour   Intake 10 ml   Output --   Net 10 ml     I/O last 3 completed shifts:   In: 10 [I.V.:10]  Out: -     Safety Concerns:     [employer] Safety Concerns:775432465}    Impairments/Disabilities:      [employer] Impairments/Disabilities:801088960}    Patient's personal belongings (please select all that are sent with patient):  {[facility] DME Belongings:155528620}    RN SIGNATURE:  {Dulce Mariagnature:893423639}    CASE MANAGEMENT/SOCIAL WORK SECTION    Inpatient Status Date: ***    Readmission Risk Assessment Score:  Readmission Risk              Risk of Unplanned Readmission:  14           Discharging to Facility/ Agency   Name: Address:  Phone:  Fax:    Dialysis Facility (if applicable)   Name:  Address:  Dialysis Schedule:  Phone:  Fax:    / signature: {Esignature:681220600}        PHYSICIAN SECTION    Nutrition Therapy:  Current Nutrition Therapy:   - Oral Diet:  General  - Oral Nutrition Supplement:  [address] MONTANE[address] Oral Nutrition:272933537}    Routes of Feeding: {[facility] DME Other Feedings:297543782}  Liquids: {Slp liquid thickness:38606}  Daily Fluid Restriction: {CHP DME Yes amt example:337138821}  Last Modified Barium Swallow with Video (Video Swallowing Test): {Done Not Done QJBV:922348218}    Treatments at the Time of Hospital Discharge:   Respiratory Treatments: ***  Oxygen Therapy:  is on oxygen at 2 L/min per nasal cannula. Ventilator:    { CC Vent BKGJ:096698040}    Rehab Therapies: Physical Therapy and Occupational Therapy  Weight Bearing Status/Restrictions: [address] ORTEGA Weight Bearin}  Other Medical Equipment (for information only, NOT a DME order):  {EQUIPMENT:449273272}  Other Treatments: ***      Prognosis: Good    Condition at Discharge: Stable    Rehab Potential (if transferring to Rehab): Good    Recommended Labs or Other Treatments After Discharge:   CBC renal    Physician Certification: I certify the above information and transfer of Mirta Lainez  is necessary for the continuing treatment of the diagnosis listed and that she requires [facility] for greater 30 days.      Update Admission H&P: No change in H&P    PHYSICIAN SIGNATURE:  {Esignature:[id number]}

## 2021-12-27 NOTE — PROGRESS NOTES
Comprehensive Nutrition Assessment    Type and Reason for Visit:  Reassess    Nutrition Recommendations/Plan:  · Continue current diet   · Consider appetite stimulant   · Add more frequent oral nutrition supplements, chocolate     Nutrition Assessment:  C/o feeling weak, tired, and loss of appetite again today. Pt had some OJ this AM.  aided with some feeds but PO intake less than 50% of meals on regular diet today. Encourage pt to participate with PT if able to increase appetite. Pt enjoys sweeter items, will trial chocolate supplements. Assist feed if  isn't present. Noted measured bedscale weight indicates further wt loss. High nutrition risk. Malnutrition Assessment:  Malnutrition Status:  Severe malnutrition    Context:  Chronic Illness     Findings of the 6 clinical characteristics of malnutrition:  Energy Intake:  Mild decrease in energy intake (Comment)  Weight Loss:  7 - 10% over 6 months (9% in 7 months)     Body Fat Loss:  7 - Severe body fat loss Triceps,Buccal region   Muscle Mass Loss:  7 - Severe muscle mass loss Clavicles (pectoralis & deltoids),Thigh (quadraceps),Hand (interosseous),Scapula (trapezius)  Fluid Accumulation:  No significant fluid accumulation Extremities   Strength:  Not Performed    Estimated Daily Nutrient Needs:  Energy (kcal):  1319-7944 (30-35 kcals/kg); Weight Used for Energy Requirements:  Current     Protein (g):  59-74 (1.2-1.5 g/kg); Weight Used for Protein Requirements:  Ideal        Fluid (ml/day):  4422-6716; Method Used for Fluid Requirements:  1 ml/kcal      Nutrition Related Findings:  Denies any N/V/P, chewing/swallowing, or issues with BM. Continues to have urine incontinence. CRP 11.2,       Wounds:  None       Current Nutrition Therapies:    ADULT DIET;  Regular  ADULT ORAL NUTRITION SUPPLEMENT; Breakfast, Lunch, Dinner; Standard High Calorie/High Protein Oral Supplement  ADULT ORAL NUTRITION SUPPLEMENT; Dinner, Lunch; Frozen Oral Supplement    Anthropometric Measures:  · Height: 5' 1.5\" (156.2 cm)  · Current Body Weight: 109 lb 9.1 oz (49.7 kg)   · Admission Body Weight: 109 lb 2 oz (49.5 kg)    · Usual Body Weight: 120 lb (54.4 kg) (5/6/21; 4.2% wt loss over 7 months)     · Ideal Body Weight: 108 lbs; % Ideal Body Weight 101.5 %   · BMI: 20.4  · BMI Categories: Underweight (BMI less than 22) age over 72       Nutrition Diagnosis:   · Severe malnutrition,In context of chronic illness related to inadequate protein-energy intake,cognitive or neurological impairment,early satiety as evidenced by severe muscle loss,severe loss of subcutaneous fat,weight loss (Loss of appetite PTA, 4% over 7 months)    Nutrition Interventions:   Food and/or Nutrient Delivery:  Continue Current Diet,Modify Oral Nutrition Supplement  Nutrition Education/Counseling:  No recommendation at this time   Coordination of Nutrition Care:  Continue to monitor while inpatient,Coordination of Community Care    Goals:  Pt will consume at least half of meals during stay consistently       Nutrition Monitoring and Evaluation:   Behavioral-Environmental Outcomes:  None Identified   Food/Nutrient Intake Outcomes:  Supplement Intake,Food and Nutrient Intake  Physical Signs/Symptoms Outcomes:  Biochemical Data,Chewing or Swallowing,GI Status,Fluid Status or Edema,Meal Time Behavior,Weight,Nutrition Focused Physical Findings     Discharge Planning:     Too soon to determine     Electronically signed by Anant Canas RD, LD on 12/27/21 at 1:08 PM EST    Contact: [phone]

## 2021-12-28 LAB
ANION GAP SERPL CALCULATED.3IONS-SCNC: 9 MMOL/L (ref 4–16)
BACTERIA: ABNORMAL /HPF
BASOPHILS ABSOLUTE: 0 K/CU MM
BASOPHILS RELATIVE PERCENT: 0.4 % (ref 0–1)
BILIRUBIN URINE: NEGATIVE MG/DL
BLOOD, URINE: ABNORMAL
BUN BLDV-MCNC: 13 MG/DL (ref 6–23)
C-REACTIVE PROTEIN, HIGH SENSITIVITY: 7.7 MG/L
CALCIUM SERPL-MCNC: 9.5 MG/DL (ref 8.3–10.6)
CHLORIDE BLD-SCNC: 100 MMOL/L (ref 99–110)
CLARITY: ABNORMAL
CO2: 32 MMOL/L (ref 21–32)
COLOR: YELLOW
CREAT SERPL-MCNC: 0.6 MG/DL (ref 0.6–1.1)
DIFFERENTIAL TYPE: ABNORMAL
EOSINOPHILS ABSOLUTE: 0.2 K/CU MM
EOSINOPHILS RELATIVE PERCENT: 2.8 % (ref 0–3)
GFR AFRICAN AMERICAN: >60 ML/MIN/1.73M2
GFR NON-AFRICAN AMERICAN: >60 ML/MIN/1.73M2
GLUCOSE BLD-MCNC: 129 MG/DL (ref 70–99)
GLUCOSE BLD-MCNC: 138 MG/DL (ref 70–99)
GLUCOSE BLD-MCNC: 190 MG/DL (ref 70–99)
GLUCOSE BLD-MCNC: 201 MG/DL (ref 70–99)
GLUCOSE BLD-MCNC: 205 MG/DL (ref 70–99)
GLUCOSE, URINE: NEGATIVE MG/DL
HCT VFR BLD CALC: 38.4 % (ref 37–47)
HEMOGLOBIN: 12.5 GM/DL (ref 12.5–16)
IMMATURE NEUTROPHIL %: 0.4 % (ref 0–0.43)
KETONES, URINE: NEGATIVE MG/DL
LEUKOCYTE ESTERASE, URINE: ABNORMAL
LYMPHOCYTES ABSOLUTE: 1.7 K/CU MM
LYMPHOCYTES RELATIVE PERCENT: 20.7 % (ref 24–44)
MCH RBC QN AUTO: 33.9 PG (ref 27–31)
MCHC RBC AUTO-ENTMCNC: 32.6 % (ref 32–36)
MCV RBC AUTO: 104.1 FL (ref 78–100)
MONOCYTES ABSOLUTE: 0.7 K/CU MM
MONOCYTES RELATIVE PERCENT: 8.6 % (ref 0–4)
NITRITE URINE, QUANTITATIVE: POSITIVE
NUCLEATED RBC %: 0 %
PDW BLD-RTO: 12.1 % (ref 11.7–14.9)
PH, URINE: 7 (ref 5–8)
PLATELET # BLD: 273 K/CU MM (ref 140–440)
PMV BLD AUTO: 9.6 FL (ref 7.5–11.1)
POTASSIUM SERPL-SCNC: 4.6 MMOL/L (ref 3.5–5.1)
PROTEIN UA: 30 MG/DL
RBC # BLD: 3.69 M/CU MM (ref 4.2–5.4)
RBC URINE: 56 /HPF (ref 0–6)
RENAL EPITHELIAL, UA: 1 /HPF
SEGMENTED NEUTROPHILS ABSOLUTE COUNT: 5.5 K/CU MM
SEGMENTED NEUTROPHILS RELATIVE PERCENT: 67.1 % (ref 36–66)
SODIUM BLD-SCNC: 141 MMOL/L (ref 135–145)
SPECIFIC GRAVITY UA: 1.01 (ref 1–1.03)
SQUAMOUS EPITHELIAL: 5 /HPF
TOTAL IMMATURE NEUTOROPHIL: 0.03 K/CU MM
TOTAL NUCLEATED RBC: 0 K/CU MM
TRANSITIONAL EPITHELIAL: 6 /HPF
TRICHOMONAS: ABNORMAL /HPF
UROBILINOGEN, URINE: NEGATIVE MG/DL (ref 0.2–1)
WBC # BLD: 8.2 K/CU MM (ref 4–10.5)
WBC CLUMP: ABNORMAL /HPF
WBC UA: 314 /HPF (ref 0–5)

## 2021-12-28 PROCEDURE — 1200000000 HC SEMI PRIVATE

## 2021-12-28 PROCEDURE — 87186 SC STD MICRODIL/AGAR DIL: CPT

## 2021-12-28 PROCEDURE — 2580000003 HC RX 258: Performed by: NURSE PRACTITIONER

## 2021-12-28 PROCEDURE — 6370000000 HC RX 637 (ALT 250 FOR IP): Performed by: NURSE PRACTITIONER

## 2021-12-28 PROCEDURE — 86140 C-REACTIVE PROTEIN: CPT

## 2021-12-28 PROCEDURE — 82962 GLUCOSE BLOOD TEST: CPT

## 2021-12-28 PROCEDURE — 85025 COMPLETE CBC W/AUTO DIFF WBC: CPT

## 2021-12-28 PROCEDURE — 97535 SELF CARE MNGMENT TRAINING: CPT

## 2021-12-28 PROCEDURE — 87077 CULTURE AEROBIC IDENTIFY: CPT

## 2021-12-28 PROCEDURE — 80048 BASIC METABOLIC PNL TOTAL CA: CPT

## 2021-12-28 PROCEDURE — 36415 COLL VENOUS BLD VENIPUNCTURE: CPT

## 2021-12-28 PROCEDURE — 2700000000 HC OXYGEN THERAPY PER DAY

## 2021-12-28 PROCEDURE — 81001 URINALYSIS AUTO W/SCOPE: CPT

## 2021-12-28 PROCEDURE — 94761 N-INVAS EAR/PLS OXIMETRY MLT: CPT

## 2021-12-28 PROCEDURE — 87086 URINE CULTURE/COLONY COUNT: CPT

## 2021-12-28 RX ADMIN — ROSUVASTATIN CALCIUM 5 MG: 5 TABLET, COATED ORAL at 20:17

## 2021-12-28 RX ADMIN — MELATONIN TAB 3 MG 3 MG: 3 TAB at 20:17

## 2021-12-28 RX ADMIN — DOXEPIN HYDROCHLORIDE 10 MG: 10 CAPSULE ORAL at 20:17

## 2021-12-28 RX ADMIN — SODIUM CHLORIDE, PRESERVATIVE FREE 10 ML: 5 INJECTION INTRAVENOUS at 09:22

## 2021-12-28 RX ADMIN — ASPIRIN 81 MG: 81 TABLET, COATED ORAL at 09:22

## 2021-12-28 RX ADMIN — PANTOPRAZOLE SODIUM 40 MG: 40 TABLET, DELAYED RELEASE ORAL at 05:13

## 2021-12-28 RX ADMIN — SODIUM CHLORIDE, PRESERVATIVE FREE 10 ML: 5 INJECTION INTRAVENOUS at 20:18

## 2021-12-28 RX ADMIN — CARVEDILOL 12.5 MG: 12.5 TABLET, FILM COATED ORAL at 09:22

## 2021-12-28 RX ADMIN — CARVEDILOL 12.5 MG: 12.5 TABLET, FILM COATED ORAL at 17:42

## 2021-12-28 NOTE — PROGRESS NOTES
Hospitalist Progress Note      Name:  Rosangela Pitts /Age/Sex: 1935  (82 y.o. female)   MRN & CSN:  [id number] & [id number] Admission Date/Time: 2021 11:38 AM   Location:  [address] [place] [address]-A PCP: Mireille Kessler MD         Hospital Day: 7    Assessment and Plan:   Rosangela Pitts is a 82 y.o.  female  who presents with Failure to thrive in adult    Failure to thrive-patient reports generalized weakness unable to get out of bed for multiple days.   Position continues waiting for SNF placement.  -Admit to observation  -Due to possible UTI or Covid infection-UA still pending.    -Encourage oral hydration and nutrition  -D5W if patient does not eat well  -PT OT evaluation, case management consult for placement  -SNF placement at discharge  -Hemoglobin A1c resulted 6.1  -TSH 0.395     Negative for Covid 19 infection  -Patient received Covid vaccination including booster shot a month ago  -No shortness of breath or cough  -Covid test indeterminate positive twice  -Respiratory virus COVID-19 indeterminate-specific Covid PCR test-negative () requested by infectious disease  -CRP 39.1 downtrending to 15.2 ()     Possible UTI  Incontinent of urine  -Chronic irritable bladder  -Had procedure to set up a bladder stimulant on right back a week ago  -concerning of UTI- straight catheter urine sample  -UA pending -nursing communication sent to obtain urine sample () still pending     Diabetes mellitus type 2 diet controlled not on medication as outpatient.  Last A1c on file 6.8-2020.  Blood sugars have been stable sitter D5W fluids of patient decrease p.o. intake.  -Sliding scale insulin  -Accu-Cheks AC at bedtime  -A1c pending     Severe malnutrition secondary to chronic illness.  Dietitian consulted  -Dietary recommendations as per nutrition     History of CAD-status post CABG follows cardiology outpatient  -Continue statin, aspirin     Other chronic conditions medications ordered otherwise specified above   History of Graves' disease: Tapazole treatment in the past-TSH pending  Osteoporosis  Depression     BMI 20.28     Dispo continue admission pending SNF placement        Diet: ADULT DIET; Regular  ADULT ORAL NUTRITION SUPPLEMENT; Breakfast, Lunch, Dinner; Standard High Calorie/High Protein Oral Supplement  ADULT ORAL NUTRITION SUPPLEMENT; Dinner, Lunch; Frozen Oral Supplement  DVT prophylaxis: Lovenox  GI prophylaxis Protonix   CODE STATUS:Full Code        Treatment and discharge plan discussed with patient/family. Patient/family voiced understanding. This patient was seen examined and treatment plan discussed with/supervising physician. History of Present Illness:     Chief Complaint: Failure to thrive in adult    Subjective  Patient resting in bed set up eating breakfast.  She states she is not hungry. She has a full plate in front of her. But she is not new yogurt. Patient denies any new concerns. Denies any nausea. Continues with generalized weakness. ROS reviewed negative, unless as noted above    Objective:   No intake or output data in the 24 hours ending 12/28/21 1315   Vitals:   Vitals:    12/28/21 0933   BP:  129/77   Pulse:  76   Resp:  16   Temp:  37.8   SpO2: 99%     Physical Exam:   Physical Exam  Constitutional:       Comments: Frail   HENT:      Mouth/Throat:      Mouth: Mucous membranes are moist.   Eyes:      Extraocular Movements: Extraocular movements intact. Conjunctiva/sclera: Conjunctivae normal.   Cardiovascular:      Rate and Rhythm: Normal rate and regular rhythm. Pulses: Normal pulses. Heart sounds: Normal heart sounds. Pulmonary:      Effort: Pulmonary effort is normal.      Breath sounds: Normal breath sounds. Abdominal:      General: Abdomen is flat. Palpations: Abdomen is soft. Musculoskeletal:         General: Normal range of motion. Skin:     General: Skin is warm and dry.    Neurological:      Mental Status: She is alert and oriented to person, place, and time. Medications:   Medications:    insulin lispro  0-6 Units SubCUTAneous TID WC    insulin lispro  0-3 Units SubCUTAneous Nightly    aspirin  81 mg Oral Daily    budesonide-formoterol  1 puff Inhalation BID    carvedilol  12.5 mg Oral BID WC    doxepin  10 mg Oral Nightly    melatonin  3 mg Oral Nightly    pantoprazole  40 mg Oral QAM AC    rosuvastatin  5 mg Oral Nightly    sodium chloride flush  5-40 mL IntraVENous 2 times per day    enoxaparin  40 mg SubCUTAneous Nightly      Infusions:    dextrose      sodium chloride       PRN Meds: glucose, 15 g, PRN  dextrose, 12.5 g, PRN  glucagon (rDNA), 1 mg, PRN  dextrose, 100 mL/hr, PRN  sodium chloride flush, 10 mL, PRN  sodium chloride, 25 mL, PRN  polyethylene glycol, 17 g, Daily PRN  nicotine polacrilex, 2 mg, Q1H PRN  acetaminophen, 650 mg, Q6H PRN   Or  acetaminophen, 650 mg, Q6H PRN  ondansetron, 4 mg, Q6H PRN        Recent Labs     12/26/21  0435 12/27/21 0157 12/28/21 0228   WBC 8.9 8.3 8.2   HGB 12.7 12.7 12.5   HCT 39.8 37.7 38.4    245 273      Recent Labs     12/26/21  0435 12/27/21 0157 12/28/21 0228    141 141   K 4.5 4.5 4.6    100 100   CO2 33* 34* 32   BUN 15 13 13   CREATININE 0.5* 0.5* 0.6     No results for input(s): AST, ALT, ALB, BILIDIR, BILITOT, ALKPHOS in the last 72 hours. No results for input(s): INR in the last 72 hours. No results for input(s): CKTOTAL, CKMB, CKMBINDEX, TROPONINT in the last 72 hours.      Imaging reviewed      Electronically signed by ERMELINDA Michel CNP on 12/28/2021 at 1:15 PM

## 2021-12-28 NOTE — CARE COORDINATION
Resent referral to Julio at [facility], she will look at case and call this CM back.

## 2021-12-28 NOTE — PROGRESS NOTES
Occupational Therapy  . Occupational Therapy Treatment Note      Name: Aldo Jones MRN: [id number] :   1935   Date:  2021   Admission Date: 2021 Room:  [facility]     Primary Problem:      Restrictions/Precautions:    General precautions, fall risk        Communication with other providers:  Nurse   And tech in to take vitals and perform straight cath. During procedure, patient required tactical and emotional support. Subjective:  Patient states: Oh please what are you doing to me. Pain: no numerical rating but small panic attack during straight cath. (location, type, intensity)    Objective:    Observation:  Patient supine. Not wanting to get OOb at this time. Did soil self requiring change. Nurse in to perform straight cath. Patient very upset. needing support and encouragement and some restraint to BUE and BLE during procedure. Objective Measures:  pokcet tele    Treatment, including education:    ADL activity training was instructed today. Cues were given for safety, sequence, UE/LE placement, visual cues, and balance. Activities performed today included toileting,  toileting- DEP. For arianna care and diaper change. Straight cath performed. Feeding- Min A with cues to sit up in bed for safety. Therapeutic activity training was instructed today. Cues were given for safety, sequence, UE/LE placement, awareness, and balance. Activities performed today included bed mobility training,     Bed mobility- rolling- multiple trials L-R- Min A.   Boost to HOB- DEP x2. Bridging- CGA    Patient educated on role of OT , benefits of OT and rationale for therapeutic intervention. All therapeutic intervention performed c emphasis on BUE strengthening and endurance to  increase strength for functional tasks / transfers. Patient left safely in bed at end of session, with call light in reach, alarm on and nursing aware.  Gait belt was used for func transfers /

## 2021-12-29 LAB
ANION GAP SERPL CALCULATED.3IONS-SCNC: 7 MMOL/L (ref 4–16)
BASOPHILS ABSOLUTE: 0 K/CU MM
BASOPHILS RELATIVE PERCENT: 0.5 % (ref 0–1)
BUN BLDV-MCNC: 18 MG/DL (ref 6–23)
C-REACTIVE PROTEIN, HIGH SENSITIVITY: 4.7 MG/L
CALCIUM SERPL-MCNC: 9.5 MG/DL (ref 8.3–10.6)
CHLORIDE BLD-SCNC: 103 MMOL/L (ref 99–110)
CO2: 35 MMOL/L (ref 21–32)
CREAT SERPL-MCNC: 0.5 MG/DL (ref 0.6–1.1)
DIFFERENTIAL TYPE: ABNORMAL
EOSINOPHILS ABSOLUTE: 0.2 K/CU MM
EOSINOPHILS RELATIVE PERCENT: 2.6 % (ref 0–3)
GFR AFRICAN AMERICAN: >60 ML/MIN/1.73M2
GFR NON-AFRICAN AMERICAN: >60 ML/MIN/1.73M2
GLUCOSE BLD-MCNC: 143 MG/DL (ref 70–99)
GLUCOSE BLD-MCNC: 151 MG/DL (ref 70–99)
GLUCOSE BLD-MCNC: 157 MG/DL (ref 70–99)
GLUCOSE BLD-MCNC: 164 MG/DL (ref 70–99)
GLUCOSE BLD-MCNC: 173 MG/DL (ref 70–99)
HCT VFR BLD CALC: 38.3 % (ref 37–47)
HEMOGLOBIN: 12.2 GM/DL (ref 12.5–16)
IMMATURE NEUTROPHIL %: 0.2 % (ref 0–0.43)
LYMPHOCYTES ABSOLUTE: 2 K/CU MM
LYMPHOCYTES RELATIVE PERCENT: 24.1 % (ref 24–44)
MCH RBC QN AUTO: 33.3 PG (ref 27–31)
MCHC RBC AUTO-ENTMCNC: 31.9 % (ref 32–36)
MCV RBC AUTO: 104.6 FL (ref 78–100)
MONOCYTES ABSOLUTE: 0.8 K/CU MM
MONOCYTES RELATIVE PERCENT: 9.4 % (ref 0–4)
NUCLEATED RBC %: 0 %
PDW BLD-RTO: 12 % (ref 11.7–14.9)
PLATELET # BLD: 280 K/CU MM (ref 140–440)
PMV BLD AUTO: 9.4 FL (ref 7.5–11.1)
POTASSIUM SERPL-SCNC: 4.6 MMOL/L (ref 3.5–5.1)
RBC # BLD: 3.66 M/CU MM (ref 4.2–5.4)
SEGMENTED NEUTROPHILS ABSOLUTE COUNT: 5.1 K/CU MM
SEGMENTED NEUTROPHILS RELATIVE PERCENT: 63.2 % (ref 36–66)
SODIUM BLD-SCNC: 145 MMOL/L (ref 135–145)
TOTAL IMMATURE NEUTOROPHIL: 0.02 K/CU MM
TOTAL NUCLEATED RBC: 0 K/CU MM
WBC # BLD: 8.1 K/CU MM (ref 4–10.5)

## 2021-12-29 PROCEDURE — 94761 N-INVAS EAR/PLS OXIMETRY MLT: CPT

## 2021-12-29 PROCEDURE — 2700000000 HC OXYGEN THERAPY PER DAY

## 2021-12-29 PROCEDURE — 36415 COLL VENOUS BLD VENIPUNCTURE: CPT

## 2021-12-29 PROCEDURE — 6370000000 HC RX 637 (ALT 250 FOR IP): Performed by: NURSE PRACTITIONER

## 2021-12-29 PROCEDURE — 6360000002 HC RX W HCPCS: Performed by: HOSPITALIST

## 2021-12-29 PROCEDURE — 1200000000 HC SEMI PRIVATE

## 2021-12-29 PROCEDURE — 82962 GLUCOSE BLOOD TEST: CPT

## 2021-12-29 PROCEDURE — 2580000003 HC RX 258: Performed by: HOSPITALIST

## 2021-12-29 PROCEDURE — 2580000003 HC RX 258: Performed by: NURSE PRACTITIONER

## 2021-12-29 PROCEDURE — 94664 DEMO&/EVAL PT USE INHALER: CPT

## 2021-12-29 PROCEDURE — 80048 BASIC METABOLIC PNL TOTAL CA: CPT

## 2021-12-29 PROCEDURE — 94640 AIRWAY INHALATION TREATMENT: CPT

## 2021-12-29 PROCEDURE — 85025 COMPLETE CBC W/AUTO DIFF WBC: CPT

## 2021-12-29 PROCEDURE — 86140 C-REACTIVE PROTEIN: CPT

## 2021-12-29 RX ADMIN — PANTOPRAZOLE SODIUM 40 MG: 40 TABLET, DELAYED RELEASE ORAL at 05:32

## 2021-12-29 RX ADMIN — DOXEPIN HYDROCHLORIDE 10 MG: 10 CAPSULE ORAL at 20:24

## 2021-12-29 RX ADMIN — ASPIRIN 81 MG: 81 TABLET, COATED ORAL at 08:58

## 2021-12-29 RX ADMIN — BUDESONIDE AND FORMOTEROL FUMARATE DIHYDRATE 1 PUFF: 160; 4.5 AEROSOL RESPIRATORY (INHALATION) at 07:41

## 2021-12-29 RX ADMIN — ROSUVASTATIN CALCIUM 5 MG: 5 TABLET, COATED ORAL at 20:24

## 2021-12-29 RX ADMIN — SODIUM CHLORIDE, PRESERVATIVE FREE 10 ML: 5 INJECTION INTRAVENOUS at 09:31

## 2021-12-29 RX ADMIN — SODIUM CHLORIDE, PRESERVATIVE FREE 10 ML: 5 INJECTION INTRAVENOUS at 20:24

## 2021-12-29 RX ADMIN — CARVEDILOL 12.5 MG: 12.5 TABLET, FILM COATED ORAL at 17:39

## 2021-12-29 RX ADMIN — CARVEDILOL 12.5 MG: 12.5 TABLET, FILM COATED ORAL at 08:58

## 2021-12-29 RX ADMIN — CEFTRIAXONE SODIUM 1000 MG: 1 INJECTION, POWDER, FOR SOLUTION INTRAMUSCULAR; INTRAVENOUS at 14:18

## 2021-12-29 RX ADMIN — MELATONIN TAB 3 MG 3 MG: 3 TAB at 20:24

## 2021-12-29 NOTE — CARE COORDINATION
Message from Dr Linh Matute, pt should be ready for SNU tomorrow. Message sent to [facility] FLORENC[facility] at [facility] to confirm they can take pt.

## 2021-12-30 LAB
ANION GAP SERPL CALCULATED.3IONS-SCNC: 10 MMOL/L (ref 4–16)
BASOPHILS ABSOLUTE: 0 K/CU MM
BASOPHILS RELATIVE PERCENT: 0.5 % (ref 0–1)
BUN BLDV-MCNC: 21 MG/DL (ref 6–23)
C-REACTIVE PROTEIN, HIGH SENSITIVITY: 3.8 MG/L
CALCIUM SERPL-MCNC: 9.4 MG/DL (ref 8.3–10.6)
CHLORIDE BLD-SCNC: 104 MMOL/L (ref 99–110)
CO2: 33 MMOL/L (ref 21–32)
CREAT SERPL-MCNC: 0.6 MG/DL (ref 0.6–1.1)
CULTURE: ABNORMAL
CULTURE: ABNORMAL
DIFFERENTIAL TYPE: ABNORMAL
EOSINOPHILS ABSOLUTE: 0.3 K/CU MM
EOSINOPHILS RELATIVE PERCENT: 3.6 % (ref 0–3)
GFR AFRICAN AMERICAN: >60 ML/MIN/1.73M2
GFR NON-AFRICAN AMERICAN: >60 ML/MIN/1.73M2
GLUCOSE BLD-MCNC: 135 MG/DL (ref 70–99)
GLUCOSE BLD-MCNC: 137 MG/DL (ref 70–99)
GLUCOSE BLD-MCNC: 143 MG/DL (ref 70–99)
GLUCOSE BLD-MCNC: 155 MG/DL (ref 70–99)
GLUCOSE BLD-MCNC: 161 MG/DL (ref 70–99)
HCT VFR BLD CALC: 40.4 % (ref 37–47)
HEMOGLOBIN: 12.7 GM/DL (ref 12.5–16)
IMMATURE NEUTROPHIL %: 0.4 % (ref 0–0.43)
LYMPHOCYTES ABSOLUTE: 2.4 K/CU MM
LYMPHOCYTES RELATIVE PERCENT: 28.1 % (ref 24–44)
Lab: ABNORMAL
MCH RBC QN AUTO: 33.6 PG (ref 27–31)
MCHC RBC AUTO-ENTMCNC: 31.4 % (ref 32–36)
MCV RBC AUTO: 106.9 FL (ref 78–100)
MONOCYTES ABSOLUTE: 0.7 K/CU MM
MONOCYTES RELATIVE PERCENT: 7.8 % (ref 0–4)
NUCLEATED RBC %: 0 %
PDW BLD-RTO: 12 % (ref 11.7–14.9)
PLATELET # BLD: 270 K/CU MM (ref 140–440)
PMV BLD AUTO: 9.3 FL (ref 7.5–11.1)
POTASSIUM SERPL-SCNC: 5 MMOL/L (ref 3.5–5.1)
RBC # BLD: 3.78 M/CU MM (ref 4.2–5.4)
SARS-COV-2, NAAT: NOT DETECTED
SEGMENTED NEUTROPHILS ABSOLUTE COUNT: 5 K/CU MM
SEGMENTED NEUTROPHILS RELATIVE PERCENT: 59.6 % (ref 36–66)
SODIUM BLD-SCNC: 147 MMOL/L (ref 135–145)
SOURCE: NORMAL
SPECIMEN: ABNORMAL
TOTAL IMMATURE NEUTOROPHIL: 0.03 K/CU MM
TOTAL NUCLEATED RBC: 0 K/CU MM
WBC # BLD: 8.4 K/CU MM (ref 4–10.5)

## 2021-12-30 PROCEDURE — 6360000002 HC RX W HCPCS: Performed by: HOSPITALIST

## 2021-12-30 PROCEDURE — 97530 THERAPEUTIC ACTIVITIES: CPT

## 2021-12-30 PROCEDURE — 80048 BASIC METABOLIC PNL TOTAL CA: CPT

## 2021-12-30 PROCEDURE — 87635 SARS-COV-2 COVID-19 AMP PRB: CPT

## 2021-12-30 PROCEDURE — 97110 THERAPEUTIC EXERCISES: CPT

## 2021-12-30 PROCEDURE — 85025 COMPLETE CBC W/AUTO DIFF WBC: CPT

## 2021-12-30 PROCEDURE — 2700000000 HC OXYGEN THERAPY PER DAY

## 2021-12-30 PROCEDURE — 2580000003 HC RX 258: Performed by: HOSPITALIST

## 2021-12-30 PROCEDURE — 2580000003 HC RX 258: Performed by: NURSE PRACTITIONER

## 2021-12-30 PROCEDURE — 36415 COLL VENOUS BLD VENIPUNCTURE: CPT

## 2021-12-30 PROCEDURE — 99211 OFF/OP EST MAY X REQ PHY/QHP: CPT

## 2021-12-30 PROCEDURE — 94761 N-INVAS EAR/PLS OXIMETRY MLT: CPT

## 2021-12-30 PROCEDURE — 82962 GLUCOSE BLOOD TEST: CPT

## 2021-12-30 PROCEDURE — 1200000000 HC SEMI PRIVATE

## 2021-12-30 PROCEDURE — 6370000000 HC RX 637 (ALT 250 FOR IP): Performed by: NURSE PRACTITIONER

## 2021-12-30 PROCEDURE — 86140 C-REACTIVE PROTEIN: CPT

## 2021-12-30 RX ORDER — SODIUM CHLORIDE 9 MG/ML
25 INJECTION, SOLUTION INTRAVENOUS PRN
Status: DISCONTINUED | OUTPATIENT
Start: 2021-12-30 | End: 2021-12-31 | Stop reason: HOSPADM

## 2021-12-30 RX ORDER — SODIUM CHLORIDE 0.9 % (FLUSH) 0.9 %
5-40 SYRINGE (ML) INJECTION EVERY 12 HOURS SCHEDULED
Status: DISCONTINUED | OUTPATIENT
Start: 2021-12-30 | End: 2021-12-31 | Stop reason: HOSPADM

## 2021-12-30 RX ORDER — SODIUM CHLORIDE 0.9 % (FLUSH) 0.9 %
5-40 SYRINGE (ML) INJECTION PRN
Status: DISCONTINUED | OUTPATIENT
Start: 2021-12-30 | End: 2021-12-31 | Stop reason: HOSPADM

## 2021-12-30 RX ADMIN — MEROPENEM 1000 MG: 1 INJECTION, POWDER, FOR SOLUTION INTRAVENOUS at 12:46

## 2021-12-30 RX ADMIN — SODIUM CHLORIDE 25 ML: 9 INJECTION, SOLUTION INTRAVENOUS at 21:10

## 2021-12-30 RX ADMIN — MEROPENEM 1000 MG: 1 INJECTION, POWDER, FOR SOLUTION INTRAVENOUS at 21:11

## 2021-12-30 RX ADMIN — CARVEDILOL 12.5 MG: 12.5 TABLET, FILM COATED ORAL at 09:13

## 2021-12-30 RX ADMIN — ASPIRIN 81 MG: 81 TABLET, COATED ORAL at 09:13

## 2021-12-30 RX ADMIN — SODIUM CHLORIDE, PRESERVATIVE FREE 10 ML: 5 INJECTION INTRAVENOUS at 20:53

## 2021-12-30 RX ADMIN — DOXEPIN HYDROCHLORIDE 10 MG: 10 CAPSULE ORAL at 20:53

## 2021-12-30 RX ADMIN — SODIUM CHLORIDE 25 ML: 9 INJECTION, SOLUTION INTRAVENOUS at 12:44

## 2021-12-30 RX ADMIN — ROSUVASTATIN CALCIUM 5 MG: 5 TABLET, COATED ORAL at 20:53

## 2021-12-30 RX ADMIN — SODIUM CHLORIDE, PRESERVATIVE FREE 10 ML: 5 INJECTION INTRAVENOUS at 09:13

## 2021-12-30 RX ADMIN — PANTOPRAZOLE SODIUM 40 MG: 40 TABLET, DELAYED RELEASE ORAL at 05:07

## 2021-12-30 RX ADMIN — MELATONIN TAB 3 MG 3 MG: 3 TAB at 20:53

## 2021-12-30 RX ADMIN — CARVEDILOL 12.5 MG: 12.5 TABLET, FILM COATED ORAL at 16:53

## 2021-12-30 ASSESSMENT — PAIN SCALES - GENERAL: PAINLEVEL_OUTOF10: 0

## 2021-12-30 NOTE — CONSULTS
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; fusion; fixation    CORONARY ARTERY BYPASS GRAFT  2017    4- vessel    INCONTINENCE SURGERY  2018    Botox 2018 ; [employer] [place] [employer]    ROTATOR CUFF REPAIR      Left    ROTATOR CUFF REPAIR  1998    Right    TOTAL HIP ARTHROPLASTY Left [id number]    Left       FAMILY HISTORY    Family History   Problem Relation Age of Onset    Diabetes Mother     Alzheimer's Disease Mother     Cancer Father 76        colon cancer    Pituitary Disorder Brother     Diabetes Brother     Diabetes Other         [address]    High Blood Pressure Other         [address]    Stroke Other         [address]    Cancer Other         [address] colon ca       SOCIAL HISTORY    Social History     Tobacco Use    Smoking status: Former Smoker     Packs/day: 0.50     Years: 30.00     Pack years: 15.00     Types: Cigarettes     Start date: 1957     Quit date: 1987     Years since quittin.9    Smokeless tobacco: Never Used   Vaping Use    Vaping Use: Never used   Substance Use Topics    Alcohol use: No     Comment: special occasion once a year maybe     Drug use: No       ALLERGIES    Allergies   Allergen Reactions    Cipro Xr     Demerol     Lipitor  [Atorvastatin Calcium]     Moxifloxacin Other (See Comments)     Phlebitis, urticaria    Statins      Myalgia      Lipitor [Atorvastatin] Other (See Comments)     myalgia    Wellbutrin [Bupropion] Other (See Comments)     \"zonked\"       MEDICATIONS    No current facility-administered medications on file prior to encounter.      Current Outpatient Medications on File Prior to Encounter   Medication Sig Dispense Refill    ipratropium-albuterol (DUONEB) 0.5-2.5 (3) MG/3ML SOLN nebulizer solution inhale ONE vial PER nebulizer EVERY FOUR Hours 360 mL 11    PROAIR  (90 Base) MCG/ACT inhaler inhale TWO puffs BY MOUTH EVERY SIX Hours AS NEEDED FOR wheezing 25.5 g 3    tiotropium (SPIRIVA RESPIMAT) 2.5 MCG/ACT AERS inhaler inhale TWO puffs BY MOUTH DAILY 12 g 3    melatonin 3 MG TABS tablet Take 3 mg by mouth daily      Calcium Polycarbophil (FIBER-CAPS PO) Take by mouth      Multiple Vitamins-Minerals (THERAPEUTIC MULTIVITAMIN-MINERALS) tablet Take 1 tablet by mouth daily      Cholecalciferol (VITAMIN D3) 125 MCG (5000 UT) TABS Take by mouth      budesonide-formoterol (SYMBICORT) 160-4.5 MCG/ACT AERO inhale TWO puffs BY MOUTH TWICE DAILY 30.6 g 3    carvedilol (COREG) 12.5 MG tablet TAKE ONE TABLET BY MOUTH TWICE DAILY 180 tablet 3    omeprazole (PRILOSEC) 40 MG delayed release capsule Take 1 capsule by mouth daily 30 capsule 3    rosuvastatin (CRESTOR) 5 MG tablet TAKE ONE TABLET BY MOUTH EVERY DAY 90 tablet 2    Lactobacillus (PROBIOTIC ACIDOPHILUS PO) Take by mouth      ONETOUCH VERIO strip TEST ONE TO TWO TIMES DAILY AS DIRECTED      Blood Glucose Monitoring Suppl (ONETOUCH VERIO FLEX SYSTEM) w/Device KIT USE AS DIRECTED      OneTouch Delica Lancets 81P MISC USE AS DIRECTED ONCE TO TWICE DAILY      Syringe, Disposable, 3 ML MISC 1 each by Does not apply route daily 12 each 1    cyanocobalamin 1000 MCG/ML injection Inject 1 mL into the muscle every 30 days for 1 dose (Patient not taking: Reported on 3/29/2021) 6 vial 1    doxepin (SINEQUAN) 10 MG capsule Take 10 mg by mouth nightly      Handicap Dickard MISC by Does not apply route Good for 5 years 1 each 0    Memantine HCl-Donepezil HCl (NAMZARIC) 28-10 MG CP24 Take 1 capsule by mouth daily      OXYGEN Inhale 2 L/min into the lungs nightly And portable; battery powered oxygen unit when needed      aspirin 81 MG chewable tablet Take 1 tablet by mouth daily 30 tablet 3         Objective:      /70   Pulse 74   Temp 98.4 °F (36.9 °C) (Oral)   Resp 18   Ht 5' 1.5\" (1.562 m)   Wt 108 lb 11.2 oz (49.3 kg)   LMP  (LMP Unknown)   SpO2 97%   BMI 20.21 kg/m²   Ender Risk Score: Ender Scale Score: 13    LABS    CBC:   Lab Results   Component Value Date    WBC 8.4 12/30/2021    RBC 3.78 12/30/2021    HGB 12.7 12/30/2021    HCT 40.4 12/30/2021    .9 12/30/2021    MCH 33.6 12/30/2021    MCHC 31.4 12/30/2021    RDW 12.0 12/30/2021     12/30/2021    MPV 9.3 12/30/2021     CMP:    Lab Results   Component Value Date     12/30/2021    K 5.0 12/30/2021     12/30/2021    CO2 33 12/30/2021    BUN 21 12/30/2021    CREATININE 0.6 12/30/2021    GFRAA >60 12/30/2021    GFRAA >60 09/18/2012    AGRATIO 1.2 02/14/2020    LABGLOM >60 12/30/2021    GLUCOSE 143 12/30/2021    PROT 6.9 12/22/2021    PROT 7.2 02/25/2013    LABALBU 3.8 12/22/2021    CALCIUM 9.4 12/30/2021    BILITOT 0.9 12/22/2021    ALKPHOS 47 12/22/2021    AST 28 12/22/2021    ALT 18 12/22/2021     Albumin:    Lab Results   Component Value Date    LABALBU 3.8 12/22/2021     PT/INR:    Lab Results   Component Value Date    PROTIME 14.7 06/11/2017    INR 1.28 06/11/2017     HgBA1c:    Lab Results   Component Value Date    LABA1C 6.1 12/26/2021         Assessment:     Patient Active Problem List   Diagnosis    Generalized anxiety disorder    Chronic neck pain    Graves' disease    Hyperlipidemia    Essential hypertension    Vascular dementia with behavior disturbance ([facility]    Cerebrovascular accident (CVA) due to vascular stenosis ([facility]    Gait disturbance    Mixed stress and urge urinary incontinence    Abnormal stress test    Subclavian artery stenosis, left (HCC)    COPD, severe (HCC)    Shortness of breath    Coronary artery disease involving native coronary artery of native heart without angina pectoris    Chronic hypoxemic respiratory failure (HCC)    Type II diabetes mellitus (HCC)    Skin abrasion    Normocytic anemia    Chronic midline low back pain without sciatica    Gastroesophageal reflux disease without esophagitis    Recurrent UTI    On mechanically assisted ventilation (HCC)    Failure to thrive in adult    Severe malnutrition (HCC)       Measurements:       Response to treatment:  Well tolerated by patient. Pain Assessment:  Severity:  none  Quality of pain:   Wound Pain Timing/Severity:   Premedicated: no    Plan:     Plan of Care:       Patient in bed  at bedside pt agreeable to wound care eval.  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 Pt has pink/red blanching area moist to sacrum from moisture. Pt is incontinent of urine and  said she just had an implant to her rt hip for incontinence. Chastity care done bed pad changed. Area pictured and measured and applied moisture barrier cream. Heels red and blanching. Floated with a pillow. Pt turned to rt side with pillow support. Atmos air pump placed to the bed. Pt is at moderate risk for skin breakdown AEB alyse. Follow alyse ordes. Specialty Bed Required :  yes  [] Low Air Loss   [x] Pressure Redistribution  [] Fluid Immersion  [] Bariatric  [] Total Pressure Relief  [] Other:     Discharge Plan:  Placement for patient upon discharge: tbd  Hospice Care: no  Patient appropriate for Outpatient [address] [facility] [address]: no    Patient/Caregiver Teaching:  Level of patient/caregiver understanding able to:   Cares explained as given. Pt's  verbalized understanding. Electronically signed by Wolfgang Day.  IBETH Barnes, on 12/30/2021 at 2:54 PM

## 2021-12-30 NOTE — PROGRESS NOTES
Comprehensive Nutrition Assessment    Type and Reason for Visit:  Reassess    Nutrition Recommendations/Plan:   · Continue current supplements and diet ordered   · Recommend starting appetite stimulant as needed     Nutrition Assessment:  Appetite/intake is improving some. Noted pt is consuming 26-50% of meals. Patient has trouble remembering at visit. Drinking her ensure during visit. States enjoys all the supplements. Denies any n/v/p. Desires to go home but still feels unwell. Remains high nutrition risk. Malnutrition Assessment:  Malnutrition Status:  Severe malnutrition    Context:  Chronic Illness     Findings of the 6 clinical characteristics of malnutrition:  Energy Intake:  Mild decrease in energy intake (Comment)  Weight Loss:  7 - 10% over 6 months (9% in 7 months)     Body Fat Loss:  7 - Severe body fat loss Triceps,Buccal region   Muscle Mass Loss:  7 - Severe muscle mass loss Clavicles (pectoralis & deltoids),Thigh (quadraceps),Hand (interosseous),Scapula (trapezius)  Fluid Accumulation:  No significant fluid accumulation Extremities   Strength:  Not Performed    Estimated Daily Nutrient Needs:  Energy (kcal):  9041-3858 (30-35 kcals/kg); Weight Used for Energy Requirements:  Current     Protein (g):  59-74 (1.2-1.5 g/kg); Weight Used for Protein Requirements:  Ideal        Fluid (ml/day):  4375-9410; Method Used for Fluid Requirements:  1 ml/kcal      Nutrition Related Findings:  , CRP 3.8, Na 147; Noted plans to d/c to SNU      Wounds:  None       Current Nutrition Therapies:    ADULT DIET;  Regular  ADULT ORAL NUTRITION SUPPLEMENT; Breakfast, Lunch, Dinner; Standard High Calorie/High Protein Oral Supplement  ADULT ORAL NUTRITION SUPPLEMENT; Dinner, Lunch; Frozen Oral Supplement    Anthropometric Measures:  · Height: 5' 1.5\" (156.2 cm)  · Current Body Weight: 108 lb 11 oz (49.3 kg)   · Admission Body Weight: 109 lb 2 oz (49.5 kg)    · Usual Body Weight: 120 lb (54.4 kg) (5/6/21; 4.2% wt loss over 7 months)     · Ideal Body Weight: 108 lbs; % Ideal Body Weight 101.5 %   · BMI: 20.2  · BMI Categories: Underweight (BMI less than 22) age over 72       Nutrition Diagnosis:   · Severe malnutrition,In context of chronic illness related to inadequate protein-energy intake,cognitive or neurological impairment,early satiety as evidenced by severe muscle loss,severe loss of subcutaneous fat,weight loss (Loss of appetite PTA, 4% over 7 months)    Nutrition Interventions:   Food and/or Nutrient Delivery:  Continue Current Diet,Continue Oral Nutrition Supplement  Nutrition Education/Counseling:  No recommendation at this time   Coordination of Nutrition Care:  Continue to monitor while inpatient,Feeding Assistance/Environment Change,Coordination of Community Care    Goals:  Pt will consume at least half of meals during stay consistently       Nutrition Monitoring and Evaluation:   Behavioral-Environmental Outcomes:  None Identified   Food/Nutrient Intake Outcomes:  Food and Nutrient Intake,Supplement Intake  Physical Signs/Symptoms Outcomes:  Biochemical Data,Chewing or Swallowing,GI Status,Meal Time Behavior,Nutrition Focused Physical Findings,Weight     Discharge Planning:    Continue Oral Nutrition Supplement,Continue current diet     Electronically signed by Artur Márquez RD, LD on 12/30/21 at 12:12 PM EST    Contact:[phone]

## 2021-12-30 NOTE — CARE COORDINATION
Dr Hatfield Economy states discharging pt to [facility] today on 10 days of iv atb. No transport available through [facility], [facility] or [facility] for today. Set up with [facility] for tomorrow (12/31) at 1000. Zara and Dr Shani Briceno updated. Will also call pt's family. PS to Dr Shani Briceno requesting [facility] for iv atb and rapid covid to be ordered. Sent in a new prescription for 5 mg of buspirone so that she can taper that down.  The instructions are on the prescription label.

## 2021-12-30 NOTE — PLAN OF CARE
Nutrition Problem #1: Severe malnutrition,In context of chronic illness  Intervention: Food and/or Nutrient Delivery: Continue Current Diet,Continue Oral Nutrition Supplement  Nutritional Goals: Pt will consume at least half of meals during stay consistently

## 2021-12-30 NOTE — PROGRESS NOTES
Hospitalist Progress Note      Name:  Noy Pacheco /Age/Sex: 1935  (82 y.o. female)   MRN & CSN:  [id number] & [id number] Admission Date/Time: 2021 11:38 AM   [facility]  [facility] PCP: Magda Ureña MD         Hospital Day: 9    Assessment and Plan:   Noy Pacheco is a 82 y.o.  female  who presents with Failure to thrive in adult    Failure to thrive-patient reports generalized weakness unable to get out of bed for multiple days.   Position continues waiting for SNF placement.  -Admit to observation  -Due to possible UTI or Covid infection-UA still pending.    -Encourage oral hydration and nutrition  -D5W if patient does not eat well  -PT OT evaluation, case management consult for placement  -SNF placement at discharge  -Hemoglobin A1c resulted 6.1  -TSH 0.395     Negative for Covid 19 infection  -Patient received Covid vaccination including booster shot a month ago  -No shortness of breath or cough  -Covid test indeterminate positive twice  -Respiratory virus COVID-19 indeterminate-specific Covid PCR test-negative () requested by infectious disease  -CRP 39.1 downtrending to 15.2 ()     Possible UTI  Incontinent of urine  -Chronic irritable bladder  -Had procedure to set up a bladder stimulant on right back a week ago  -concerning of UTI- straight catheter urine sample  -UA pending -nursing communication sent to obtain urine sample () still pending     Diabetes mellitus type 2 diet controlled not on medication as outpatient.  Last A1c on file 6.8-2020.  Blood sugars have been stable sitter D5W fluids of patient decrease p.o. intake.  -Sliding scale insulin  -Accu-Cheks AC at bedtime  -A1c pending     Severe malnutrition secondary to chronic illness.  Dietitian consulted  -Dietary recommendations as per nutrition     History of CAD-status post CABG follows cardiology outpatient  -Continue statin, aspirin     Other chronic conditions medications ordered otherwise and oriented to person, place, and time. Medications:   Medications:    meropenem  1,000 mg IntraVENous Q8H    insulin lispro  0-6 Units SubCUTAneous TID WC    insulin lispro  0-3 Units SubCUTAneous Nightly    aspirin  81 mg Oral Daily    budesonide-formoterol  1 puff Inhalation BID    carvedilol  12.5 mg Oral BID WC    doxepin  10 mg Oral Nightly    melatonin  3 mg Oral Nightly    pantoprazole  40 mg Oral QAM AC    rosuvastatin  5 mg Oral Nightly    sodium chloride flush  5-40 mL IntraVENous 2 times per day    enoxaparin  40 mg SubCUTAneous Nightly      Infusions:    dextrose      sodium chloride       PRN Meds: glucose, 15 g, PRN  dextrose, 12.5 g, PRN  glucagon (rDNA), 1 mg, PRN  dextrose, 100 mL/hr, PRN  sodium chloride flush, 10 mL, PRN  sodium chloride, 25 mL, PRN  polyethylene glycol, 17 g, Daily PRN  nicotine polacrilex, 2 mg, Q1H PRN  acetaminophen, 650 mg, Q6H PRN   Or  acetaminophen, 650 mg, Q6H PRN  ondansetron, 4 mg, Q6H PRN        Recent Labs     12/28/21 0228 12/29/21 0317 12/30/21 0318   WBC 8.2 8.1 8.4   HGB 12.5 12.2* 12.7   HCT 38.4 38.3 40.4    280 270      Recent Labs     12/28/21 0228 12/29/21 0317 12/30/21 0318    145 147*   K 4.6 4.6 5.0    103 104   CO2 32 35* 33*   BUN 13 18 21   CREATININE 0.6 0.5* 0.6     No results for input(s): AST, ALT, ALB, BILIDIR, BILITOT, ALKPHOS in the last 72 hours. No results for input(s): INR in the last 72 hours. No results for input(s): CKTOTAL, CKMB, CKMBINDEX, TROPONINT in the last 72 hours.      Imaging reviewed      Electronically signed by Wanda Moy MD on 12/30/2021 at 10:21 AM

## 2021-12-30 NOTE — PROGRESS NOTES
requires inc assist to steady and perform functional mobility, inc confusion, and dec tolerance to EOB and OOB activities; she will require cont skilled therapy services to facilitate a return to near PLOF.  cont to be agreeable w/ need for SNF placement prior to transition home in order to promote return to inc independence. Patient's tolerance of treatment:  Fair  Barriers to improvement:  Confusion limiting participation,  assisting w/ redirection; cognition/memory  Plan for Next Session:    Cont w/ est POC and DC plan (SNF)  EOB strength, transfer training next visit    Time in:  1510  Time out:  1536  Timed treatment minutes:  26  Total treatment time:  26 (TA, TE)    Previously filed items:  Social/Functional History  Lives With: Spouse  Type of Home:  (Condo)  Home Layout: Two level,Able to Live on Main level with bedroom/bathroom  Home Access: Level entry  Bathroom Shower/Tub: Walk-in shower,Tub/Shower unit,Shower chair with back  Bathroom Toilet: Standard  Bathroom Equipment: Grab bars in [address]: 4 wheeled walker  ADL Assistance: Needs assistance (DEP assist for bathing and dressing.  Pt is incontinent and wears depends.)  Homemaking Assistance: Needs assistance  Homemaking Responsibilities: No  Ambulation Assistance: Independent  Transfer Assistance: Independent  Active : No  Patient's  Info: Spouse  Short term goals  Time Frame for Short term goals: 1 week  Short term goal 1: pt to complete all bed mobility min A  Short term goal 2: Pt to complete STS transfers to/from bed, commode, and chair mod A  Short term goal 3: Pt to complete all stand pivot transfer with LRAD mod A  Short term goal 4: Pt to ambulate 13' with LRAD mod A       Electronically signed by:    Sharon Woodward, PT, DPT  12/30/2021, 4:02 PM

## 2021-12-31 ENCOUNTER — OUTSIDE SERVICES (OUTPATIENT)
Dept: INTERNAL MEDICINE CLINIC | Age: 86
End: 2021-12-31
Payer: MEDICARE

## 2021-12-31 VITALS
RESPIRATION RATE: 18 BRPM | OXYGEN SATURATION: 98 % | HEART RATE: 69 BPM | TEMPERATURE: 98 F | DIASTOLIC BLOOD PRESSURE: 81 MMHG | WEIGHT: 108.7 LBS | HEIGHT: 62 IN | BODY MASS INDEX: 20 KG/M2 | SYSTOLIC BLOOD PRESSURE: 127 MMHG

## 2021-12-31 DIAGNOSIS — R62.7 FAILURE TO THRIVE IN ADULT: ICD-10-CM

## 2021-12-31 DIAGNOSIS — E43 SEVERE MALNUTRITION (HCC): ICD-10-CM

## 2021-12-31 DIAGNOSIS — Z99.11 ON MECHANICALLY ASSISTED VENTILATION (HCC): ICD-10-CM

## 2021-12-31 DIAGNOSIS — I25.10 CORONARY ARTERY DISEASE INVOLVING NATIVE CORONARY ARTERY OF NATIVE HEART WITHOUT ANGINA PECTORIS: ICD-10-CM

## 2021-12-31 DIAGNOSIS — E11.9 TYPE 2 DIABETES MELLITUS WITHOUT COMPLICATION, WITHOUT LONG-TERM CURRENT USE OF INSULIN (HCC): ICD-10-CM

## 2021-12-31 DIAGNOSIS — F01.518 VASCULAR DEMENTIA WITH BEHAVIOR DISTURBANCE: ICD-10-CM

## 2021-12-31 DIAGNOSIS — J44.9 COPD, SEVERE (HCC): Primary | ICD-10-CM

## 2021-12-31 DIAGNOSIS — J96.11 CHRONIC HYPOXEMIC RESPIRATORY FAILURE (HCC): ICD-10-CM

## 2021-12-31 DIAGNOSIS — F33.41 RECURRENT MAJOR DEPRESSIVE DISORDER, IN PARTIAL REMISSION (HCC): ICD-10-CM

## 2021-12-31 DIAGNOSIS — I77.1 SUBCLAVIAN ARTERY STENOSIS, LEFT (HCC): ICD-10-CM

## 2021-12-31 LAB
ANION GAP SERPL CALCULATED.3IONS-SCNC: 8 MMOL/L (ref 4–16)
BUN BLDV-MCNC: 19 MG/DL (ref 6–23)
C-REACTIVE PROTEIN, HIGH SENSITIVITY: 3 MG/L
CALCIUM SERPL-MCNC: 9.3 MG/DL (ref 8.3–10.6)
CHLORIDE BLD-SCNC: 103 MMOL/L (ref 99–110)
CO2: 32 MMOL/L (ref 21–32)
CREAT SERPL-MCNC: 0.6 MG/DL (ref 0.6–1.1)
GFR AFRICAN AMERICAN: >60 ML/MIN/1.73M2
GFR NON-AFRICAN AMERICAN: >60 ML/MIN/1.73M2
GLUCOSE BLD-MCNC: 123 MG/DL (ref 70–99)
GLUCOSE BLD-MCNC: 124 MG/DL (ref 70–99)
GLUCOSE BLD-MCNC: 187 MG/DL (ref 70–99)
POTASSIUM SERPL-SCNC: 4.5 MMOL/L (ref 3.5–5.1)
SODIUM BLD-SCNC: 143 MMOL/L (ref 135–145)

## 2021-12-31 PROCEDURE — 99305 1ST NF CARE MODERATE MDM 35: CPT | Performed by: INTERNAL MEDICINE

## 2021-12-31 PROCEDURE — 94761 N-INVAS EAR/PLS OXIMETRY MLT: CPT

## 2021-12-31 PROCEDURE — 6360000002 HC RX W HCPCS: Performed by: HOSPITALIST

## 2021-12-31 PROCEDURE — 80048 BASIC METABOLIC PNL TOTAL CA: CPT

## 2021-12-31 PROCEDURE — 86140 C-REACTIVE PROTEIN: CPT

## 2021-12-31 PROCEDURE — 6370000000 HC RX 637 (ALT 250 FOR IP): Performed by: NURSE PRACTITIONER

## 2021-12-31 PROCEDURE — 36415 COLL VENOUS BLD VENIPUNCTURE: CPT

## 2021-12-31 PROCEDURE — 76937 US GUIDE VASCULAR ACCESS: CPT

## 2021-12-31 PROCEDURE — 2580000003 HC RX 258: Performed by: HOSPITALIST

## 2021-12-31 PROCEDURE — 2580000003 HC RX 258: Performed by: NURSE PRACTITIONER

## 2021-12-31 PROCEDURE — 82962 GLUCOSE BLOOD TEST: CPT

## 2021-12-31 PROCEDURE — 2700000000 HC OXYGEN THERAPY PER DAY

## 2021-12-31 PROCEDURE — C1751 CATH, INF, PER/CENT/MIDLINE: HCPCS

## 2021-12-31 PROCEDURE — 05HA33Z INSERTION OF INFUSION DEVICE INTO LEFT BRACHIAL VEIN, PERCUTANEOUS APPROACH: ICD-10-PCS | Performed by: INTERNAL MEDICINE

## 2021-12-31 PROCEDURE — 36410 VNPNXR 3YR/> PHY/QHP DX/THER: CPT

## 2021-12-31 RX ADMIN — SODIUM CHLORIDE 25 ML: 9 INJECTION, SOLUTION INTRAVENOUS at 03:44

## 2021-12-31 RX ADMIN — ASPIRIN 81 MG: 81 TABLET, COATED ORAL at 08:35

## 2021-12-31 RX ADMIN — MEROPENEM 1000 MG: 1 INJECTION, POWDER, FOR SOLUTION INTRAVENOUS at 12:33

## 2021-12-31 RX ADMIN — CARVEDILOL 12.5 MG: 12.5 TABLET, FILM COATED ORAL at 08:35

## 2021-12-31 RX ADMIN — SODIUM CHLORIDE, PRESERVATIVE FREE 10 ML: 5 INJECTION INTRAVENOUS at 10:19

## 2021-12-31 RX ADMIN — MEROPENEM 1000 MG: 1 INJECTION, POWDER, FOR SOLUTION INTRAVENOUS at 03:45

## 2021-12-31 RX ADMIN — PANTOPRAZOLE SODIUM 40 MG: 40 TABLET, DELAYED RELEASE ORAL at 05:34

## 2021-12-31 RX ADMIN — SODIUM CHLORIDE, PRESERVATIVE FREE 10 ML: 5 INJECTION INTRAVENOUS at 10:18

## 2021-12-31 NOTE — CONSULTS
Consult reviewed with primary nurse. Education, benefits and risks discussed with Patient. Patient verbalized understanding. Arrowg+anna Blue Advanced Midline 4.5fr 15cm catheter placed in HERMINIO using sterile ultrasound-technique. Placement verified via ultrasound visualization of catheter within the vessel lumen. Catheter returns blood briskly and flushes with ease and no abnormalities noted. Patient tolerated well. Photos of vein diameter placed in patient's chart. Please consult IV/PICC Team if patient's needs change.

## 2021-12-31 NOTE — DISCHARGE SUMMARY
Discharge Summary      Admission Date: 12/22/2021  Discharge Date: 12/31/2021    Discharge Diagnosis :     Patient Active Problem List   Diagnosis    Generalized anxiety disorder    Chronic neck pain    Graves' disease    Hyperlipidemia    Essential hypertension    Vascular dementia with behavior disturbance ([address]    Cerebrovascular accident (CVA) due to vascular stenosis ([address]    Gait disturbance    Mixed stress and urge urinary incontinence    Abnormal stress test    Subclavian artery stenosis, left (HCC)    COPD, severe (HCC)    Shortness of breath    Coronary artery disease involving native coronary artery of native heart without angina pectoris    Chronic hypoxemic respiratory failure (HCC)    Type II diabetes mellitus (HCC)    Skin abrasion    Normocytic anemia    Chronic midline low back pain without sciatica    Gastroesophageal reflux disease without esophagitis    Recurrent UTI    On mechanically assisted ventilation (HCC)    Failure to thrive in adult    Severe malnutrition ([address]     1. Failure to thrive    2. UTI incontinence of urine    3. History of coronary artery disease      HPI:    Too Matta is a 82 y.o.  female  who presents with Failure to thrive in adult who presents with Failure to thrive in adult severe significant history of dementia history of COPD using 2 L oxygen at home CAD and CABG about 6 months ago with some irritable bladder presented to the ED with complaints of failure to thrive. Hospital Course:   Too Matta is a 82 y.o.  female  who presents with Failure to thrive in adult severe significant history of dementia history of COPD using 2 L oxygen at home CAD and CABG about 6 months ago with some irritable bladder presented to the ED with complaints of failure to thrive. Patient was admitted with acute floor work-up was done to rule out COVID-19 infection and there was possibility of UTI for which she was being treated.   At the present time patient seems to be doing well and is back to her baseline. She was assessed by PT OT and was deemed appropriate for SNF. Patient also complains of incontinence of urine for which she uses diapers. Patient was also noted to have UTI for which she was treated with IV antibiotics is finished yesterday on 30th December. At the present time patient is clinically stable and is ready for discharge    Today at the time patient is clinically stable and is ready for discharge.     Consults: none    Inpatient Procedures: none    Discharge Medications:    Current Facility-Administered Medications: meropenem (MERREM) 1,000 mg in sodium chloride 0.9 % 100 mL IVPB (mini-bag), 1,000 mg, IntraVENous, Q8H  lidocaine 1 % injection 5 mL, 5 mL, IntraDERmal, Once  sodium chloride flush 0.9 % injection 5-40 mL, 5-40 mL, IntraVENous, 2 times per day  sodium chloride flush 0.9 % injection 5-40 mL, 5-40 mL, IntraVENous, PRN  0.9 % sodium chloride infusion, 25 mL, IntraVENous, PRN  glucose (GLUTOSE) 40 % oral gel 15 g, 15 g, Oral, PRN  dextrose 50 % IV solution, 12.5 g, IntraVENous, PRN  glucagon (rDNA) injection 1 mg, 1 mg, IntraMUSCular, PRN  dextrose 5 % solution, 100 mL/hr, IntraVENous, PRN  insulin lispro (HUMALOG) injection vial 0-6 Units, 0-6 Units, SubCUTAneous, TID WC  insulin lispro (HUMALOG) injection vial 0-3 Units, 0-3 Units, SubCUTAneous, Nightly  aspirin EC tablet 81 mg, 81 mg, Oral, Daily  budesonide-formoterol (SYMBICORT) 160-4.5 MCG/ACT inhaler 1 puff, 1 puff, Inhalation, BID  carvedilol (COREG) tablet 12.5 mg, 12.5 mg, Oral, BID WC  doxepin (SINEQUAN) capsule 10 mg, 10 mg, Oral, Nightly  melatonin tablet 3 mg, 3 mg, Oral, Nightly  pantoprazole (PROTONIX) tablet 40 mg, 40 mg, Oral, QAM AC  rosuvastatin (CRESTOR) tablet 5 mg, 5 mg, Oral, Nightly  sodium chloride flush 0.9 % injection 5-40 mL, 5-40 mL, IntraVENous, 2 times per day  sodium chloride flush 0.9 % injection 10 mL, 10 mL, IntraVENous, PRN  0.9 % sodium chloride infusion, 25 mL, IntraVENous, PRN  enoxaparin (LOVENOX) injection 40 mg, 40 mg, SubCUTAneous, Nightly  polyethylene glycol (GLYCOLAX) packet 17 g, 17 g, Oral, Daily PRN  nicotine polacrilex (COMMIT) lozenge 2 mg, 2 mg, Oral, Q1H PRN  acetaminophen (TYLENOL) tablet 650 mg, 650 mg, Oral, Q6H PRN **OR** acetaminophen (TYLENOL) suppository 650 mg, 650 mg, Rectal, Q6H PRN  ondansetron (ZOFRAN) injection 4 mg, 4 mg, IntraVENous, Q6H PRN    Physical Exam:12/31/21   Physical Exam:   CONSTITUTIONAL:  awake, alert, cooperative, no apparent distress, and appears stated age complaining of some incontinence of urine . LUNGS:  no increased work of breathing and clear to auscultation, no crackles or wheezing  CARDIOVASCULAR:  normal S1 and S2 and no JVD  ABDOMEN:  normal bowel sounds, non-distended and non-tender to palpation  EXT: No edema, no calf tenderness. Pulses are present bilaterally. NEUROLOGIC:  Mental Status Exam:  Level of Alertness:   awake  Orientation:   person, place, time.  Non focal  SKIN:  normal skin color, texture, turgor, no redness, warmth, or swelling at IV sites    Most Recent Lab Values:   CBC with Differential:    Lab Results   Component Value Date    WBC 8.4 12/30/2021    RBC 3.78 12/30/2021    HGB 12.7 12/30/2021    HCT 40.4 12/30/2021     12/30/2021    .9 12/30/2021    MCH 33.6 12/30/2021    MCHC 31.4 12/30/2021    RDW 12.0 12/30/2021    SEGSPCT 59.6 12/30/2021    LYMPHOPCT 28.1 12/30/2021    MONOPCT 7.8 12/30/2021    EOSPCT 1.2 04/11/2012    BASOPCT 0.5 12/30/2021    MONOSABS 0.7 12/30/2021    LYMPHSABS 2.4 12/30/2021    EOSABS 0.3 12/30/2021    BASOSABS 0.0 12/30/2021    DIFFTYPE AUTOMATED DIFFERENTIAL 12/30/2021     BMP:    Lab Results   Component Value Date     12/31/2021    K 4.5 12/31/2021     12/31/2021    CO2 32 12/31/2021    BUN 19 12/31/2021    CREATININE 0.6 12/31/2021    CALCIUM 9.3 12/31/2021    GFRAA >60 12/31/2021    GFRAA >60 09/18/2012 LABGLOM >60 12/31/2021    GLUCOSE 124 12/31/2021     Hepatic Function Panel:    Lab Results   Component Value Date    ALKPHOS 47 12/22/2021    ALT 18 12/22/2021    AST 28 12/22/2021    PROT 6.9 12/22/2021    PROT 7.2 02/25/2013    BILITOT 0.9 12/22/2021    BILIDIR 0.2 12/22/2021    IBILI 0.7 12/22/2021     ABG:    Lab Results   Component Value Date    ZED7SSZ 24.9 09/07/2018    BEART 0.5 02/25/2013    XBM5LLO 44.0 09/07/2018    PO2ART 98 09/07/2018     Culture Results:        Discharge Instruction:   Follow up appointments:   Primary care physician:  within 2 weeks    Diet:  regular diet   Activity: activity as tolerated  Disposition: Discharged to:   []Home, []C, [x]SNF, []Acute Rehab, []Hospice   Condition on discharge: Stable      @Esingn@    Time Spent >30 Minutes

## 2021-12-31 NOTE — PROGRESS NOTES
Attempted to call report to RN at [facility], put on hold for 10 minutes, then was hung up on. Attempted to call back but no one answered phone.

## 2021-12-31 NOTE — PROGRESS NOTES
Hospitalist Progress Note      Name:  William Bassett /Age/Sex: 1935  (82 y.o. female)   MRN & CSN:  [id number] & [id number] Admission Date/Time: 2021 11:38 AM   [facility]  [facility] PCP: Kimberly Lennon MD         Hospital Day: 10    Assessment and Plan:   William Bassett is a 82 y.o.  female  who presents with Failure to thrive in adult    Failure to thrive-patient reports generalized weakness unable to get out of bed for multiple days.   Position continues waiting for SNF placement.  -Admit to observation  -Due to possible UTI or Covid infection-UA still pending.    -Encourage oral hydration and nutrition  -D5W if patient does not eat well  -PT OT evaluation, case management consult for placement  -SNF placement at discharge  -Hemoglobin A1c resulted 6.1  -TSH 0.395     Negative for Covid 19 infection  -Patient received Covid vaccination including booster shot a month ago  -No shortness of breath or cough  -Covid test indeterminate positive twice  -Respiratory virus COVID-19 indeterminate-specific Covid PCR test-negative () requested by infectious disease  -CRP 39.1 downtrending to 15.2 ()     Possible UTI  Incontinent of urine  -Chronic irritable bladder  -Had procedure to set up a bladder stimulant on right back a week ago  -concerning of UTI- straight catheter urine sample  -UA pending -nursing communication sent to obtain urine sample () still pending     Diabetes mellitus type 2 diet controlled not on medication as outpatient.  Last A1c on file 6.8-2020.  Blood sugars have been stable sitter D5W fluids of patient decrease p.o. intake.  -Sliding scale insulin  -Accu-Cheks AC at bedtime  -A1c pending     Severe malnutrition secondary to chronic illness.  Dietitian consulted  -Dietary recommendations as per nutrition     History of CAD-status post CABG follows cardiology outpatient  -Continue statin, aspirin     Other chronic conditions medications ordered otherwise specified above   History of Graves' disease: Tapazole treatment in the past-TSH pending  Osteoporosis  Depression     BMI 20.28     Dispo continue admission pending SNF placement        Diet: ADULT DIET; Regular  ADULT ORAL NUTRITION SUPPLEMENT; Breakfast, Lunch, Dinner; Standard High Calorie/High Protein Oral Supplement  ADULT ORAL NUTRITION SUPPLEMENT; Dinner, Lunch; Frozen Oral Supplement  DVT prophylaxis: Lovenox  GI prophylaxis Protonix   CODE STATUS:Full Code    Dispo: Growing ESBL in urine, meropenem x 10 days, OK for discharge however transport could not be set up till tomorrow     Treatment and discharge plan discussed with patient/family. Patient/family voiced understanding. This patient was seen examined and treatment plan discussed with/supervising physician. History of Present Illness:     Chief Complaint: Failure to thrive in adult    Subjective  Doing well, no acute issues     ROS reviewed negative, unless as noted above    Objective:   No intake or output data in the 24 hours ending 12/31/21 0954   Vitals:   Vitals:    12/28/21 0933   BP:  129/77   Pulse:  76   Resp:  16   Temp:  37.8   SpO2: 99%     Physical Exam:   Physical Exam  Constitutional:       Comments: Frail   HENT:      Mouth/Throat:      Mouth: Mucous membranes are moist.   Eyes:      Extraocular Movements: Extraocular movements intact. Conjunctiva/sclera: Conjunctivae normal.   Cardiovascular:      Rate and Rhythm: Normal rate and regular rhythm. Pulses: Normal pulses. Heart sounds: Normal heart sounds. Pulmonary:      Effort: Pulmonary effort is normal.      Breath sounds: Normal breath sounds. Abdominal:      General: Abdomen is flat. Palpations: Abdomen is soft. Musculoskeletal:         General: Normal range of motion. Skin:     General: Skin is warm and dry. Neurological:      Mental Status: She is alert and oriented to person, place, and time.          Medications:   Medications:    meropenem  1,000 mg IntraVENous Q8H    lidocaine  5 mL IntraDERmal Once    sodium chloride flush  5-40 mL IntraVENous 2 times per day    insulin lispro  0-6 Units SubCUTAneous TID WC    insulin lispro  0-3 Units SubCUTAneous Nightly    aspirin  81 mg Oral Daily    budesonide-formoterol  1 puff Inhalation BID    carvedilol  12.5 mg Oral BID WC    doxepin  10 mg Oral Nightly    melatonin  3 mg Oral Nightly    pantoprazole  40 mg Oral QAM AC    rosuvastatin  5 mg Oral Nightly    sodium chloride flush  5-40 mL IntraVENous 2 times per day    enoxaparin  40 mg SubCUTAneous Nightly      Infusions:    sodium chloride      dextrose      sodium chloride 25 mL (12/31/21 0344)     PRN Meds: sodium chloride flush, 5-40 mL, PRN  sodium chloride, 25 mL, PRN  glucose, 15 g, PRN  dextrose, 12.5 g, PRN  glucagon (rDNA), 1 mg, PRN  dextrose, 100 mL/hr, PRN  sodium chloride flush, 10 mL, PRN  sodium chloride, 25 mL, PRN  polyethylene glycol, 17 g, Daily PRN  nicotine polacrilex, 2 mg, Q1H PRN  acetaminophen, 650 mg, Q6H PRN   Or  acetaminophen, 650 mg, Q6H PRN  ondansetron, 4 mg, Q6H PRN        Recent Labs     12/29/21  0317 12/30/21  0318   WBC 8.1 8.4   HGB 12.2* 12.7   HCT 38.3 40.4    270      Recent Labs     12/29/21  0317 12/30/21  0318 12/31/21  0328    147* 143   K 4.6 5.0 4.5    104 103   CO2 35* 33* 32   BUN 18 21 19   CREATININE 0.5* 0.6 0.6     No results for input(s): AST, ALT, ALB, BILIDIR, BILITOT, ALKPHOS in the last 72 hours. No results for input(s): INR in the last 72 hours. No results for input(s): CKTOTAL, CKMB, CKMBINDEX, TROPONINT in the last 72 hours.      Imaging reviewed      Electronically signed by Nahomi Gomez MD on 12/31/2021 at 9:54 AM

## 2022-01-01 NOTE — PROGRESS NOTES
History and Physical   ___________________________    Ricardo Shonna Joseph's  is 1935  SEEN ON 2021 at [facility] NH/TCU  ___________________________    S:  Patient is seen today and discussed with the nurses. Patient has been is a poor  historian  Patient  states  Patient's  functional ability has declined in the last few months. She used to walk with a cane about 6 months ago then was using walker later on and now is having difficulty getting out of bed. He was unable to take care of her at home. He brought patient to the emergency room work-up showed patient had  UTI which was treated. Patient has dementia and is not a good historian  However denies any chest pain. No shortness of breath no cough or sputum production. Patient is on oxygen for COPD   Denies nausea vomiting or diarrhea.   No abdominal pain    ALLERGIES:  Allergies   Allergen Reactions    Cipro Xr     Demerol     Lipitor  [Atorvastatin Calcium]     Moxifloxacin Other (See Comments)     Phlebitis, urticaria    Statins      Myalgia      Lipitor [Atorvastatin] Other (See Comments)     myalgia    Wellbutrin [Bupropion] Other (See Comments)     \"zonked\"          PAST MEDICAL HISTORY:    has a past medical history of Acute cystitis, Acute ischemic colitis ([address] CAD (coronary artery disease), Cerebrovascular event ([address] Chronic hypoxemic respiratory failure ([address] Chronic neck pain, Cognitive impairment, COPD, severe ([address] Depression, Diverticulitis, Family history of colon cancer, Family history of diabetes mellitus, Fibromyalgia, Gastroesophageal reflux disease without esophagitis, Graves disease, H/O echocardiogram, Hyperlipidemia, Hypertension, Lumbar spinal stenosis, Nocturnal hypoxemia, Nocturnal oxygen desaturation, Obstructive sleep apnea, Osteoporosis, S/P CABG (coronary artery bypass graft), S/P cardiac catheterization, Subclavian artery stenosis, left ([facility] Subclinical Hyperthyroidism, Type II diabetes mellitus ([facility] [address] [facility] Urine incontinence, and Vascular dementia ([facility] [address] [facility] PAST SURGICAL HISTORY:   has a past surgical history that includes Carpal tunnel release; Total hip arthroplasty (Left, [id number]); Cervical discectomy (01-); cervical laminectomy (09-); Rotator cuff repair (1992); Rotator cuff repair (1998); Cervical spine surgery (4/2011); Cataract removal with implant (Right, 07/2016); Coronary artery bypass graft (06/2017); Incontinence surgery (11/2018); and Cataract removal with implant (Left, 03/2019). SOCIAL HISTORY:   reports that she quit smoking about 34 years ago. Her smoking use included cigarettes. She started smoking about 64 years ago. She has a 15.00 pack-year smoking history. She has never used smokeless tobacco. She reports that she does not drink alcohol and does not use drugs. Vital signs: /62. Temp 97.1. Pulse 72. RR 17.  O2 sat 97% on oxygen. Weight 108 pounds  GENERAL:  Alert, oriented, pleasant, in no apparent distress. HEENT:  Conjunctiva pink, no scleral icterus. ENT clear. NECK:  Supple. No jugular venous distention noted. No masses felt,  CARDIOVASCULAR:  Normal S1 and S2    PULMONARY:  No respiratory distress. No wheezes or rales. ABDOMEN:  Soft and non-tender,no masses  or organomegaly. EXTREMITIES:  No cyanosis, clubbing, or significant edema. SKIN: Skin is warm and dry. NEUROLOGICAL:  Cranial nerves II through XII are grossly intact. Patient short memory is poor        Assessment:  . Failure to thrive  . UTI  . Diabetes mellitus type 2  . Coronary artery disease status post CABG  . COPD on oxygen, DuoNeb, albuterol, Symbicort, Spiriva  . Dementia patient is on memantinedonezepil  . Hypertension: On Coreg  . Hyperlipidemia on Crestor  .   B12 deficiency on B12 injections monthly         Plan:  Discussed with patient and her   Consult PT and OT  Medication were reviewed  Continue current treatment.

## 2022-01-02 PROBLEM — F33.41 RECURRENT MAJOR DEPRESSIVE DISORDER, IN PARTIAL REMISSION (HCC): Status: ACTIVE | Noted: 2022-01-02

## 2022-01-04 ENCOUNTER — HOSPITAL ENCOUNTER (OUTPATIENT)
Age: 87
Setting detail: SPECIMEN
Discharge: HOME | End: 2022-01-04

## 2022-01-04 PROCEDURE — 36415 COLL VENOUS BLD VENIPUNCTURE: CPT

## 2022-01-04 PROCEDURE — 86480 TB TEST CELL IMMUN MEASURE: CPT

## 2022-01-07 LAB
QUANTI TB1 MINUS NIL: 0 IU/ML (ref 0–0.34)
QUANTI TB2 MINUS NIL: 0 IU/ML (ref 0–0.34)
QUANTIFERON (R) TB GOLD (INCUBATED): NEGATIVE IU/ML
QUANTIFERON MITOGEN MINUS NIL: 8.2 IU/ML
QUANTIFERON NIL: 0.01 IU/ML

## 2022-01-19 ENCOUNTER — HOSPITAL ENCOUNTER (OUTPATIENT)
Age: 87
Setting detail: SPECIMEN
Discharge: HOME | End: 2022-01-19

## 2022-01-19 LAB
ALBUMIN SERPL-MCNC: 4 GM/DL (ref 3.4–5)
ANION GAP SERPL CALCULATED.3IONS-SCNC: 11 MMOL/L (ref 4–16)
BACTERIA: ABNORMAL /HPF
BILIRUBIN URINE: NEGATIVE MG/DL
BLOOD, URINE: ABNORMAL
BUN BLDV-MCNC: 19 MG/DL (ref 6–23)
CALCIUM SERPL-MCNC: 9 MG/DL (ref 8.3–10.6)
CHLORIDE BLD-SCNC: 97 MMOL/L (ref 99–110)
CLARITY: ABNORMAL
CO2: 29 MMOL/L (ref 21–32)
COLOR: YELLOW
CREAT SERPL-MCNC: 0.5 MG/DL (ref 0.6–1.1)
CREATININE URINE: 73.1 MG/DL (ref 28–217)
GFR AFRICAN AMERICAN: >60 ML/MIN/1.73M2
GFR NON-AFRICAN AMERICAN: >60 ML/MIN/1.73M2
GLUCOSE BLD-MCNC: 154 MG/DL (ref 70–99)
GLUCOSE, URINE: NEGATIVE MG/DL
KETONES, URINE: NEGATIVE MG/DL
LEUKOCYTE ESTERASE, URINE: ABNORMAL
MAGNESIUM: 2.2 MG/DL (ref 1.8–2.4)
MUCUS: ABNORMAL HPF
NITRITE URINE, QUANTITATIVE: POSITIVE
PH, URINE: 7 (ref 5–8)
PHOSPHORUS: 3.9 MG/DL (ref 2.5–4.9)
POTASSIUM SERPL-SCNC: 4.1 MMOL/L (ref 3.5–5.1)
PROT/CREAT RATIO, UR: 1
PROTEIN UA: 30 MG/DL
RBC URINE: 39 /HPF (ref 0–6)
SODIUM BLD-SCNC: 137 MMOL/L (ref 135–145)
SPECIFIC GRAVITY UA: 1.02 (ref 1–1.03)
SQUAMOUS EPITHELIAL: 7 /HPF
TRICHOMONAS: ABNORMAL /HPF
URINE TOTAL PROTEIN: 71.9 MG/DL
UROBILINOGEN, URINE: 2 MG/DL (ref 0.2–1)
WBC CLUMP: ABNORMAL /HPF
WBC UA: 773 /HPF (ref 0–5)

## 2022-01-19 PROCEDURE — 82570 ASSAY OF URINE CREATININE: CPT

## 2022-01-19 PROCEDURE — 80048 BASIC METABOLIC PNL TOTAL CA: CPT

## 2022-01-19 PROCEDURE — 36415 COLL VENOUS BLD VENIPUNCTURE: CPT

## 2022-01-19 PROCEDURE — 84156 ASSAY OF PROTEIN URINE: CPT

## 2022-01-19 PROCEDURE — 82040 ASSAY OF SERUM ALBUMIN: CPT

## 2022-01-19 PROCEDURE — 83735 ASSAY OF MAGNESIUM: CPT

## 2022-01-19 PROCEDURE — 81001 URINALYSIS AUTO W/SCOPE: CPT

## 2022-01-19 PROCEDURE — 84100 ASSAY OF PHOSPHORUS: CPT

## 2022-01-27 ENCOUNTER — CARE COORDINATION (OUTPATIENT)
Dept: CASE MANAGEMENT | Age: 87
End: 2022-01-27

## 2022-01-27 DIAGNOSIS — R62.7 FAILURE TO THRIVE IN ADULT: Primary | ICD-10-CM

## 2022-01-27 PROCEDURE — 1111F DSCHRG MED/CURRENT MED MERGE: CPT | Performed by: FAMILY MEDICINE

## 2022-01-27 NOTE — CARE COORDINATION
Sheryl 45 Transitions Initial Follow Up Call    Call within 2 business days of discharge: Yes    Patient: Noy Pacheco Patient : 1935   MRN: [id number]  Reason for Admission: General Weakness  Discharge Date: 21 RARS: Readmission Risk Score: 17.3 ( )      Last Discharge [facility]       Complaint Diagnosis Description Type Department Provider    21 Failure To Thrive General weakness . .. ED to Hosp-Admission (Discharged) (Dionne Vaughn) Beryl Workman MD; Selvin Guevara. .. Acute Care Course: Patient admitted to [facility] for failure to thrive from -. She discharged to [facility] for rehab -. Sig Hx: UTI, hx CAD    DME: Walker, walk in shower and bench    Conversation: Spouse/caregiver James De León  last evening, day of discharged to home was a rough evening. States patient was so tired she fell asleep in her clothes. States he was unable to remove them. States he took her to the [facility] from snf before going home. He states he thinks she was exhausted. States patient is up this am. She is eating breakfast. Appetite okay with supplemental drinks added. States she could drink more water. She drinks gatorade, orange juice and root beer. Discussed more water and gatorade. He has not weighed her, but states she weighs between 115-120. Medication review completed. Denies c/o sob, pain, cough, congestion, n/v, fever, chills, bowel or bladder concerns. Patient on [email or web address] continuous. She receives daily breathing treatments. Jennifer Kumar states the biggest problem now is her incontinence. She has a medtronic bladder control device to R hip. Jennifer Kumar states patient ambulates using her walker with assist x1. He states he uses a gait belt. She requires assistance for ADL's. He states [facility] called to tell him they will called to schedule SOC date. Patient has a nephology appointment .  Jennifer Kumar states he rescheduled PCP follow up for 2/21 because the first appointment was scheduled while patient was still in the snf. Follow up plan: Patient needs are met. No further outreach as patient has a non-[facility] PCP.      Non-face-to-face services provided:      Care Transitions 24 Hour Call    Do you have any ongoing symptoms?: No  Do you have a copy of your discharge instructions?: Yes  Do you have all of your prescriptions and are they filled?: Yes  Have you been contacted by a [address]?: No  Have you scheduled your follow up appointment?: Yes (Comment: 2/21/22 PCP)  Were you discharged with any Home Care or Post Acute Services: Yes  Post Acute Services: Home Health (Comment: [facility])  Patient DME: Wheelchair  Do you feel like you have everything you need to keep you well at home?: Yes  Are you an active caregiver in your home?: No  Care Transitions Interventions         Follow Up  Future Appointments   Date Time Provider Ronnie Foss   1/28/2022 10:00 AM Edgar Maurice MD [id number]   2/10/2022 10:30 AM ERMELINDA Lockwood -  [facility]   3/3/2022  9:45 AM [facility]ina [facility]   3/28/2022  1:10 PM Shira Doty MD [facility]       Ana Cueto RN

## 2022-02-10 ENCOUNTER — OFFICE VISIT (OUTPATIENT)
Dept: NEUROLOGY | Age: 87
End: 2022-02-10
Payer: MEDICARE

## 2022-02-10 VITALS
HEART RATE: 64 BPM | HEIGHT: 62 IN | BODY MASS INDEX: 20.2 KG/M2 | OXYGEN SATURATION: 93 % | WEIGHT: 109.8 LBS | DIASTOLIC BLOOD PRESSURE: 70 MMHG | SYSTOLIC BLOOD PRESSURE: 132 MMHG

## 2022-02-10 DIAGNOSIS — N39.0 RECURRENT UTI: ICD-10-CM

## 2022-02-10 DIAGNOSIS — N39.46 MIXED STRESS AND URGE URINARY INCONTINENCE: ICD-10-CM

## 2022-02-10 DIAGNOSIS — F01.518 VASCULAR DEMENTIA WITH BEHAVIOR DISTURBANCE: Primary | ICD-10-CM

## 2022-02-10 DIAGNOSIS — R62.7 FAILURE TO THRIVE IN ADULT: ICD-10-CM

## 2022-02-10 DIAGNOSIS — I63.20 CEREBROVASCULAR ACCIDENT (CVA) DUE TO STENOSIS OF PRECEREBRAL ARTERY (HCC): ICD-10-CM

## 2022-02-10 PROCEDURE — G8420 CALC BMI NORM PARAMETERS: HCPCS | Performed by: NURSE PRACTITIONER

## 2022-02-10 PROCEDURE — 1036F TOBACCO NON-USER: CPT | Performed by: NURSE PRACTITIONER

## 2022-02-10 PROCEDURE — G8400 PT W/DXA NO RESULTS DOC: HCPCS | Performed by: NURSE PRACTITIONER

## 2022-02-10 PROCEDURE — 99214 OFFICE O/P EST MOD 30 MIN: CPT | Performed by: NURSE PRACTITIONER

## 2022-02-10 PROCEDURE — G8484 FLU IMMUNIZE NO ADMIN: HCPCS | Performed by: NURSE PRACTITIONER

## 2022-02-10 PROCEDURE — 4040F PNEUMOC VAC/ADMIN/RCVD: CPT | Performed by: NURSE PRACTITIONER

## 2022-02-10 PROCEDURE — 1090F PRES/ABSN URINE INCON ASSESS: CPT | Performed by: NURSE PRACTITIONER

## 2022-02-10 PROCEDURE — 1123F ACP DISCUSS/DSCN MKR DOCD: CPT | Performed by: NURSE PRACTITIONER

## 2022-02-10 PROCEDURE — G8427 DOCREV CUR MEDS BY ELIG CLIN: HCPCS | Performed by: NURSE PRACTITIONER

## 2022-02-10 PROCEDURE — 0509F URINE INCON PLAN DOCD: CPT | Performed by: NURSE PRACTITIONER

## 2022-02-10 NOTE — PROGRESS NOTES
2/10/22    Shruthi Saunders  1935    Chief Complaint   Patient presents with    Dementia     pt's  states her symptoms are worsening, pt gets the most \"wound up\" at night, pt is also getting weaker with walking and can barely do it now, pt's  states she is more temporalmental, confusion has increased       History of Present Illness  Shruthi Trinidad is a 82 y.o. female presenting today for follow-up of:  Vascular dementia. Shruthi Trinidad remains on [employer] for management.  reports that Shruthi Trinidad has been a little more argumentative and confused since her recent hospitalization for failure to thrive. Shruthi Trinidad had a UTI and just finished antibiotics. Shruthi Trinidad has a history of ongoing UTI and recently received a bladder stimulator in December. Bladder and bowel function improving but still incontinent. She follows with urology. Shruthi Trinidad  is currently participating in PT at home. She is unable to ambulate, she uses a wheelchair. Currently Shruthi Trinidad is receiving [facility] weekly for a few more weeks but then this service will end.  finds it helpful and would like to have this continue.        Current Outpatient Medications   Medication Sig Dispense Refill    mesalamine (LIALDA) 1.2 g EC tablet Take 1,200 mg by mouth daily (with breakfast)      citalopram (CELEXA) 10 MG tablet TAKE ONE TABLET BY MOUTH EVERY MORNING      ipratropium-albuterol (DUONEB) 0.5-2.5 (3) MG/3ML SOLN nebulizer solution inhale ONE vial PER nebulizer EVERY FOUR Hours 360 mL 11    PROAIR  (90 Base) MCG/ACT inhaler inhale TWO puffs BY MOUTH EVERY SIX Hours AS NEEDED FOR wheezing 25.5 g 3    tiotropium (SPIRIVA RESPIMAT) 2.5 MCG/ACT AERS inhaler inhale TWO puffs BY MOUTH DAILY 12 g 3    melatonin 3 MG TABS tablet Take 5 mg by mouth daily       Calcium Polycarbophil (FIBER-CAPS PO) Take by mouth      Multiple Vitamins-Minerals (THERAPEUTIC MULTIVITAMIN-MINERALS) tablet Take 1 tablet by mouth daily      Cholecalciferol (VITAMIN D3) 125 MCG (5000 UT) TABS Take by mouth      budesonide-formoterol (SYMBICORT) 160-4.5 MCG/ACT AERO inhale TWO puffs BY MOUTH TWICE DAILY 30.6 g 3    carvedilol (COREG) 12.5 MG tablet TAKE ONE TABLET BY MOUTH TWICE DAILY 180 tablet 3    omeprazole (PRILOSEC) 40 MG delayed release capsule Take 1 capsule by mouth daily 30 capsule 3    rosuvastatin (CRESTOR) 5 MG tablet TAKE ONE TABLET BY MOUTH EVERY DAY 90 tablet 2    Lactobacillus (PROBIOTIC ACIDOPHILUS PO) Take by mouth      ONETOUCH VERIO strip TEST ONE TO TWO TIMES DAILY AS DIRECTED      Blood Glucose Monitoring Suppl (ONETOUCH VERIO FLEX SYSTEM) w/Device KIT USE AS DIRECTED      OneTouch Delica Lancets 95Q MISC USE AS DIRECTED ONCE TO TWICE DAILY      Syringe, Disposable, 3 ML MISC 1 each by Does not apply route daily 12 each 1    cyanocobalamin 1000 MCG/ML injection Inject 1 mL into the muscle every 30 days for 1 dose 6 vial 1    doxepin (SINEQUAN) 10 MG capsule Take 10 mg by mouth nightly      Handicap Placard MISC by Does not apply route Good for 5 years 1 each 0    Memantine HCl-Donepezil HCl (NAMZARIC) 28-10 MG CP24 Take 1 capsule by mouth daily      OXYGEN Inhale 2 L/min into the lungs nightly And portable; battery powered oxygen unit when needed      aspirin 81 MG chewable tablet Take 1 tablet by mouth daily 30 tablet 3     Current Facility-Administered Medications   Medication Dose Route Frequency Provider Last Rate Last Admin    cyanocobalamin injection 1,000 mcg  1,000 mcg SubCUTAneous Q30 Days Rajan Lloyd DO   1,000 mcg at 02/17/20 5419       Physical Exam:  Also present during visit:   Mental Status   Orientation: oriented to person    Mood/affectPleasant but confused   Memory/Other: remote memory intact, recent memory impaired, remote memory impaired, reduced fund of knowledge, attention span reduced and concentration reduced  Language  Language: (normal) language, no dysarthria, (normal) articulation and no dysphasia/aphasia  Cranial Nerves   Eyes: pupils normal size and reactive to light and visual fields appear full   CN III, IV, VI : extraocular muscle strength normal, normal pursuit, no nystagmus and no ptosis   Facial Motor: normal facial motor   CN XII: tongue protrudes midline  Motor/Coordination Exam   Power: no arm drift   Coordination: normal finger-to-nose, forearm rotation intact and rapid alternating movement normal  Sensory Exam No Bradykinesia, No Dyskindesia, No Tremor and No myoclonus. Mild upper extremity weakness bilateral, moderate lower extremity weakness bilateral     Gait and Stance   Gait/Posture: needs assistance to walk and Unable to do ambulation exam, patient cannot stand unassisted        /70 (Site: Left Upper Arm, Position: Sitting, Cuff Size: Medium Adult)   Pulse 64   Ht 5' 1.5\" (1.562 m)   Wt 109 lb 12.8 oz (49.8 kg)   LMP  (LMP Unknown)   SpO2 93%   BMI 20.41 kg/m²     Assessment and Plan     Diagnosis Orders   1. Vascular dementia with behavior disturbance ([address]     2. Cerebrovascular accident (CVA) due to stenosis of precerebral artery ([address]     3. Mixed stress and urge urinary incontinence     4. Recurrent UTI     5. Failure to thrive in adult       She will continue on Namzaric as she is tolerating well. I had a long discussion with  about recurrent UTI and the effects on dementia patients. I encouraged him to follow up with urology to discuss possible prophylactic antibiotic therapy to prevent recurrent UTI. I would like to have Coreen Montana continue to participate in physical therapy,  is going to discuss with [facility] therapist about this and will let me know if further therapy can be ordered, if so I will be happy to arrange. Also, I feel has been needs more assistance in caring for Coreen Montana as  is also dealing with his own health issues. He does enjoy having home health care however this is scheduled to and in a few weeks. Has been is going to discuss with Alana Leblanc about getting home care ordered that can come in and help with Michelle's ADLs and what ever assistance may be available for  to care for Veronica at home.  will let me know what is needed and I will be happy to provide. I will plan on following up with Veronica again in 3 months, sooner if the need arises. We discussed nonpharmacologic interventions including staying active cognitively, socially, and physically to help slow down the progression of memory loss. Medications prescribed for the patient were discussed in detail. This included a discussion of the potential risks versus potential benefits of the medications. The patient was given time to ask questions and these were answered to the best of my ability. The patient appeared to understand the information provided. Return in about 3 months (around 5/10/2022).     ERMELINDA Vallejo - ASIA

## 2022-03-03 ENCOUNTER — OFFICE VISIT (OUTPATIENT)
Dept: PULMONOLOGY | Age: 87
End: 2022-03-03
Payer: MEDICARE

## 2022-03-03 VITALS
HEIGHT: 62 IN | BODY MASS INDEX: 21.16 KG/M2 | DIASTOLIC BLOOD PRESSURE: 62 MMHG | OXYGEN SATURATION: 97 % | HEART RATE: 65 BPM | WEIGHT: 115 LBS | SYSTOLIC BLOOD PRESSURE: 114 MMHG

## 2022-03-03 DIAGNOSIS — J96.11 CHRONIC HYPOXEMIC RESPIRATORY FAILURE (HCC): ICD-10-CM

## 2022-03-03 DIAGNOSIS — J44.9 COPD, SEVERE (HCC): Primary | ICD-10-CM

## 2022-03-03 DIAGNOSIS — Z99.11 ON MECHANICALLY ASSISTED VENTILATION (HCC): ICD-10-CM

## 2022-03-03 DIAGNOSIS — F01.518 VASCULAR DEMENTIA WITH BEHAVIOR DISTURBANCE: ICD-10-CM

## 2022-03-03 DIAGNOSIS — R06.02 SHORTNESS OF BREATH: ICD-10-CM

## 2022-03-03 PROCEDURE — 1090F PRES/ABSN URINE INCON ASSESS: CPT | Performed by: INTERNAL MEDICINE

## 2022-03-03 PROCEDURE — G8400 PT W/DXA NO RESULTS DOC: HCPCS | Performed by: INTERNAL MEDICINE

## 2022-03-03 PROCEDURE — G8427 DOCREV CUR MEDS BY ELIG CLIN: HCPCS | Performed by: INTERNAL MEDICINE

## 2022-03-03 PROCEDURE — G8420 CALC BMI NORM PARAMETERS: HCPCS | Performed by: INTERNAL MEDICINE

## 2022-03-03 PROCEDURE — 3023F SPIROM DOC REV: CPT | Performed by: INTERNAL MEDICINE

## 2022-03-03 PROCEDURE — 1036F TOBACCO NON-USER: CPT | Performed by: INTERNAL MEDICINE

## 2022-03-03 PROCEDURE — 99213 OFFICE O/P EST LOW 20 MIN: CPT | Performed by: INTERNAL MEDICINE

## 2022-03-03 PROCEDURE — 4040F PNEUMOC VAC/ADMIN/RCVD: CPT | Performed by: INTERNAL MEDICINE

## 2022-03-03 PROCEDURE — G8484 FLU IMMUNIZE NO ADMIN: HCPCS | Performed by: INTERNAL MEDICINE

## 2022-03-03 PROCEDURE — 1123F ACP DISCUSS/DSCN MKR DOCD: CPT | Performed by: INTERNAL MEDICINE

## 2022-03-03 RX ORDER — AMOXICILLIN 500 MG/1
CAPSULE ORAL
Status: ON HOLD | COMMUNITY
Start: 2022-03-02 | End: 2022-04-19 | Stop reason: ALTCHOICE

## 2022-03-03 RX ORDER — TRAZODONE HYDROCHLORIDE 100 MG/1
TABLET ORAL
COMMUNITY
Start: 2022-02-21

## 2022-03-03 RX ORDER — BUDESONIDE 3 MG/1
CAPSULE, COATED PELLETS ORAL
COMMUNITY
Start: 2022-02-28

## 2022-03-03 NOTE — PROGRESS NOTES
SUBJECTIVE:  Chief Complaint: Severe COPD, chronic hypoxemic respiratory failure, shortness of breath, vascular dementia  According to Mr. Saunders, he has had no recent bronchitic infections. She has had urinary and rectal issues that are being addressed and currently is on Entocort. She does continue on Symbicort, Spiriva, theophylline pills and albuterol either per MDI or solution. He states that she has not had any worsening shortness of breath. She does continue on oxygen 24 hours a day. She has had no known COVID-19 exposure or infection and has received all 3 Moderna COVID-19 vaccinations including her booster vaccination and has received her influenza vaccine      ROS:  Constitution:  HEENT: Negative for ear, throat pain  Cardiovascular: Negative for chest pain, syncope, edema  Pulmonary: See HPI  Musculoskeletal: Negative for DVT, myalgias, arthralgias    OBJECTIVE:  /62   Pulse 65   Ht 5' 1.5\" (1.562 m)   Wt 115 lb (52.2 kg)   LMP  (LMP Unknown)   SpO2 97%   BMI 21.38 kg/m²      Physical Exam:  Constitutional:  She appears thin but no significant respiratory distress at rest.  Neck:  Supple, No palpable lymphadenopathy, No JVD  Cardiovascular:  S1, S2 Normal, Regular rhythm, no murmurs or gallops, No pericardial  rubs. Pulmonary: Diminished breath sounds throughout all lung fields without wheezing or rhonchi  Abdomen: Not examined  Extremities: no edema, No DVT  Neurologic: Oriented x3, No focal deficits    Radiology: Portable chest x-ray on 12/22/2021 showed question of small right effusion versus scarring and no focal consolidation identified  PFT: Office spirometry last done on 11/26/2019 demonstrated severe obstructive defect with a significant response to bronchodilators          ASSESSMENT:    1. COPD, severe ([address]    2. On mechanically assisted ventilation ([address]    3. Shortness of breath    4. Chronic hypoxemic respiratory failure (HCC)    5.  Vascular dementia with behavior disturbance (HCC)          PLAN:   I will make no change in her current bronchodilator therapy. I did recommend she continue wearing oxygen 24 hours a day. I will continue to follow her    We have discussed the need to maintain yearly flu immunization, pneumococcal vaccination. We have discussed Coronavirus precaution including handwashing practice, wiping items touched in public such as gas pumps, door handles, shopping carts, etc. Self monitoring for infection - fever, chills, cough, SOB. Should they develop symptoms they should call office for further instructions. Return in about 6 months (around 9/3/2022) for Recheck for COPD, Recheck for Shortness of Breath, chronic hypoxemic respiratory failure. This dictation was performed with a verbal recognition program and it was checked for errors. It is possible that there are still dictated errors within this office note. Any errors should be brought immediately to my attention for correction. All efforts were made to ensure that this office note is accurate.

## 2022-03-14 ENCOUNTER — OFFICE VISIT (OUTPATIENT)
Dept: CARDIOLOGY CLINIC | Age: 87
End: 2022-03-14
Payer: MEDICARE

## 2022-03-14 VITALS
HEART RATE: 68 BPM | WEIGHT: 115 LBS | HEIGHT: 62 IN | BODY MASS INDEX: 21.16 KG/M2 | DIASTOLIC BLOOD PRESSURE: 70 MMHG | SYSTOLIC BLOOD PRESSURE: 122 MMHG

## 2022-03-14 DIAGNOSIS — I25.10 CORONARY ARTERY DISEASE INVOLVING NATIVE CORONARY ARTERY OF NATIVE HEART WITHOUT ANGINA PECTORIS: Primary | ICD-10-CM

## 2022-03-14 DIAGNOSIS — I10 ESSENTIAL HYPERTENSION: ICD-10-CM

## 2022-03-14 DIAGNOSIS — I65.23 BILATERAL CAROTID ARTERY STENOSIS: ICD-10-CM

## 2022-03-14 DIAGNOSIS — E78.2 MIXED HYPERLIPIDEMIA: ICD-10-CM

## 2022-03-14 PROCEDURE — G8400 PT W/DXA NO RESULTS DOC: HCPCS | Performed by: NURSE PRACTITIONER

## 2022-03-14 PROCEDURE — G8484 FLU IMMUNIZE NO ADMIN: HCPCS | Performed by: NURSE PRACTITIONER

## 2022-03-14 PROCEDURE — 1036F TOBACCO NON-USER: CPT | Performed by: NURSE PRACTITIONER

## 2022-03-14 PROCEDURE — 1123F ACP DISCUSS/DSCN MKR DOCD: CPT | Performed by: NURSE PRACTITIONER

## 2022-03-14 PROCEDURE — G8420 CALC BMI NORM PARAMETERS: HCPCS | Performed by: NURSE PRACTITIONER

## 2022-03-14 PROCEDURE — G8427 DOCREV CUR MEDS BY ELIG CLIN: HCPCS | Performed by: NURSE PRACTITIONER

## 2022-03-14 PROCEDURE — 1090F PRES/ABSN URINE INCON ASSESS: CPT | Performed by: NURSE PRACTITIONER

## 2022-03-14 PROCEDURE — 4040F PNEUMOC VAC/ADMIN/RCVD: CPT | Performed by: NURSE PRACTITIONER

## 2022-03-14 PROCEDURE — 99214 OFFICE O/P EST MOD 30 MIN: CPT | Performed by: NURSE PRACTITIONER

## 2022-03-14 NOTE — ASSESSMENT & PLAN NOTE
-At or near goal Yes, no recent labs  -She is to continue current medications (crestor 5 mg) Hepatic function panel WNL. No abdominal pain. No myalgias.     -The nature of cardiac risk has been fully discussed with this patient. I have made her aware of her LDL target goal given her cardiovascular risk analysis. I have discussed the appropriate diet. The need for lifelong compliance in order to reduce risk is stressed. A regular exercise program is recommended to help achieve and maintain normal body weight, fitness and improve lipid balance. A written copy of a low fat, low cholesterol diet has been given to the patient.

## 2022-03-14 NOTE — PROGRESS NOTES
[address], [address] [facility],Third [address]  Phone: [phone]    Fax [phone]                  Channing Camilo MD, Lm Flor MD, Clint Walls MD, MD Acacia Chavez MD, Lyly Hays MD, Alyssa Caruso MD, [address] [place] [address], [facility]        Cardiology Progress Note      3/14/2022    RE: Raina Closs  (1935)                             Primary cardiologist: Dr. Acacia Ortega       Subjective:  CC:   1. Coronary artery disease involving native coronary artery of native heart without angina pectoris    2. Essential hypertension    3. Bilateral carotid artery stenosis    4. Mixed hyperlipidemia        HPI: Raina Closs, who is a  82y.o. year old female with a past medical history as listed below. Patient presents to the office for follow up on CAD (CABG x 4 in 2017), HTN, and hyperlipidemia. Patient had complications after CABG with sternal infection which resulted in redo sternotomy. Patient is not an active female who walks regularly. Patient is  compliant with medications. Patient denies any chest pain, shortness of breath, dizziness, syncope, or palpitations.     Past Medical History:   Diagnosis Date    Acute cystitis 02/10/2020    Acute ischemic colitis ([address] 2008    Colonoscopy done    CAD (coronary artery disease) 06/2017    4 vessel CABG    Cerebrovascular event ([address] 02/2016    balance, speech, leg weakness; ARU    Chronic hypoxemic respiratory failure ([address] 02/18/2019    Chronic neck pain     Dr Noelle Ingram 2/2011    Cognitive impairment 09/2011    COPD, severe ([address] 11/07/2018    Depression     Diverticulitis 05/2012    Family history of colon cancer     Family history of diabetes mellitus     Fibromyalgia 1989    Gastroesophageal reflux disease without esophagitis 02/15/2020    Graves disease 2012 Esperanza Monday; tapazole for a time    H/O echocardiogram 04/08/2021    Normal EF 55-60%. 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 Mata Monday;  Tapazole    Type II diabetes mellitus ([facility] 2019 7/2019;DIET CONTROL    Urine incontinence 06/2011    Timed voiding; Isidro; urology    Vascular dementia [facility])        Current Outpatient Medications   Medication Sig Dispense Refill    budesonide (ENTOCORT EC) 3 MG extended release capsule       amoxicillin (AMOXIL) 500 MG capsule       nystatin (MYCOSTATIN) 650768 UNIT/ML suspension       traZODone (DESYREL) 100 MG tablet TAKE ONE TABLET BY MOUTH EVERY NIGHT AT BEDTIME      citalopram (CELEXA) 10 MG tablet TAKE ONE TABLET BY MOUTH EVERY MORNING      ipratropium-albuterol (DUONEB) 0.5-2.5 (3) MG/3ML SOLN nebulizer solution inhale ONE vial PER nebulizer EVERY FOUR Hours 360 mL 11    PROAIR  (90 Base) MCG/ACT inhaler inhale TWO puffs BY MOUTH EVERY SIX Hours AS NEEDED FOR wheezing 25.5 g 3    tiotropium (SPIRIVA RESPIMAT) 2.5 MCG/ACT AERS inhaler inhale TWO puffs BY MOUTH DAILY 12 g 3    melatonin 3 MG TABS tablet Take 5 mg by mouth daily       Calcium Polycarbophil (FIBER-CAPS PO) Take by mouth      Multiple Vitamins-Minerals (THERAPEUTIC MULTIVITAMIN-MINERALS) tablet Take 1 tablet by mouth daily      Cholecalciferol (VITAMIN D3) 125 MCG (5000 UT) TABS Take by mouth      budesonide-formoterol (SYMBICORT) 160-4.5 MCG/ACT AERO inhale TWO puffs BY MOUTH TWICE DAILY 30.6 g 3    carvedilol (COREG) 12.5 MG tablet TAKE ONE TABLET BY MOUTH TWICE DAILY 180 tablet 3    omeprazole (PRILOSEC) 40 MG delayed release capsule Take 1 capsule by mouth daily 30 capsule 3    rosuvastatin (CRESTOR) 5 MG tablet TAKE ONE TABLET BY MOUTH EVERY DAY 90 tablet 2    Lactobacillus (PROBIOTIC ACIDOPHILUS PO) Take by mouth      ONETOUCH VERIO strip TEST ONE TO TWO TIMES DAILY AS DIRECTED      Blood Glucose Monitoring Suppl (ONETOUCH VERIO FLEX SYSTEM) w/Device KIT USE AS DIRECTED      OneTouch Delica Lancets 88P MISC USE AS DIRECTED ONCE TO TWICE DAILY      Syringe, Disposable, 3 ML MISC 1 each by Does not apply route daily 12 each 1    cyanocobalamin 1000 MCG/ML injection Inject 1 mL into the muscle every 30 days for 1 dose 6 vial 1    doxepin (SINEQUAN) 10 MG capsule Take 10 mg by mouth nightly      Handicap Placard MISC by Does not apply route Good for 5 years 1 each 0    Memantine HCl-Donepezil HCl (NAMZARIC) 28-10 MG CP24 Take 1 capsule by mouth daily      OXYGEN Inhale 2 L/min into the lungs nightly And portable; battery powered oxygen unit when needed      aspirin 81 MG chewable tablet Take 1 tablet by mouth daily 30 tablet 3    mesalamine (LIALDA) 1.2 g EC tablet Take 1,200 mg by mouth daily (with breakfast) (Patient not taking: Reported on 3/14/2022)       Current Facility-Administered Medications   Medication Dose Route Frequency Provider Last Rate Last Admin    cyanocobalamin injection 1,000 mcg  1,000 mcg SubCUTAneous Q30 Days Rajan Lloyd DO   1,000 mcg at 02/17/20 3827       Review of Systems:  Review of Systems   Cardiovascular: Negative for chest pain, palpitations and leg swelling. Musculoskeletal: Negative. Skin: Negative. Neurological: Negative for dizziness and weakness. All other systems reviewed and are negative.          Objective:      Physical Exam:  /70   Pulse 68   Ht 5' 1.5\" (1.562 m)   Wt 115 lb (52.2 kg) LMP  (LMP Unknown)   BMI 21.38 kg/m²   Wt Readings from Last 3 Encounters:   03/14/22 115 lb (52.2 kg)   03/03/22 115 lb (52.2 kg)   02/10/22 109 lb 12.8 oz (49.8 kg)     Body mass index is 21.38 kg/m². Physical exam:  Physical Exam  Constitutional:       Appearance: She is well-developed. Cardiovascular:      Rate and Rhythm: Normal rate and regular rhythm. Pulses: Intact distal pulses. Dorsalis pedis pulses are 2+ on the right side and 2+ on the left side. Posterior tibial pulses are 2+ on the right side and 2+ on the left side. Heart sounds: Normal heart sounds, S1 normal and S2 normal.   Pulmonary:      Effort: Pulmonary effort is normal.      Breath sounds: Normal breath sounds. Musculoskeletal:         General: Normal range of motion. Skin:     General: Skin is warm and dry. Neurological:      Mental Status: She is alert. She is disoriented. DATA:  Lab Results   Component Value Date    TROPONINI <0.006 02/25/2013     BNP:    Lab Results   Component Value Date    BNP 44 02/25/2013     PT/INR:  No results found for: PTINR  Lab Results   Component Value Date    LABA1C 6.1 12/26/2021    LABA1C 6.8 02/14/2020     Lab Results   Component Value Date    CHOL 171 10/29/2019    TRIG 159 (H) 10/29/2019    HDL 41 02/14/2020    LDLCALC 61 02/14/2020    LDLDIRECT 82 10/29/2019     Lab Results   Component Value Date    ALT 18 12/22/2021    AST 28 12/22/2021     TSH:    Lab Results   Component Value Date    TSH 0.69 02/14/2020       Vitals:    03/14/22 1426   BP: 122/70   Pulse: 68       Echo:4/8/21  Technically difficult examination. Left ventricular systolic function is normal with an ejection fraction of   55-60%. Mild concentric left ventricular hypertrophy. Mild-to-moderate tricuspid regurgitation. Normal pulmonary artery pressure with a RVSP of 29mmHg.    Trace Physiological Pericardial effusion is present    Stress Test:2018  No ischemia     Cath:6/7/17  LMCA: Normal.     LAD: 95% mid LAD stenosis   diagonal branch has 95% stenosis     LCx: 95% mid segment stenosis     RCA: 80% proximal segment stenosis        The ASCVD Risk score (Apple Viramontes., et al., 2013) failed to calculate for the following reasons: The 2013 ASCVD risk score is only valid for ages 36 to 78    The patient has a prior MI or stroke diagnosis      Assessment/ Plan:     Coronary artery disease involving native coronary artery of native heart without angina pectoris   -CABG x 4 in 2017. Normal stress test in 2018. Primary and secondary prevention discussed with patient:   -Low sodium cardiac diet   -exercise 30 min a day three days a week    Continue current medications Coreg, aspirin. Essential hypertension   -Stable, continue with Coreg 12.5 mg twice daily. Bilateral carotid artery stenosis   -Ultrasound in 2017 showed bilateral internal carotid artery stenosis moderate degree. Patient has dementia has worsened over the last year, will not pursue any further. Hyperlipidemia   -At or near goal Yes, no recent labs  -She is to continue current medications (crestor 5 mg) Hepatic function panel WNL. No abdominal pain. No myalgias.     -The nature of cardiac risk has been fully discussed with this patient. I have made her aware of her LDL target goal given her cardiovascular risk analysis. I have discussed the appropriate diet. The need for lifelong compliance in order to reduce risk is stressed. A regular exercise program is recommended to help achieve and maintain normal body weight, fitness and improve lipid balance. A written copy of a low fat, low cholesterol diet has been given to the patient. Patient seen, interviewed and examined. Testing was reviewed. Patient is encouraged to exercise even a brisk walk for 30 minutes at least 3 to 4 times a week. Lifestyle and risk factor modificatons discussed. Various goals are discussed and questions answered.  Continue current medications. Appropriate prescriptions are addressed. Questions answered and patient verbalizes understanding. Call for any problems, questions, or concerns. Pt is to follow up in 6 months for Cardiac management    Electronically signed by Ryan Last.  ERMELINDA Gregory CNP on 3/14/2022 at 2:48 PM

## 2022-03-14 NOTE — LETTER
Gail Butt Shoulders A Shook  1935  [id number]  Have you had any Chest Pain that is not new? - No    Have you had any Shortness of Breath - No    Have you had any dizziness - No    Have you had any palpitations that are not new?  - No    Do you have any edema - swelling in No      When did you have your last labs drawn 01/19/2022 - Mag & BMP    Do you have a surgery or procedure scheduled in the near future - No     [id number] Score for Atrial Fibrillation Stroke Risk   Risk   Factors  Component Value   C CHF No 0   H HTN Yes 1   A2 Age >= 76 Yes,  (82 y.o.) 2   D DM Yes 1   S2 Prior Stroke/TIA No 0   V Vascular Disease Yes 1   A Age 74-69 No,  (82 y.o.) 0   Sc Sex female 1    [id number]  Score  6   Score last updated 3/14/22 2:29 PM EDT

## 2022-03-14 NOTE — ASSESSMENT & PLAN NOTE
-CABG x 4 in 2017. Normal stress test in 2018. Primary and secondary prevention discussed with patient:   -Low sodium cardiac diet   -exercise 30 min a day three days a week    Continue current medications Coreg, aspirin.

## 2022-03-15 ENCOUNTER — HOSPITAL ENCOUNTER (OUTPATIENT)
Age: 87
Setting detail: SPECIMEN
Discharge: HOME | End: 2022-03-15
Payer: MEDICARE

## 2022-03-15 LAB
ESTIMATED AVERAGE GLUCOSE: 131 MG/DL
HBA1C MFR BLD: 6.2 % (ref 4.2–6.3)

## 2022-03-15 PROCEDURE — 83036 HEMOGLOBIN GLYCOSYLATED A1C: CPT

## 2022-03-28 DIAGNOSIS — I25.10 CORONARY ARTERY DISEASE INVOLVING NATIVE CORONARY ARTERY OF NATIVE HEART WITHOUT ANGINA PECTORIS: ICD-10-CM

## 2022-03-29 RX ORDER — CARVEDILOL 12.5 MG/1
TABLET ORAL
Qty: 180 TABLET | Refills: 3 | Status: SHIPPED | OUTPATIENT
Start: 2022-03-29

## 2022-04-17 ENCOUNTER — APPOINTMENT (OUTPATIENT)
Dept: GENERAL RADIOLOGY | Age: 87
DRG: 689 | End: 2022-04-17
Payer: MEDICARE

## 2022-04-17 ENCOUNTER — HOSPITAL ENCOUNTER (INPATIENT)
Age: 87
LOS: 1 days | Discharge: NURSING, REHAB OR LONG-TERM CARE | DRG: 689 | End: 2022-04-21
Attending: STUDENT IN AN ORGANIZED HEALTH CARE EDUCATION/TRAINING PROGRAM | Admitting: INTERNAL MEDICINE
Payer: MEDICARE

## 2022-04-17 DIAGNOSIS — Z78.9 UNABLE TO CARE FOR SELF: Primary | ICD-10-CM

## 2022-04-17 DIAGNOSIS — Z86.59 HISTORY OF DEMENTIA: ICD-10-CM

## 2022-04-17 LAB
ADENOVIRUS DETECTION BY PCR: NOT DETECTED
ALBUMIN SERPL-MCNC: 4.2 GM/DL (ref 3.4–5)
ALP BLD-CCNC: 55 IU/L (ref 40–129)
ALT SERPL-CCNC: 25 U/L (ref 10–40)
ANION GAP SERPL CALCULATED.3IONS-SCNC: 15 MMOL/L (ref 4–16)
AST SERPL-CCNC: 22 IU/L (ref 15–37)
BASOPHILS ABSOLUTE: 0 K/CU MM
BASOPHILS RELATIVE PERCENT: 0.3 % (ref 0–1)
BILIRUB SERPL-MCNC: 0.7 MG/DL (ref 0–1)
BORDETELLA PARAPERTUSSIS BY PCR: NOT DETECTED
BORDETELLA PERTUSSIS PCR: NOT DETECTED
BUN BLDV-MCNC: 18 MG/DL (ref 6–23)
CALCIUM SERPL-MCNC: 9.8 MG/DL (ref 8.3–10.6)
CHLAMYDOPHILA PNEUMONIA PCR: NOT DETECTED
CHLORIDE BLD-SCNC: 97 MMOL/L (ref 99–110)
CO2: 28 MMOL/L (ref 21–32)
CORONAVIRUS 229E PCR: NOT DETECTED
CORONAVIRUS HKU1 PCR: NOT DETECTED
CORONAVIRUS NL63 PCR: NOT DETECTED
CORONAVIRUS OC43 PCR: NOT DETECTED
CREAT SERPL-MCNC: 0.7 MG/DL (ref 0.6–1.1)
DIFFERENTIAL TYPE: ABNORMAL
EOSINOPHILS ABSOLUTE: 0.1 K/CU MM
EOSINOPHILS RELATIVE PERCENT: 0.5 % (ref 0–3)
GFR AFRICAN AMERICAN: >60 ML/MIN/1.73M2
GFR NON-AFRICAN AMERICAN: >60 ML/MIN/1.73M2
GLUCOSE BLD-MCNC: 77 MG/DL (ref 70–99)
HCT VFR BLD CALC: 41.5 % (ref 37–47)
HEMOGLOBIN: 13.6 GM/DL (ref 12.5–16)
HUMAN METAPNEUMOVIRUS PCR: NOT DETECTED
IMMATURE NEUTROPHIL %: 0.3 % (ref 0–0.43)
INFLUENZA A BY PCR: NOT DETECTED
INFLUENZA A H1 (2009) PCR: NOT DETECTED
INFLUENZA A H1 PANDEMIC PCR: NOT DETECTED
INFLUENZA A H3 PCR: NOT DETECTED
INFLUENZA B BY PCR: NOT DETECTED
LYMPHOCYTES ABSOLUTE: 2.1 K/CU MM
LYMPHOCYTES RELATIVE PERCENT: 20.6 % (ref 24–44)
MCH RBC QN AUTO: 33.7 PG (ref 27–31)
MCHC RBC AUTO-ENTMCNC: 32.8 % (ref 32–36)
MCV RBC AUTO: 102.7 FL (ref 78–100)
MONOCYTES ABSOLUTE: 0.6 K/CU MM
MONOCYTES RELATIVE PERCENT: 5.9 % (ref 0–4)
MYCOPLASMA PNEUMONIAE PCR: NOT DETECTED
NUCLEATED RBC %: 0 %
PARAINFLUENZA 1 PCR: NOT DETECTED
PARAINFLUENZA 2 PCR: NOT DETECTED
PARAINFLUENZA 3 PCR: NOT DETECTED
PARAINFLUENZA 4 PCR: NOT DETECTED
PDW BLD-RTO: 12.7 % (ref 11.7–14.9)
PLATELET # BLD: 280 K/CU MM (ref 140–440)
PMV BLD AUTO: 8.7 FL (ref 7.5–11.1)
POTASSIUM SERPL-SCNC: 4.3 MMOL/L (ref 3.5–5.1)
RBC # BLD: 4.04 M/CU MM (ref 4.2–5.4)
RHINOVIRUS ENTEROVIRUS PCR: NOT DETECTED
RSV PCR: NOT DETECTED
SARS-COV-2: NOT DETECTED
SEGMENTED NEUTROPHILS ABSOLUTE COUNT: 7.4 K/CU MM
SEGMENTED NEUTROPHILS RELATIVE PERCENT: 72.4 % (ref 36–66)
SODIUM BLD-SCNC: 140 MMOL/L (ref 135–145)
TOTAL IMMATURE NEUTOROPHIL: 0.03 K/CU MM
TOTAL NUCLEATED RBC: 0 K/CU MM
TOTAL PROTEIN: 7.4 GM/DL (ref 6.4–8.2)
WBC # BLD: 10.3 K/CU MM (ref 4–10.5)

## 2022-04-17 PROCEDURE — G0378 HOSPITAL OBSERVATION PER HR: HCPCS

## 2022-04-17 PROCEDURE — 93005 ELECTROCARDIOGRAM TRACING: CPT | Performed by: STUDENT IN AN ORGANIZED HEALTH CARE EDUCATION/TRAINING PROGRAM

## 2022-04-17 PROCEDURE — 71045 X-RAY EXAM CHEST 1 VIEW: CPT

## 2022-04-17 PROCEDURE — 85025 COMPLETE CBC W/AUTO DIFF WBC: CPT

## 2022-04-17 PROCEDURE — 83036 HEMOGLOBIN GLYCOSYLATED A1C: CPT

## 2022-04-17 PROCEDURE — 6370000000 HC RX 637 (ALT 250 FOR IP): Performed by: NURSE PRACTITIONER

## 2022-04-17 PROCEDURE — 80053 COMPREHEN METABOLIC PANEL: CPT

## 2022-04-17 PROCEDURE — 99285 EMERGENCY DEPT VISIT HI MDM: CPT

## 2022-04-17 PROCEDURE — 0202U NFCT DS 22 TRGT SARS-COV-2: CPT

## 2022-04-17 RX ORDER — MEMANTINE HYDROCHLORIDE 10 MG/1
10 TABLET ORAL 2 TIMES DAILY
Status: DISCONTINUED | OUTPATIENT
Start: 2022-04-18 | End: 2022-04-21 | Stop reason: HOSPADM

## 2022-04-17 RX ORDER — ONDANSETRON 2 MG/ML
4 INJECTION INTRAMUSCULAR; INTRAVENOUS EVERY 6 HOURS PRN
Status: DISCONTINUED | OUTPATIENT
Start: 2022-04-17 | End: 2022-04-21 | Stop reason: HOSPADM

## 2022-04-17 RX ORDER — SODIUM CHLORIDE 0.9 % (FLUSH) 0.9 %
5-40 SYRINGE (ML) INJECTION PRN
Status: DISCONTINUED | OUTPATIENT
Start: 2022-04-17 | End: 2022-04-21 | Stop reason: HOSPADM

## 2022-04-17 RX ORDER — CITALOPRAM 20 MG/1
10 TABLET ORAL DAILY
Status: DISCONTINUED | OUTPATIENT
Start: 2022-04-18 | End: 2022-04-21 | Stop reason: HOSPADM

## 2022-04-17 RX ORDER — DOXEPIN HYDROCHLORIDE 25 MG/1
50 CAPSULE ORAL EVERY EVENING
Status: DISCONTINUED | OUTPATIENT
Start: 2022-04-17 | End: 2022-04-21 | Stop reason: HOSPADM

## 2022-04-17 RX ORDER — PANTOPRAZOLE SODIUM 40 MG/1
40 TABLET, DELAYED RELEASE ORAL
Status: DISCONTINUED | OUTPATIENT
Start: 2022-04-18 | End: 2022-04-21 | Stop reason: HOSPADM

## 2022-04-17 RX ORDER — DEXTROSE MONOHYDRATE 50 MG/ML
100 INJECTION, SOLUTION INTRAVENOUS PRN
Status: DISCONTINUED | OUTPATIENT
Start: 2022-04-17 | End: 2022-04-21 | Stop reason: HOSPADM

## 2022-04-17 RX ORDER — INSULIN GLARGINE 100 [IU]/ML
0.25 INJECTION, SOLUTION SUBCUTANEOUS NIGHTLY
Status: DISCONTINUED | OUTPATIENT
Start: 2022-04-17 | End: 2022-04-17

## 2022-04-17 RX ORDER — ALBUTEROL SULFATE 90 UG/1
2 AEROSOL, METERED RESPIRATORY (INHALATION) EVERY 4 HOURS PRN
Status: DISCONTINUED | OUTPATIENT
Start: 2022-04-17 | End: 2022-04-21 | Stop reason: HOSPADM

## 2022-04-17 RX ORDER — ONDANSETRON 4 MG/1
4 TABLET, ORALLY DISINTEGRATING ORAL EVERY 8 HOURS PRN
Status: DISCONTINUED | OUTPATIENT
Start: 2022-04-17 | End: 2022-04-21 | Stop reason: HOSPADM

## 2022-04-17 RX ORDER — DONEPEZIL HYDROCHLORIDE 10 MG/1
10 TABLET, FILM COATED ORAL DAILY
Status: DISCONTINUED | OUTPATIENT
Start: 2022-04-18 | End: 2022-04-21 | Stop reason: HOSPADM

## 2022-04-17 RX ORDER — DOXEPIN HYDROCHLORIDE 50 MG/1
1 CAPSULE ORAL EVERY EVENING
COMMUNITY
Start: 2022-04-06

## 2022-04-17 RX ORDER — ROSUVASTATIN CALCIUM 5 MG/1
5 TABLET, COATED ORAL DAILY
Status: DISCONTINUED | OUTPATIENT
Start: 2022-04-18 | End: 2022-04-21 | Stop reason: HOSPADM

## 2022-04-17 RX ORDER — ACETAMINOPHEN 325 MG/1
650 TABLET ORAL EVERY 6 HOURS PRN
Status: DISCONTINUED | OUTPATIENT
Start: 2022-04-17 | End: 2022-04-21 | Stop reason: HOSPADM

## 2022-04-17 RX ORDER — SODIUM CHLORIDE 0.9 % (FLUSH) 0.9 %
5-40 SYRINGE (ML) INJECTION EVERY 12 HOURS SCHEDULED
Status: DISCONTINUED | OUTPATIENT
Start: 2022-04-17 | End: 2022-04-21 | Stop reason: HOSPADM

## 2022-04-17 RX ORDER — ASPIRIN 81 MG/1
81 TABLET, CHEWABLE ORAL DAILY
Status: DISCONTINUED | OUTPATIENT
Start: 2022-04-18 | End: 2022-04-21 | Stop reason: HOSPADM

## 2022-04-17 RX ORDER — POLYETHYLENE GLYCOL 3350 17 G/17G
17 POWDER, FOR SOLUTION ORAL DAILY PRN
Status: DISCONTINUED | OUTPATIENT
Start: 2022-04-17 | End: 2022-04-21 | Stop reason: HOSPADM

## 2022-04-17 RX ORDER — DEXTROSE MONOHYDRATE 25 G/50ML
12.5 INJECTION, SOLUTION INTRAVENOUS PRN
Status: DISCONTINUED | OUTPATIENT
Start: 2022-04-17 | End: 2022-04-17 | Stop reason: CLARIF

## 2022-04-17 RX ORDER — ACETAMINOPHEN 650 MG/1
650 SUPPOSITORY RECTAL EVERY 6 HOURS PRN
Status: DISCONTINUED | OUTPATIENT
Start: 2022-04-17 | End: 2022-04-21 | Stop reason: HOSPADM

## 2022-04-17 RX ORDER — SODIUM CHLORIDE 9 MG/ML
25 INJECTION, SOLUTION INTRAVENOUS PRN
Status: DISCONTINUED | OUTPATIENT
Start: 2022-04-17 | End: 2022-04-21 | Stop reason: HOSPADM

## 2022-04-17 RX ORDER — CARVEDILOL 6.25 MG/1
12.5 TABLET ORAL 2 TIMES DAILY
Status: DISCONTINUED | OUTPATIENT
Start: 2022-04-17 | End: 2022-04-21 | Stop reason: HOSPADM

## 2022-04-17 RX ORDER — TRAZODONE HYDROCHLORIDE 50 MG/1
100 TABLET ORAL NIGHTLY
Status: DISCONTINUED | OUTPATIENT
Start: 2022-04-17 | End: 2022-04-21 | Stop reason: HOSPADM

## 2022-04-17 RX ORDER — BUDESONIDE AND FORMOTEROL FUMARATE DIHYDRATE 160; 4.5 UG/1; UG/1
2 AEROSOL RESPIRATORY (INHALATION) 2 TIMES DAILY
Status: DISCONTINUED | OUTPATIENT
Start: 2022-04-17 | End: 2022-04-21 | Stop reason: HOSPADM

## 2022-04-17 ASSESSMENT — PAIN SCALES - GENERAL: PAINLEVEL_OUTOF10: 0

## 2022-04-17 NOTE — Clinical Note
Discharge Plan[de-identified] Other/[facility])   Telemetry/Cardiac Monitoring Required?: No

## 2022-04-17 NOTE — CARE COORDINATION
CM consult to assist with discharge planning for pt with dementia from home, pt  is also a pt of ER and will be admitted to hospital. Family/Sons/ Abe Gale [phone], Bobby Howard [phone], had called in to ER to say pt needed to go to the nursing home due to  being a pt, stated she was to go to [facility]. CM spoke with Dr Al Yo about reason for this pt visit to ER. CM attempted to call Radha Milian or Monserrat/admission at [facility] # [phone], no answer being its a  and a holiday, was able to leave a message. Dr Denise Crowell will order labs and look for acute findings for admission, attempted SNF placement if not applicable, will discuss pt going as private pay. PT spouse  in ER, pt is unaware of this at this time. Son's live out of state will be flying in tomorrow.  GodwinRN/BJ

## 2022-04-17 NOTE — ED TRIAGE NOTES
Per ems, patient was transport to ED for SNF placement. EMS states sister arranged for patient to placed somewhere and instructed to go to the ED. Patient has hx dementia and is unable to take care of self.

## 2022-04-18 LAB
ESTIMATED AVERAGE GLUCOSE: 128 MG/DL
GLUCOSE BLD-MCNC: 179 MG/DL (ref 70–99)
GLUCOSE BLD-MCNC: 74 MG/DL (ref 70–99)
GLUCOSE BLD-MCNC: 83 MG/DL (ref 70–99)
GLUCOSE BLD-MCNC: 92 MG/DL (ref 70–99)
HBA1C MFR BLD: 6.1 % (ref 4.2–6.3)
PROCALCITONIN: 0.06

## 2022-04-18 PROCEDURE — G0378 HOSPITAL OBSERVATION PER HR: HCPCS

## 2022-04-18 PROCEDURE — 97530 THERAPEUTIC ACTIVITIES: CPT

## 2022-04-18 PROCEDURE — 6370000000 HC RX 637 (ALT 250 FOR IP): Performed by: NURSE PRACTITIONER

## 2022-04-18 PROCEDURE — 94640 AIRWAY INHALATION TREATMENT: CPT

## 2022-04-18 PROCEDURE — 6360000002 HC RX W HCPCS: Performed by: NURSE PRACTITIONER

## 2022-04-18 PROCEDURE — 96372 THER/PROPH/DIAG INJ SC/IM: CPT

## 2022-04-18 PROCEDURE — 2580000003 HC RX 258: Performed by: NURSE PRACTITIONER

## 2022-04-18 PROCEDURE — 2700000000 HC OXYGEN THERAPY PER DAY

## 2022-04-18 PROCEDURE — 84145 PROCALCITONIN (PCT): CPT

## 2022-04-18 PROCEDURE — 94761 N-INVAS EAR/PLS OXIMETRY MLT: CPT

## 2022-04-18 PROCEDURE — 97162 PT EVAL MOD COMPLEX 30 MIN: CPT

## 2022-04-18 PROCEDURE — 97166 OT EVAL MOD COMPLEX 45 MIN: CPT

## 2022-04-18 PROCEDURE — 99211 OFF/OP EST MAY X REQ PHY/QHP: CPT

## 2022-04-18 PROCEDURE — 82962 GLUCOSE BLOOD TEST: CPT

## 2022-04-18 RX ADMIN — DOXEPIN HYDROCHLORIDE 50 MG: 25 CAPSULE ORAL at 17:21

## 2022-04-18 RX ADMIN — TRAZODONE HYDROCHLORIDE 100 MG: 50 TABLET ORAL at 20:39

## 2022-04-18 RX ADMIN — SODIUM CHLORIDE, PRESERVATIVE FREE 10 ML: 5 INJECTION INTRAVENOUS at 20:44

## 2022-04-18 RX ADMIN — ALBUTEROL SULFATE 2 PUFF: 90 AEROSOL, METERED RESPIRATORY (INHALATION) at 07:13

## 2022-04-18 RX ADMIN — DOXEPIN HYDROCHLORIDE 50 MG: 25 CAPSULE ORAL at 00:59

## 2022-04-18 RX ADMIN — CITALOPRAM 10 MG: 20 TABLET, FILM COATED ORAL at 08:48

## 2022-04-18 RX ADMIN — BUDESONIDE AND FORMOTEROL FUMARATE DIHYDRATE 2 PUFF: 160; 4.5 AEROSOL RESPIRATORY (INHALATION) at 21:05

## 2022-04-18 RX ADMIN — ENOXAPARIN SODIUM 40 MG: 100 INJECTION SUBCUTANEOUS at 08:50

## 2022-04-18 RX ADMIN — SODIUM CHLORIDE, PRESERVATIVE FREE 10 ML: 5 INJECTION INTRAVENOUS at 00:59

## 2022-04-18 RX ADMIN — BUDESONIDE AND FORMOTEROL FUMARATE DIHYDRATE 2 PUFF: 160; 4.5 AEROSOL RESPIRATORY (INHALATION) at 07:15

## 2022-04-18 RX ADMIN — TIOTROPIUM BROMIDE INHALATION SPRAY 2 PUFF: 3.12 SPRAY, METERED RESPIRATORY (INHALATION) at 07:17

## 2022-04-18 RX ADMIN — CARVEDILOL 12.5 MG: 12.5 TABLET, FILM COATED ORAL at 08:50

## 2022-04-18 RX ADMIN — DONEPEZIL HYDROCHLORIDE 10 MG: 10 TABLET, FILM COATED ORAL at 08:49

## 2022-04-18 RX ADMIN — CARVEDILOL 12.5 MG: 12.5 TABLET, FILM COATED ORAL at 20:40

## 2022-04-18 RX ADMIN — MEMANTINE 10 MG: 10 TABLET ORAL at 08:50

## 2022-04-18 RX ADMIN — CARVEDILOL 12.5 MG: 12.5 TABLET, FILM COATED ORAL at 00:59

## 2022-04-18 RX ADMIN — ASPIRIN 81 MG 81 MG: 81 TABLET ORAL at 08:50

## 2022-04-18 RX ADMIN — PANTOPRAZOLE SODIUM 40 MG: 40 TABLET, DELAYED RELEASE ORAL at 05:38

## 2022-04-18 RX ADMIN — MEMANTINE 10 MG: 10 TABLET ORAL at 20:39

## 2022-04-18 RX ADMIN — TRAZODONE HYDROCHLORIDE 100 MG: 50 TABLET ORAL at 00:59

## 2022-04-18 RX ADMIN — ROSUVASTATIN CALCIUM 5 MG: 5 TABLET, COATED ORAL at 08:50

## 2022-04-18 RX ADMIN — SODIUM CHLORIDE, PRESERVATIVE FREE 10 ML: 5 INJECTION INTRAVENOUS at 08:50

## 2022-04-18 ASSESSMENT — ENCOUNTER SYMPTOMS
SORE THROAT: 0
VOMITING: 0
EYES NEGATIVE: 1
ALLERGIC/IMMUNOLOGIC NEGATIVE: 1
GASTROINTESTINAL NEGATIVE: 1
EYE PAIN: 0
ABDOMINAL PAIN: 0
SHORTNESS OF BREATH: 0
COUGH: 0
NAUSEA: 0
RESPIRATORY NEGATIVE: 1
DIARRHEA: 0
EYE REDNESS: 0

## 2022-04-18 ASSESSMENT — PAIN SCALES - GENERAL
PAINLEVEL_OUTOF10: 0

## 2022-04-18 NOTE — PROGRESS NOTES
[facility] BERNIE Saunders, 1935, [id number], 4/18/2022    Discharge Recommendation: SNF with OT    History:  [place]:  The primary encounter diagnosis was Unable to care for self. A diagnosis of History of dementia was also pertinent to this visit. Subjective:  Patient states: \"I'm gonna fall! \", \"my 's in here somewhere. \"  Pain: 0/10   Restrictions: general precautions, fall risk, confused at times      Home Setup/Prior level of function:  Social/Functional History  Lives With: Spouse  Type of Home: House  Home Layout: Two level,Able to Live on Main level with bedroom/bathroom  Home Access: Level entry  Bathroom Shower/Tub: Walk-in shower  Bathroom Toilet: Standard  Bathroom Equipment: Shower chair,Grab bars in shower,Grab bars around toilet  Home Equipment: 4 wheeled walker,Oxygen (2L most of the time)  ADL Assistance: Needs assistance (spouse assists with all bathing, dressing, and toileting tasks)  Homemaking Responsibilities: No (spouse primary)  Ambulation Assistance: Needs assistance (uses 4ww;  follows pt for safety)  Transfer Assistance: Needs assistance (spouse assists with all transfers)  Active : No  Patient's  Info: Spouse  Additional Comments: Pts sons live out of town. Pt reports her spouse is in the hospital at this time.  No recent falls    Examination:  · Observation: Pt was laying on L side in bed upon arrival, 2L O2  · [facility]  · [facility]    Body Systems and functions:  · ROM: WFL in BUE  · Strength: 3+/5 in BUE  · Sensation: WFL  · Tone: normotonic in BUE  · Coordination: movements fluid and coordinated  · Posture: normal posture    Activities of Daily Living (ADLs):  · Feeding: stand-by assist  · Grooming: stand-by assist  · Toileting: maximal assist  · UB dressing: contact guard assist    · LB dressing: maximal assist  · UB bathing: minimal assist  · LB bathing: maximal assist    *pt ADL function inferred from gross functional assessment of mobility, balance, posture, safety awareness, activity tolerance (unless otherwise indicated)    Cognitive and Psychosocial Functioning:  · Overall cognitive status: A/Ox3  · Affect: slightly confused/anxious, but friendly    Balance:   · Sitting: good  · Standing: fair, pt demos severe anxiety of falling with standing activities, self limiting    Functional Mobility:  · Rolling Laterally in Bed: SBA  · Supine to Sit: mod A  · Sit to Stand: min A using FWW, severe anxiety   · Ambulation: ANTOINE d/t generalized weakness, anxiety  · Standing pivot transfer: mod A      AM-PAC 6 click short form for inpatient daily activity:   How much help from another person does the patient currently need. .. Unable  Dep A Lot  Max A A Lot   Mod A A Little  Min A A Little   CGA  SBA None   Mod I  Indep  Sup   1. Putting on and taking off regular lower body clothing? [] 1    [x] 2   [] 2   [] 3   [] 3   [] 4      2. Bathing (including washing, rinsing, drying)? [] 1   [x] 2   [] 2 [] 3 [] 3 [] 4   3. Toileting, which includes using toilet, bedpan, or urinal? [] 1    [x] 2   [] 2   [] 3   [] 3   [] 4     4. Putting on and taking off regular upper body clothing? [] 1   [] 2   [] 2   [] 3   [x] 3    [] 4      5. Taking care of personal grooming such as brushing teeth? [] 1   [] 2    [] 2 [] 3    [x] 3   [] 4      6. Eating meals?    [] 1   [] 2   [] 2   [] 3   [x] 3   [] 4        Raw Score:  15    24/24 = unimpaired  23/24 = 1-20% impaired   20/24-22/24 = 21-40% impaired  15/24-19/24 = 41-59% impaired   10/24-14/24 = 60%-79% impaired  7/24-9/24 = 80%-99% impaired  6/24 = 100% impaired       Education: Role of OT, OT POC, discharge needs, safety, benefits of EOB/OOB activity  Safety Measures: Gait belt used for safety of pt and therapist, Left seated/reclined in chair, Alarm in place, call light and phone within reach    Assessment:  Pt is a 82 yr old female who presents with troubles performing self cares. Pt at baseline requires assistance from spouse for transfers, receives SBA from spouse for ambulation, and requires assistance for her ADLs/IADLs. Pt at this time presents with increased fear of falling, generalized weakness, decreased balance, decreased mobility decreased ADL/IADL function. Pt would benefit from continued IP OT services. Complexity: moderate  Prognosis: fair    Plan:  Plan: 3+/week  Pt would benefit from continued edu on benefits of EOB/OOB activity, reassurance with fear of falling  Adaptive Equipment Recommendations: none      Goals:  Time frame for goals: 2 weeks  1. Pt will complete feeding tasks with supervision at EOB  2. Pt will complete grooming tasks with supervision at EOB  3. Pt will complete toileting tasks with moderate assist   using BSC  4. Pt will complete UB dressing tasks with stand-by assist   5. Pt will complete LB dressing tasks with moderate assist    6. Pt will complete UB bathing tasks with stand-by assist  7.  Pt will complete LB bathing tasks with moderate assist        Time:   Time in: 0949  Time out: 1005  Evaluation Minutes: 16  Total time: 16      Electronically signed by:      LATONYA Munguia  4/18/2022

## 2022-04-18 NOTE — H&P
V2.0  History and Physical      Name:  Nisha Hicks /Age/Sex: 1935  (82 y.o. female)   MRN & CSN:  [id number] & [id number] Encounter Date/Time: 2022 9:02 PM EDT   Location:  [place] [address] PCP: Juliette Goncalves, [facility] Day: 1    Assessment and Plan:   Nisha Hicks is a 82 y.o. female with a pmh of dementia, essential hypertension and hyperlipidemia who presents with self-care deficit, dementia and patient is unable to care for self at home    [facility] [place] to Observation   -Case management consult for placement. Patients  is in ED receiving emergency treatment- is primary caregiver    -T OT consult   -Fall precautions   -Laboratory work-up in the ED unremarkable   Patient complains of fatigue, shortness of breath. Has been being evaluated for sepsis. Will order respiratory panel to rule out viral process and chest xray. Currently saturating well on room air   -Continue home medications including donazepail, Namenda   -Unable to reconcile medications due to patients  being acutely ill and patient having dementia with patient. List from prior outpatient visits in March, pharmacy fills used      Essential hypertension   Continue home Coreg  Hyperlipidemia   Continue home crestor   Chronic Conditions: continue home medication as ordered      All testing  and results reviewed with patient . All questions answered. Patient and family voiced understanding    This patient was seen and examined in conjunction with Dr. Radha Shearer. He/She was agreeable with the plan and management as dictated above. Disposition:   Current Living situation: With   Expected Disposition: Facility  Estimated D/C: 2 days     Diet ADULT DIET;  Dysphagia - Soft and Bite Sized; 5 carb choices (75 gm/meal)   GI Prophylaxis  [x] PPI,  [] H2 Blocker,  [] Carafate,  [] Diet/Tube Feeds   DVT Prophylaxis [x] Lovenox, []  Heparin, [] SCDs, [] Ambulation,  [] NOAC   Code Status Full Code   Surrogate Decision Maker/ POA          History from:     patient, electronic medical record    History of Present Illness:     Chief Complaint:Michelle Kessler is a 82 y.o. female with pmh of dementia who presents with a chief complaint of unable to care for self as her  who is her primary caregiver is being admitted at this hospital this evening. Patient states she does not have any other family that lives with her. EMS stated that they brought the patient to the ED due to her being unable to care for self. Review of Systems: Need 10 Elements   Review of Systems   Unable to perform ROS: Dementia           Objective:   No intake or output data in the 24 hours ending 04/17/22 2116   Vitals:   Vitals:    04/17/22 1729 04/17/22 1943   BP: (!) 160/88 (!) 187/78   Pulse: 88 86   Resp: 22    Temp: 98.7 °F (37.1 °C)    TempSrc: Oral    SpO2: 93% 98%   Weight: 123 lb (55.8 kg)    Height: 5' 1\" (1.549 m)        Medications Prior to Admission     Prior to Admission medications    Medication Sig Start Date End Date Taking? Authorizing Provider   doxepin (SINEQUAN) 50 MG capsule Take 1 capsule by mouth every evening 4/6/22  Yes Historical Provider, MD   carvedilol (COREG) 12.5 MG tablet TAKE ONE TABLET BY MOUTH TWICE DAILY 3/29/22   Daryl Ortiz, APRN - CNP   budesonide (ENTOCORT EC) 3 MG extended release capsule  2/28/22   Historical Provider, MD   amoxicillin (AMOXIL) 500 MG capsule  3/2/22   Historical Provider, MD   nystatin (MYCOSTATIN) 321108 UNIT/ML suspension  3/2/22   Historical Provider, MD   traZODone (DESYREL) 100 MG tablet TAKE ONE TABLET BY MOUTH EVERY NIGHT AT BEDTIME 2/21/22   Historical Provider, MD   mesalamine (LIALDA) 1.2 g EC tablet Take 1,200 mg by mouth daily (with breakfast)  Patient not taking: Reported on 3/14/2022    Historical Provider, MD   citalopram (CELEXA) 10 MG tablet TAKE ONE TABLET BY MOUTH EVERY MORNING 12/13/21   Historical Provider, MD   ipratropium-albuterol (DUONEB) 0.5-2.5 (3) MG/3ML SOLN nebulizer solution inhale ONE vial PER nebulizer EVERY FOUR Hours 5/20/21   Kavita Wing MD   PROAIR  (06 Base) MCG/ACT inhaler inhale TWO puffs BY MOUTH EVERY SIX Hours AS NEEDED FOR wheezing 4/13/21   Kavita Wing MD   tiotropium (SPIRIVA RESPIMAT) 2.5 MCG/ACT AERS inhaler inhale TWO puffs BY MOUTH DAILY 4/8/21   Kavita Wing MD   melatonin 3 MG TABS tablet Take 5 mg by mouth daily     Historical Provider, MD   Calcium Polycarbophil (FIBER-CAPS PO) Take by mouth    Historical Provider, MD   Multiple Vitamins-Minerals (THERAPEUTIC MULTIVITAMIN-MINERALS) tablet Take 1 tablet by mouth daily    Historical Provider, MD   Cholecalciferol (VITAMIN D3) 125 MCG (5000 UT) TABS Take by mouth    Historical Provider, MD   budesonide-formoterol (SYMBICORT) 160-4.5 MCG/ACT AERO inhale TWO puffs BY MOUTH TWICE DAILY 3/8/21   Kavita Wing MD   omeprazole (PRILOSEC) 40 MG delayed release capsule Take 1 capsule by mouth daily 1/8/21   Cortland Apley, MD   rosuvastatin (CRESTOR) 5 MG tablet TAKE ONE TABLET BY MOUTH EVERY DAY 8/18/20   Rajan Arriaga DO   Lactobacillus (PROBIOTIC ACIDOPHILUS PO) Take by mouth    Historical Provider, MD   Daila Long strip TEST ONE TO TWO TIMES DAILY AS DIRECTED 5/21/20   Historical Provider, MD   Blood Glucose Monitoring Suppl ([address]) w/Device KIT USE AS DIRECTED 5/22/20   Historical Provider, MD   OneTouch Delica Lancets 57I [facility] USE AS DIRECTED ONCE TO TWICE DAILY 5/21/20   Historical Provider, MD   Syringe, Disposable, 3 ML MISC 1 each by Does not apply route daily 6/24/20   Rajan Arriaga DO   cyanocobalamin 1000 MCG/ML injection Inject 1 mL into the muscle every 30 days for 1 dose 6/24/20 3/14/22  Rajan Arriaga DO   doxepin (SINEQUAN) 10 MG capsule Take 10 mg by mouth nightly    Historical Provider, MD   Handicap Placard MISC by Does not apply route Good for 5 years 2/6/20   Rajan Arriaga, DO Memantine HCl-Donepezil HCl (NAMZARIC) 28-10 MG CP24 Take 1 capsule by mouth daily    Historical Provider, MD   OXYGEN Inhale 2 L/min into the lungs nightly And portable; battery powered oxygen unit when needed    Historical Provider, MD   aspirin 81 MG chewable tablet Take 1 tablet by mouth daily 2/19/16   CATINA Perez MD       Physical Exam: Need 8 Elements   Physical Exam  Vitals and nursing note reviewed. HENT:      Head: Normocephalic. Eyes:      Pupils: Pupils are equal, round, and reactive to light. Cardiovascular:      Rate and Rhythm: Tachycardia present. Pulmonary:      Effort: Pulmonary effort is normal.   Abdominal:      General: Abdomen is flat. Skin:     General: Skin is warm. Capillary Refill: Capillary refill takes more than 3 seconds. Neurological:      General: No focal deficit present. Mental Status: She is alert. She is confused. GCS: GCS eye subscore is 4. GCS verbal subscore is 4. GCS motor subscore is 6. Motor: Motor function is intact. Comments: Dementia, appears at baseline. Pleasant. Oriented to person and situation            Past Medical History:   PMHx   Past Medical History:   Diagnosis Date    Acute cystitis 02/10/2020    Acute ischemic colitis ([address] 2008    Colonoscopy done    CAD (coronary artery disease) 06/2017    4 vessel CABG    Cerebrovascular event ([address] 02/2016    balance, speech, leg weakness; ARU    Chronic hypoxemic respiratory failure ([address] 02/18/2019    Chronic neck pain     Dr Merary Coffey 2/2011    Cognitive impairment 09/2011    COPD, severe ([address] 11/07/2018    Depression     Diverticulitis 05/2012    Family history of colon cancer     Family history of diabetes mellitus     Fibromyalgia 1989    Gastroesophageal reflux disease without esophagitis 02/15/2020    Graves disease 2012    Katerin Gave; tapazole for a time    H/O echocardiogram 04/08/2021    Normal EF 55-60%.     Hyperlipidemia     Hypertension     Lumbar spinal stenosis 11/2015    moderate ; MRI by Dr Filbert Fleischer Nocturnal hypoxemia 11/07/2018    Nocturnal oxygen desaturation 2015    Obstructive sleep apnea     Osteoporosis     S/P CABG (coronary artery bypass graft) 06/2017    4 vessel-CABG EVH RCA Marginal, OM1, OM2,  LIMA to LAD    S/P cardiac catheterization 06/07/2017    severe multivessel disease    Subclavian artery stenosis, left ([facility] 06/2017    noted in hosp for CABG; to be dealt with as OP by [facility] surgeons    Subclinical Hyperthyroidism 2010    Dr. Melba Monge; Tapazole    Type II diabetes mellitus ([facility] 2019 7/2019;DIET CONTROL    Urine incontinence 06/2011    Timed voiding; Isidro; urology    Vascular dementia [facility])      PSHX:  has a past surgical history that includes Carpal tunnel release; Total hip arthroplasty (Left, [id number]); Cervical discectomy (01-); cervical laminectomy (09-); Rotator cuff repair (1992); Rotator cuff repair (1998); Cervical spine surgery (4/2011); Cataract removal with implant (Right, 07/2016); Coronary artery bypass graft (06/2017); Incontinence surgery (11/2018); and Cataract removal with implant (Left, 03/2019). Allergies: Allergies   Allergen Reactions    Cipro Xr     Demerol     Lipitor  [Atorvastatin Calcium]     Moxifloxacin Other (See Comments)     Phlebitis, urticaria    Statins      Myalgia      Lipitor [Atorvastatin] Other (See Comments)     myalgia    Wellbutrin [Bupropion] Other (See Comments)     \"zonked\"     Fam HX:  family history includes Alzheimer's Disease in her mother; Cancer in an other family member; Cancer (age of onset: 76) in her father; Diabetes in her brother, mother, and another family member; High Blood Pressure in an other family member; Pituitary Disorder in her brother; Stroke in an other family member.   Soc HX:   Social History     Socioeconomic History    Marital status:      Spouse name: None    Number of children: 2    Years of education: 15    Highest education level: None   Occupational History    Occupation: retired   Tobacco Use    Smoking status: Former Smoker     Packs/day: 0.50     Years: 30.00     Pack years: 15.00     Types: Cigarettes     Start date: 1957     Quit date: 1987     Years since quittin.2    Smokeless tobacco: Never Used   Vaping Use    Vaping Use: Never used   Substance and Sexual Activity    Alcohol use: No     Comment: special occasion once a year maybe     Drug use: No    Sexual activity: Not Currently     Partners: Male   Other Topics Concern    None   Social History Narrative    None     Social Determinants of Health     Financial Resource Strain:     Difficulty of Paying Living Expenses: Not on file   Food Insecurity:     Worried About Running Out of Food in the Last Year: Not on file    Edith of Food in the Last Year: Not on file   Transportation Needs:     Lack of Transportation (Medical): Not on file    Lack of Transportation (Non-Medical):  Not on file   Physical Activity:     Days of Exercise per Week: Not on file    Minutes of Exercise per Session: Not on file   Stress:     Feeling of Stress : Not on file   Social Connections:     Frequency of Communication with Friends and Family: Not on file    Frequency of Social Gatherings with Friends and Family: Not on file    Attends Worship Services: Not on file    Active Member of [address] [place] [address] or Organizations: Not on file    Attends Club or Organization Meetings: Not on file    Marital Status: Not on file   Intimate Partner Violence:     Fear of Current or Ex-Partner: Not on file    Emotionally Abused: Not on file    Physically Abused: Not on file    Sexually Abused: Not on file   Housing Stability:     Unable to Pay for Housing in the Last Year: Not on file    Number of [place] in the Last Year: Not on file    Unstable Housing in the Last Year: Not on file       Medications:   Medications:    [START ON 2022] aspirin  81 mg Oral Daily    budesonide-formoterol  2 puff Inhalation BID    carvedilol  1 tablet Oral BID    [START ON 4/18/2022] citalopram  10 mg Oral Daily    doxepin  50 mg Oral QPM    [START ON 4/18/2022] Memantine HCl-Donepezil HCl  1 capsule Oral Daily    [START ON 4/18/2022] pantoprazole  40 mg Oral QAM AC    [START ON 4/18/2022] rosuvastatin  1 tablet Oral Daily    [START ON 4/18/2022] tiotropium  2 puff Inhalation Daily    traZODone  100 mg Oral Nightly    sodium chloride flush  5-40 mL IntraVENous 2 times per day    [START ON 4/18/2022] enoxaparin  40 mg SubCUTAneous Daily    [START ON 4/18/2022] insulin lispro  0.08 Units/kg SubCUTAneous TID WC    cyanocobalamin  1,000 mcg SubCUTAneous Q30 Days      Infusions:    sodium chloride      dextrose       PRN Meds: albuterol sulfate HFA, 2 puff, Q4H PRN  sodium chloride flush, 5-40 mL, PRN  sodium chloride, 25 mL, PRN  ondansetron, 4 mg, Q8H PRN   Or  ondansetron, 4 mg, Q6H PRN  polyethylene glycol, 17 g, Daily PRN  acetaminophen, 650 mg, Q6H PRN   Or  acetaminophen, 650 mg, Q6H PRN  glucose, 15 g, PRN  dextrose, 12.5 g, PRN  glucagon (rDNA), 1 mg, PRN  dextrose, 100 mL/hr, PRN        Labs      CBC:   Recent Labs     04/17/22 1927   WBC 10.3   HGB 13.6        BMP:    Recent Labs     04/17/22 1927      K 4.3   CL 97*   CO2 28   BUN 18   CREATININE 0.7   GLUCOSE 77     Hepatic:   Recent Labs     04/17/22 1927   AST 22   ALT 25   BILITOT 0.7   ALKPHOS 55     Lipids:   Lab Results   Component Value Date    CHOL 171 10/29/2019    CHOL 231 02/06/2019    HDL 41 02/14/2020    HDL 66 04/11/2012    TRIG 159 10/29/2019     Hemoglobin A1C:   Lab Results   Component Value Date    LABA1C 6.2 03/15/2022     TSH:   Lab Results   Component Value Date    TSH 0.69 02/14/2020     Troponin:   Lab Results   Component Value Date    TROPONINT <0.010 12/22/2021    TROPONINT <0.010 09/07/2018    TROPONINT 0.114 04/03/2018     Lactic Acid: No results for input(s): LACTA in the last 72 hours. BNP: No results for input(s): PROBNP in the last 72 hours. UA:  Lab Results   Component Value Date    NITRU POSITIVE 01/19/2022    NITRU Positive 01/21/2021    COLORU YELLOW 01/19/2022    PHUR 5.5 01/21/2021    LABCAST CANCELED 01/21/2021    LABCAST CANCELED 01/21/2021    WBCUA 773 01/19/2022    RBCUA 39 01/19/2022    MUCUS OCCASIONAL 01/19/2022    TRICHOMONAS NONE SEEN 01/19/2022    YEAST CANCELED 01/21/2021    BACTERIA RARE 01/19/2022    CLARITYU CLOUDY 01/19/2022    SPECGRAV 1.020 01/19/2022    LEUKOCYTESUR LARGE NUMBER OR AMOUNT OBSERVED 01/19/2022    UROBILINOGEN 2.0 01/19/2022    BILIRUBINUR NEGATIVE 01/19/2022    BLOODU LARGE NUMBER OR AMOUNT OBSERVED 01/19/2022    GLUCOSEU Negative 01/21/2021    KETUA NEGATIVE 01/19/2022    AMORPHOUS 1+ 06/19/2014     Urine Cultures:   Lab Results   Component Value Date    LABURIN >100,000 CFU/ml 03/11/2020     Blood Cultures: No results found for: BC  No results found for: BLOODCULT2  Organism:   Lab Results   Component Value Date    ORG Escherichia coli 03/11/2020       Imaging/Diagnostics Last 24 Hours   No results found. Electronically signed by ERMELINDA Curiel CNP on 4/17/2022 at 9:16 PM          This dictation was created with voice recognition software. While attempts have been made to review the dictation as it is transcribed, on occasion the spoken word can be misinterpreted by the technology leading to omissions or inappropriate words, phrases or sentences.      Electronically signed by ERMELINDA Curiel CNP on 4/17/2022 at 9:16 PM

## 2022-04-18 NOTE — CONSULTS
[address] [address] Continence Nurse  Consult Note       Michelle Saunders  AGE: 82 y.o. GENDER: female  : 1935  TODAY'S DATE:  2022    Subjective:     Reason for Evaluation and Assessment: wound care evalAmelia Sheriff is a 82 y.o. female referred by:   [x] Physician  [] Nursing  [] Other:     Wound Identification:  Wound Type: blanching erythema sacrum  Contributing Factors: chronic pressure and decreased mobility        PAST MEDICAL HISTORY        Diagnosis Date    Acute cystitis 02/10/2020    Acute ischemic colitis ([address]     Colonoscopy done    CAD (coronary artery disease) 2017    4 vessel CABG    Cerebrovascular event ([address] 2016    balance, speech, leg weakness; ARU    Chronic hypoxemic respiratory failure ([address] 2019    Chronic neck pain     Dr Mahin Deluca 2011    Cognitive impairment 2011    COPD, severe ([address] 2018    Depression     Diverticulitis 2012    Family history of colon cancer     Family history of diabetes mellitus     Fibromyalgia     Gastroesophageal reflux disease without esophagitis 02/15/2020    Graves disease 2012    Karon Hoffman; tapazole for a time    H/O echocardiogram 2021    Normal EF 55-60%.  Hyperlipidemia     Hypertension     Lumbar spinal stenosis 2015    moderate ; MRI by Dr Aldon Crigler Nocturnal hypoxemia 2018    Nocturnal oxygen desaturation     Obstructive sleep apnea     Osteoporosis     S/P CABG (coronary artery bypass graft) 2017    4 vessel-CABG EVH RCA Marginal, OM1, OM2,  LIMA to LAD    S/P cardiac catheterization 2017    severe multivessel disease    Subclavian artery stenosis, left ([address] 2017    noted in hosp for CABG; to be dealt with as OP by vasc surgeons    Subclinical Hyperthyroidism     Dr. Karon Hoffman;  Tapazole    Type II diabetes mellitus ([address] 2019;DIET CONTROL    Urine incontinence 2011    Timed voiding; Isidro; urology    Vascular dementia [facility])        PAST SURGICAL HISTORY    Past Surgical History:   Procedure Laterality Date    CARPAL TUNNEL RELEASE      Rt & Lt     CATARACT REMOVAL WITH IMPLANT Right 2016    CATARACT REMOVAL WITH IMPLANT Left 2019    iris surgery also    CERVICAL DISCECTOMY  1998    C5-6    CERVICAL LAMINECTOMY  2006    with fusion and instrumentation    CERVICAL SPINE SURGERY  2011    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; fusion; fixation    CORONARY ARTERY BYPASS GRAFT  2017    4- vessel    INCONTINENCE SURGERY  2018    Botox 2018 ; Wanjennifer Basurto    ROTATOR CUFF REPAIR      Left    ROTATOR CUFF REPAIR  1998    Right    TOTAL HIP ARTHROPLASTY Left [id number]    Left       FAMILY HISTORY    Family History   Problem Relation Age of Onset    Diabetes Mother     Alzheimer's Disease Mother     Cancer Father 76        colon cancer    Pituitary Disorder Brother     Diabetes Brother     Diabetes Other         [address]    High Blood Pressure Other         [address]    Stroke Other         [address]    Cancer Other         [address] colon ca       SOCIAL HISTORY    Social History     Tobacco Use    Smoking status: Former Smoker     Packs/day: 0.50     Years: 30.00     Pack years: 15.00     Types: Cigarettes     Start date: 1957     Quit date: 1987     Years since quittin.2    Smokeless tobacco: Never Used   Vaping Use    Vaping Use: Never used   Substance Use Topics    Alcohol use: No     Comment: special occasion once a year maybe     Drug use: No       ALLERGIES    Allergies   Allergen Reactions    Cipro Xr     Demerol     Lipitor  [Atorvastatin Calcium]     Moxifloxacin Other (See Comments)     Phlebitis, urticaria    Statins      Myalgia      Lipitor [Atorvastatin] Other (See Comments)     myalgia    Wellbutrin [Bupropion] Other (See Comments)     \"zonked\"       MEDICATIONS    No current facility-administered medications on file prior to encounter.      Current Outpatient Medications on File Prior to Encounter   Medication Sig Dispense Refill    doxepin (SINEQUAN) 50 MG capsule Take 1 capsule by mouth every evening      carvedilol (COREG) 12.5 MG tablet TAKE ONE TABLET BY MOUTH TWICE DAILY 180 tablet 3    budesonide (ENTOCORT EC) 3 MG extended release capsule       amoxicillin (AMOXIL) 500 MG capsule       nystatin (MYCOSTATIN) 304952 UNIT/ML suspension       traZODone (DESYREL) 100 MG tablet TAKE ONE TABLET BY MOUTH EVERY NIGHT AT BEDTIME      mesalamine (LIALDA) 1.2 g EC tablet Take 1,200 mg by mouth daily (with breakfast) (Patient not taking: Reported on 3/14/2022)      citalopram (CELEXA) 10 MG tablet TAKE ONE TABLET BY MOUTH EVERY MORNING      ipratropium-albuterol (DUONEB) 0.5-2.5 (3) MG/3ML SOLN nebulizer solution inhale ONE vial PER nebulizer EVERY FOUR Hours 360 mL 11    PROAIR  (90 Base) MCG/ACT inhaler inhale TWO puffs BY MOUTH EVERY SIX Hours AS NEEDED FOR wheezing 25.5 g 3    tiotropium (SPIRIVA RESPIMAT) 2.5 MCG/ACT AERS inhaler inhale TWO puffs BY MOUTH DAILY 12 g 3    melatonin 3 MG TABS tablet Take 5 mg by mouth daily       Calcium Polycarbophil (FIBER-CAPS PO) Take by mouth      Multiple Vitamins-Minerals (THERAPEUTIC MULTIVITAMIN-MINERALS) tablet Take 1 tablet by mouth daily      Cholecalciferol (VITAMIN D3) 125 MCG (5000 UT) TABS Take by mouth      budesonide-formoterol (SYMBICORT) 160-4.5 MCG/ACT AERO inhale TWO puffs BY MOUTH TWICE DAILY 30.6 g 3    omeprazole (PRILOSEC) 40 MG delayed release capsule Take 1 capsule by mouth daily 30 capsule 3    rosuvastatin (CRESTOR) 5 MG tablet TAKE ONE TABLET BY MOUTH EVERY DAY 90 tablet 2    Lactobacillus (PROBIOTIC ACIDOPHILUS PO) Take by mouth      ONETOUCH VERIO strip TEST ONE TO TWO TIMES DAILY AS DIRECTED      Blood Glucose Monitoring Suppl (ONETOUCH VERIO FLEX SYSTEM) w/Device KIT USE AS DIRECTED      OneTouch Delica Lancets 91N MISC USE AS DIRECTED ONCE TO TWICE DAILY      Syringe, Disposable, 3 ML MISC 1 each by Does not apply route daily 12 each 1    cyanocobalamin 1000 MCG/ML injection Inject 1 mL into the muscle every 30 days for 1 dose 6 vial 1    doxepin (SINEQUAN) 10 MG capsule Take 10 mg by mouth nightly      Handicap Placard MISC by Does not apply route Good for 5 years 1 each 0    Memantine HCl-Donepezil HCl (NAMZARIC) 28-10 MG CP24 Take 1 capsule by mouth daily      OXYGEN Inhale 2 L/min into the lungs nightly And portable; battery powered oxygen unit when needed      aspirin 81 MG chewable tablet Take 1 tablet by mouth daily 30 tablet 3         Objective:      BP (!) 142/65   Pulse 65   Temp 98 °F (36.7 °C) (Oral)   Resp 17   Ht 5' 1\" (1.549 m)   Wt 107 lb 8 oz (48.8 kg)   LMP  (LMP Unknown)   SpO2 99%   BMI 20.31 kg/m²   Ender Risk Score: Ender Scale Score: 14    LABS    CBC:   Lab Results   Component Value Date    WBC 10.3 04/17/2022    RBC 4.04 04/17/2022    HGB 13.6 04/17/2022    HCT 41.5 04/17/2022    .7 04/17/2022    MCH 33.7 04/17/2022    MCHC 32.8 04/17/2022    RDW 12.7 04/17/2022     04/17/2022    MPV 8.7 04/17/2022     CMP:    Lab Results   Component Value Date     04/17/2022    K 4.3 04/17/2022    CL 97 04/17/2022    CO2 28 04/17/2022    BUN 18 04/17/2022    CREATININE 0.7 04/17/2022    GFRAA >60 04/17/2022    GFRAA >60 09/18/2012    AGRATIO 1.2 02/14/2020    LABGLOM >60 04/17/2022    GLUCOSE 77 04/17/2022    PROT 7.4 04/17/2022    PROT 7.2 02/25/2013    LABALBU 4.2 04/17/2022    CALCIUM 9.8 04/17/2022    BILITOT 0.7 04/17/2022    ALKPHOS 55 04/17/2022    AST 22 04/17/2022    ALT 25 04/17/2022     Albumin:    Lab Results   Component Value Date    LABALBU 4.2 04/17/2022     PT/INR:    Lab Results   Component Value Date    PROTIME 14.7 06/11/2017    INR 1.28 06/11/2017     HgBA1c:    Lab Results   Component Value Date    LABA1C 6.1 04/17/2022         Assessment:     Patient Active Problem List   Diagnosis    Generalized anxiety disorder    Chronic neck pain Martine Hamper' disease    Hyperlipidemia    Essential hypertension    [facility] with [facility] disturbance [facility]    Cerebrovascular accident (CVA) due to vascular stenosis (HCC)    Gait disturbance    Mixed stress and urge urinary incontinence    Abnormal stress test    Subclavian artery stenosis, left (HCC)    COPD, severe (HCC)    Shortness of breath    Coronary artery disease involving native coronary artery of native heart without angina pectoris    Chronic hypoxemic respiratory failure (HCC)    Type II diabetes mellitus (HCC)    Skin abrasion    Normocytic anemia    Chronic midline low back pain without sciatica    Gastroesophageal reflux disease without esophagitis    Recurrent UTI    On mechanically assisted ventilation (HCC)    Failure to thrive in adult    Severe malnutrition (HCC)    Recurrent major depressive disorder, in partial remission ([facility]    Bilateral carotid artery stenosis    Self-care deficit       Measurements:  Wound 12/30/21 Coccyx (Active)   Number of days: 108       Response to treatment:  Well tolerated by patient. Pain Assessment:  Severity:  none  Quality of pain:  Wound Pain Timing/Severity:   Premedicated: no    Plan:     Plan of Care:       Patient sitting in chair agreeable to wound care eval. Pt has blanching erythema to sacrum. No open wounds. Recommend moisture barrier cream. Heels intact. Atmos air pump placed to the bed. Pt is at moderate risk for skin breakdown AEB alyse. Follow alyse orders. Specialty Bed Required : yes   [x] Low Air Loss   [] Pressure Redistribution  [] Fluid Immersion  [] Bariatric  [] Total Pressure Relief  [] Other:     Discharge Plan:  Placement for patient upon discharge:tbd   Hospice Care: no  Patient appropriate for Outpatient [address]: no    Patient/Caregiver Teaching:  Level of patient/caregiver understanding able to:   Pt voiced understanding. Electronically signed by Leavy Kussmaul.  IBETH Barnes, on 4/18/2022 at 12:45 PM

## 2022-04-18 NOTE — CARE COORDINATION
Chart reviewed and call to Tavares Witt, [facility] admissions, to check on status of referral. Tavares Miryam stated that she was aware of referral but was waiting for therapy notes to be available. CM advised her that OT note is in with SNF recommendation but PT note is pending. Tavares Witt to watch for PT notes and update this CM once available. 3:11 PM Pt son, [facility] in pt room. CM introduced self and explained role. CM updated [facility] on info from [facility]. [facility] continues to be in agreement with discharge plan.

## 2022-04-18 NOTE — PROGRESS NOTES
V2.0  [facility] Hospitalist Progress Note      Name:  Debra Dyson /Age/Sex: 1935  (82 y.o. female)   MRN & CSN:  [id number] & [id number] Encounter Date/Time: 2022 12:07 PM EDT    Location:  5630/0528-O PCP: Alison Kaufman, [facility] Day: 2    Assessment and Plan:   Debra Dyson is a 82 y.o. female with pmh of dementia, essential hypertension and hyperlipidemia who presents with self-care deficit. Pt's care giver her  passed away in ER recently.  is on board for discharge planning. Plan:  Self Care Deficit   Dementia  -pt's  who was her primary care giver passed away on   -Fall precautions  -Laboratory work-up in the ED unremarkable   -Continue home medications including donazepail, Namenda  - is on board, contact nursing home [facility], pending PT/OT     Essential hypertension  -Continue home Coreg    Hyperlipidemia  -Continue home crestor     Chronic Conditions: continue home medication as ordered       Diet ADULT DIET; Dysphagia - Soft and Bite Sized; 5 carb choices (75 gm/meal)   DVT Prophylaxis [x] Lovenox, []  Heparin, [] SCDs, [] Ambulation,  [] Eliquis, [] Xarelto  [] Coumadin   Code Status Full Code   Disposition From: home  Expected Disposition: SNF  Estimated Date of Discharge: 24-48 hours  Patient requires continued admission due to placement   Surrogate Decision Maker/ POA      Subjective:     Chief Complaint: Dementia (supposed to being admitted for SNF placement)     Debra Dyson is a 82 y.o. female with history of dementia who presents with unable to care for self. Pt's  passed away in ER on the same day when pt was admitted. Pt has no complaint over night. She is not able to take care of herself. Her  was her primary care giver. Pending PT/OT and SNF placement. Review of Systems:    Review of Systems   Constitutional:        Not able to perform ADELINE due to dementia   HENT: Negative. Eyes: Negative. Respiratory: Negative. Cardiovascular: Negative. Gastrointestinal: Negative. Endocrine: Negative. Genitourinary: Negative. Musculoskeletal: Negative. Skin: Negative. Allergic/Immunologic: Negative. Neurological:        Dementia   Hematological: Negative. Psychiatric/Behavioral: Negative. Objective: Intake/Output Summary (Last 24 hours) at 4/18/2022 1207  Last data filed at 4/18/2022 0539  Gross per 24 hour   Intake 240 ml   Output    Net 240 ml        Vitals:   Vitals:    04/18/22 0830   BP: (!) 142/65   Pulse: 65   Resp: 17   Temp: 98 °F (36.7 °C)   SpO2: 99%       Physical Exam:     General: NAD  Eyes: EOMI  ENT: neck supple  Cardiovascular: Regular rate. Respiratory: Clear to auscultation  Gastrointestinal: Soft, non tender  Genitourinary: no suprapubic tenderness  Musculoskeletal: No edema  Skin: warm, dry  Neuro: Alert. Oriented to people, not know time and place. Psych: Mood appropriate.      Medications:   Medications:    aspirin  81 mg Oral Daily    budesonide-formoterol  2 puff Inhalation BID    carvedilol  12.5 mg Oral BID    citalopram  10 mg Oral Daily    doxepin  50 mg Oral QPM    pantoprazole  40 mg Oral QAM AC    rosuvastatin  5 mg Oral Daily    tiotropium  2 puff Inhalation Daily    traZODone  100 mg Oral Nightly    sodium chloride flush  5-40 mL IntraVENous 2 times per day    enoxaparin  40 mg SubCUTAneous Daily    insulin lispro  0.08 Units/kg SubCUTAneous TID WC    memantine  10 mg Oral BID    And    donepezil  10 mg Oral Daily      Infusions:    sodium chloride      dextrose       PRN Meds: albuterol sulfate HFA, 2 puff, Q4H PRN  sodium chloride flush, 5-40 mL, PRN  sodium chloride, 25 mL, PRN  ondansetron, 4 mg, Q8H PRN   Or  ondansetron, 4 mg, Q6H PRN  polyethylene glycol, 17 g, Daily PRN  acetaminophen, 650 mg, Q6H PRN   Or  acetaminophen, 650 mg, Q6H PRN  glucose, 4 tablet, PRN  glucagon (rDNA), 1 mg, PRN  dextrose, 100 mL/hr, PRN  dextrose bolus (hypoglycemia), 125 mL, PRN   Or  dextrose bolus (hypoglycemia), 250 mL, PRN        Labs      Recent Results (from the past 24 hour(s))   Hemoglobin A1c    Collection Time: 04/17/22  6:15 PM   Result Value Ref Range    Hemoglobin A1C 6.1 4.2 - 6.3 %    eAG 128 mg/dL   CBC with Auto Differential    Collection Time: 04/17/22  7:27 PM   Result Value Ref Range    WBC 10.3 4.0 - 10.5 K/CU MM    RBC 4.04 (L) 4.2 - 5.4 M/CU MM    Hemoglobin 13.6 12.5 - 16.0 GM/DL    Hematocrit 41.5 37 - 47 %    .7 (H) 78 - 100 FL    MCH 33.7 (H) 27 - 31 PG    MCHC 32.8 32.0 - 36.0 %    RDW 12.7 11.7 - 14.9 %    Platelets 430 475 - 773 K/CU MM    MPV 8.7 7.5 - 11.1 FL    Differential Type AUTOMATED DIFFERENTIAL     Segs Relative 72.4 (H) 36 - 66 %    Lymphocytes % 20.6 (L) 24 - 44 %    Monocytes % 5.9 (H) 0 - 4 %    Eosinophils % 0.5 0 - 3 %    Basophils % 0.3 0 - 1 %    Segs Absolute 7.4 K/CU MM    Lymphocytes Absolute 2.1 K/CU MM    Monocytes Absolute 0.6 K/CU MM    Eosinophils Absolute 0.1 K/CU MM    Basophils Absolute 0.0 K/CU MM    Nucleated RBC % 0.0 %    Total Nucleated RBC 0.0 K/CU MM    Total Immature Neutrophil 0.03 K/CU MM    Immature Neutrophil % 0.3 0 - 0.43 %   Comprehensive Metabolic Panel w/ Reflex to MG    Collection Time: 04/17/22  7:27 PM   Result Value Ref Range    Sodium 140 135 - 145 MMOL/L    Potassium 4.3 3.5 - 5.1 MMOL/L    Chloride 97 (L) 99 - 110 mMol/L    CO2 28 21 - 32 MMOL/L    BUN 18 6 - 23 MG/DL    CREATININE 0.7 0.6 - 1.1 MG/DL    Glucose 77 70 - 99 MG/DL    Calcium 9.8 8.3 - 10.6 MG/DL    Albumin 4.2 3.4 - 5.0 GM/DL    Total Protein 7.4 6.4 - 8.2 GM/DL    Total Bilirubin 0.7 0.0 - 1.0 MG/DL    ALT 25 10 - 40 U/L    AST 22 15 - 37 IU/L    Alkaline Phosphatase 55 40 - 129 IU/L    GFR Non-African American >60 >60 mL/min/1.73m2    GFR African American >60 >60 mL/min/1.73m2    Anion Gap 15 4 - 16   EKG 12 Lead    Collection Time: 04/17/22  7:30 PM   Result Value Ref Range    Ventricular Rate 84 BPM    Atrial Rate 84 BPM    P-R Interval 142 ms    QRS Duration 72 ms    Q-T Interval 398 ms    QTc Calculation (Bazett) 470 ms    P Axis 47 degrees    R Axis -23 degrees    T Axis 23 degrees    Diagnosis       Normal sinus rhythm  Possible Inferior infarct , age undetermined  Anterior infarct , age undetermined  Abnormal ECG  When compared with ECG of 22-DEC-2021 12:23,  Anterior infarct is now present  ST no longer depressed in Anterior leads     Respiratory Panel, Molecular, with COVID-19 (Restricted: peds pts or suitable admitted adults)    Collection Time: 04/17/22  9:06 PM    Specimen: Nasopharyngeal   Result Value Ref Range    Adenovirus Detection by PCR NOT DETECTED NOT DETECTED    Coronavirus 229E PCR NOT DETECTED NOT DETECTED    Coronavirus HKU1 PCR NOT DETECTED NOT DETECTED    Coronavirus NL63 PCR NOT DETECTED NOT DETECTED    Coronavirus OC43 PCR NOT DETECTED NOT DETECTED    SARS-CoV-2 NOT DETECTED NOT DETECTED    Human Metapneumovirus PCR NOT DETECTED NOT DETECTED    Rhinovirus Enterovirus PCR NOT DETECTED NOT DETECTED    Influenza A by PCR NOT DETECTED NOT DETECTED    Influenza A H1 Pandemic PCR NOT DETECTED NOT DETECTED    Influenza A H1 (2009) PCR NOT DETECTED NOT DETECTED    Influenza A H3 PCR NOT DETECTED NOT DETECTED    Influenza B by PCR NOT DETECTED NOT DETECTED    Parainfluenza 1 PCR NOT DETECTED NOT DETECTED    Parainfluenza 2 PCR NOT DETECTED NOT DETECTED    Parainfluenza 3 PCR NOT DETECTED NOT DETECTED    Parainfluenza 4 PCR NOT DETECTED NOT DETECTED    RSV PCR NOT DETECTED NOT DETECTED    Bordetella parapertussis by PCR NOT DETECTED NOT DETECTED    B Pertussis by PCR NOT DETECTED NOT DETECTED    Chlamydophila Pneumonia PCR NOT DETECTED NOT DETECTED    Mycoplasma pneumo by PCR NOT DETECTED NOT DETECTED   POCT Glucose    Collection Time: 04/18/22  5:06 AM   Result Value Ref Range    POC Glucose 83 70 - 99 MG/DL        Imaging/Diagnostics Last 24 Hours XR CHEST PORTABLE    Result Date: 4/17/2022  EXAMINATION: ONE XRAY VIEW OF THE CHEST 4/17/2022 9:13 pm COMPARISON: 12/22/2021 HISTORY: Acute fatigue and shortness of breath. FINDINGS: Patient is slightly rotated. Post CABG changes. Normal cardiomediastinal silhouette. No acute airspace disease, pleural effusion, pneumothorax. Postoperative changes of the cervical spine. Status post left rotator cuff repair. Osteopenia with degenerative changes of the shoulders. No acute abnormality.        Electronically signed by Gurvinder Coleman CNP on 4/18/2022 at 12:07 PM

## 2022-04-18 NOTE — ED PROVIDER NOTES
[facility] ED Attending Note:    NAME: Patrick Leyva 82 y.o.  CSN: [id number]  MRN: [id number]   PCP:    Lexie Mcguire PA-C      History:     Chief Complaint:   Unable to care of self at home     HPI:      The history was obtained from the patient and EMS. Opal House is a 82 y.o. female who presents with concerns of unable to take care of herself at home. Patient reports that she has a history of dementia and her  is the one who usually takes care of him. However, he came to the hospital today so there is no one to take care of her at home. Denies any complaints. PMHx:    Past Medical History:   Diagnosis Date    Acute cystitis 02/10/2020    Acute ischemic colitis ([facility] 2008    Colonoscopy done    CAD (coronary artery disease) 06/2017    4 vessel CABG    Cerebrovascular event ([address] 02/2016    balance, speech, leg weakness; ARU    Chronic hypoxemic respiratory failure ([facility] [address] 02/18/2019    Chronic neck pain     Dr Bennett Done 2/2011    Cognitive impairment 09/2011    COPD, severe ([address] 11/07/2018    Depression     Diverticulitis 05/2012    Family history of colon cancer     Family history of diabetes mellitus     Fibromyalgia 1989    Gastroesophageal reflux disease without esophagitis 02/15/2020    Graves disease 2012    [place]; tapazole for a time    H/O echocardiogram 04/08/2021    Normal EF 55-60%.     Hyperlipidemia     Hypertension     Lumbar spinal stenosis 11/2015    moderate ; MRI by Dr Maria Esther Zabala Nocturnal hypoxemia 11/07/2018    Nocturnal oxygen desaturation 2015    Obstructive sleep apnea     Osteoporosis     S/P CABG (coronary artery bypass graft) 06/2017    4 vessel-CABG EVH RCA Marginal, OM1, OM2,  LIMA to LAD    S/P cardiac catheterization 06/07/2017    severe multivessel disease    Subclavian artery stenosis, left ([address] 06/2017    noted in hosp for CABG; to be dealt with as OP by vasc surgeons    Subclinical Hyperthyroidism     Dr. Jaime Nguyễn; Tapazole    Type II diabetes mellitus ([address] 2019    2019;DIET CONTROL    Urine incontinence 2011    Timed voiding; Isidro; urology    Vascular dementia [facility])        PMSx:    Past Surgical History:   Procedure Laterality Date    CARPAL TUNNEL RELEASE      Rt & Lt     CATARACT REMOVAL WITH IMPLANT Right 2016    CATARACT REMOVAL WITH IMPLANT Left 2019    iris surgery also    CERVICAL DISCECTOMY  1998    C5-6    CERVICAL LAMINECTOMY  2006    with fusion and instrumentation    CERVICAL SPINE SURGERY  2011    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; fusion; fixation    CORONARY ARTERY BYPASS GRAFT  2017    4- vessel    INCONTINENCE SURGERY  2018    Botox 2018 ; Precilla Love    ROTATOR CUFF REPAIR      Left    ROTATOR CUFF REPAIR      Right    TOTAL HIP ARTHROPLASTY Left [id number]    Left       FAM. Hx:   Family History   Problem Relation Age of Onset    Diabetes Mother     Alzheimer's Disease Mother     Cancer Father 76        colon cancer    Pituitary Disorder Brother     Diabetes Brother     Diabetes Other         [address]    High Blood Pressure Other         [address]    Stroke Other         [address]    Cancer Other         [address] colon ca       SOC.  Hx:   Social History     Socioeconomic History    Marital status:      Spouse name: Not on file    Number of children: 2    Years of education: 12    Highest education level: Not on file   Occupational History    Occupation: retired   Tobacco Use    Smoking status: Former Smoker     Packs/day: 0.50     Years: 30.00     Pack years: 15.00     Types: Cigarettes     Start date: 1957     Quit date: 1987     Years since quittin.2    Smokeless tobacco: Never Used   Vaping Use    Vaping Use: Never used   Substance and Sexual Activity    Alcohol use: No     Comment: special occasion once a year maybe     Drug use: No    Sexual activity: Not Currently     Partners: Male   Other Topics Concern    Not on file   Social History Narrative    Not on file     Social Determinants of Health     Financial Resource Strain:     Difficulty of Paying Living Expenses: Not on file   Food Insecurity:     Worried About Running Out of Food in the Last Year: Not on file    Edith of Food in the Last Year: Not on file   Transportation Needs:     Lack of Transportation (Medical): Not on file    Lack of Transportation (Non-Medical):  Not on file   Physical Activity:     Days of Exercise per Week: Not on file    Minutes of Exercise per Session: Not on file   Stress:     Feeling of Stress : Not on file   Social Connections:     Frequency of Communication with Friends and Family: Not on file    Frequency of Social Gatherings with Friends and Family: Not on file    Attends [employer] Services: Not on file    Active Member of [address] [place] [address] or Organizations: Not on file    Attends Club or Organization Meetings: Not on file    Marital Status: Not on file   Intimate Partner Violence:     Fear of Current or Ex-Partner: Not on file    Emotionally Abused: Not on file    Physically Abused: Not on file    Sexually Abused: Not on file   Housing Stability:     Unable to Pay for Housing in the Last Year: Not on file    Number of [place] in the Last Year: Not on file    Unstable Housing in the Last Year: Not on file       MEDs:    Previous Medications    AMOXICILLIN (AMOXIL) 500 MG CAPSULE        ASPIRIN 81 MG CHEWABLE TABLET    Take 1 tablet by mouth daily    BLOOD GLUCOSE MONITORING SUPPL (ONETOUCH VERIO FLEX SYSTEM) W/DEVICE KIT    USE AS DIRECTED    BUDESONIDE (ENTOCORT EC) 3 MG EXTENDED RELEASE CAPSULE        BUDESONIDE-FORMOTEROL (SYMBICORT) 160-4.5 MCG/ACT AERO    inhale TWO puffs BY MOUTH TWICE DAILY    CALCIUM POLYCARBOPHIL (FIBER-CAPS PO)    Take by mouth    CARVEDILOL (COREG) 12.5 MG TABLET    TAKE ONE TABLET BY MOUTH TWICE DAILY    CHOLECALCIFEROL (VITAMIN D3) 125 MCG (5000 UT) TABS    Take by mouth    CITALOPRAM (CELEXA) 10 MG TABLET    TAKE ONE TABLET BY MOUTH EVERY MORNING    CYANOCOBALAMIN 1000 MCG/ML INJECTION    Inject 1 mL into the muscle every 30 days for 1 dose    DOXEPIN (SINEQUAN) 10 MG CAPSULE    Take 10 mg by mouth nightly    HANDICAP PLACARD MISC    by Does not apply route Good for 5 years    IPRATROPIUM-ALBUTEROL (DUONEB) 0.5-2.5 (3) MG/3ML SOLN NEBULIZER SOLUTION    inhale ONE vial PER nebulizer EVERY FOUR Hours    LACTOBACILLUS (PROBIOTIC ACIDOPHILUS PO)    Take by mouth    MELATONIN 3 MG TABS TABLET    Take 5 mg by mouth daily     MEMANTINE HCL-DONEPEZIL HCL (NAMZARIC) 28-10 MG CP24    Take 1 capsule by mouth daily    MESALAMINE (LIALDA) 1.2 G EC TABLET    Take 1,200 mg by mouth daily (with breakfast)    MULTIPLE VITAMINS-MINERALS (THERAPEUTIC MULTIVITAMIN-MINERALS) TABLET    Take 1 tablet by mouth daily    NYSTATIN (MYCOSTATIN) 558273 UNIT/ML SUSPENSION        OMEPRAZOLE (PRILOSEC) 40 MG DELAYED RELEASE CAPSULE    Take 1 capsule by mouth daily    ONETOUCH DELICA LANCFRANCISCA 84J MISC    USE AS DIRECTED ONCE TO TWICE DAILY    ONETOUCH VERIO STRIP    TEST ONE TO TWO TIMES DAILY AS DIRECTED    OXYGEN    Inhale 2 L/min into the lungs nightly And portable; battery powered oxygen unit when needed    PROAIR  (90 BASE) MCG/ACT INHALER    inhale TWO puffs BY MOUTH EVERY SIX Hours AS NEEDED FOR wheezing    ROSUVASTATIN (CRESTOR) 5 MG TABLET    TAKE ONE TABLET BY MOUTH EVERY DAY    SYRINGE, DISPOSABLE, 3 ML MISC    1 each by Does not apply route daily    TIOTROPIUM (SPIRIVA RESPIMAT) 2.5 MCG/ACT AERS INHALER    inhale TWO puffs BY MOUTH DAILY    TRAZODONE (DESYREL) 100 MG TABLET    TAKE ONE TABLET BY MOUTH EVERY NIGHT AT BEDTIME        ALL:     Allergies   Allergen Reactions    Cipro Xr     Demerol     Lipitor  [Atorvastatin Calcium]     Moxifloxacin Other (See Comments)     Phlebitis, urticaria    Statins      Myalgia      Lipitor [Atorvastatin] Other (See Comments)     myalgia    Wellbutrin [Bupropion] Other (See Comments)     \"zonked\"       ROS:  Review of Systems   Constitutional: Negative for fatigue and fever. HENT: Negative for congestion and sore throat. Eyes: Negative for pain and redness. Respiratory: Negative for cough and shortness of breath. Cardiovascular: Negative for chest pain and leg swelling. Gastrointestinal: Negative for abdominal pain, diarrhea, nausea and vomiting. Genitourinary: Negative for difficulty urinating. Skin: Negative for rash. Neurological: Negative for facial asymmetry and headaches. Psychiatric/Behavioral: Negative for agitation and behavioral problems. Positives andpertinent negatives as per HPI. All other systems were reviewed and are negative. Physical Exam:      Patient Vitals for the past 24 hrs:   BP Temp Temp src Pulse Resp SpO2 Height Weight   04/17/22 1943 (!) 187/78   86  98 %     04/17/22 1729 (!) 160/88 98.7 °F (37.1 °C) Oral 88 22 93 % 5' 1\" (1.549 m) 123 lb (55.8 kg)                  Physical Exam  Vitals and nursing note reviewed. Constitutional:       Appearance: She is well-developed. HENT:      Head: Normocephalic. Eyes:      Extraocular Movements: Extraocular movements intact. Pupils: Pupils are equal, round, and reactive to light. Cardiovascular:      Rate and Rhythm: Normal rate and regular rhythm. Pulmonary:      Effort: Pulmonary effort is normal. No respiratory distress. Breath sounds: Normal breath sounds. Abdominal:      Palpations: Abdomen is soft. Tenderness: There is no abdominal tenderness. There is no guarding or rebound. Musculoskeletal:         General: Normal range of motion. Cervical back: Normal range of motion and neck supple. Skin:     General: Skin is warm and dry. Capillary Refill: Capillary refill takes less than 2 seconds. Neurological:      General: No focal deficit present.       Mental Status: She is alert and oriented to person, place, and time. Psychiatric:         Mood and Affect: Mood normal.         Behavior: Behavior normal.              Laboratory & Radiological Imaging (if done):      Labs Reviewed   CBC WITH AUTO DIFFERENTIAL - Abnormal; Notable for the following components:       Result Value    RBC 4.04 (*)     .7 (*)     MCH 33.7 (*)     Segs Relative 72.4 (*)     Lymphocytes % 20.6 (*)     Monocytes % 5.9 (*)     All other components within normal limits   COMPREHENSIVE METABOLIC PANEL W/ REFLEX TO MG FOR LOW K - Abnormal; Notable for the following components:    Chloride 97 (*)     All other components within normal limits        Interpretation per the Radiologist below, if available at the time of this note:  No results found. Medical Decision Making/           Patient is a 82year old female with PMHX of Dementia presenting with the concerns of unable to take care of herself. States that she lives with her  and he came to the hospital and he is her primary care taker. Consult for case management was placed. Discussed with the patient of potentially placing her in a nursing home in the meantime. Patient agrees with plan of care. Admitted to the hospital while pending case management placement for nursing home. Procedure(s):     12 lead EKG per my interpretation:  Normal Sinus Rhythm 84  Axis is   Left axis deviation  QTc is  within an acceptable range  There is no specific T wave changes appreciated. There is no specific ST wave changes appreciated. No STEMI   Prior EKG to compare with was available and similar to before         Clinical Impression:      1. Unable to care for self    2.  History of dementia          Disposition:              Patient is being admitted                                                                      Greggory Olszewski, M.D                                                              ED Attending Physician      (Please note that portions of this note have been completed with a voice recognition software.  Efforts were made to correct any errors, butoccasionally words are mis-transcribed.)               Fito Bernabe MD  04/18/22 5847

## 2022-04-18 NOTE — PROGRESS NOTES
Physical Therapy  [address] [facility] BERNIE Saunders, 1935, [id number], 4/18/2022    History  [place]:  The primary encounter diagnosis was Unable to care for self. A diagnosis of History of dementia was also pertinent to this visit. Patient  has a past medical history of Acute cystitis, Acute ischemic colitis ([address] CAD (coronary artery disease), Cerebrovascular event ([address] Chronic hypoxemic respiratory failure ([address] Chronic neck pain, Cognitive impairment, COPD, severe ([address] Depression, Diverticulitis, Family history of colon cancer, Family history of diabetes mellitus, Fibromyalgia, Gastroesophageal reflux disease without esophagitis, Graves disease, H/O echocardiogram, Hyperlipidemia, Hypertension, Lumbar spinal stenosis, Nocturnal hypoxemia, Nocturnal oxygen desaturation, Obstructive sleep apnea, Osteoporosis, S/P CABG (coronary artery bypass graft), S/P cardiac catheterization, Subclavian artery stenosis, left ([address] Subclinical Hyperthyroidism, Type II diabetes mellitus ([address] Urine incontinence, and Vascular dementia ([address] Patient  has a past surgical history that includes Carpal tunnel release; Total hip arthroplasty (Left, [id number]); Cervical discectomy (01-); cervical laminectomy (09-); Rotator cuff repair (1992); Rotator cuff repair (1998); Cervical spine surgery (4/2011); Cataract removal with implant (Right, 07/2016); Coronary artery bypass graft (06/2017); Incontinence surgery (11/2018); and Cataract removal with implant (Left, 03/2019). Subjective:  Patient states:  \"My  is in here somewhere\"   Pain:  Denies   Communication with other providers:   co-eval with Jaki SUMNER   Restrictions: general precautions, falls     Home Setup/Prior level of function  Social/Functional History  Lives With: Spouse  Type of Home: House  Home Layout: Two level,Able to Live on Main level with bedroom/bathroom  Home Access: Level entry  Bathroom Shower/Tub: Walk-in shower  Bathroom Toilet: Standard  Bathroom Equipment: Shower chair,Grab bars in shower,Grab bars around toilet  Home Equipment: 4 wheeled walker,Oxygen (2L most of the time)  ADL Assistance: Needs assistance (spouse assists with all bathing, dressing, and toileting tasks)  Homemaking Responsibilities: No (spouse primary)  Ambulation Assistance: Needs assistance (uses 4ww;  follows pt for safety)  Transfer Assistance: Needs assistance (spouse assists with all transfers)  Active : No  Patient's  Info: Spouse  Additional Comments: Pts sons live out of town. Pt reports her spouse is in the hospital at this time. No recent falls    Examination of body systems (includes body structures/functions, activity/participation limitations):  · Observation:  Supine in bed upon arrival  · Vision:  WFL, glasses  · Hearing: Temporal Power  · Cardiopulmonary:  2L O2, stable vitals   · Orientation: oriented to person and place     Musculoskeletal  · ROM R/L:  WFL BLEs  · Strength R/L:  BLEs grossly 4-/5     Mobility/treatment:   · Rolling L/R:  NT   · Supine to sit:  modA for minimal sequencing of LEs to EOB and uprighting trunk   · Transfers:   · Sit to stand: Eed from EOB to RW (2x). VCs for fwd weight shift and hand sequencing with little carry over wanting to pull from RW. · Stand to sit: Ede to EOB for controlling sit. Cues for reaching back to seat surface with little carry over  · Stand pivot: modA for lift, fwd weight shift, and sequencing turn. Pt very anxious and fearful of falling. Performed without AD and pt holding onto therapists arms   · Sitting balance: CGA to SBA at EOB, static with BUE support   · Standing balance:  Ede at RW, x ~30 seconds. Inc anxiety and fear of falling stating she needed to sit after 30 seconds due to generalized fatigue.     · Gait: unable to safely assess this date with pts report of fatigue and increased anxiety     [facility] 6 Clicks Inpatient Mobility:  AM-PAC Inpatient Mobility Raw Score : 12    Safety: patient left in chair with alarm, call light within reach, gait belt used. Assessment:  Pt is an 82year old female admitted with inability to care for herself and generalized weakness. Recommend subacute rehab once medically stable. At baseline she reports needing some assistance with walking/transfers as well as ADLs. She performed below her baseline this date with inability to ambulate and inc assist needed for transfers. She would benefit from continued therapy to address her current deficits, dec potential fall risk,and restore function. Complexity: Moderate  Prognosis: Good, no significant barriers to participation at this time.    Plan Times per week: 3+/week  Discharge Recommendations: Subacute/Skilled Nursing Facility  Equipment: continue to assess at next level of care     Goals:  Short term goals  Time Frame for Short term goals: 1 week  Short term goal 1: Pt will perform sit><supine Ede  Short term goal 2: Pt will perform sit><stand CGA  Short term goal 3: Pt will transfer between surfaces Ede  Short term goal 4: Pt will ambulate 30ft with RW Ede  Short term goal 5: Pt will perform standing light dynamic activity with single UE support x 2 minutes Ede       Treatment plan:  Bed mobility, transfers, balance, gait, TA, TX, stairs, WC     Recommendations for NURSING mobility: stand pivot to BSC/recliner     Time:   Time in: 0947  Time out: 1008  Timed treatment minutes: 11  Total time: 21    Electronically signed by:    Mary San [id number]  4/18/2022, 3:13 PM

## 2022-04-18 NOTE — PROGRESS NOTES
4 Eyes Skin Assessment     NAME:  Opal Saunders  YOB: 1935  MEDICAL RECORD NUMBER:  [id number]    The patient is being assess for  Admission    I agree that 2 RN's have performed a thorough Head to Toe Skin Assessment on the patient. ALL assessment sites listed below have been assessed. Areas assessed by both nurses:    Head, Face, Ears, Shoulders, Back, Chest, Arms, Elbows, Hands, Sacrum. Buttock, Coccyx, Ischium and Legs. Feet and Heels        Does the Patient have a Wound? No noted wound(s)   Redness but intact skin noted to genitals, redness and st. 1 pressure sore to buttocks, no other areas of concern at this time.         Ender Prevention initiated:  NA   Wound Care Orders initiated:  No    Pressure Injury (Stage 3,4, Unstageable, DTI, NWPT, and Complex wounds) if present place consult order under [de-identified] NA    New and Established Ostomies if present place consult order under : NA      Nurse 1 eSignature:Electronically signed by Mk Hdez RN on 4/18/2022 at 4:46 AM    **SHARE this note so that the co-signing nurse is able to place an eSignature**    Nurse 2 eSignature:  Electronically signed by Edwin Herrera RN on 4/18/2022 at 6:31 AM

## 2022-04-19 LAB
BACTERIA: ABNORMAL /HPF
BILIRUBIN URINE: NEGATIVE MG/DL
BLOOD, URINE: ABNORMAL
CLARITY: ABNORMAL
COLOR: YELLOW
EKG ATRIAL RATE: 84 BPM
EKG DIAGNOSIS: NORMAL
EKG P AXIS: 47 DEGREES
EKG P-R INTERVAL: 142 MS
EKG Q-T INTERVAL: 398 MS
EKG QRS DURATION: 72 MS
EKG QTC CALCULATION (BAZETT): 470 MS
EKG R AXIS: -23 DEGREES
EKG T AXIS: 23 DEGREES
EKG VENTRICULAR RATE: 84 BPM
GLUCOSE BLD-MCNC: 117 MG/DL (ref 70–99)
GLUCOSE BLD-MCNC: 120 MG/DL (ref 70–99)
GLUCOSE BLD-MCNC: 143 MG/DL (ref 70–99)
GLUCOSE BLD-MCNC: 156 MG/DL (ref 70–99)
GLUCOSE BLD-MCNC: 161 MG/DL (ref 70–99)
GLUCOSE, URINE: NEGATIVE MG/DL
KETONES, URINE: NEGATIVE MG/DL
LEUKOCYTE ESTERASE, URINE: ABNORMAL
NITRITE URINE, QUANTITATIVE: POSITIVE
PH, URINE: 6.5 (ref 5–8)
PROTEIN UA: ABNORMAL MG/DL
RBC URINE: 148 /HPF (ref 0–6)
SPECIFIC GRAVITY UA: 1.01 (ref 1–1.03)
SQUAMOUS EPITHELIAL: 5 /HPF
UROBILINOGEN, URINE: NORMAL MG/DL (ref 0.2–1)
WBC UA: 197 /HPF (ref 0–5)

## 2022-04-19 PROCEDURE — 94640 AIRWAY INHALATION TREATMENT: CPT

## 2022-04-19 PROCEDURE — 96372 THER/PROPH/DIAG INJ SC/IM: CPT

## 2022-04-19 PROCEDURE — 2580000003 HC RX 258: Performed by: NURSE PRACTITIONER

## 2022-04-19 PROCEDURE — 2700000000 HC OXYGEN THERAPY PER DAY

## 2022-04-19 PROCEDURE — 94761 N-INVAS EAR/PLS OXIMETRY MLT: CPT

## 2022-04-19 PROCEDURE — 87088 URINE BACTERIA CULTURE: CPT

## 2022-04-19 PROCEDURE — 6360000002 HC RX W HCPCS: Performed by: NURSE PRACTITIONER

## 2022-04-19 PROCEDURE — 81001 URINALYSIS AUTO W/SCOPE: CPT

## 2022-04-19 PROCEDURE — G0378 HOSPITAL OBSERVATION PER HR: HCPCS

## 2022-04-19 PROCEDURE — 93010 ELECTROCARDIOGRAM REPORT: CPT | Performed by: INTERNAL MEDICINE

## 2022-04-19 PROCEDURE — 87186 SC STD MICRODIL/AGAR DIL: CPT

## 2022-04-19 PROCEDURE — 87086 URINE CULTURE/COLONY COUNT: CPT

## 2022-04-19 PROCEDURE — 82962 GLUCOSE BLOOD TEST: CPT

## 2022-04-19 PROCEDURE — 6370000000 HC RX 637 (ALT 250 FOR IP): Performed by: NURSE PRACTITIONER

## 2022-04-19 PROCEDURE — 92610 EVALUATE SWALLOWING FUNCTION: CPT

## 2022-04-19 RX ADMIN — TRAZODONE HYDROCHLORIDE 100 MG: 50 TABLET ORAL at 20:12

## 2022-04-19 RX ADMIN — SODIUM CHLORIDE, PRESERVATIVE FREE 10 ML: 5 INJECTION INTRAVENOUS at 21:00

## 2022-04-19 RX ADMIN — MEMANTINE 10 MG: 10 TABLET ORAL at 20:12

## 2022-04-19 RX ADMIN — DOXEPIN HYDROCHLORIDE 50 MG: 25 CAPSULE ORAL at 17:13

## 2022-04-19 RX ADMIN — BUDESONIDE AND FORMOTEROL FUMARATE DIHYDRATE 2 PUFF: 160; 4.5 AEROSOL RESPIRATORY (INHALATION) at 20:20

## 2022-04-19 RX ADMIN — BUDESONIDE AND FORMOTEROL FUMARATE DIHYDRATE 2 PUFF: 160; 4.5 AEROSOL RESPIRATORY (INHALATION) at 08:47

## 2022-04-19 RX ADMIN — MEMANTINE 10 MG: 10 TABLET ORAL at 09:17

## 2022-04-19 RX ADMIN — TIOTROPIUM BROMIDE INHALATION SPRAY 2 PUFF: 3.12 SPRAY, METERED RESPIRATORY (INHALATION) at 08:47

## 2022-04-19 RX ADMIN — DONEPEZIL HYDROCHLORIDE 10 MG: 10 TABLET, FILM COATED ORAL at 09:16

## 2022-04-19 RX ADMIN — ENOXAPARIN SODIUM 40 MG: 100 INJECTION SUBCUTANEOUS at 09:17

## 2022-04-19 RX ADMIN — CITALOPRAM 10 MG: 20 TABLET, FILM COATED ORAL at 09:16

## 2022-04-19 RX ADMIN — ASPIRIN 81 MG 81 MG: 81 TABLET ORAL at 09:17

## 2022-04-19 RX ADMIN — SODIUM CHLORIDE, PRESERVATIVE FREE 10 ML: 5 INJECTION INTRAVENOUS at 09:16

## 2022-04-19 RX ADMIN — CARVEDILOL 12.5 MG: 12.5 TABLET, FILM COATED ORAL at 09:17

## 2022-04-19 RX ADMIN — ROSUVASTATIN CALCIUM 5 MG: 5 TABLET, COATED ORAL at 09:17

## 2022-04-19 RX ADMIN — PANTOPRAZOLE SODIUM 40 MG: 40 TABLET, DELAYED RELEASE ORAL at 06:28

## 2022-04-19 RX ADMIN — CARVEDILOL 12.5 MG: 12.5 TABLET, FILM COATED ORAL at 20:12

## 2022-04-19 ASSESSMENT — PAIN SCALES - GENERAL
PAINLEVEL_OUTOF10: 0

## 2022-04-19 NOTE — PROGRESS NOTES
Pt bathed by ABI De La Cruz. Pt bottom washed with soap and water and barrier cream applied.  Pt up to chair at this time

## 2022-04-19 NOTE — PROGRESS NOTES
Pt has purewick in place, but it has been readjusted and the suction container and control has been changed. Pt is wet and has been having urine output. Nursing staff has attempted all day to collect urine sample unsuccessfully and will continue to attempt.  Pt cleaned, depends changed, and pericare performed by ABI De La Cruz at this time

## 2022-04-19 NOTE — PROGRESS NOTES
Speech Language Pathology  Facility/Department: [facility] 4E   CLINICAL BEDSIDE SWALLOW EVALUATION    NAME: Merlin Erm Shook  : 1935  MRN: [id number]    IMPRESSIONS: Nidia Recinos was seen for clinical swallow evaluation following admission to [facility] with self-care deficits, dementia. Medical hx includes HTN, HLD, CAD, COPD, GERD, Grave's disease. She has been seen by SLP during previous admissions; last seen 2018 and recommended regular diet/thin liquids. Pt seen for today's assessment seated upright in chair, alert, confused, cooperative given frequent cues/redirections. Family present throughout. Oral mechanism examination WFL without asymmetry. Pt presented with PO trials of thin liquids via cup/straw, puree, and regular solids. Oral stage WFL with intact labial seal, slow/adequate mastication, intact AP transit/oral clearance. Pharyngeal stage appears [facility] with adequate swallow initiation/laryngeal elevation, no s/s aspiration across all trials. Pt had one episode of cough with expectoration of thick mucus. She and family deny any concerns for dysphagia, however, pt did request continued soft/bite-sized foods at this time d/t some missing teeth and reported difficulty with mastication. Recommend continued soft/bite-sized diet and thin liquids. No further acute SLP needs identified. Results/recommendations communicated to pt and family. ADMISSION DATE: 2022  ADMITTING DIAGNOSIS: has Generalized anxiety disorder; Chronic neck pain; Graves' disease; Hyperlipidemia; Essential hypertension; Vascular dementia with behavior disturbance ([address] Cerebrovascular accident (CVA) due to vascular stenosis ([address] Gait disturbance; Mixed stress and urge urinary incontinence; Abnormal stress test; Subclavian artery stenosis, left (HCC); COPD, severe ([address]  Shortness of breath; Coronary artery disease involving native coronary artery of native heart without angina pectoris; Chronic hypoxemic respiratory failure ([address] Type II diabetes mellitus ([address] Skin abrasion; Normocytic anemia; Chronic midline low back pain without sciatica; Gastroesophageal reflux disease without esophagitis; Recurrent UTI; On mechanically assisted ventilation ([address] Failure to thrive in adult; Severe malnutrition ([address] Recurrent major depressive disorder, in partial remission ([facility] Bilateral carotid artery stenosis; and Self-care deficit on their problem list.  ONSET DATE: this admission    Recent Chest Xray/CT of Chest: see chart    Date of Eval: 4/19/2022  Evaluating Therapist: HECTOR Bonner    Current Diet level:  Current Diet : Dysphagia Soft and Bite-Sized (Dysphagia III)  Current Liquid Diet : Thin      Primary Complaint  Patient Complaint: denies swallowing complaints    Pain:  Pain Assessment  Pain Assessment: 0-10  Pain Level: 0  Patient's Stated Pain Goal: No pain    Reason for Referral  Camilla Atkinson was referred for a bedside swallow evaluation to assess the efficiency of her swallow function, identify signs and symptoms of aspiration and make recommendations regarding safe dietary consistencies, effective compensatory strategies, and safe eating environment. Impression  Dysphagia Diagnosis: Swallow function appears grossly intact  Dysphagia Outcome Severity Scale: Level 6: Within functional limits/Modified independence     Treatment Plan  Requires SLP Intervention: No  Duration/Frequency of Treatment: N/A  D/C Recommendations: To be determined       Recommended Diet and Intervention  Diet Solids Recommendation: Dysphagia Soft and Bite-Sized (Dysphagia III)  Liquid Consistency Recommendation:  Thin  Recommended Form of Meds: PO          Compensatory Swallowing Strategies  Compensatory Swallowing Strategies: Upright as possible for all oral intake    Treatment/Goals  Short-term Goals  Timeframe for Short-term Goals: N/A    General  Chart Reviewed: Yes  Behavior/Cognition: Alert; Cooperative;Confused; Requires cueing  Communication Observation:  (cognitive communication deficits)  Follows Directions: Simple  Dentition: Some missing teeth  Patient Positioning: Upright in chair  Baseline Vocal Quality: Normal  Prior Dysphagia History: yes; last seen in September 2018, recommended regular diet/thin liquids  Consistencies Administered: Reg solid; Dysphagia Pureed (Dysphagia I); Thin - cup; Thin - straw           Vision/Hearing  Hearing  Hearing: Within functional limits    Oral Motor Deficits  Oral/Motor  Oral Motor: Within functional limits    Oral Phase Dysfunction  Oral Phase  Oral Phase: WFL     Indicators of Pharyngeal Phase Dysfunction   Pharyngeal Phase  Pharyngeal Phase: WFL    Prognosis  Prognosis  Barriers/Prognosis Comment: N/A, safe for regular diet, pt requests soft/bite-sized  Individuals consulted  Consulted and agree with results and recommendations: Patient; Family member  Family member consulted: sister, sons    Education  Patient Education: results, recommendations  Patient Education Response: Demonstrated understanding  Safety Devices in place: Yes  Type of devices:  All fall risk precautions in place       Therapy Time  SLP Individual Minutes  Time In: 7013  Time Out: [address]  Minutes: [address] [facility], [address] [address] [facility] [address]  4/19/2022 4:03 PM

## 2022-04-19 NOTE — PLAN OF CARE
Problem: Pain:  Goal: Pain level will decrease  Description: Pain level will decrease  Outcome: Ongoing  Goal: Control of acute pain  Description: Control of acute pain  Outcome: Ongoing  Goal: Control of chronic pain  Description: Control of chronic pain  Outcome: Ongoing     Problem: Skin Integrity:  Goal: Will show no infection signs and symptoms  Description: Will show no infection signs and symptoms  Outcome: Ongoing  Goal: Absence of new skin breakdown  Description: Absence of new skin breakdown  Outcome: Ongoing     Problem: Falls - Risk of:  Goal: Will remain free from falls  Description: Will remain free from falls  Outcome: Met This Shift  Goal: Absence of physical injury  Description: Absence of physical injury  Outcome: Met This Shift     Problem: Discharge Planning:  Goal: Discharged to appropriate level of care  Description: Discharged to appropriate level of care  Outcome: Ongoing

## 2022-04-19 NOTE — DISCHARGE INSTR - COC
Continuity of Care Form    Patient Name: Patrick Leyva   :  1935  MRN:  [id number]    Admit date:  2022  Discharge date:  22    Code Status Order: Full Code   Advance Directives:      Admitting Physician:  Jelena Cohen MD  PCP: Lexie Mcguire PA-C    Discharging Nurse:  Concepción WADDELL [address] Unit/Room#: 3753/0121-B  Discharging Unit Phone Number: [phone]    Emergency Contact:   Extended Emergency Contact Information  Primary Emergency Contact: Antonio Saunders  Address: [address] [place] [address] Phone: [phone]  Relation: Spouse  Secondary Emergency Contact: Fransisca Hercules Phone: [phone]  Relation: Child    Past Surgical History:  Past Surgical History:   Procedure Laterality Date    CARPAL TUNNEL RELEASE      Rt & Lt     CATARACT REMOVAL WITH IMPLANT Right 2016    CATARACT REMOVAL WITH IMPLANT Left 2019    iris surgery also    CERVICAL DISCECTOMY  1998    C5-6    CERVICAL LAMINECTOMY  2006    with fusion and instrumentation    CERVICAL SPINE SURGERY  2011    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; fusion; fixation    CORONARY ARTERY BYPASS GRAFT  2017    4- vessel    INCONTINENCE SURGERY  2018    Botox 2018 ; [address]    Left    ROTATOR CUFF [address] [facility]    Right    TOTAL HIP ARTHROPLASTY Left [id number]    Left       Immunization History:   Immunization History   Administered Date(s) Administered    COVID-19, Gokul Files, Primary or Immunocompromised, PF, 100mcg/0.5mL 2021, 2021, 10/26/2021    Influenza Virus Vaccine 10/01/2014, 10/15/2015    Influenza Whole 10/21/2011, 10/01/2014, 10/15/2015    Influenza, High Dose (Fluzone 65 yrs and older) 10/12/2016, 10/31/2017, 2018, 2019    Influenza, Quadv, adjuvanted, 65 yrs +, IM, PF (Fluad) 2020    Pneumococcal Conjugate 13-valent (Wuaaene43) 04/15/2015    Pneumococcal Polysaccharide (Satasjemh24) 12/14/2003, 09/11/2012    Td, unspecified formulation 01/14/1995, 07/12/2016    Zoster Live (Zostavax) 12/10/2010    Zoster Recombinant (Shingrix) 07/12/2019, 09/13/2019       Active Problems:  Patient Active Problem List   Diagnosis Code    Generalized anxiety disorder F41.1    Chronic neck pain M54.2, G89.29    Graves' disease E05.00    Hyperlipidemia E78.5    Essential hypertension I10    Vascular dementia with behavior disturbance ([facility]) F01.51    Cerebrovascular accident (CVA) due to vascular stenosis ([facility] I63.9    Gait disturbance R26.9    Mixed stress and urge urinary incontinence N39.46    Abnormal stress test R94.39    Subclavian artery stenosis, left ([facility]) I77.1    COPD, severe ([facility]) J44.9    Shortness of breath R06.02    Coronary artery disease involving native coronary artery of native heart without angina pectoris I25.10    Chronic hypoxemic respiratory failure ([facility]) J96.11    Type II diabetes mellitus ([facility] E11.9    Skin abrasion T14. 8XXA    Normocytic anemia D64.9    Chronic midline low back pain without sciatica M54.50, G89.29    Gastroesophageal reflux disease without esophagitis K21.9    Recurrent UTI N39.0    On mechanically assisted ventilation ([facility]) Z99.11    Failure to thrive in adult R62.7    Severe malnutrition ([facility]) E43    Recurrent major depressive disorder, in partial remission ([facility] F33.41    Bilateral carotid artery stenosis I65.23    Self-care deficit Z78.9       Isolation/Infection:   Isolation            Contact          Patient Infection Status       Infection Onset Added Last Indicated Last Indicated By Review Planned Expiration Resolved Resolved By    ESBL (Extended Spectrum Beta Lactamase) 12/28/21 12/30/21 12/28/21 Culture, Urine        MRSA  04/06/18 04/06/18 María Elena Escobar LPN        2/7/79 sputum    Resolved    COVID-19 (Rule Out) 04/17/22 04/17/22 04/17/22 Respiratory Panel, Molecular, with COVID-19 (Restricted: peds pts or suitable admitted adults) (Ordered)   04/17/22 Rule-Out Test Resulted    COVID-19 (Rule Out) 12/23/21 12/23/21 12/23/21 COVID-19, Rapid (Ordered)   12/23/21 Rule-Out Test Resulted    COVID-19 (Rule Out) 12/23/21 12/23/21 12/23/21 COVID-19 (Ordered)   12/23/21 Waldon Lennox, RN    COVID-19 (Rule Out) 12/22/21 12/22/21 12/22/21 Respiratory Panel, Molecular, with COVID-19 (Restricted: peds pts or suitable admitted adults) (Ordered)   12/22/21 Rule-Out Test Resulted    COVID-19 (Rule Out) 12/22/21 12/22/21 12/22/21 COVID-19, Rapid (Ordered)   12/22/21 Rule-Out Test Resulted    COVID-19 12/22/21 12/22/21 12/22/21 COVID-19, Rapid   12/23/21 Waldon Lennox, RN    COVID-19 (Rule Out) 12/22/21 12/22/21 12/22/21 COVID-19, Rapid (Ordered)   12/22/21 Rule-Out Test Resulted            Nurse Assessment:  Last Vital Signs: /67   Pulse 72   Temp 97.4 °F (36.3 °C) (Oral)   Resp 16   Ht 5' 1\" (1.549 m)   Wt 107 lb 8 oz (48.8 kg)   LMP  (LMP Unknown)   SpO2 96%   BMI 20.31 kg/m²     Last documented pain score (0-10 scale): Pain Level: 0  Last Weight:   Wt Readings from Last 1 Encounters:   04/18/22 107 lb 8 oz (48.8 kg)     Mental Status:  disoriented and alert    IV Access:  - None    Nursing Mobility/ADLs:  Walking   Assisted  Transfer  Assisted  Bathing  Dependent  Dressing  Dependent  Toileting  Dependent  Feeding  Dependent  Med Admin  Dependent  Med Delivery   prefers mixed with apple sauce- takes whole    Wound Care Documentation and Therapy:  Wound 12/30/21 Coccyx (Active)   Number of days: 109        Elimination:  Continence: Bowel: No  Bladder: No  Urinary Catheter: None   Colostomy/Ileostomy/Ileal Conduit: No       Date of Last BM: 2/21/22    No intake or output data in the 24 hours ending 04/19/22 1144  I/O last 3 completed shifts: In: 240 [P.O.:240]  Out: -     Safety Concerns:      At Risk for Falls    Impairments/Disabilities:      None    Nutrition Therapy:  Current Nutrition Therapy:   - Oral Diet:  Carb Control 5 carbs/meal (2000kcals/day) and Dysphagia soft and bite sized    Routes of Feeding: Oral  Liquids: Thin Liquids  Daily Fluid Restriction: no  Last Modified Barium Swallow with Video (Video Swallowing Test): not done    Treatments at the Time of Hospital Discharge:   Respiratory Treatments: nasal cannula  Oxygen Therapy:  is on oxygen at 2 L/min per nasal cannula. Ventilator:    - No ventilator support    Rehab Therapies: Physical Therapy and Occupational Therapy  Weight Bearing Status/Restrictions: No weight bearing restrictions  Other Medical Equipment (for information only, NOT a DME order):  walker  Other Treatments:     Patient's personal belongings (please select all that are sent with patient):  None    RN SIGNATURE:  Electronically signed by Isak Nguyễn RN on 4/21/22 at 11:01 AM EDT    CASE MANAGEMENT/SOCIAL WORK SECTION    Inpatient Status Date: ***    Readmission Risk Assessment Score:  Readmission Risk              Risk of Unplanned Readmission:  0           Discharging to Facility/ Agency   Name:   Address:  Phone:  Fax:    Dialysis Facility (if applicable)   Name:  Address:  Dialysis Schedule:  Phone:  Fax:    / signature: {Esignature:[id number]}    PHYSICIAN SECTION    Prognosis: Fair    Condition at Discharge: Stable    Rehab Potential (if transferring to Rehab): Fair    Recommended Labs or Other Treatments After Discharge:     Physician Certification: I certify the above information and transfer of Nisha Hicks  is necessary for the continuing treatment of the diagnosis listed and that she requires [facility] for greater 30 days.      Update Admission H&P: No change in H&P    PHYSICIAN SIGNATURE:  Electronically signed by Alexandre Toro PA-C on 4/19/22 at 11:46 AM EDT

## 2022-04-19 NOTE — PROGRESS NOTES
Comprehensive Nutrition Assessment    Type and Reason for Visit:  Initial,Positive Nutrition Screen (Difficulty Chewing/swallowing) (Low BMI for age)     Nutrition Recommendations/Plan:   1. Start chocolate Diabetic oral nutrition supplement TID with frozen oral nutrition supplement BID   2. Recommend feeding assistance to optimize nutrition/PO intakes   3. Encourage and record PO intakes and hydration      Malnutrition Assessment:  Malnutrition Status:  Severe malnutrition (04/19/22 1628)    Context:  Chronic Illness (ongoing)     Findings of the 6 clinical characteristics of malnutrition:  Energy Intake:  Mild decrease in energy intake (Comment)  Weight Loss:  Greater than 5% over 1 month   (severe)  Body Fat Loss:  Mild body fat loss Triceps,Buccal region   Muscle Mass Loss:  Severe muscle mass loss Clavicles (pectoralis & deltoids),Scapula (trapezius),Thigh (quadraceps)  Fluid Accumulation:  No significant fluid accumulation     Strength:  Not Performed    Nutrition Assessment:    Admitted with self-care deficit, dementia, essential HTN. Pt was busy with SLP at visit, observed limited visual. SLP recommends Soft and Bite Sized diet, carb controlled. Consumed 1-75% of meals. Significant wt loss x 1 month. RD familiar with pt, seen in December. Hx of poor po intake, malnutrition, feed assistance from .  recently passed away. Per hx, pt enjoys chocolate flavored foods and sweets. Recommend total assist/needs assist feed. Remains high nutrition risk. Nutrition Related Findings:    POCT 156 Wound Type: None       Current Nutrition Intake & Therapies:    Average Meal Intake: 51-75%,1-25%  Average Supplements Intake: None Ordered  ADULT DIET;  Dysphagia - Soft and Bite Sized; 5 carb choices (75 gm/meal)    Anthropometric Measures:  Height: 5' 1\" (154.9 cm)  Ideal Body Weight (IBW): 105 lbs (48 kg)    Admission Body Weight: 107 lb 9.4 oz (48.8 kg)  Current Body Weight: 106 lb 11.2 oz (48.4 kg), 101.6 % IBW. Weight Source: Bed Scale  Current BMI (kg/m2): 20.2  Usual Body Weight: 115 lb (52.2 kg) (March 2022)  % Weight Change (Calculated): -7.2  BMI Categories: Underweight (BMI less than 22) age over 72    Estimated Daily Nutrient Needs:  Energy Requirements Based On: Kcal/kg  Weight Used for Energy Requirements: Current  Energy (kcal/day): 0455-7867  Weight Used for Protein Requirements: Current  Protein (g/day): 50-59  Method Used for Fluid Requirements: 1 ml/kcal  Fluid (ml/day): at least 1500    Nutrition Diagnosis:   · Severe malnutrition,In context of chronic illness (ongoing) related to catabolic illness (poor po intake due to self-care deficit) as evidenced by poor intake prior to admission,weight loss greater than or equal to 5% in 1 month,severe muscle loss,mild loss of subcutaneous fat    Nutrition Interventions:   Food and/or Nutrient Delivery: Continue Current Diet,Start Oral Nutrition Supplement  Nutrition Education/Counseling: No recommendation at this time  Coordination of Nutrition Care: Continue to monitor while inpatient,Feeding Assistance/Environment Change,Speech Therapy,Swallow Evaluation,Coordination of Care  Plan of Care discussed with: To consume greater than half of meals and supplements with feeding assistance    Goals:     Goals: PO intake 50% or greater,within 7 days       Nutrition Monitoring and Evaluation:   Behavioral-Environmental Outcomes: None Identified  Food/Nutrient Intake Outcomes: Diet Advancement/Tolerance,Food and Nutrient Intake,Supplement Intake  Physical Signs/Symptoms Outcomes: Biochemical Data,Chewing or Swallowing,Meal Time Behavior,Nutrition Focused Physical Findings,Weight    Discharge Planning:     Too soon to determine     Jackson Friedman RD, LD  Contact: [phone]

## 2022-04-19 NOTE — PROGRESS NOTES
Pt blood glucose taken at this time. Pt had recently ate lunch so blood sugar elevated due to recent intake. Blood glucose was taken by NA prior to lunch and was 120.

## 2022-04-19 NOTE — PROGRESS NOTES
V2.0  [facility]      Name:  Arley Armstrong /Age/Sex: 1935  (82 y.o. female)   MRN & CSN:  [id number] & [id number] Encounter Date/Time: 2022 7:40 AM EDT    Location:  3653/4657-E PCP: [facility], [facility] Day: 3    Assessment and Plan:   Arley Armstrong is a 82 y.o. female with past medical history of dementia, essential hypertension and hyperlipidemia who presents with self-care deficit. Pt's care giver her  passed away in ER recently.  is on board for discharge planning. Self Care Deficit   Dementia  -pt's  who was her primary care giver passed away on   -Laboratory work-up in the ED unremarkable   - continue fall precautions   -Continue home medications including donepezil, Namenda  - is on board, contact nursing home [facility], pending PT/OT     Essential hypertension  - BP well controlled   -Continue home Coreg     Hyperlipidemia  -Continue home crestor     Chronic hypoxic respiratory failure  -On 2 L nasal cannula at baseline       This patient was discussed with Dr. Marga Leventhal. He was agreeable with the plan and management as dictated above. Current Living situation: home alone  Expected Disposition: SNF  Estimated D/C: TBD, stable for discharge once approved     Diet ADULT DIET; Dysphagia - Soft and Bite Sized; 5 carb choices (75 gm/meal)   DVT Prophylaxis [x] Lovenox, []  Heparin, [] SCDs, [] Ambulation,  [] Eliquis, [] Xarelto []Coumadin   Code Status Full Code   Disposition Patient requires continued admission due to awaiting SNF placement    Surrogate Decision Maker/ POA      Subjective:     Chief Complaint: Dementia (supposed to being admitted for SNF placement)     Patient seen and examined at bedside. No acute events overnight. Patient denies any acute complaints at this time. States her   and she is unable to care for self. Agreeable to go to nursing home.   She is alert and oriented to self and place only this morning, not to time and poor situational awareness. We discussed plan of care, patient is agreeable. Review of Systems:    Ten point ROS reviewed negative, unless as noted above    Objective:   No intake or output data in the 24 hours ending 04/19/22 0740     Vitals:   Vitals:    04/19/22 0418   BP: 117/79   Pulse: 62   Resp: 17   Temp: 98.2 °F (36.8 °C)   SpO2: 95%       Physical Exam:   PHYSICAL EXAM   GEN Awake female, sitting upright in bed in no apparent distress. Appears given age. EYES Pupils are equally round. Gaze conjugate. HENT Mucous membranes are moist.   NECK Supple, no apparent thyromegaly or masses. RESP Respirations even and unlabored on RA, clear to auscultation bilaterally   CARDIO/VASC Regular rate without appreciable murmurs. GI Abdomen is soft without significant tenderness, masses, or guarding.   Zayas catheter is not present. MSK No gross joint deformities. SKIN Normal coloration, warm, dry. NEURO Cranial nerves appear grossly intact, normal speech, no lateralizing weakness. PSYCH Awake, alert, oriented x 2. Affect appropriate.     Medications:   Medications:    aspirin  81 mg Oral Daily    budesonide-formoterol  2 puff Inhalation BID    carvedilol  12.5 mg Oral BID    citalopram  10 mg Oral Daily    doxepin  50 mg Oral QPM    pantoprazole  40 mg Oral QAM AC    rosuvastatin  5 mg Oral Daily    tiotropium  2 puff Inhalation Daily    traZODone  100 mg Oral Nightly    sodium chloride flush  5-40 mL IntraVENous 2 times per day    enoxaparin  40 mg SubCUTAneous Daily    insulin lispro  0.08 Units/kg SubCUTAneous TID WC    memantine  10 mg Oral BID    And    donepezil  10 mg Oral Daily      Infusions:    sodium chloride      dextrose       PRN Meds: albuterol sulfate HFA, 2 puff, Q4H PRN  sodium chloride flush, 5-40 mL, PRN  sodium chloride, 25 mL, PRN  ondansetron, 4 mg, Q8H PRN   Or  ondansetron, 4 mg, Q6H PRN  polyethylene glycol, 17 g, Daily PRN  acetaminophen, 650 mg, Q6H PRN   Or  acetaminophen, 650 mg, Q6H PRN  glucose, 4 tablet, PRN  glucagon (rDNA), 1 mg, PRN  dextrose, 100 mL/hr, PRN  dextrose bolus (hypoglycemia), 125 mL, PRN   Or  dextrose bolus (hypoglycemia), 250 mL, PRN        Labs      Recent Results (from the past 24 hour(s))   Procalcitonin    Collection Time: 04/18/22  9:00 AM   Result Value Ref Range    Procalcitonin 0.055    POCT Glucose    Collection Time: 04/18/22 12:26 PM   Result Value Ref Range    POC Glucose 74 70 - 99 MG/DL   POCT Glucose    Collection Time: 04/18/22  5:42 PM   Result Value Ref Range    POC Glucose 92 70 - 99 MG/DL   POCT Glucose    Collection Time: 04/18/22 10:23 PM   Result Value Ref Range    POC Glucose 179 (H) 70 - 99 MG/DL        Imaging/Diagnostics Last 24 Hours   XR CHEST PORTABLE    Result Date: 4/17/2022  EXAMINATION: ONE XRAY VIEW OF THE CHEST 4/17/2022 9:13 pm COMPARISON: 12/22/2021 HISTORY: Acute fatigue and shortness of breath. FINDINGS: Patient is slightly rotated. Post CABG changes. Normal cardiomediastinal silhouette. No acute airspace disease, pleural effusion, pneumothorax. Postoperative changes of the cervical spine. Status post left rotator cuff repair. Osteopenia with degenerative changes of the shoulders. No acute abnormality.        Electronically signed by Rolo Alvarado PA-C on 4/19/2022 at 7:40 AM

## 2022-04-19 NOTE — CARE COORDINATION
Call to Mary Duarte, [facility] admissions, who stated that pt has been accepted to facility but they will not have bed ready until Thursday. CM asked Mary Duarte to update this CM if bed becomes available before then. Message to [facility] PA to update her with information.

## 2022-04-19 NOTE — PLAN OF CARE
Nutrition Problem #1: Severe malnutrition,In context of chronic illness (ongoing)  Intervention: Food and/or Nutrient Delivery: Continue Current Diet,Start Oral Nutrition Supplement  Nutritional

## 2022-04-20 LAB
BASOPHILS ABSOLUTE: 0 K/CU MM
BASOPHILS RELATIVE PERCENT: 0.3 % (ref 0–1)
DIFFERENTIAL TYPE: ABNORMAL
EOSINOPHILS ABSOLUTE: 0.1 K/CU MM
EOSINOPHILS RELATIVE PERCENT: 1.3 % (ref 0–3)
GLUCOSE BLD-MCNC: 102 MG/DL (ref 70–99)
GLUCOSE BLD-MCNC: 168 MG/DL (ref 70–99)
GLUCOSE BLD-MCNC: 173 MG/DL (ref 70–99)
GLUCOSE BLD-MCNC: 182 MG/DL (ref 70–99)
HCT VFR BLD CALC: 42.5 % (ref 37–47)
HEMOGLOBIN: 13.5 GM/DL (ref 12.5–16)
IMMATURE NEUTROPHIL %: 0.3 % (ref 0–0.43)
LYMPHOCYTES ABSOLUTE: 1.3 K/CU MM
LYMPHOCYTES RELATIVE PERCENT: 16.4 % (ref 24–44)
MCH RBC QN AUTO: 32.9 PG (ref 27–31)
MCHC RBC AUTO-ENTMCNC: 31.8 % (ref 32–36)
MCV RBC AUTO: 103.7 FL (ref 78–100)
MONOCYTES ABSOLUTE: 0.4 K/CU MM
MONOCYTES RELATIVE PERCENT: 4.9 % (ref 0–4)
NUCLEATED RBC %: 0 %
PDW BLD-RTO: 12.4 % (ref 11.7–14.9)
PLATELET # BLD: 274 K/CU MM (ref 140–440)
PMV BLD AUTO: 9.2 FL (ref 7.5–11.1)
RBC # BLD: 4.1 M/CU MM (ref 4.2–5.4)
SEGMENTED NEUTROPHILS ABSOLUTE COUNT: 6.1 K/CU MM
SEGMENTED NEUTROPHILS RELATIVE PERCENT: 76.8 % (ref 36–66)
TOTAL IMMATURE NEUTOROPHIL: 0.02 K/CU MM
TOTAL NUCLEATED RBC: 0 K/CU MM
WBC # BLD: 7.9 K/CU MM (ref 4–10.5)

## 2022-04-20 PROCEDURE — 94640 AIRWAY INHALATION TREATMENT: CPT

## 2022-04-20 PROCEDURE — 6360000002 HC RX W HCPCS: Performed by: NURSE PRACTITIONER

## 2022-04-20 PROCEDURE — 2700000000 HC OXYGEN THERAPY PER DAY

## 2022-04-20 PROCEDURE — G0378 HOSPITAL OBSERVATION PER HR: HCPCS

## 2022-04-20 PROCEDURE — 2580000003 HC RX 258: Performed by: NURSE PRACTITIONER

## 2022-04-20 PROCEDURE — 6370000000 HC RX 637 (ALT 250 FOR IP): Performed by: PHYSICIAN ASSISTANT

## 2022-04-20 PROCEDURE — 6370000000 HC RX 637 (ALT 250 FOR IP): Performed by: NURSE PRACTITIONER

## 2022-04-20 PROCEDURE — 85025 COMPLETE CBC W/AUTO DIFF WBC: CPT

## 2022-04-20 PROCEDURE — 1200000000 HC SEMI PRIVATE

## 2022-04-20 PROCEDURE — 94761 N-INVAS EAR/PLS OXIMETRY MLT: CPT

## 2022-04-20 PROCEDURE — 36415 COLL VENOUS BLD VENIPUNCTURE: CPT

## 2022-04-20 PROCEDURE — 96372 THER/PROPH/DIAG INJ SC/IM: CPT

## 2022-04-20 RX ORDER — NITROFURANTOIN 25; 75 MG/1; MG/1
100 CAPSULE ORAL EVERY 12 HOURS SCHEDULED
Status: DISCONTINUED | OUTPATIENT
Start: 2022-04-20 | End: 2022-04-21 | Stop reason: HOSPADM

## 2022-04-20 RX ADMIN — NITROFURANTOIN (MONOHYDRATE/MACROCRYSTALS) 100 MG: 75; 25 CAPSULE ORAL at 21:59

## 2022-04-20 RX ADMIN — CITALOPRAM 10 MG: 20 TABLET, FILM COATED ORAL at 10:20

## 2022-04-20 RX ADMIN — DOXEPIN HYDROCHLORIDE 50 MG: 25 CAPSULE ORAL at 17:59

## 2022-04-20 RX ADMIN — ASPIRIN 81 MG 81 MG: 81 TABLET ORAL at 10:19

## 2022-04-20 RX ADMIN — BUDESONIDE AND FORMOTEROL FUMARATE DIHYDRATE 2 PUFF: 160; 4.5 AEROSOL RESPIRATORY (INHALATION) at 08:45

## 2022-04-20 RX ADMIN — ROSUVASTATIN CALCIUM 5 MG: 5 TABLET, COATED ORAL at 10:19

## 2022-04-20 RX ADMIN — TRAZODONE HYDROCHLORIDE 100 MG: 50 TABLET ORAL at 21:59

## 2022-04-20 RX ADMIN — CARVEDILOL 12.5 MG: 12.5 TABLET, FILM COATED ORAL at 10:19

## 2022-04-20 RX ADMIN — MEMANTINE 10 MG: 10 TABLET ORAL at 10:20

## 2022-04-20 RX ADMIN — BUDESONIDE AND FORMOTEROL FUMARATE DIHYDRATE 2 PUFF: 160; 4.5 AEROSOL RESPIRATORY (INHALATION) at 20:52

## 2022-04-20 RX ADMIN — TIOTROPIUM BROMIDE INHALATION SPRAY 2 PUFF: 3.12 SPRAY, METERED RESPIRATORY (INHALATION) at 08:45

## 2022-04-20 RX ADMIN — ONDANSETRON 4 MG: 2 INJECTION INTRAMUSCULAR; INTRAVENOUS at 15:04

## 2022-04-20 RX ADMIN — DONEPEZIL HYDROCHLORIDE 10 MG: 10 TABLET, FILM COATED ORAL at 10:20

## 2022-04-20 RX ADMIN — SODIUM CHLORIDE, PRESERVATIVE FREE 10 ML: 5 INJECTION INTRAVENOUS at 22:05

## 2022-04-20 RX ADMIN — MEMANTINE 10 MG: 10 TABLET ORAL at 21:59

## 2022-04-20 RX ADMIN — ENOXAPARIN SODIUM 40 MG: 100 INJECTION SUBCUTANEOUS at 10:23

## 2022-04-20 ASSESSMENT — PAIN SCALES - GENERAL
PAINLEVEL_OUTOF10: 0
PAINLEVEL_OUTOF10: 0

## 2022-04-20 NOTE — PROGRESS NOTES
V2.0  [facility] [place] Hospitalist Progress Note      Name:  Jan Clifton /Age/Sex: 1935  (82 y.o. female)   MRN & CSN:  [id number] & [id number] Encounter Date/Time: 2022 2:54 PM EDT    Location:  2960/4684-O PCP: Damaris Mcardle, [facility] Day: 4    Assessment and Plan:   Jan Clifton is a 82 y.o. female with past medical history of dementia, essential hypertension and hyperlipidemia who presents with self-care deficit. Pt's care giver her  passed away in ER recently.  is on board for discharge planning.      Self Care Deficit   Dementia  -pt's  who was her primary care giver passed away on   -Laboratory work-up in the ED unremarkable   - continue fall precautions   -Continue home medications including donepezil, Namenda  - is on board, bed at [facility] should be ready tomorrow     Acute cystitis   - UA markedly abnormal, +nitrites   - difficult to discern symptoms given dementia   - afebrile without leukocytosis   - start macrobid, await urine culture      Essential hypertension  - BP well controlled   -Continue home Coreg     Hyperlipidemia  -Continue home crestor      COPD with chronic hypoxic respiratory failure  - no signs or symptoms of acute exacerbation   -On 2 L nasal cannula at baseline    This patient was discussed with Dr. Domingo Sanchez. He/She was agreeable with the plan and management as dictated above. Current Living situation: home  Expected Disposition: SNF  Estimated D/C: tomorrow     Diet ADULT DIET;  Dysphagia - Soft and Bite Sized; 5 carb choices (75 gm/meal)  ADULT ORAL NUTRITION SUPPLEMENT; Breakfast, Lunch, Dinner; Diabetic Oral Supplement  ADULT ORAL NUTRITION SUPPLEMENT; Lunch, Dinner; Frozen Oral Supplement   DVT Prophylaxis [x] Lovenox, []  Heparin, [] SCDs, [] Ambulation,  [] Eliquis, [] Xarelto []Coumadin   Code Status Full Code   Disposition Patient requires continued admission due to awaiting bed at [facility] Surrogate Decision Maker/ POA      Subjective:     Chief Complaint: Dementia (supposed to being admitted for SNF placement)       Patient seen and examined at bedside. Denies urinary symptoms, but is confused. States her   and appears sad. Discussed plan of care, patient agreeable. Review of Systems:    Unable to obtain secondary to dementia     Objective: Intake/Output Summary (Last 24 hours) at 2022 1454  Last data filed at 2022 1800  Gross per 24 hour   Intake 100 ml   Output 100 ml   Net 0 ml        Vitals:   Vitals:    22 0735   BP: 130/74   Pulse: 68   Resp: 16   Temp: 97.5 °F (36.4 °C)   SpO2: 96%       Physical Exam:   PHYSICAL EXAM   GEN Awake female, sitting upright in bed in no apparent distress. Appears given age. EYES Pupils are equally round. Gaze conjugate. HENT Mucous membranes are moist.   NECK Supple, no apparent thyromegaly or masses. RESP Respirations even and unlabored on RA, clear to auscultation bilaterally   CARDIO/VASC Regular rate without appreciable murmurs. GI Abdomen is soft without significant tenderness, masses, or guarding.   Zayas catheter is not present. MSK No gross joint deformities. SKIN Normal coloration, warm, dry. NEURO Cranial nerves appear grossly intact, normal speech, no lateralizing weakness. PSYCH Awake, alert, oriented x 2. Affect appropriate.     Medications:   Medications:    nitrofurantoin (macrocrystal-monohydrate)  100 mg Oral 2 times per day    aspirin  81 mg Oral Daily    budesonide-formoterol  2 puff Inhalation BID    carvedilol  12.5 mg Oral BID    citalopram  10 mg Oral Daily    doxepin  50 mg Oral QPM    pantoprazole  40 mg Oral QAM AC    rosuvastatin  5 mg Oral Daily    tiotropium  2 puff Inhalation Daily    traZODone  100 mg Oral Nightly    sodium chloride flush  5-40 mL IntraVENous 2 times per day    enoxaparin  40 mg SubCUTAneous Daily    memantine  10 mg Oral BID    And    donepezil 10 mg Oral Daily      Infusions:    sodium chloride      dextrose       PRN Meds: albuterol sulfate HFA, 2 puff, Q4H PRN  sodium chloride flush, 5-40 mL, PRN  sodium chloride, 25 mL, PRN  ondansetron, 4 mg, Q8H PRN   Or  ondansetron, 4 mg, Q6H PRN  polyethylene glycol, 17 g, Daily PRN  acetaminophen, 650 mg, Q6H PRN   Or  acetaminophen, 650 mg, Q6H PRN  glucose, 4 tablet, PRN  glucagon (rDNA), 1 mg, PRN  dextrose, 100 mL/hr, PRN  dextrose bolus (hypoglycemia), 125 mL, PRN   Or  dextrose bolus (hypoglycemia), 250 mL, PRN        Labs      Recent Results (from the past 24 hour(s))   POCT Glucose    Collection Time: 04/19/22  4:31 PM   Result Value Ref Range    POC Glucose 161 (H) 70 - 99 MG/DL   Urinalysis    Collection Time: 04/19/22  6:41 PM   Result Value Ref Range    Color, UA YELLOW YELLOW    Clarity, UA CLOUDY (A) CLEAR    Glucose, Urine NEGATIVE NEGATIVE MG/DL    Bilirubin Urine NEGATIVE NEGATIVE MG/DL    Ketones, Urine NEGATIVE NEGATIVE MG/DL    Specific Gravity, UA 1.010 1.001 - 1.035    Blood, Urine MODERATE (A) NEGATIVE    pH, Urine 6.5 5.0 - 8.0    Protein, UA TRACE (A) NEGATIVE MG/DL    Urobilinogen, Urine NORMAL 0.2 - 1.0 MG/DL    Nitrite Urine, Quantitative POSITIVE (A) NEGATIVE    Leukocyte Esterase, Urine LARGE (A) NEGATIVE    RBC,  (H) 0 - 6 /HPF    WBC,  (H) 0 - 5 /HPF    Bacteria, UA MANY (A) NEGATIVE /HPF    Squam Epithel, UA 5 /HPF   POCT Glucose    Collection Time: 04/19/22  8:04 PM   Result Value Ref Range    POC Glucose 143 (H) 70 - 99 MG/DL   POCT Glucose    Collection Time: 04/20/22  8:10 AM   Result Value Ref Range    POC Glucose 102 (H) 70 - 99 MG/DL   POCT Glucose    Collection Time: 04/20/22 12:50 PM   Result Value Ref Range    POC Glucose 182 (H) 70 - 99 MG/DL        Imaging/Diagnostics Last 24 Hours   No results found.     Electronically signed by Dulce Pollock PA-C on 4/20/2022 at 2:54 PM

## 2022-04-21 VITALS
SYSTOLIC BLOOD PRESSURE: 132 MMHG | OXYGEN SATURATION: 95 % | BODY MASS INDEX: 20.33 KG/M2 | TEMPERATURE: 98 F | RESPIRATION RATE: 16 BRPM | HEART RATE: 66 BPM | DIASTOLIC BLOOD PRESSURE: 51 MMHG | HEIGHT: 61 IN | WEIGHT: 107.7 LBS

## 2022-04-21 LAB
SARS-COV-2, NAAT: NOT DETECTED
SOURCE: NORMAL

## 2022-04-21 PROCEDURE — 6360000002 HC RX W HCPCS: Performed by: NURSE PRACTITIONER

## 2022-04-21 PROCEDURE — 94761 N-INVAS EAR/PLS OXIMETRY MLT: CPT

## 2022-04-21 PROCEDURE — 6370000000 HC RX 637 (ALT 250 FOR IP): Performed by: NURSE PRACTITIONER

## 2022-04-21 PROCEDURE — 94640 AIRWAY INHALATION TREATMENT: CPT

## 2022-04-21 PROCEDURE — 2700000000 HC OXYGEN THERAPY PER DAY

## 2022-04-21 PROCEDURE — 87635 SARS-COV-2 COVID-19 AMP PRB: CPT

## 2022-04-21 PROCEDURE — 6370000000 HC RX 637 (ALT 250 FOR IP): Performed by: PHYSICIAN ASSISTANT

## 2022-04-21 PROCEDURE — 2580000003 HC RX 258: Performed by: NURSE PRACTITIONER

## 2022-04-21 RX ORDER — LANOLIN ALCOHOL/MO/W.PET/CERES
5 CREAM (GRAM) TOPICAL NIGHTLY
Qty: 30 TABLET | Refills: 3
Start: 2022-04-21

## 2022-04-21 RX ORDER — NITROFURANTOIN 25; 75 MG/1; MG/1
100 CAPSULE ORAL EVERY 12 HOURS SCHEDULED
Qty: 8 CAPSULE | Refills: 0 | Status: SHIPPED | OUTPATIENT
Start: 2022-04-21 | End: 2022-04-25

## 2022-04-21 RX ADMIN — ROSUVASTATIN CALCIUM 5 MG: 5 TABLET, COATED ORAL at 07:37

## 2022-04-21 RX ADMIN — DONEPEZIL HYDROCHLORIDE 10 MG: 10 TABLET, FILM COATED ORAL at 07:39

## 2022-04-21 RX ADMIN — ASPIRIN 81 MG 81 MG: 81 TABLET ORAL at 07:39

## 2022-04-21 RX ADMIN — CITALOPRAM 10 MG: 20 TABLET, FILM COATED ORAL at 07:38

## 2022-04-21 RX ADMIN — SODIUM CHLORIDE, PRESERVATIVE FREE 10 ML: 5 INJECTION INTRAVENOUS at 07:43

## 2022-04-21 RX ADMIN — MEMANTINE 10 MG: 10 TABLET ORAL at 07:38

## 2022-04-21 RX ADMIN — NITROFURANTOIN (MONOHYDRATE/MACROCRYSTALS) 100 MG: 75; 25 CAPSULE ORAL at 07:37

## 2022-04-21 RX ADMIN — TIOTROPIUM BROMIDE INHALATION SPRAY 2 PUFF: 3.12 SPRAY, METERED RESPIRATORY (INHALATION) at 08:10

## 2022-04-21 RX ADMIN — BUDESONIDE AND FORMOTEROL FUMARATE DIHYDRATE 2 PUFF: 160; 4.5 AEROSOL RESPIRATORY (INHALATION) at 08:10

## 2022-04-21 RX ADMIN — ENOXAPARIN SODIUM 40 MG: 100 INJECTION SUBCUTANEOUS at 07:37

## 2022-04-21 RX ADMIN — CARVEDILOL 12.5 MG: 12.5 TABLET, FILM COATED ORAL at 07:38

## 2022-04-21 RX ADMIN — PANTOPRAZOLE SODIUM 40 MG: 40 TABLET, DELAYED RELEASE ORAL at 06:28

## 2022-04-21 NOTE — FLOWSHEET NOTE
Transport here to take pt to [facility]. Discharge paperwork given. Pt MARIA D Bear are meeting pt at [facility]

## 2022-04-21 NOTE — DISCHARGE SUMMARY
V2.0  Discharge Summary    Name:  Narcisa Acosta /Age/Sex: 1935 (82 y.o. female)   Admit Date: 2022  Discharge Date: 22    MRN & CSN:  [id number] & [id number] Encounter Date and Time 22 11:07 AM EDT    Attending:  Gia Can MD Discharging Provider: Serena Umanzor [facility] Course:     Brief HPI: Kristyn Estes a 82 y. o. female with past medical history of dementia, essential hypertension and hyperlipidemia who presents with self-care deficit. Pt's care giver her  passed away in ER recently.  is on board for discharge planning.     Problems addressed during this hospitalization:     Self Care Deficit   Dementia  -pt's  who was her primary care giver passed away on   -Laboratory work-up in the ED unremarkable   - continue fall precautions   -Continue home medications including donepezil, Namenda, sinequan  - patient discharged to [facility] in stable condition      Acute cystitis   - UA markedly abnormal, +nitrites   - UCx with >100K E coli, sensitivities pending   - difficult to discern symptoms given dementia   - afebrile without leukocytosis   - continue Macrobid for 5 day course on discharge, will follow culture on discharge and adjust as needed       Essential hypertension  - BP well controlled   -Continue home Coreg on discharge      Hyperlipidemia  -Continue home crestor on discharge      COPD with chronic hypoxic respiratory failure  - no signs or symptoms of acute exacerbation   -On 2 L nasal cannula at baseline, stable on discharge     This patient was seen and examined in conjunction with Dr. Annabel Salcedo. He was agreeable with patient's plan at discharge as dictated above. The patient expressed appropriate understanding of, and agreement with the discharge recommendations, medications, and plan.      Consults this admission:  IP CONSULT TO SOCIAL WORK  IP CONSULT TO HOSPITALIST    Discharge Diagnosis:   Self-care deficit    Discharge Instruction:   Follow up appointments:   Primary care physician: Jorge Luis Grace PA-C within 2 weeks  Diet: regular diet   Activity: activity as tolerated  Disposition: Discharged to:   []Home, []C, [x]SNF, []Acute Rehab, []Hospice   Condition on discharge: Stable  Labs and Tests to be Followed up as an outpatient by PCP or Specialist:     Discharge Medications:        Medication List      START taking these medications    nitrofurantoin (macrocrystal-monohydrate) 100 MG capsule  Commonly known as: MACROBID  Take 1 capsule by mouth every 12 hours for 8 doses        CHANGE how you take these medications    doxepin 50 MG capsule  Commonly known as: SINEQUAN  What changed: Another medication with the same name was removed. Continue taking this medication, and follow the directions you see here.      melatonin 3 MG Tabs tablet  Take 1.5 tablets by mouth nightly  What changed:   · how much to take  · when to take this        CONTINUE taking these medications    aspirin 81 MG chewable tablet     budesonide 3 MG extended release capsule  Commonly known as: ENTOCORT EC     budesonide-formoterol 160-4.5 MCG/ACT Aero  Commonly known as: Symbicort  inhale TWO puffs BY MOUTH TWICE DAILY     carvedilol 12.5 MG tablet  Commonly known as: COREG  TAKE ONE TABLET BY MOUTH TWICE DAILY     citalopram 10 MG tablet  Commonly known as: CELEXA     FIBER-CAPS PO     Handicap Placard Misc  by Does not apply route Good for 5 years     ipratropium-albuterol 0.5-2.5 (3) MG/3ML Soln nebulizer solution  Commonly known as: DUONEB  inhale ONE vial PER nebulizer EVERY FOUR Hours     Namzaric 28-10 MG Cp24  Generic drug: Memantine HCl-Donepezil HCl     nystatin 449518 UNIT/ML suspension  Commonly known as: MYCOSTATIN     omeprazole 40 MG delayed release capsule  Commonly known as: PRILOSEC  Take 1 capsule by mouth daily     OneTouch Delica Lancets 67G Misc     OneTouch Verio Flex System w/Device Kit     LAMONT strip  Generic drug: blood glucose test strips     OXYGEN     ProAir  (90 Base) MCG/ACT inhaler  Generic drug: albuterol sulfate HFA  inhale TWO puffs BY MOUTH EVERY SIX Hours AS NEEDED FOR wheezing     PROBIOTIC ACIDOPHILUS PO     rosuvastatin 5 MG tablet  Commonly known as: CRESTOR  TAKE ONE TABLET BY MOUTH EVERY DAY     Syringe (Disposable) 3 ML Misc  1 each by Does not apply route daily     therapeutic multivitamin-minerals tablet     tiotropium 2.5 MCG/ACT Aers inhaler  Commonly known as: Spiriva Respimat  inhale TWO puffs BY MOUTH DAILY     traZODone 100 MG tablet  Commonly known as: DESYREL     Vitamin D3 125 MCG (5000 UT) Tabs        STOP taking these medications    amoxicillin 500 MG capsule  Commonly known as: AMOXIL     cyanocobalamin 1000 MCG/ML injection     mesalamine 1.2 g EC tablet  Commonly known as: [place]ats           Where to Get Your Medications      You can get these medications from any pharmacy    Bring a paper prescription for each of these medications  · nitrofurantoin (macrocrystal-monohydrate) 100 MG capsule     Information about where to get these medications is not yet available    Ask your nurse or doctor about these medications  · melatonin 3 MG Tabs tablet        Objective Findings at Discharge:   BP (!) 132/51   Pulse 66   Temp 98 °F (36.7 °C) (Oral)   Resp 16   Ht 5' 1\" (1.549 m)   Wt 107 lb 11.2 oz (48.9 kg)   LMP  (LMP Unknown)   SpO2 95%   BMI 20.35 kg/m²       Physical Exam:   General: NAD  Eyes: EOMI  ENT: neck supple  Cardiovascular: Regular rate and rhythm   Respiratory: Clear to auscultation bilaterally, respirations even and unlabored on 2L NC  Gastrointestinal: Abdomen soft, non tender  Genitourinary: no suprapubic tenderness  Musculoskeletal: No edema  Skin: warm, dry  Neuro: Alert and oriented x2. No focal deficits. Psych: Mood appropriate.          Labs and Imaging   XR CHEST PORTABLE    Result Date: 4/17/2022  EXAMINATION: ONE XRAY VIEW OF THE CHEST 4/17/2022 9:13 pm COMPARISON: 12/22/2021 HISTORY: Acute fatigue and shortness of breath. FINDINGS: Patient is slightly rotated. Post CABG changes. Normal cardiomediastinal silhouette. No acute airspace disease, pleural effusion, pneumothorax. Postoperative changes of the cervical spine. Status post left rotator cuff repair. Osteopenia with degenerative changes of the shoulders. No acute abnormality. CBC:   Recent Labs     04/20/22  1417   WBC 7.9   HGB 13.5        BMP:  No results for input(s): NA, K, CL, CO2, BUN, CREATININE, GLUCOSE in the last 72 hours. Hepatic: No results for input(s): AST, ALT, ALB, BILITOT, ALKPHOS in the last 72 hours. Lipids:   Lab Results   Component Value Date    CHOL 171 10/29/2019    CHOL 231 02/06/2019    HDL 41 02/14/2020    HDL 66 04/11/2012    TRIG 159 10/29/2019     Hemoglobin A1C:   Lab Results   Component Value Date    LABA1C 6.1 04/17/2022     TSH:   Lab Results   Component Value Date    TSH 0.69 02/14/2020     Troponin:   Lab Results   Component Value Date    TROPONINT <0.010 12/22/2021    TROPONINT <0.010 09/07/2018    TROPONINT 0.114 04/03/2018     Lactic Acid: No results for input(s): LACTA in the last 72 hours. BNP: No results for input(s): PROBNP in the last 72 hours.   UA:  Lab Results   Component Value Date    NITRU POSITIVE 04/19/2022    NITRU Positive 01/21/2021    COLORU YELLOW 04/19/2022    PHUR 5.5 01/21/2021    LABCAST CANCELED 01/21/2021    LABCAST CANCELED 01/21/2021    WBCUA 197 04/19/2022    RBCUA 148 04/19/2022    MUCUS OCCASIONAL 01/19/2022    TRICHOMONAS NONE SEEN 01/19/2022    YEAST CANCELED 01/21/2021    BACTERIA MANY 04/19/2022    CLARITYU CLOUDY 04/19/2022    SPECGRAV 1.010 04/19/2022    LEUKOCYTESUR LARGE 04/19/2022    UROBILINOGEN NORMAL 04/19/2022    BILIRUBINUR NEGATIVE 04/19/2022    BLOODU MODERATE 04/19/2022    GLUCOSEU Negative 01/21/2021    KETUA NEGATIVE 04/19/2022    AMORPHOUS 1+ 06/19/2014     Urine Cultures:   Lab Results   Component Value Date    LABURIN >100,000 CFU/ml 03/11/2020     Blood Cultures: No results found for: BC  No results found for: BLOODCULT2  Organism:   Lab Results   Component Value Date    ORG Escherichia coli 03/11/2020       Time Spent Discharging patient 35 minutes    Electronically signed by Glory Gutiérrez PA-C on 4/21/2022 at 11:18 AM

## 2022-04-21 NOTE — CARE COORDINATION
Call to Hayde Curry, [address] admissions, to check status of bed availability. Per Hayde Curry, bed is ready and pt can admit once medically ready. Pt will need a rapid COVID test prior to discharge. Update sent to [facility] with information. 11:17 AM Transport arranged with [facility] for 12pm. CM updated primary IBETH Aleman and Hayde Curry with discharge time.

## 2022-04-21 NOTE — PLAN OF CARE
Problem: Pain:  Goal: Pain level will decrease  Description: Pain level will decrease  Outcome: Progressing  Goal: Control of acute pain  Description: Control of acute pain  Outcome: Progressing  Goal: Control of chronic pain  Description: Control of chronic pain  Outcome: Progressing     Problem: Skin Integrity:  Goal: Will show no infection signs and symptoms  Description: Will show no infection signs and symptoms  Outcome: Progressing  Goal: Absence of new skin breakdown  Description: Absence of new skin breakdown  Outcome: Progressing     Problem: Falls - Risk of:  Goal: Will remain free from falls  Description: Will remain free from falls  Outcome: Progressing  Goal: Absence of physical injury  Description: Absence of physical injury  Outcome: Progressing     Problem: Discharge Planning:  Goal: Discharged to appropriate level of care  Description: Discharged to appropriate level of care  Outcome: Progressing     Problem: Nutrition  Goal: Optimal nutrition therapy  Outcome: Progressing     Problem: Discharge Planning  Goal: Discharge to home or other facility with appropriate resources  Outcome: Progressing

## 2022-04-22 LAB
CULTURE: ABNORMAL
CULTURE: ABNORMAL
Lab: ABNORMAL
SPECIMEN: ABNORMAL

## 2022-04-27 NOTE — PROGRESS NOTES
Physician Progress Note      Padmaja Escobedo  CSN #:                  [id number]  :                       1935  ADMIT DATE:       2022 5:25 PM  [address] [place] DATE:        2022 12:10 PM  RESPONDING  PROVIDER #:        Luiz Chavez          QUERY TEXT:    Pt admitted with acute cystitis. Noted documentation of severe malnutrition   on 22 by Nayeli Forbes, RD, LD, dietician consultant. If possible,   please document in progress notes and discharge summary:    The medical record reflects the following:  Risk Factors: chronic illness, self-care deficit, dementia  Clinical Indicators: Per dietician consult 22, \"Severe malnutrition, In   context of chronic illness (ongoing) related to catabolic illness (poor po   intake due to self-care deficit) as evidenced by poor intake prior to   admission, weight loss greater than or equal to 5% in 1 month, severe muscle   loss, mild loss of subcutaneous fat. BMI is 20.2  Treatment: Diet Advancement/ Tolerance, Food and Nutrient Intake, Supplement   Intake, Monitor Weight. Thank you,  Lopez Broderick, RN  [phone]  Options provided:  -- Severe malnutrition confirmed present on admission  -- Severe malnutrition ruled out  -- Other - I will add my own diagnosis  -- Disagree - Not applicable / Not valid  -- Disagree - Clinically unable to determine / Unknown  -- Refer to Clinical Documentation Reviewer    PROVIDER RESPONSE TEXT:    The diagnosis of severe malnutrition was confirmed as present on admission.     Query created by: Ria Church on 2022 2:03 PM      Electronically signed by:  Luiz Chavez 2022 2:07 PM

## 2022-05-23 ENCOUNTER — OFFICE VISIT (OUTPATIENT)
Dept: NEUROLOGY | Age: 87
End: 2022-05-23
Payer: MEDICARE

## 2022-05-23 VITALS
HEART RATE: 80 BPM | DIASTOLIC BLOOD PRESSURE: 70 MMHG | SYSTOLIC BLOOD PRESSURE: 132 MMHG | OXYGEN SATURATION: 97 % | BODY MASS INDEX: 20.33 KG/M2 | HEIGHT: 61 IN | WEIGHT: 107.7 LBS

## 2022-05-23 DIAGNOSIS — F01.518 VASCULAR DEMENTIA WITH BEHAVIOR DISTURBANCE: Primary | ICD-10-CM

## 2022-05-23 DIAGNOSIS — R62.7 FAILURE TO THRIVE IN ADULT: ICD-10-CM

## 2022-05-23 DIAGNOSIS — N39.0 RECURRENT UTI: ICD-10-CM

## 2022-05-23 DIAGNOSIS — I63.20 CEREBROVASCULAR ACCIDENT (CVA) DUE TO STENOSIS OF PRECEREBRAL ARTERY (HCC): ICD-10-CM

## 2022-05-23 DIAGNOSIS — N39.46 MIXED STRESS AND URGE URINARY INCONTINENCE: ICD-10-CM

## 2022-05-23 PROCEDURE — G8400 PT W/DXA NO RESULTS DOC: HCPCS | Performed by: NURSE PRACTITIONER

## 2022-05-23 PROCEDURE — 1123F ACP DISCUSS/DSCN MKR DOCD: CPT | Performed by: NURSE PRACTITIONER

## 2022-05-23 PROCEDURE — 99214 OFFICE O/P EST MOD 30 MIN: CPT | Performed by: NURSE PRACTITIONER

## 2022-05-23 PROCEDURE — G8420 CALC BMI NORM PARAMETERS: HCPCS | Performed by: NURSE PRACTITIONER

## 2022-05-23 PROCEDURE — G8427 DOCREV CUR MEDS BY ELIG CLIN: HCPCS | Performed by: NURSE PRACTITIONER

## 2022-05-23 PROCEDURE — 1090F PRES/ABSN URINE INCON ASSESS: CPT | Performed by: NURSE PRACTITIONER

## 2022-05-23 PROCEDURE — 0509F URINE INCON PLAN DOCD: CPT | Performed by: NURSE PRACTITIONER

## 2022-05-23 PROCEDURE — 1036F TOBACCO NON-USER: CPT | Performed by: NURSE PRACTITIONER

## 2022-05-23 RX ORDER — FLUTICASONE PROPIONATE AND SALMETEROL 100; 50 UG/1; UG/1
1 POWDER RESPIRATORY (INHALATION) EVERY 12 HOURS
COMMUNITY

## 2022-05-23 RX ORDER — MEMANTINE HYDROCHLORIDE AND DONEPEZIL HYDROCHLORIDE 28; 10 MG/1; MG/1
1 CAPSULE ORAL DAILY
Qty: 30 CAPSULE | Refills: 5 | Status: SHIPPED | OUTPATIENT
Start: 2022-05-23

## 2022-05-23 RX ORDER — NITROFURANTOIN MACROCRYSTALS 100 MG/1
100 CAPSULE ORAL 2 TIMES DAILY
COMMUNITY

## 2022-05-23 NOTE — PROGRESS NOTES
5/23/22    Ender Saunders  1935    Chief Complaint   Patient presents with    Dementia     pt presents for f/u of dementia, pt's care giver states she has a habit of repetativenss and saying the same thing over and over, pt's  has recently passed and is now in skilled nursing, pt has declined since his passing a month ago       History of Present Illness  Ender Ortiz is a 82 y.o. female presenting today for follow-up of: Vascular dementia, patient remains on Namzaric for management. Ender Ortiz does have a history of UTI and received a bladder stimulator in December 2021. On last visit we discussed following up with urology for possible prophylactic antibiotic therapy for her recurring UTIs. MRI she wants to know Ender Ortiz remains incontinent of bowel and bladder but as of last visit this was improving. Ender Ortiz was participating in PT at home as of last visit but was unable to ambulate and using a wheelchair. Ender Ortiz is accompanied by caregiver today. Michelle's  recently passed away and Ender Ortiz has been placed in an extended care facility. Caregiver reports that Ender Ortiz has declined since the passing of her . Caregiver reports more anxiety and Michelle repeating herself more frequently. Ender Ortiz is participating in PT at center. Caregiver reports that Ender Ortiz is able to walk short distance with assistance however she becomes very anxious and reports that her legs are going to give out. Caregiver reports that Ender Ortiz is sleeping fairly well and eating fairly well. Ender Ortiz has taken a liking to chocolate milk. Ender Ortiz remains incontinent of bowel and bladder. Caregiver does tell me that Ender Ortiz is able to recognize when she has to go to the restroom, she just does not make it there in time.             Current Outpatient Medications   Medication Sig Dispense Refill    melatonin 3 MG TABS tablet Take 1.5 tablets by mouth nightly 30 tablet 3    doxepin when needed      aspirin 81 MG chewable tablet Take 1 tablet by mouth daily 30 tablet 3     Current Facility-Administered Medications   Medication Dose Route Frequency Provider Last Rate Last Admin    cyanocobalamin injection 1,000 mcg  1,000 mcg SubCUTAneous Q30 Days Rajan Lloyd DO   1,000 mcg at 02/17/20 7219       Physical Exam:    Also present during visit:   Mental Status              Orientation: oriented to person                         Mood/affectPleasant but confused              Memory/Other: remote memory intact, recent memory impaired, remote memory impaired, reduced fund of knowledge, attention span reduced and concentration reduced  Language  Language: (normal) language, no dysarthria, (normal) articulation and no dysphasia/aphasia  Cranial Nerves              Eyes: pupils normal size and reactive to light and visual fields appear full              CN III, IV, VI : extraocular muscle strength normal, normal pursuit, no nystagmus and no ptosis              Facial Motor: normal facial motor              CN XII: tongue protrudes midline  Motor/Coordination Exam              Power: no arm drift              Coordination: normal finger-to-nose, forearm rotation intact and rapid alternating movement normal  Sensory Exam No Bradykinesia, No Dyskindesia, No Tremor and No myoclonus. Mild upper extremity weakness bilateral, moderate lower extremity weakness bilateral-worse on left                Gait and Stance              Gait/Posture: needs assistance to walk and Unable to do ambulation exam, patient cannot stand unassisted        /70 (Site: Left Upper Arm, Position: Sitting, Cuff Size: Medium Adult)   Pulse 80   Ht 5' 1\" (1.549 m)   Wt 107 lb 11.2 oz (48.9 kg)   LMP  (LMP Unknown)   SpO2 97%   BMI 20.35 kg/m²     Assessment and Plan     Diagnosis Orders   1. Vascular dementia with behavior disturbance ([facility] [address]     2. Recurrent UTI  Urinalysis with Reflex to Culture   3. Cerebrovascular accident (CVA) due to stenosis of precerebral artery ([address]     4. Mixed stress and urge urinary incontinence     5. Failure to thrive in adult       Veronica will remain on Namzaric. I did encourage caregiver to have primary care follow-up with Veronica to determine if she would benefit from a low-dose antianxiety medication temporarily to help her recover from the grief and sadness of losing her . She will continue participating in physical therapy. I am going to order a urinalysis to ensure that Veronica does not have one of her UTIs that she has been prone to. I do see that since our last visit she has been started on nitrofurantoin 100 mg twice daily. I did provide paper scripts for urinalysis with culture as well as refills on Namzaric for 6 months to be filled at facility pharmacy. We discussed nonpharmacologic interventions including staying active cognitively, socially, and physically to help slow down the progression of memory loss. Veronica remains on aspirin and statin for secondary stroke prevention. I will plan on following up with Veronica again in 6 months, sooner if the need arises. Return in about 6 months (around 11/23/2022).     Thi Jimenez, APRN - NP

## 2022-09-28 ENCOUNTER — OFFICE VISIT (OUTPATIENT)
Dept: PULMONOLOGY | Age: 87
End: 2022-09-28
Payer: MEDICARE

## 2022-09-28 VITALS
BODY MASS INDEX: 19.63 KG/M2 | HEART RATE: 68 BPM | DIASTOLIC BLOOD PRESSURE: 60 MMHG | WEIGHT: 104 LBS | OXYGEN SATURATION: 94 % | SYSTOLIC BLOOD PRESSURE: 102 MMHG | HEIGHT: 61 IN

## 2022-09-28 DIAGNOSIS — F01.518 VASCULAR DEMENTIA WITH BEHAVIOR DISTURBANCE: ICD-10-CM

## 2022-09-28 DIAGNOSIS — J96.11 CHRONIC HYPOXEMIC RESPIRATORY FAILURE (HCC): ICD-10-CM

## 2022-09-28 DIAGNOSIS — J44.9 COPD, SEVERE (HCC): Primary | ICD-10-CM

## 2022-09-28 DIAGNOSIS — R06.02 SHORTNESS OF BREATH: ICD-10-CM

## 2022-09-28 PROCEDURE — 3023F SPIROM DOC REV: CPT | Performed by: INTERNAL MEDICINE

## 2022-09-28 PROCEDURE — G8427 DOCREV CUR MEDS BY ELIG CLIN: HCPCS | Performed by: INTERNAL MEDICINE

## 2022-09-28 PROCEDURE — 1036F TOBACCO NON-USER: CPT | Performed by: INTERNAL MEDICINE

## 2022-09-28 PROCEDURE — 1090F PRES/ABSN URINE INCON ASSESS: CPT | Performed by: INTERNAL MEDICINE

## 2022-09-28 PROCEDURE — 1123F ACP DISCUSS/DSCN MKR DOCD: CPT | Performed by: INTERNAL MEDICINE

## 2022-09-28 PROCEDURE — G8400 PT W/DXA NO RESULTS DOC: HCPCS | Performed by: INTERNAL MEDICINE

## 2022-09-28 PROCEDURE — 99213 OFFICE O/P EST LOW 20 MIN: CPT | Performed by: INTERNAL MEDICINE

## 2022-09-28 PROCEDURE — G8420 CALC BMI NORM PARAMETERS: HCPCS | Performed by: INTERNAL MEDICINE

## 2022-09-28 RX ORDER — DOXEPIN HYDROCHLORIDE 10 MG/1
CAPSULE ORAL
COMMUNITY
Start: 2022-09-05

## 2022-09-28 RX ORDER — DIVALPROEX SODIUM 125 MG/1
CAPSULE, COATED PELLETS ORAL
COMMUNITY
Start: 2022-09-19

## 2022-09-28 NOTE — PROGRESS NOTES
SUBJECTIVE:  Chief Complaint: COPD, chronic hypoxemic respiratory failure, shortness of breath, vascular dementia  Mrs. Saunders is now in [facility] and her dementia has progressed. According to her caregiver she wears oxygen only at night but they do test her on room air during the day and usually remains above 90%. She has had no episodes of bronchitis or worsening shortness of breath and no episodes of pneumonia. She is followed by the [facility] provider. She currently is on Wixela twice a day, Spiriva, DuoNeb per nebulizer 3-4 times a day and her albuterol. It does not appear that she is on theophylline  Her caregiver states that she has had Moderna of COVID-19 vaccinations and has not had COVID-19 infection    ROS:  Constitution:  HEENT: Negative for ear, throat pain  Cardiovascular: Negative for chest pain, syncope, edema  Pulmonary: See HPI  Musculoskeletal: Negative for DVT, myalgias, arthralgias    OBJECTIVE:  /60   Pulse 68   Ht 5' 1\" (1.549 m)   Wt 104 lb (47.2 kg)   LMP  (LMP Unknown)   SpO2 94%   BMI 19.65 kg/m²      Physical Exam:  Constitutional:  She appears well developed and well-nourished. Pleasant and not in any respiratory distress  Neck:  Supple, No palpable lymphadenopathy, No JVD  Cardiovascular:  S1, S2 Normal, Regular rhythm, no murmurs or gallops, No pericardial  rubs. Pulmonary: Diminished breath sounds throughout all lung areas without wheezing or rhonchi  Abdomen: Not examined  Extremities: no edema, No DVT  Neurologic: Oriented x3, No focal deficits    Radiology: Portable chest x-ray on 4/17/2022 showed no acute abnormality  PFT: No recent spirometry available          ASSESSMENT:    1. COPD, severe ([address]    2. Chronic hypoxemic respiratory failure (HCC)    3. Shortness of breath    4.  Vascular dementia with behavior disturbance ([address]          PLAN:  Since the patient is on Spiriva I would recommend stopping DuoNeb and simply use albuterol solution every 4-6 hours as needed and her nebulizer for a rescue medication. All of her other inhaled medications can continue as ordered. I will make no other changes or recommendations concerning her medical therapy. At this time I will drop off her regular care since she is in an ECF and has a provider available to her. Please refer her back to [facility] pulmonary at a later date if needed    We have discussed the need to maintain yearly flu immunization, pneumococcal vaccination. We have discussed Coronavirus precaution including handwashing practice, wiping items touched in public such as gas pumps, door handles, shopping carts, etc. Self monitoring for infection - fever, chills, cough, SOB. Should they develop symptoms they should call office for further instructions. No follow-ups on file. This dictation was performed with a verbal recognition program and it was checked for errors. It is possible that there are still dictated errors within this office note. Any errors should be brought immediately to my attention for correction. All efforts were made to ensure that this office note is accurate.

## 2023-12-28 ENCOUNTER — TELEPHONE (OUTPATIENT)
Dept: INTERNAL MEDICINE CLINIC | Age: 88
End: 2023-12-28

## 2023-12-28 NOTE — TELEPHONE ENCOUNTER
----- Message from SUJATAanne Avila sent at 12/28/2023 11:55 AM EST -----  Subject: Message to Provider    QUESTIONS  Information for Provider? Son Katharine Germain called to update that mother moved   out of state and also passed away and would like to have her removed from   care and all reminders ended. ---------------------------------------------------------------------------  --------------  Radha Mauro INFO  [phone]; OK to leave message on voicemail  ---------------------------------------------------------------------------  --------------  SCRIPT ANSWERS  Relationship to Patient? Other/Third Party  Representative Name? Katharine Germain  Is the representative on the Communication Release of Information (KRISTIAN)   form in Epic?  Yes

## 2024-08-17 NOTE — PROGRESS NOTES
Lavelle Meza MD        OFFICE  FOLLOWUP NOTE    Chief complaints: patient is here for management of CAD,S/P CABG, HTN, DYSLPIDEMIA    Subjective: patient feels better, no chest pain, no shortness of breath, no dizziness, no palpitations    HPI Val Macias is a 80 y. o.year old who  has a past medical history of Acute ischemic colitis [address] [address] CAD (coronary artery disease); Cerebrovascular event ([facility] [address] Chronic neck pain; Cognitive impairment; COPD (chronic obstructive pulmonary disease) ([facility] [address] Depression; Diverticulitis; Family history of colon cancer; Family history of diabetes mellitus; Fibromyalgia; Graves disease; H/O cardiac catheterization; H/O echocardiogram; Hyperlipidemia; Hypertension; Lumbar spinal stenosis; Nocturnal oxygen desaturation; Obstructive sleep apnea; Osteoporosis; Prediabetes; S/P CABG (coronary artery bypass graft); Subclavian artery stenosis, left ([facility] [address] Subclinical Hyperthyroidism; and Urine incontinence. and presents for management of CAD,S/P CABG, HTN, DYSLPIDEMIA which are well controlled, she had complications post CABG with sternal infection and had to redo sternotomy, she is better now,she is here with her  and she has been in NH for now. she was on  Metformin and has stopped it, she is discharged home from NH, and has home physcial therapy, her CXR is normal    Current Outpatient Prescriptions   Medication Sig Dispense Refill    theophylline (BROOKE-24) 200 MG extended release capsule Take 1 capsule by mouth daily 30 capsule 5    rosuvastatin (CRESTOR) 5 MG tablet Take 1 tablet by mouth nightly 30 tablet 4    carvedilol (COREG) 12.5 MG tablet Take 1 tablet by mouth 2 times daily 60 tablet 2    DULoxetine (CYMBALTA) 60 MG extended release capsule Take 1 capsule by mouth daily 30 capsule 4    ipratropium-albuterol (DUONEB) 0.5-2.5 (3) MG/3ML SOLN nebulizer solution Inhale 3 mLs into the lungs every 6 hours as needed for Shortness of Breath 360 mL 3    guaiFENesin (MUCINEX) 600 MG extended release tablet Take 1 tablet by mouth 2 times daily      budesonide-formoterol (SYMBICORT) 160-4.5 MCG/ACT AERO Inhale 2 puffs into the lungs 2 times daily 3 Inhaler 2    aspirin 81 MG chewable tablet Take 1 tablet by mouth daily 30 tablet 3     No current facility-administered medications for this visit. Allergies: Cipro xr; Demerol; Moxifloxacin; Statins; Lipitor [atorvastatin]; and Wellbutrin [bupropion]  Past Medical History:   Diagnosis Date    Acute ischemic colitis ([facility] [address] 2008    Colonoscopy done    CAD (coronary artery disease) 06/2017    4 vessel CABG    Cerebrovascular event ([facility] [address] 02/2016    balance, speech, leg weakness; ARU    Chronic neck pain     Dr Scott Weaver 2/2011    Cognitive impairment 09/2011    COPD (chronic obstructive pulmonary disease) ([facility] [address] 3/2013    Moderately severe by PFTs    Depression     Diverticulitis 5/2012    Family history of colon cancer     Family history of diabetes mellitus     Fibromyalgia 1989    Graves disease 2012    Junior Ovalle; tapazole for a time    H/O cardiac catheterization 06/07/2017    severe multivessel disease    H/O echocardiogram 06/19/2017    EF 50-55%    Hyperlipidemia     Hypertension     Lumbar spinal stenosis 11/2015    moderate ; MRI by Dr Sunni Hart Nocturnal oxygen desaturation 2015    Obstructive sleep apnea     Osteoporosis     Prediabetes 2012    hgb 6.5    S/P CABG (coronary artery bypass graft) 06/2017    4 vessel-CABG EVH RCA Marginal, OM1, OM2,  LIMA to LAD    Subclavian artery stenosis, left ([facility] [address] 06/2017    noted in hosp for CABG; to be dealt with as OP by vasc surgeons    Subclinical Hyperthyroidism 2010    Dr. Junior Ovalle;  Tapazole    Urine incontinence 6/2011    Timed voiding; Kegel; urology     Past Surgical History:   Procedure Laterality Date    CARPAL TUNNEL RELEASE      Rt & Lt     CATARACT REMOVAL WITH IMPLANT Right 07/2016    CERVICAL DISCECTOMY  01-    C5-6    08/21/2017    HDL 85 08/21/2017    LDLCALC 198 (H) 12/07/2016    LDLDIRECT 108 (H) 08/21/2017     Lab Results   Component Value Date    ALT 19 11/14/2017    AST 18 11/14/2017     TSH:    Lab Results   Component Value Date    TSH 0.87 12/07/2016       Impression:  Val  is a 80 y. o.year old who  has a past medical history of Acute ischemic colitis ([facility] [address] CAD (coronary artery disease); Cerebrovascular event ([facility] [address] Chronic neck pain; Cognitive impairment; COPD (chronic obstructive pulmonary disease) ([facility] [address] Depression; Diverticulitis; Family history of colon cancer; Family history of diabetes mellitus; Fibromyalgia; Graves disease; H/O cardiac catheterization; H/O echocardiogram; Hyperlipidemia; Hypertension; Lumbar spinal stenosis; Nocturnal oxygen desaturation; Obstructive sleep apnea; Osteoporosis; Prediabetes; S/P CABG (coronary artery bypass graft); Subclavian artery stenosis, left ([facility] [address] Subclinical Hyperthyroidism; and Urine incontinence. and presents with     Plan:  1. CAD s/p CABG and had complications post CABG with sternal infection and had to redo sternotomy, she is better now,continue coreg, aspirin and crestor, home physical therapy,. once she makes up her mind, will start cardiac rehab after regular stress test  2. Dyslipidemia: continue crestor 5mg po daily  3. History of TIA:CONTINUE aspirincretor  4. HTN: stable, continue coreg  5. Dm: she is  Not Diabetuc and off  metformin   6. Health maintenance: exerise and dietAll labs, medications and tests reviewed, continue all other medications of all above medical condition listed as is.     [unfilled] Private car

## 2024-12-05 NOTE — PROGRESS NOTES
assistance  Quality of Gait: wide YUNIOR, short steps and inconsistent foot placement, reciprocal pattern, tendency for posterior LOB if CGA is not provided, slow pace, does not turn head to observe surroundings  Distance: 100 feet x 2                            Assessment   Conditions Requiring Skilled Therapeutic Intervention  Body structures, Functions, Activity limitations: Decreased functional mobility ; Decreased strength;Decreased cognition;Decreased safe awareness;Decreased endurance;Decreased balance;Decreased high-level IADLs;Decreased coordination  Assessment: Pt is an [de-identified] y.o. female w/DX balance problem/ataxia. Patient and spouse report a decline in mobility, balance, strength and activity over the last year. Over recent years, has had significant medical issues (CVAs/pneumonia.)  Currently, patient is ambulating w/a SPC over the last 3 years off/on, however recently uses it consistently and ocassionally uses a RW. Spouse assist w/mobility (transfers sometimes and gait), showers. Patient toilets self, self-feed self. Patient is unable to perform any household tasks other than fold clothes.   Treatment Diagnosis: dec balance, B LE weakness, deconditioning  Prognosis: Fair;Good  Decision Making: Medium Complexity  History: PMH:  C1/2 laminectomy w/fusion d/t tumor, discetomy C5/6lacunar infart basal ganglia in 6/2017, CVA 2/2016 affecting balance, speech, strength, 8/2018 MRI (+) remote left occipital infarct and  worsening white matter changes/small vessel ischemia, CABG x 4, mx episodes of pneumonia, bilat rotator cuff repair, fibromyalgia, Graves dz, CAD, COPD, urinary incont., HTN, osteoporosis, diverticulitis, left hip SUSAN d/t OA  Exam: MMT, ROM, gait/balance  Clinical Presentation: Medium complexity  Patient Education: see daily note  Barriers to Learning: memory  REQUIRES PT FOLLOW UP: Yes  Treatment Initiated : see daily note  Discharge Recommendations: Home with assist PRN;24 hour supervision or assist  Activity Tolerance  Activity Tolerance: Patient limited by fatigue         Plan   Plan  Times per week: 2  Plan weeks: 8  Current Treatment Recommendations: Strengthening, Balance Training, Functional Mobility Training, Transfer Training, Gait Training, Endurance Training, Neuromuscular Re-education, Patient/Caregiver Education & Training, Equipment Evaluation, Education, & procurement, Safety Education & Training, Home Exercise Program  Safety Devices  Type of devices: Patient at risk for falls    G-Code  PT G-Codes  Functional Assessment Tool Used: Tinetti Balance/Gait  Score: 10/28 (64.3%)  Functional Limitation: Mobility: Walking and moving around  Mobility: Walking and Moving Around Current Status (): At least 60 percent but less than 80 percent impaired, limited or restricted  Mobility: Walking and Moving Around Goal Status (): At least 20 percent but less than 40 percent impaired, limited or restricted    OutComes Score                                           Goals  Long term goals  Time Frame for Long term goals : In 8 weeks, patient will  Long term goal 1: demonstrate compliance and SBA for HEP. Long term goal 2: score 19 or better on Tinetti Balance/Gait assessment tool as indication of decreased risk of falls. Long term goal 3: perform all appropriate facility transfers w/supervision, using chair arms prn to complete the task safely. Long term goal 4: ambulate facility surfaces >= 200 feet, using appropriate AD, w/supervision. Long term goal 5: ambulate community surfaces >= 200 feet, using appropriate AD, w/close supervision and cues for safety prn.        Therapy Time   Individual Concurrent Group Co-treatment   Time In 0906         Time Out 0904         Minutes -2         Timed Code Treatment Minutes: [address] Steward PT kodi. upper extremity Active ROM was WNL (within normal limits)/bilateral lower extremity Active ROM was WNL (within normal limits)